# Patient Record
Sex: FEMALE | Race: WHITE | NOT HISPANIC OR LATINO | Employment: OTHER | URBAN - METROPOLITAN AREA
[De-identification: names, ages, dates, MRNs, and addresses within clinical notes are randomized per-mention and may not be internally consistent; named-entity substitution may affect disease eponyms.]

---

## 2017-01-20 ENCOUNTER — APPOINTMENT (OUTPATIENT)
Dept: WOMENS IMAGING | Facility: HOSPITAL | Age: 72
End: 2017-01-20

## 2017-01-20 PROCEDURE — MDREVIEWSP: Performed by: RADIOLOGY

## 2017-01-20 PROCEDURE — 77063 BREAST TOMOSYNTHESIS BI: CPT | Performed by: RADIOLOGY

## 2017-01-20 PROCEDURE — G0202 SCR MAMMO BI INCL CAD: HCPCS | Performed by: RADIOLOGY

## 2017-01-27 RX ORDER — LISINOPRIL 20 MG/1
TABLET ORAL
Qty: 180 TABLET | Refills: 1 | Status: SHIPPED | OUTPATIENT
Start: 2017-01-27 | End: 2017-06-12 | Stop reason: SDUPTHER

## 2017-01-27 RX ORDER — GLIMEPIRIDE 2 MG/1
TABLET ORAL
Qty: 270 TABLET | Refills: 1 | Status: SHIPPED | OUTPATIENT
Start: 2017-01-27 | End: 2017-06-12 | Stop reason: SDUPTHER

## 2017-02-20 RX ORDER — AMLODIPINE BESYLATE 10 MG/1
TABLET ORAL
Qty: 90 TABLET | Refills: 1 | Status: SHIPPED | OUTPATIENT
Start: 2017-02-20 | End: 2017-07-07 | Stop reason: SDUPTHER

## 2017-03-13 RX ORDER — ATENOLOL 100 MG/1
TABLET ORAL
Qty: 90 TABLET | Refills: 0 | Status: SHIPPED | OUTPATIENT
Start: 2017-03-13 | End: 2017-05-17 | Stop reason: SDUPTHER

## 2017-04-20 RX ORDER — FUROSEMIDE 20 MG/1
TABLET ORAL
Qty: 90 TABLET | Refills: 0 | Status: SHIPPED | OUTPATIENT
Start: 2017-04-20 | End: 2017-06-21 | Stop reason: SDUPTHER

## 2017-05-08 RX ORDER — ATORVASTATIN CALCIUM 10 MG/1
TABLET, FILM COATED ORAL
Qty: 90 TABLET | Refills: 1 | Status: SHIPPED | OUTPATIENT
Start: 2017-05-08 | End: 2017-07-07 | Stop reason: SDUPTHER

## 2017-05-17 RX ORDER — ATENOLOL 100 MG/1
TABLET ORAL
Qty: 90 TABLET | Refills: 0 | Status: SHIPPED | OUTPATIENT
Start: 2017-05-17 | End: 2017-07-07 | Stop reason: SDUPTHER

## 2017-06-13 RX ORDER — GLIMEPIRIDE 2 MG/1
TABLET ORAL
Qty: 270 TABLET | Refills: 1 | Status: SHIPPED | OUTPATIENT
Start: 2017-06-13 | End: 2017-07-07 | Stop reason: SDUPTHER

## 2017-06-13 RX ORDER — LISINOPRIL 20 MG/1
TABLET ORAL
Qty: 180 TABLET | Refills: 1 | Status: SHIPPED | OUTPATIENT
Start: 2017-06-13 | End: 2017-07-07 | Stop reason: SDUPTHER

## 2017-06-21 RX ORDER — FUROSEMIDE 20 MG/1
TABLET ORAL
Qty: 90 TABLET | Refills: 0 | Status: SHIPPED | OUTPATIENT
Start: 2017-06-21 | End: 2017-06-26 | Stop reason: SDUPTHER

## 2017-06-23 ENCOUNTER — RESULTS ENCOUNTER (OUTPATIENT)
Dept: FAMILY MEDICINE CLINIC | Facility: CLINIC | Age: 72
End: 2017-06-23

## 2017-06-23 DIAGNOSIS — E11.9 TYPE 2 DIABETES MELLITUS WITHOUT COMPLICATION, WITHOUT LONG-TERM CURRENT USE OF INSULIN (HCC): ICD-10-CM

## 2017-06-23 DIAGNOSIS — E78.00 HYPERCHOLESTEROLEMIA: ICD-10-CM

## 2017-06-23 DIAGNOSIS — I10 BENIGN ESSENTIAL HYPERTENSION: ICD-10-CM

## 2017-06-26 RX ORDER — FUROSEMIDE 20 MG/1
TABLET ORAL
Qty: 90 TABLET | Refills: 0 | Status: SHIPPED | OUTPATIENT
Start: 2017-06-26 | End: 2017-07-07 | Stop reason: SDUPTHER

## 2017-07-05 LAB
BUN SERPL-MCNC: 12 MG/DL (ref 8–23)
BUN/CREAT SERPL: 12.2 (ref 7–25)
CALCIUM SERPL-MCNC: 9.3 MG/DL (ref 8.6–10.5)
CHLORIDE SERPL-SCNC: 97 MMOL/L (ref 98–107)
CO2 SERPL-SCNC: 25.3 MMOL/L (ref 22–29)
CREAT SERPL-MCNC: 0.98 MG/DL (ref 0.57–1)
ERYTHROCYTE [DISTWIDTH] IN BLOOD BY AUTOMATED COUNT: 14.3 % (ref 11.7–13)
GLUCOSE SERPL-MCNC: 148 MG/DL (ref 65–99)
HBA1C MFR BLD: 7.2 % (ref 4.8–5.6)
HCT VFR BLD AUTO: 39.4 % (ref 35.6–45.5)
HGB BLD-MCNC: 12.6 G/DL (ref 11.9–15.5)
MCH RBC QN AUTO: 29.2 PG (ref 26.9–32)
MCHC RBC AUTO-ENTMCNC: 32 G/DL (ref 32.4–36.3)
MCV RBC AUTO: 91.4 FL (ref 80.5–98.2)
MICROALBUMIN UR-MCNC: 422.9 UG/ML
PLATELET # BLD AUTO: 160 10*3/MM3 (ref 140–500)
POTASSIUM SERPL-SCNC: 4.4 MMOL/L (ref 3.5–5.2)
RBC # BLD AUTO: 4.31 10*6/MM3 (ref 3.9–5.2)
SODIUM SERPL-SCNC: 140 MMOL/L (ref 136–145)
TSH SERPL DL<=0.005 MIU/L-ACNC: 1.45 UIU/ML (ref 0.45–4.5)
WBC # BLD AUTO: 8.09 10*3/MM3 (ref 4.5–10.7)

## 2017-07-07 ENCOUNTER — OFFICE VISIT (OUTPATIENT)
Dept: FAMILY MEDICINE CLINIC | Facility: CLINIC | Age: 72
End: 2017-07-07

## 2017-07-07 VITALS
HEIGHT: 62 IN | BODY MASS INDEX: 47.11 KG/M2 | TEMPERATURE: 98.2 F | SYSTOLIC BLOOD PRESSURE: 130 MMHG | WEIGHT: 256 LBS | OXYGEN SATURATION: 96 % | HEART RATE: 82 BPM | DIASTOLIC BLOOD PRESSURE: 70 MMHG

## 2017-07-07 DIAGNOSIS — E11.9 TYPE 2 DIABETES MELLITUS WITHOUT COMPLICATION, WITHOUT LONG-TERM CURRENT USE OF INSULIN (HCC): Primary | ICD-10-CM

## 2017-07-07 DIAGNOSIS — E78.00 HYPERCHOLESTEROLEMIA: ICD-10-CM

## 2017-07-07 DIAGNOSIS — I10 BENIGN ESSENTIAL HYPERTENSION: ICD-10-CM

## 2017-07-07 PROCEDURE — 99214 OFFICE O/P EST MOD 30 MIN: CPT | Performed by: FAMILY MEDICINE

## 2017-07-07 RX ORDER — LISINOPRIL 20 MG/1
40 TABLET ORAL DAILY
Qty: 180 TABLET | Refills: 1 | Status: SHIPPED | OUTPATIENT
Start: 2017-07-07 | End: 2017-10-30 | Stop reason: SDUPTHER

## 2017-07-07 RX ORDER — ATENOLOL 100 MG/1
100 TABLET ORAL DAILY
Qty: 90 TABLET | Refills: 1 | Status: SHIPPED | OUTPATIENT
Start: 2017-07-07 | End: 2017-07-25 | Stop reason: SDUPTHER

## 2017-07-07 RX ORDER — AMLODIPINE BESYLATE 10 MG/1
10 TABLET ORAL DAILY
Qty: 90 TABLET | Refills: 1 | Status: SHIPPED | OUTPATIENT
Start: 2017-07-07 | End: 2017-10-23 | Stop reason: SDUPTHER

## 2017-07-07 RX ORDER — FUROSEMIDE 20 MG/1
20 TABLET ORAL DAILY
Qty: 90 TABLET | Refills: 0 | Status: SHIPPED | OUTPATIENT
Start: 2017-07-07 | End: 2017-10-25 | Stop reason: SDUPTHER

## 2017-07-07 RX ORDER — GLIMEPIRIDE 2 MG/1
TABLET ORAL
Qty: 270 TABLET | Refills: 1 | Status: SHIPPED | OUTPATIENT
Start: 2017-07-07 | End: 2017-10-06 | Stop reason: SDUPTHER

## 2017-07-07 RX ORDER — ATORVASTATIN CALCIUM 10 MG/1
10 TABLET, FILM COATED ORAL NIGHTLY
Qty: 90 TABLET | Refills: 1 | Status: SHIPPED | OUTPATIENT
Start: 2017-07-07 | End: 2017-11-29 | Stop reason: SDUPTHER

## 2017-07-07 NOTE — PROGRESS NOTES
Subjective   Philly Person is a 71 y.o. female.   Chief Complaint   Patient presents with   • Hypertension   • Diabetes   • Hyperlipidemia       History of Present Illness     #1 diabetes type 2 - diagnosed in 2001. Patient did diabetes education at the time of diagnosis. She is on metformin 1000 mg twice a day and glimepiride 2 mg, 1.5 tablet twice a day. She had eye exam in 3/ 2017. She is on ACE inhibitor and statin. She has a history of microalbuminuria.   She had Pneumovax in 2009, she had Prevnar 13 in 12/2015.  She saw Dr. Millard for diabetes, but she prefers to follow-up with us. She says that her diabetes is under good control and it is easier for her to follow-up on it with us. She agrees to see Dr. Millard if diabetes gets out of control.  From last office visit-no blood sugar log available.  No low symptomatic sugars.  She takes all the medications as prescribed. , A1c 7.2. Microalbumin 422.  On lisinopril.      #2 HTN- on amlodipine 10 mg a day, atenolol 100 mg a day and lisinopril 20 mg 2 tablets taken daily. She is also on furosemide 20 mg a day. She takes no additional potassium. She reports no side effects. She does not exercise regularly.  Kidney tests are normal.       #3 hypercholesterolemia- on atorvastatin 10 mg a day. She takes it everyday. No side effects. No muscle aches, no muscle cramps.      The following portions of the patient's history were reviewed and updated as appropriate: allergies, current medications, past family history, past medical history, past social history, past surgical history and problem list.    Review of Systems   Constitutional: Negative.    Respiratory: Negative.    Cardiovascular: Negative.    Musculoskeletal: Negative for myalgias.         Objective   Wt Readings from Last 3 Encounters:   07/07/17 256 lb (116 kg)   12/23/16 256 lb (116 kg)   08/03/16 249 lb (113 kg)      Vitals:    07/07/17 1306   BP: 130/70   Pulse: 82   Temp: 98.2 °F (36.8 °C)   SpO2: 96%      Temp Readings from Last 3 Encounters:   07/07/17 98.2 °F (36.8 °C)   12/23/16 98.4 °F (36.9 °C)   08/03/16 98.4 °F (36.9 °C)     BP Readings from Last 3 Encounters:   07/07/17 130/70   12/23/16 132/76   08/03/16 128/80     Pulse Readings from Last 3 Encounters:   07/07/17 82   12/23/16 80   08/03/16 76       Physical Exam   Constitutional: She is oriented to person, place, and time. She appears well-developed and well-nourished.   HENT:   Head: Normocephalic and atraumatic.   Neck: Neck supple. Carotid bruit is not present. No thyromegaly present.   Cardiovascular: Normal rate, regular rhythm, normal heart sounds and intact distal pulses.    Pulmonary/Chest: Effort normal and breath sounds normal.      During the foot exam she had a monofilament test performed (Please see diabetic foot form which was scanned.).  Neurological: She is alert and oriented to person, place, and time.   Skin: Skin is warm, dry and intact.   Psychiatric: She has a normal mood and affect. Her behavior is normal.       Assessment/Plan   Philly was seen today for hypertension, diabetes and hyperlipidemia.    Diagnoses and all orders for this visit:    Type 2 diabetes mellitus without complication, without long-term current use of insulin  -     Basic Metabolic Panel; Future  -     Hemoglobin A1c; Future    Benign essential hypertension    Hypercholesterolemia    Other orders  -     metFORMIN (GLUCOPHAGE) 1000 MG tablet; Take 1 tablet by mouth 2 (Two) Times a Day With Meals.  -     lisinopril (PRINIVIL,ZESTRIL) 20 MG tablet; Take 2 tablets by mouth Daily.  -     glimepiride (AMARYL) 2 MG tablet; 1.5tb twice a day  -     furosemide (LASIX) 20 MG tablet; Take 1 tablet by mouth Daily.  -     atorvastatin (LIPITOR) 10 MG tablet; Take 1 tablet by mouth Every Night.  -     atenolol (TENORMIN) 100 MG tablet; Take 1 tablet by mouth Daily.  -     amLODIPine (NORVASC) 10 MG tablet; Take 1 tablet by mouth Daily.        #1 diabetes type  2-uncontrolled.  Patient prefers no change in medications at this time.  She will work on lifestyle changes.  Follow-up in 3 months.  Labs before office visit.    #2 hypertension-continue current treatment.  Follow-up in 6 months.      #3 hyperlipidemia-continue current treatment.  Follow-up in 6 months.

## 2017-07-25 RX ORDER — ATENOLOL 100 MG/1
TABLET ORAL
Qty: 90 TABLET | Refills: 1 | Status: SHIPPED | OUTPATIENT
Start: 2017-07-25 | End: 2018-01-30 | Stop reason: SDUPTHER

## 2017-09-26 DIAGNOSIS — E11.9 TYPE 2 DIABETES MELLITUS WITHOUT COMPLICATION, WITHOUT LONG-TERM CURRENT USE OF INSULIN (HCC): ICD-10-CM

## 2017-09-29 LAB
BUN SERPL-MCNC: 15 MG/DL (ref 8–23)
BUN/CREAT SERPL: 16.3 (ref 7–25)
CALCIUM SERPL-MCNC: 9.8 MG/DL (ref 8.6–10.5)
CHLORIDE SERPL-SCNC: 100 MMOL/L (ref 98–107)
CO2 SERPL-SCNC: 26.3 MMOL/L (ref 22–29)
CREAT SERPL-MCNC: 0.92 MG/DL (ref 0.57–1)
GLUCOSE SERPL-MCNC: 150 MG/DL (ref 65–99)
HBA1C MFR BLD: 7.1 % (ref 4.8–5.6)
POTASSIUM SERPL-SCNC: 4.1 MMOL/L (ref 3.5–5.2)
SODIUM SERPL-SCNC: 143 MMOL/L (ref 136–145)

## 2017-10-06 ENCOUNTER — OFFICE VISIT (OUTPATIENT)
Dept: INTERNAL MEDICINE | Facility: CLINIC | Age: 72
End: 2017-10-06

## 2017-10-06 VITALS
SYSTOLIC BLOOD PRESSURE: 130 MMHG | OXYGEN SATURATION: 98 % | HEART RATE: 87 BPM | TEMPERATURE: 98.2 F | DIASTOLIC BLOOD PRESSURE: 80 MMHG | WEIGHT: 256 LBS | HEIGHT: 62 IN | BODY MASS INDEX: 47.11 KG/M2

## 2017-10-06 DIAGNOSIS — E11.9 TYPE 2 DIABETES MELLITUS WITHOUT COMPLICATION, WITHOUT LONG-TERM CURRENT USE OF INSULIN (HCC): Primary | ICD-10-CM

## 2017-10-06 DIAGNOSIS — E78.00 HYPERCHOLESTEROLEMIA: ICD-10-CM

## 2017-10-06 PROCEDURE — 99213 OFFICE O/P EST LOW 20 MIN: CPT | Performed by: FAMILY MEDICINE

## 2017-10-06 RX ORDER — GLIMEPIRIDE 2 MG/1
TABLET ORAL
Qty: 315 TABLET | Refills: 0 | Status: SHIPPED | OUTPATIENT
Start: 2017-10-06 | End: 2017-12-21 | Stop reason: SDUPTHER

## 2017-10-06 NOTE — PROGRESS NOTES
Subjective   Philly Person is a 72 y.o. female.   Chief Complaint   Patient presents with   • Diabetes       History of Present Illness   #1 diabetes type 2 - diagnosed in 2001. Patient did diabetes education at the time of diagnosis. She is on metformin 1000 mg twice a day and glimepiride 2 mg, 1.5 tablet twice a day. She had eye exam in 3/ 2017. She is on ACE inhibitor and statin. She has a history of microalbuminuria.   She had Pneumovax in 2009, she had Prevnar 13 in 12/2015.  She saw Dr. Millard for diabetes, but she prefers to follow-up with us. It is easier for her to follow-up on it with us. She agrees to see Dr. Millard if diabetes gets out of control.  From last office visit-no blood sugar log available, but patient reports blood sugars in 110s.  No low symptomatic sugars.  She takes all the medications as prescribed. , A1c 7.1.     The following portions of the patient's history were reviewed and updated as appropriate: allergies, current medications, past medical history, past social history and problem list.    Review of Systems   Constitutional: Negative.    Respiratory: Negative.    Cardiovascular: Negative.    Endocrine: Negative.          Objective   Wt Readings from Last 3 Encounters:   10/06/17 256 lb (116 kg)   07/07/17 256 lb (116 kg)   12/23/16 256 lb (116 kg)      Vitals:    10/06/17 1307   BP: 130/80   Pulse: 87   Temp: 98.2 °F (36.8 °C)   SpO2: 98%     Temp Readings from Last 3 Encounters:   10/06/17 98.2 °F (36.8 °C)   07/07/17 98.2 °F (36.8 °C)   12/23/16 98.4 °F (36.9 °C)     BP Readings from Last 3 Encounters:   10/06/17 130/80   07/07/17 130/70   12/23/16 132/76     Pulse Readings from Last 3 Encounters:   10/06/17 87   07/07/17 82   12/23/16 80       Physical Exam   Constitutional: She is oriented to person, place, and time. She appears well-developed and well-nourished.   HENT:   Head: Normocephalic and atraumatic.   Neck: Neck supple. Carotid bruit is not present. No thyromegaly  present.   Cardiovascular: Normal rate, regular rhythm, normal heart sounds and intact distal pulses.    Pulmonary/Chest: Effort normal and breath sounds normal.   Neurological: She is alert and oriented to person, place, and time.   Skin: Skin is warm, dry and intact.   Psychiatric: She has a normal mood and affect. Her behavior is normal.       Assessment/Plan   Philly was seen today for diabetes.    Diagnoses and all orders for this visit:    Type 2 diabetes mellitus without complication, without long-term current use of insulin  -     Lipid Panel With LDL / HDL Ratio; Future  -     Comprehensive Metabolic Panel; Future  -     MicroAlbumin, Urine, Random - Urine, Clean Catch; Future  -     Hemoglobin A1c; Future    Hypercholesterolemia  -     Lipid Panel With LDL / HDL Ratio; Future  -     Comprehensive Metabolic Panel; Future  -     MicroAlbumin, Urine, Random - Urine, Clean Catch; Future  -     Hemoglobin A1c; Future    Other orders  -     glimepiride (AMARYL) 2 MG tablet; 2 tablets in the morning, 1.5 tablet in the evening        #1 diabetes type 2-uncontrolled.  We'll continue metformin at current dose.  We are increasing dose of glimepiride to 2 tablets in the morning and 1.5 tablet in the evening.  Risk of hypoglycemia.  Increase exercise to at least 5 days a week.  Follow-up in 3 months.  Labs before office visit.

## 2017-10-23 RX ORDER — AMLODIPINE BESYLATE 10 MG/1
TABLET ORAL
Qty: 90 TABLET | Refills: 1 | Status: SHIPPED | OUTPATIENT
Start: 2017-10-23 | End: 2018-01-30 | Stop reason: SDUPTHER

## 2017-10-26 RX ORDER — FUROSEMIDE 20 MG/1
TABLET ORAL
Qty: 90 TABLET | Refills: 0 | Status: SHIPPED | OUTPATIENT
Start: 2017-10-26 | End: 2018-01-02 | Stop reason: SDUPTHER

## 2017-10-31 RX ORDER — LISINOPRIL 20 MG/1
TABLET ORAL
Qty: 180 TABLET | Refills: 1 | Status: SHIPPED | OUTPATIENT
Start: 2017-10-31 | End: 2018-01-30 | Stop reason: SDUPTHER

## 2017-11-29 RX ORDER — ATORVASTATIN CALCIUM 10 MG/1
TABLET, FILM COATED ORAL
Qty: 90 TABLET | Refills: 1 | Status: SHIPPED | OUTPATIENT
Start: 2017-11-29 | End: 2018-01-30 | Stop reason: SDUPTHER

## 2017-12-22 RX ORDER — GLIMEPIRIDE 2 MG/1
TABLET ORAL
Qty: 315 TABLET | Refills: 0 | Status: SHIPPED | OUTPATIENT
Start: 2017-12-22 | End: 2018-01-30 | Stop reason: SDUPTHER

## 2018-01-02 RX ORDER — FUROSEMIDE 20 MG/1
TABLET ORAL
Qty: 90 TABLET | Refills: 0 | Status: SHIPPED | OUTPATIENT
Start: 2018-01-02 | End: 2018-01-30 | Stop reason: SDUPTHER

## 2018-01-06 ENCOUNTER — RESULTS ENCOUNTER (OUTPATIENT)
Dept: INTERNAL MEDICINE | Facility: CLINIC | Age: 73
End: 2018-01-06

## 2018-01-06 DIAGNOSIS — E11.9 TYPE 2 DIABETES MELLITUS WITHOUT COMPLICATION, WITHOUT LONG-TERM CURRENT USE OF INSULIN (HCC): ICD-10-CM

## 2018-01-06 DIAGNOSIS — E78.00 HYPERCHOLESTEROLEMIA: ICD-10-CM

## 2018-01-26 LAB
ALBUMIN SERPL-MCNC: 3.7 G/DL (ref 3.5–5.2)
ALBUMIN/GLOB SERPL: 1.1 G/DL
ALP SERPL-CCNC: 143 U/L (ref 39–117)
ALT SERPL-CCNC: 31 U/L (ref 1–33)
AST SERPL-CCNC: 42 U/L (ref 1–32)
BILIRUB SERPL-MCNC: 2 MG/DL (ref 0.1–1.2)
BUN SERPL-MCNC: 21 MG/DL (ref 8–23)
BUN/CREAT SERPL: 19.4 (ref 7–25)
CALCIUM SERPL-MCNC: 9.4 MG/DL (ref 8.6–10.5)
CHLORIDE SERPL-SCNC: 96 MMOL/L (ref 98–107)
CHOLEST SERPL-MCNC: 116 MG/DL (ref 0–200)
CO2 SERPL-SCNC: 28.1 MMOL/L (ref 22–29)
CREAT SERPL-MCNC: 1.08 MG/DL (ref 0.57–1)
GFR SERPLBLD CREATININE-BSD FMLA CKD-EPI: 50 ML/MIN/1.73
GFR SERPLBLD CREATININE-BSD FMLA CKD-EPI: 60 ML/MIN/1.73
GLOBULIN SER CALC-MCNC: 3.4 GM/DL
GLUCOSE SERPL-MCNC: 129 MG/DL (ref 65–99)
HBA1C MFR BLD: 5.9 % (ref 4.8–5.6)
HDLC SERPL-MCNC: 44 MG/DL (ref 40–60)
LDLC SERPL CALC-MCNC: 51 MG/DL (ref 0–100)
LDLC/HDLC SERPL: 1.16 {RATIO}
MICROALBUMIN UR-MCNC: 1251.3 UG/ML
POTASSIUM SERPL-SCNC: 4.1 MMOL/L (ref 3.5–5.2)
PROT SERPL-MCNC: 7.1 G/DL (ref 6–8.5)
SODIUM SERPL-SCNC: 140 MMOL/L (ref 136–145)
TRIGL SERPL-MCNC: 105 MG/DL (ref 0–150)
VLDLC SERPL CALC-MCNC: 21 MG/DL (ref 5–40)

## 2018-01-30 ENCOUNTER — OFFICE VISIT (OUTPATIENT)
Dept: INTERNAL MEDICINE | Facility: CLINIC | Age: 73
End: 2018-01-30

## 2018-01-30 VITALS
WEIGHT: 244 LBS | OXYGEN SATURATION: 98 % | BODY MASS INDEX: 44.9 KG/M2 | HEART RATE: 89 BPM | DIASTOLIC BLOOD PRESSURE: 80 MMHG | SYSTOLIC BLOOD PRESSURE: 136 MMHG | HEIGHT: 62 IN | TEMPERATURE: 98.2 F

## 2018-01-30 DIAGNOSIS — E11.29 TYPE 2 DIABETES MELLITUS WITH MICROALBUMINURIA, WITHOUT LONG-TERM CURRENT USE OF INSULIN (HCC): Primary | ICD-10-CM

## 2018-01-30 DIAGNOSIS — M79.671 FOOT PAIN, RIGHT: ICD-10-CM

## 2018-01-30 DIAGNOSIS — I10 BENIGN ESSENTIAL HYPERTENSION: ICD-10-CM

## 2018-01-30 DIAGNOSIS — E78.00 HYPERCHOLESTEROLEMIA: ICD-10-CM

## 2018-01-30 DIAGNOSIS — R80.9 TYPE 2 DIABETES MELLITUS WITH MICROALBUMINURIA, WITHOUT LONG-TERM CURRENT USE OF INSULIN (HCC): Primary | ICD-10-CM

## 2018-01-30 DIAGNOSIS — Z00.00 MEDICARE ANNUAL WELLNESS VISIT, INITIAL: ICD-10-CM

## 2018-01-30 PROBLEM — C43.9 MELANOMA: Status: ACTIVE | Noted: 2018-01-30

## 2018-01-30 PROBLEM — C44.91 BASAL CELL CARCINOMA: Status: ACTIVE | Noted: 2018-01-30

## 2018-01-30 PROCEDURE — G0438 PPPS, INITIAL VISIT: HCPCS | Performed by: FAMILY MEDICINE

## 2018-01-30 PROCEDURE — 99214 OFFICE O/P EST MOD 30 MIN: CPT | Performed by: FAMILY MEDICINE

## 2018-01-30 RX ORDER — GLIMEPIRIDE 2 MG/1
TABLET ORAL
Qty: 315 TABLET | Refills: 1 | Status: SHIPPED | OUTPATIENT
Start: 2018-01-30 | End: 2018-01-30 | Stop reason: SDUPTHER

## 2018-01-30 RX ORDER — AMLODIPINE BESYLATE 10 MG/1
10 TABLET ORAL DAILY
Qty: 90 TABLET | Refills: 1 | Status: SHIPPED | OUTPATIENT
Start: 2018-01-30 | End: 2018-07-10 | Stop reason: SDUPTHER

## 2018-01-30 RX ORDER — ATENOLOL 100 MG/1
100 TABLET ORAL DAILY
Qty: 90 TABLET | Refills: 1 | Status: SHIPPED | OUTPATIENT
Start: 2018-01-30 | End: 2018-07-10 | Stop reason: SDUPTHER

## 2018-01-30 RX ORDER — GLIMEPIRIDE 2 MG/1
TABLET ORAL
Qty: 315 TABLET | Refills: 1 | Status: SHIPPED | OUTPATIENT
Start: 2018-01-30 | End: 2018-01-31 | Stop reason: SDUPTHER

## 2018-01-30 RX ORDER — FUROSEMIDE 20 MG/1
20 TABLET ORAL DAILY
Qty: 90 TABLET | Refills: 1 | Status: SHIPPED | OUTPATIENT
Start: 2018-01-30 | End: 2018-07-10 | Stop reason: SDUPTHER

## 2018-01-30 RX ORDER — ATORVASTATIN CALCIUM 10 MG/1
10 TABLET, FILM COATED ORAL NIGHTLY
Qty: 90 TABLET | Refills: 1 | Status: SHIPPED | OUTPATIENT
Start: 2018-01-30 | End: 2018-06-22 | Stop reason: SDUPTHER

## 2018-01-30 RX ORDER — INDOMETHACIN 50 MG/1
50 CAPSULE ORAL 3 TIMES DAILY PRN
Qty: 15 CAPSULE | Refills: 0 | Status: SHIPPED | OUTPATIENT
Start: 2018-01-30 | End: 2018-07-31

## 2018-01-30 RX ORDER — LISINOPRIL 20 MG/1
40 TABLET ORAL DAILY
Qty: 180 TABLET | Refills: 1 | Status: SHIPPED | OUTPATIENT
Start: 2018-01-30 | End: 2018-07-31 | Stop reason: SDUPTHER

## 2018-01-30 NOTE — PROGRESS NOTES
QUICK REFERENCE INFORMATION:  The ABCs of the Annual Wellness Visit    Subsequent Medicare Wellness Visit    HEALTH RISK ASSESSMENT    1945    Recent Hospitalizations:  No hospitalization(s) within the last year..        Current Medical Providers:  Patient Care Team:  Shazia Cardoza MD as PCP - General (Family Medicine)  No Known Provider as PCP - Family Medicine  Shazia Cardoza MD as PCP - Claims Attributed        Smoking Status:  History   Smoking Status   • Never Smoker   Smokeless Tobacco   • Never Used       Alcohol Consumption:  History   Alcohol Use   • Yes       Depression Screen:   PHQ-2/PHQ-9 Depression Screening 1/30/2018   Little interest or pleasure in doing things 0   Feeling down, depressed, or hopeless 0   Trouble falling or staying asleep, or sleeping too much 1   Feeling tired or having little energy 0   Poor appetite or overeating 0   Feeling bad about yourself - or that you are a failure or have let yourself or your family down 0   Trouble concentrating on things, such as reading the newspaper or watching television 0   Moving or speaking so slowly that other people could have noticed. Or the opposite - being so fidgety or restless that you have been moving around a lot more than usual 0   Thoughts that you would be better off dead, or of hurting yourself in some way 0   Total Score 1   If you checked off any problems, how difficult have these problems made it for you to do your work, take care of things at home, or get along with other people? Not difficult at all       Health Habits and Functional and Cognitive Screening:  Functional & Cognitive Status 1/30/2018   Do you have difficulty preparing food and eating? No   Do you have difficulty bathing yourself, getting dressed or grooming yourself? No   Do you have difficulty using the toilet? No   Do you have difficulty moving around from place to place? No   Do you have trouble with steps or getting out of a bed or a chair? No   In the  past year have you fallen or experienced a near fall? No   Current Diet Well Balanced Diet   Dental Exam Up to date   Eye Exam Up to date   Exercise (times per week) 0 times per week   Current Exercise Activities Include None   Do you need help using the phone?  No   Are you deaf or do you have serious difficulty hearing?  No   Do you need help with transportation? No   Do you need help shopping? No   Do you need help preparing meals?  No   Do you need help with housework?  No   Do you need help with laundry? No   Do you need help taking your medications? No   Do you need help managing money? No   Have you felt unusual stress, anger or loneliness in the last month? No   Who do you live with? Spouse   If you need help, do you have trouble finding someone available to you? No   Have you been bothered in the last four weeks by sexual problems? No   Do you have difficulty concentrating, remembering or making decisions? No           Does the patient have evidence of cognitive impairment? No    Aspirin use counseling: Taking ASA appropriately as indicated      Recent Lab Results:  CMP:  Lab Results   Component Value Date     (H) 01/25/2018    BUN 21 01/25/2018    CREATININE 1.08 (H) 01/25/2018    EGFRIFNONA 50 (L) 01/25/2018    EGFRIFAFRI 60 (L) 01/25/2018    BCR 19.4 01/25/2018     01/25/2018    K 4.1 01/25/2018    CO2 28.1 01/25/2018    CALCIUM 9.4 01/25/2018    PROTENTOTREF 7.1 01/25/2018    ALBUMIN 3.70 01/25/2018    LABGLOBREF 3.4 01/25/2018    LABIL2 1.1 01/25/2018    BILITOT 2.0 (H) 01/25/2018    ALKPHOS 143 (H) 01/25/2018    AST 42 (H) 01/25/2018    ALT 31 01/25/2018     Lipid Panel:  Lab Results   Component Value Date    TRIG 105 01/25/2018    HDL 44 01/25/2018    VLDL 21 01/25/2018    LDLHDL 1.16 01/25/2018     HbA1c:  Lab Results   Component Value Date    HGBA1C 5.90 (H) 01/25/2018       Visual Acuity:  No exam data present    Age-appropriate Screening Schedule:  Refer to the list below for future  screening recommendations based on patient's age, sex and/or medical conditions. Orders for these recommended tests are listed in the plan section. The patient has been provided with a written plan.    Health Maintenance   Topic Date Due   • TDAP/TD VACCINES (2 - Td) 03/18/2018   • DIABETIC FOOT EXAM  07/07/2018   • HEMOGLOBIN A1C  07/25/2018   • DIABETIC EYE EXAM  11/09/2018   • MAMMOGRAM  01/20/2019   • LIPID PANEL  01/25/2019   • URINE MICROALBUMIN  01/25/2019   • COLONOSCOPY  08/16/2020   • INFLUENZA VACCINE  Addressed   • PNEUMOCOCCAL VACCINES (65+ LOW/MEDIUM RISK)  Addressed   • ZOSTER VACCINE  Completed        Subjective   History of Present Illness    Philly Person is a 72 y.o. female who presents for an Subsequent Wellness Visit.    The following portions of the patient's history were reviewed and updated as appropriate: allergies, current medications, past family history, past medical history, past social history, past surgical history and problem list.    Outpatient Medications Prior to Visit   Medication Sig Dispense Refill   • aspirin 325 MG tablet Take  by mouth daily.     • Biotin 1000 MCG tablet Take 1,000 mcg by mouth 3 (three) times a day.     • Cholecalciferol 97941 UNITS capsule Take  by mouth daily.     • Cranberry 125 MG tablet Take 2 tablets by mouth daily.     • glucose blood (ONE TOUCH ULTRA TEST) test strip OneTouch Ultra Blue In Vitro Strip; Patient Sig: OneTouch Ultra Blue In Vitro Strip TEST ONCE BID; 180; 0; 15-Jun-2015; Active 100 each 5   • vitamin B-6 (PYRIDOXINE) 50 MG tablet Take 50 mg by mouth daily.     • amLODIPine (NORVASC) 10 MG tablet TAKE 1 TABLET EVERY DAY AS DIRECTED 90 tablet 1   • atenolol (TENORMIN) 100 MG tablet TAKE 1 TABLET EVERY DAY 90 tablet 1   • atorvastatin (LIPITOR) 10 MG tablet TAKE 1 TABLET EVERY DAY 90 tablet 1   • furosemide (LASIX) 20 MG tablet TAKE 1 TABLET EVERY DAY 90 tablet 0   • glimepiride (AMARYL) 2 MG tablet TAKE 2 TABLETS IN THE MORNING AND 1  "AND 1/2 TABLETS IN THE EVENING 315 tablet 0   • lisinopril (PRINIVIL,ZESTRIL) 20 MG tablet TAKE 2 TABLETS EVERY  tablet 1   • metFORMIN (GLUCOPHAGE) 1000 MG tablet TAKE 1 TABLET TWICE DAILY WITH MEALS 180 tablet 1     No facility-administered medications prior to visit.        Patient Active Problem List   Diagnosis   • Elevated liver enzymes   • Acute renal failure   • Anemia   • Arthritis   • Benign essential hypertension   • Hypercholesterolemia   • Hyperkalemia   • Microalbuminuria   • Nonalcoholic fatty liver disease   • Calculus of kidney   • Adiposity   • Type 2 diabetes mellitus   • Squamous cell carcinoma of skin   • Melanoma   • Basal cell carcinoma       Advance Care Planning:  has an advance directive - a copy has been provided and is in file    Identification of Risk Factors:  Risk factors include: weight , cardiovascular risk, inactivity, increased fall risk and vision limitations.    Review of Systems    Compared to one year ago, the patient feels her physical health is the same.  Compared to one year ago, the patient feels her mental health is the same.    Objective     Physical Exam    Vitals:    01/30/18 1307   BP: 136/80   Pulse: 89   Temp: 98.2 °F (36.8 °C)   SpO2: 98%   Weight: 111 kg (244 lb)   Height: 157.5 cm (62\")   PainSc: 6  Comment: foot pain       Body mass index is 44.63 kg/(m^2).  Discussed the patient's BMI with her. BMI is above normal parameters. Follow-up plan includes:  educational material, exercise counseling and nutrition counseling.    Assessment/Plan   Patient Self-Management and Personalized Health Advice  The patient has been provided with information about: diet, exercise, weight management, prevention of cardiac or vascular disease and fall prevention and preventive services including:   · Exercise counseling provided, Fall Risk plan of care done, Nutrition counseling provided.  Patient saw dietitian in the past.  She is going to review her notes.  Exercise as " tolerated 30 minutes a day, 5 days a week.  Fall prevention discussed.      Visit Diagnoses:    ICD-10-CM ICD-9-CM   1. Type 2 diabetes mellitus with microalbuminuria, without long-term current use of insulin E11.29 250.40    R80.9 791.0   2. Benign essential hypertension I10 401.1   3. Hypercholesterolemia E78.00 272.0   4. Foot pain, right M79.671 729.5   5. Medicare annual wellness visit, initial Z00.00 V70.0       Orders Placed This Encounter   Procedures   • Hemoglobin A1c     Standing Status:   Future     Standing Expiration Date:   1/31/2019   • Comprehensive Metabolic Panel     Standing Status:   Future     Standing Expiration Date:   1/31/2019   • Uric Acid       Outpatient Encounter Prescriptions as of 1/30/2018   Medication Sig Dispense Refill   • amLODIPine (NORVASC) 10 MG tablet Take 1 tablet by mouth Daily. 90 tablet 1   • aspirin 325 MG tablet Take  by mouth daily.     • atenolol (TENORMIN) 100 MG tablet Take 1 tablet by mouth Daily. 90 tablet 1   • atorvastatin (LIPITOR) 10 MG tablet Take 1 tablet by mouth Every Night. 90 tablet 1   • Biotin 1000 MCG tablet Take 1,000 mcg by mouth 3 (three) times a day.     • Cholecalciferol 95699 UNITS capsule Take  by mouth daily.     • Cranberry 125 MG tablet Take 2 tablets by mouth daily.     • furosemide (LASIX) 20 MG tablet Take 1 tablet by mouth Daily. 90 tablet 1   • glimepiride (AMARYL) 2 MG tablet Sig:   TAKE 2 TABLETS IN THE MORNING AND 1 AND 1/2 TABLETS IN THE EVENING 315 tablet 1   • glucose blood (ONE TOUCH ULTRA TEST) test strip OneTouch Ultra Blue In Vitro Strip; Patient Sig: OneTouch Ultra Blue In Vitro Strip TEST ONCE BID; 180; 0; 15-Jun-2015; Active 100 each 5   • lisinopril (PRINIVIL,ZESTRIL) 20 MG tablet Take 2 tablets by mouth Daily. 180 tablet 1   • metFORMIN (GLUCOPHAGE) 1000 MG tablet Take 1 tablet by mouth 2 (Two) Times a Day With Meals. 180 tablet 1   • vitamin B-6 (PYRIDOXINE) 50 MG tablet Take 50 mg by mouth daily.     • [DISCONTINUED]  amLODIPine (NORVASC) 10 MG tablet TAKE 1 TABLET EVERY DAY AS DIRECTED 90 tablet 1   • [DISCONTINUED] atenolol (TENORMIN) 100 MG tablet TAKE 1 TABLET EVERY DAY 90 tablet 1   • [DISCONTINUED] atorvastatin (LIPITOR) 10 MG tablet TAKE 1 TABLET EVERY DAY 90 tablet 1   • [DISCONTINUED] furosemide (LASIX) 20 MG tablet TAKE 1 TABLET EVERY DAY 90 tablet 0   • [DISCONTINUED] glimepiride (AMARYL) 2 MG tablet TAKE 2 TABLETS IN THE MORNING AND 1 AND 1/2 TABLETS IN THE EVENING 315 tablet 0   • [DISCONTINUED] lisinopril (PRINIVIL,ZESTRIL) 20 MG tablet TAKE 2 TABLETS EVERY  tablet 1   • [DISCONTINUED] metFORMIN (GLUCOPHAGE) 1000 MG tablet TAKE 1 TABLET TWICE DAILY WITH MEALS 180 tablet 1     No facility-administered encounter medications on file as of 1/30/2018.        Reviewed use of high risk medication in the elderly: yes  Reviewed for potential of harmful drug interactions in the elderly: yes    Follow Up:  Return in about 6 months (around 7/30/2018).     An After Visit Summary and PPPS with all of these plans were given to the patient.

## 2018-01-30 NOTE — PROGRESS NOTES
Subjective   Philly Person is a 72 y.o. female.   Chief Complaint   Patient presents with   • Hypertension   • Hyperlipidemia   • Diabetes   • Foot Pain     right for 1 week        History of Present Illness     #1 diabetes type 2 - diagnosed in 2001. Patient did diabetes education at the time of diagnosis. She is on metformin 1000 mg twice a day and glimepiride 2 mg, 2 tb in AM, 1.5 tablet in PM. We increased dose at last office visit.  He had one episode of low symptom of thick sugars at the beginning of treatment.  Blood sugar was at 69.  No other episodes of low sugars.  Blood sugar stays mostly between 105 and 120.  A1c is at 5.9 from 7.1.  , microalbumin 1251.5.  She had eye exam in 11/ 2017. She is on ACE inhibitor and statin. She has a history of microalbuminuria.   She had Pneumovax in 2009, she had Prevnar 13 in 12/2015.    She saw Dr. Millard for diabetes, but she prefers to follow-up with us. It is easier for her to follow-up on it with us. She agrees to see Dr. Millard if diabetes gets out of control.      #2 HTN- on amlodipine 10 mg a day, atenolol 100 mg a day and lisinopril 20 mg 2 tablets taken daily. She is also on furosemide 20 mg a day. She takes no additional potassium. She reports no side effects. She does not exercise regularly.  Kidney tests are mildly elevated, but BUN was up.      #3 hypercholesterolemia- on atorvastatin 10 mg a day. She takes it everyday. No side effects. No muscle aches, no muscle cramps.   LDL 51, HDL 44, LFTs stable.    #4 Right foot pain-started one week ago.  It is localized at the base of the right big toe.  There was no known injury.  Pain is sharp, constant, worse with walking.  Intensity from 2-10 out of 10.  It is associated with swelling or redness.  A day prior to that patient had sea food.    The following portions of the patient's history were reviewed and updated as appropriate: allergies, current medications, past family history, past medical history,  past social history, past surgical history and problem list.    Review of Systems   Constitutional: Negative.    Respiratory: Negative.    Cardiovascular: Negative.    Musculoskeletal: Positive for gait problem.   Skin: Positive for rash.         Objective   Wt Readings from Last 3 Encounters:   01/30/18 111 kg (244 lb)   10/06/17 116 kg (256 lb)   07/07/17 116 kg (256 lb)      Vitals:    01/30/18 1307   BP: 136/80   Pulse: 89   Temp: 98.2 °F (36.8 °C)   SpO2: 98%     Temp Readings from Last 3 Encounters:   01/30/18 98.2 °F (36.8 °C)   10/06/17 98.2 °F (36.8 °C)   07/07/17 98.2 °F (36.8 °C)     BP Readings from Last 3 Encounters:   01/30/18 136/80   10/06/17 130/80   07/07/17 130/70     Pulse Readings from Last 3 Encounters:   01/30/18 89   10/06/17 87   07/07/17 82       Physical Exam   Constitutional: She is oriented to person, place, and time. She appears well-developed and well-nourished.   HENT:   Head: Normocephalic and atraumatic.   Neck: Neck supple. Carotid bruit is not present. No thyromegaly present.   Cardiovascular: Normal rate, regular rhythm and normal heart sounds.    Pulmonary/Chest: Effort normal and breath sounds normal.   Musculoskeletal:   Redness,swelling and TTP over right MTP.    Neurological: She is alert and oriented to person, place, and time.   Skin: Skin is warm, dry and intact.   Psychiatric: She has a normal mood and affect. Her behavior is normal.       Assessment/Plan   Philly was seen today for hypertension, hyperlipidemia, diabetes and foot pain.    Diagnoses and all orders for this visit:    Type 2 diabetes mellitus with microalbuminuria, without long-term current use of insulin  -     Hemoglobin A1c; Future  -     Comprehensive Metabolic Panel; Future    Benign essential hypertension    Hypercholesterolemia  -     Hemoglobin A1c; Future  -     Comprehensive Metabolic Panel; Future    Foot pain, right  -     Uric Acid    Medicare annual wellness visit, initial    Other orders  -      metFORMIN (GLUCOPHAGE) 1000 MG tablet; Take 1 tablet by mouth 2 (Two) Times a Day With Meals.  -     lisinopril (PRINIVIL,ZESTRIL) 20 MG tablet; Take 2 tablets by mouth Daily.  -     glimepiride (AMARYL) 2 MG tablet; Sig:   TAKE 2 TABLETS IN THE MORNING AND 1 AND 1/2 TABLETS IN THE EVENING  -     furosemide (LASIX) 20 MG tablet; Take 1 tablet by mouth Daily.  -     atorvastatin (LIPITOR) 10 MG tablet; Take 1 tablet by mouth Every Night.  -     atenolol (TENORMIN) 100 MG tablet; Take 1 tablet by mouth Daily.  -     amLODIPine (NORVASC) 10 MG tablet; Take 1 tablet by mouth Daily.  -     indomethacin (INDOCIN) 50 MG capsule; Take 1 capsule by mouth 3 (Three) Times a Day As Needed for Moderate Pain .        #1 DM type II with microalbuminuria-significant improvement.  Will continue current treatment.  Warnings on hypoglycemia.  Follow-up in 6 months.      #2 hypertension-controlled.  Continue same.  Follow-up in 6 months.    #3 hyperlipidemia-continue current treatment.  Follow-up in 6 months.      #4 foot pain-suspecting gout.  Check uric acid.  Discussed treatment with prednisone, but patient prefers not to use it.  She also prefers no colchicine until we know results of uric acid.  Will treat with indomethacin as needed.  Risks of kidney failure, bleeding and cardiovascular risk.  Consider treatment with allopurinol when asymptomatic.

## 2018-01-30 NOTE — PATIENT INSTRUCTIONS
Medicare Wellness  Personal Prevention Plan of Service     Date of Office Visit:  2018  Encounter Provider:  Shazia Cardoza MD  Place of Service:  [facility]  Patient Name: Philly Person  :  1945    As part of the Medicare Wellness portion of your visit today, we are providing you with this personalized preventive plan of services (PPPS). This plan is based upon recommendations of the [place] Preventive Services Task Force (USPSTF) and the Advisory Committee on Immunization Practices (ACIP).    This lists the preventive care services that should be considered, and provides dates of when you are due. Items listed as completed are up-to-date and do not require any further intervention.    Health Maintenance   Topic Date Due   • MEDICARE ANNUAL WELLNESS  2016   • TDAP/TD VACCINES (2 - Td) 2018   • DIABETIC FOOT EXAM  2018   • HEMOGLOBIN A1C  2018   • DIABETIC EYE EXAM  2018   • MAMMOGRAM  2019   • LIPID PANEL  2019   • URINE MICROALBUMIN  2019   • COLONOSCOPY  2020   • INFLUENZA VACCINE  Addressed   • PNEUMOCOCCAL VACCINES (65+ LOW/MEDIUM RISK)  Addressed   • ZOSTER VACCINE  Completed       Orders Placed This Encounter   Procedures   • Hemoglobin A1c     Standing Status:   Future     Standing Expiration Date:   2019   • Comprehensive Metabolic Panel     Standing Status:   Future     Standing Expiration Date:   2019   • Uric Acid       Return in about 6 months (around 2018).

## 2018-01-31 LAB — URATE SERPL-MCNC: 8.6 MG/DL (ref 2.4–5.7)

## 2018-01-31 RX ORDER — GLIMEPIRIDE 2 MG/1
TABLET ORAL
Qty: 315 TABLET | Refills: 1 | Status: SHIPPED | OUTPATIENT
Start: 2018-01-31 | End: 2018-07-10 | Stop reason: SDUPTHER

## 2018-01-31 RX ORDER — COLCHICINE 0.6 MG/1
TABLET ORAL
Qty: 15 TABLET | Refills: 0 | Status: SHIPPED | OUTPATIENT
Start: 2018-01-31 | End: 2018-02-02 | Stop reason: SDUPTHER

## 2018-02-02 RX ORDER — COLCHICINE 0.6 MG/1
TABLET ORAL
Qty: 3 TABLET | Refills: 3 | Status: SHIPPED | OUTPATIENT
Start: 2018-02-02 | End: 2018-07-31

## 2018-03-01 ENCOUNTER — APPOINTMENT (OUTPATIENT)
Dept: WOMENS IMAGING | Facility: HOSPITAL | Age: 73
End: 2018-03-01

## 2018-03-01 PROCEDURE — 77067 SCR MAMMO BI INCL CAD: CPT | Performed by: RADIOLOGY

## 2018-03-01 PROCEDURE — 77063 BREAST TOMOSYNTHESIS BI: CPT | Performed by: RADIOLOGY

## 2018-03-13 ENCOUNTER — APPOINTMENT (OUTPATIENT)
Dept: WOMENS IMAGING | Facility: HOSPITAL | Age: 73
End: 2018-03-13

## 2018-03-13 PROCEDURE — G0279 TOMOSYNTHESIS, MAMMO: HCPCS | Performed by: RADIOLOGY

## 2018-03-13 PROCEDURE — 77065 DX MAMMO INCL CAD UNI: CPT | Performed by: RADIOLOGY

## 2018-06-25 RX ORDER — ATORVASTATIN CALCIUM 10 MG/1
TABLET, FILM COATED ORAL
Qty: 90 TABLET | Refills: 1 | Status: SHIPPED | OUTPATIENT
Start: 2018-06-25 | End: 2018-07-31 | Stop reason: SDUPTHER

## 2018-07-11 RX ORDER — ATENOLOL 100 MG/1
TABLET ORAL
Qty: 90 TABLET | Refills: 1 | Status: SHIPPED | OUTPATIENT
Start: 2018-07-11 | End: 2018-07-31 | Stop reason: SDUPTHER

## 2018-07-11 RX ORDER — AMLODIPINE BESYLATE 10 MG/1
TABLET ORAL
Qty: 90 TABLET | Refills: 1 | Status: SHIPPED | OUTPATIENT
Start: 2018-07-11 | End: 2018-07-31 | Stop reason: SDUPTHER

## 2018-07-11 RX ORDER — FUROSEMIDE 20 MG/1
TABLET ORAL
Qty: 90 TABLET | Refills: 1 | Status: SHIPPED | OUTPATIENT
Start: 2018-07-11 | End: 2018-07-31 | Stop reason: SDUPTHER

## 2018-07-11 RX ORDER — GLIMEPIRIDE 2 MG/1
TABLET ORAL
Qty: 315 TABLET | Refills: 1 | Status: SHIPPED | OUTPATIENT
Start: 2018-07-11 | End: 2018-07-31 | Stop reason: SDUPTHER

## 2018-07-24 DIAGNOSIS — E11.29 TYPE 2 DIABETES MELLITUS WITH MICROALBUMINURIA, WITHOUT LONG-TERM CURRENT USE OF INSULIN (HCC): ICD-10-CM

## 2018-07-24 DIAGNOSIS — R80.9 TYPE 2 DIABETES MELLITUS WITH MICROALBUMINURIA, WITHOUT LONG-TERM CURRENT USE OF INSULIN (HCC): ICD-10-CM

## 2018-07-24 DIAGNOSIS — E78.00 HYPERCHOLESTEROLEMIA: ICD-10-CM

## 2018-07-25 LAB
ALBUMIN SERPL-MCNC: 4.1 G/DL (ref 3.5–5.2)
ALBUMIN/GLOB SERPL: 1.5 G/DL
ALP SERPL-CCNC: 124 U/L (ref 39–117)
ALT SERPL-CCNC: 45 U/L (ref 1–33)
AST SERPL-CCNC: 55 U/L (ref 1–32)
BILIRUB SERPL-MCNC: 1.5 MG/DL (ref 0.1–1.2)
BUN SERPL-MCNC: 18 MG/DL (ref 8–23)
BUN/CREAT SERPL: 20.5 (ref 7–25)
CALCIUM SERPL-MCNC: 10 MG/DL (ref 8.6–10.5)
CHLORIDE SERPL-SCNC: 99 MMOL/L (ref 98–107)
CO2 SERPL-SCNC: 28.1 MMOL/L (ref 22–29)
CREAT SERPL-MCNC: 0.88 MG/DL (ref 0.57–1)
GLOBULIN SER CALC-MCNC: 2.8 GM/DL
GLUCOSE SERPL-MCNC: 122 MG/DL (ref 65–99)
HBA1C MFR BLD: 6.43 % (ref 4.8–5.6)
POTASSIUM SERPL-SCNC: 4 MMOL/L (ref 3.5–5.2)
PROT SERPL-MCNC: 6.9 G/DL (ref 6–8.5)
SODIUM SERPL-SCNC: 140 MMOL/L (ref 136–145)

## 2018-07-31 ENCOUNTER — OFFICE VISIT (OUTPATIENT)
Dept: INTERNAL MEDICINE | Facility: CLINIC | Age: 73
End: 2018-07-31

## 2018-07-31 VITALS
BODY MASS INDEX: 44.53 KG/M2 | WEIGHT: 242 LBS | OXYGEN SATURATION: 98 % | HEART RATE: 87 BPM | HEIGHT: 62 IN | DIASTOLIC BLOOD PRESSURE: 80 MMHG | TEMPERATURE: 98.2 F | SYSTOLIC BLOOD PRESSURE: 136 MMHG

## 2018-07-31 DIAGNOSIS — E78.00 HYPERCHOLESTEROLEMIA: ICD-10-CM

## 2018-07-31 DIAGNOSIS — M10.9 GOUT OF FOOT, UNSPECIFIED CAUSE, UNSPECIFIED CHRONICITY, UNSPECIFIED LATERALITY: ICD-10-CM

## 2018-07-31 DIAGNOSIS — E11.29 TYPE 2 DIABETES MELLITUS WITH MICROALBUMINURIA, WITHOUT LONG-TERM CURRENT USE OF INSULIN (HCC): Primary | ICD-10-CM

## 2018-07-31 DIAGNOSIS — I10 BENIGN ESSENTIAL HYPERTENSION: ICD-10-CM

## 2018-07-31 DIAGNOSIS — R80.9 TYPE 2 DIABETES MELLITUS WITH MICROALBUMINURIA, WITHOUT LONG-TERM CURRENT USE OF INSULIN (HCC): Primary | ICD-10-CM

## 2018-07-31 PROCEDURE — 99214 OFFICE O/P EST MOD 30 MIN: CPT | Performed by: FAMILY MEDICINE

## 2018-07-31 RX ORDER — ATENOLOL 100 MG/1
100 TABLET ORAL DAILY
Qty: 90 TABLET | Refills: 1 | Status: SHIPPED | OUTPATIENT
Start: 2018-07-31 | End: 2019-01-29 | Stop reason: SDUPTHER

## 2018-07-31 RX ORDER — ATORVASTATIN CALCIUM 10 MG/1
10 TABLET, FILM COATED ORAL NIGHTLY
Qty: 90 TABLET | Refills: 1 | Status: SHIPPED | OUTPATIENT
Start: 2018-07-31 | End: 2018-08-03 | Stop reason: SDUPTHER

## 2018-07-31 RX ORDER — GLIMEPIRIDE 2 MG/1
TABLET ORAL
Qty: 315 TABLET | Refills: 1 | Status: SHIPPED | OUTPATIENT
Start: 2018-07-31 | End: 2019-01-29 | Stop reason: SDUPTHER

## 2018-07-31 RX ORDER — LISINOPRIL 20 MG/1
40 TABLET ORAL DAILY
Qty: 180 TABLET | Refills: 1 | Status: SHIPPED | OUTPATIENT
Start: 2018-07-31 | End: 2018-10-22 | Stop reason: SDUPTHER

## 2018-07-31 RX ORDER — AMLODIPINE BESYLATE 10 MG/1
10 TABLET ORAL DAILY
Qty: 90 TABLET | Refills: 1 | Status: SHIPPED | OUTPATIENT
Start: 2018-07-31 | End: 2019-01-29 | Stop reason: SDUPTHER

## 2018-07-31 RX ORDER — FUROSEMIDE 20 MG/1
20 TABLET ORAL DAILY
Qty: 90 TABLET | Refills: 1 | Status: SHIPPED | OUTPATIENT
Start: 2018-07-31 | End: 2019-01-29 | Stop reason: SDUPTHER

## 2018-07-31 NOTE — PROGRESS NOTES
Subjective   Philly Person is a 72 y.o. female.   Chief Complaint   Patient presents with   • Diabetes   • Hypertension   • Gout   • Hyperlipidemia       History of Present Illness     She was diagnosed with melanoma in spring 2018.  It was early stage and she is post surgery.  She follows up with dermatologist every 3 months.    #1 diabetes type 2 - diagnosed in 2001. Patient did diabetes education at the time of diagnosis. She is on metformin 1000 mg twice a day and glimepiride 2 mg, 2 tb in AM, 1.5 tablet in PM.  No symptomatic low sugars.   A1c is at 6.4 .  She had eye exam in 11/ 2017. She is on ACE inhibitor and statin. She has a history of microalbuminuria.   She had Pneumovax in 2009, she had Prevnar 13 in 12/2015.  She is under a lot of stress.  She worries about her son Juan Carlos.     She saw Dr. Millard for diabetes, but she prefers to follow-up with us. It is easier for her to follow-up on it with us. She agrees to see Dr. Millard if diabetes gets out of control.        #2 HTN- on amlodipine 10 mg a day, atenolol 100 mg a day and lisinopril 20 mg 2 tablets taken daily. She is also on furosemide 20 mg a day. She takes no additional potassium. She reports no side effects. She does not exercise regularly.  Kidney tests are stable.      #3 hypercholesterolemia- on atorvastatin 10 mg a day. She takes it everyday. No side effects. No muscle aches, no muscle cramps.       #4 Gout of rt foot- In January uric acid was 8.6.  It was the first attack.  Patient had no flareups since then.  It responded to colchicin.    The following portions of the patient's history were reviewed and updated as appropriate: allergies, current medications, past family history, past medical history, past social history, past surgical history and problem list.    Review of Systems   Constitutional: Negative.    Respiratory: Negative.    Cardiovascular: Negative.    Endocrine: Negative for polydipsia, polyphagia and polyuria.          Objective   Wt Readings from Last 3 Encounters:   07/31/18 110 kg (242 lb)   01/30/18 111 kg (244 lb)   10/06/17 116 kg (256 lb)      Vitals:    07/31/18 1300   BP: 136/80   Pulse: 87   Temp: 98.2 °F (36.8 °C)   SpO2: 98%     Temp Readings from Last 3 Encounters:   07/31/18 98.2 °F (36.8 °C)   01/30/18 98.2 °F (36.8 °C)   10/06/17 98.2 °F (36.8 °C)     BP Readings from Last 3 Encounters:   07/31/18 136/80   01/30/18 136/80   10/06/17 130/80     Pulse Readings from Last 3 Encounters:   07/31/18 87   01/30/18 89   10/06/17 87       Physical Exam   Constitutional: She is oriented to person, place, and time. She appears well-developed and well-nourished.   HENT:   Head: Normocephalic and atraumatic.   Neck: Neck supple. Carotid bruit is not present. No thyromegaly present.   Cardiovascular: Normal rate, regular rhythm, normal heart sounds and intact distal pulses.    Pulmonary/Chest: Effort normal and breath sounds normal.      During the foot exam she had a monofilament test performed (See diabetic foot form which will be scanned.).  Neurological: She is alert and oriented to person, place, and time.   Skin: Skin is warm, dry and intact.   Psychiatric: She has a normal mood and affect. Her behavior is normal.       Assessment/Plan   Philly was seen today for diabetes, hypertension, gout and hyperlipidemia.    Diagnoses and all orders for this visit:    Type 2 diabetes mellitus with microalbuminuria, without long-term current use of insulin (CMS/[facility])  -     Hemoglobin A1c; Future  -     MicroAlbumin, Urine, Random - Urine, Clean Catch; Future  -     CBC (No Diff); Future  -     Lipid Panel With LDL / HDL Ratio; Future  -     Comprehensive Metabolic Panel; Future  -     Thyroid Cascade Profile; Future    Benign essential hypertension  -     Hemoglobin A1c; Future  -     MicroAlbumin, Urine, Random - Urine, Clean Catch; Future  -     CBC (No Diff); Future  -     Lipid Panel With LDL / HDL Ratio; Future  -      Comprehensive Metabolic Panel; Future  -     Thyroid Cascade Profile; Future    Hypercholesterolemia  -     Hemoglobin A1c; Future  -     MicroAlbumin, Urine, Random - Urine, Clean Catch; Future  -     CBC (No Diff); Future  -     Lipid Panel With LDL / HDL Ratio; Future  -     Comprehensive Metabolic Panel; Future  -     Thyroid Cascade Profile; Future    Gout of foot, unspecified cause, unspecified chronicity, unspecified laterality  -     Uric Acid; Future    Other orders  -     metFORMIN (GLUCOPHAGE) 1000 MG tablet; Take 1 tablet by mouth 2 (Two) Times a Day With Meals.  -     lisinopril (PRINIVIL,ZESTRIL) 20 MG tablet; Take 2 tablets by mouth Daily.  -     glimepiride (AMARYL) 2 MG tablet; TAKE 2 TABLETS IN THE MORNING AND 1 AND 1/2 TABLETS IN THE EVENING  -     furosemide (LASIX) 20 MG tablet; Take 1 tablet by mouth Daily.  -     atorvastatin (LIPITOR) 10 MG tablet; Take 1 tablet by mouth Every Night.  -     atenolol (TENORMIN) 100 MG tablet; Take 1 tablet by mouth Daily.  -     amLODIPine (NORVASC) 10 MG tablet; Take 1 tablet by mouth Daily.        #1 DM type II-controlled.  Continue current treatment.  I'm asking patient to start exercise at least 30 minutes late for 5 days a week.  Follow-up in 6 months.      #2 hypertension-continue current treatment.  Follow-up in 6 months.    #3 hyperlipidemia-continue current treatment.  Follow-up in 6 months.     #4 gout-talked about starting allopurinol, but as it was only one episode, patient would like to hold with it.

## 2018-08-03 RX ORDER — ATORVASTATIN CALCIUM 10 MG/1
10 TABLET, FILM COATED ORAL NIGHTLY
Qty: 90 TABLET | Refills: 1 | Status: SHIPPED | OUTPATIENT
Start: 2018-08-03 | End: 2019-02-26 | Stop reason: SDUPTHER

## 2018-10-23 RX ORDER — LISINOPRIL 20 MG/1
TABLET ORAL
Qty: 180 TABLET | Refills: 1 | Status: SHIPPED | OUTPATIENT
Start: 2018-10-23 | End: 2019-02-26 | Stop reason: SDUPTHER

## 2019-01-30 RX ORDER — AMLODIPINE BESYLATE 10 MG/1
TABLET ORAL
Qty: 90 TABLET | Refills: 1 | Status: SHIPPED | OUTPATIENT
Start: 2019-01-30 | End: 2019-02-26 | Stop reason: SDUPTHER

## 2019-01-30 RX ORDER — ATENOLOL 100 MG/1
TABLET ORAL
Qty: 90 TABLET | Refills: 1 | Status: SHIPPED | OUTPATIENT
Start: 2019-01-30 | End: 2019-02-26 | Stop reason: SDUPTHER

## 2019-01-30 RX ORDER — FUROSEMIDE 20 MG/1
TABLET ORAL
Qty: 90 TABLET | Refills: 1 | Status: SHIPPED | OUTPATIENT
Start: 2019-01-30 | End: 2019-02-26 | Stop reason: SDUPTHER

## 2019-01-30 RX ORDER — GLIMEPIRIDE 2 MG/1
TABLET ORAL
Qty: 315 TABLET | Refills: 1 | Status: SHIPPED | OUTPATIENT
Start: 2019-01-30 | End: 2019-02-26 | Stop reason: SDUPTHER

## 2019-01-31 ENCOUNTER — RESULTS ENCOUNTER (OUTPATIENT)
Dept: INTERNAL MEDICINE | Facility: CLINIC | Age: 74
End: 2019-01-31

## 2019-01-31 DIAGNOSIS — M10.9 GOUT OF FOOT, UNSPECIFIED CAUSE, UNSPECIFIED CHRONICITY, UNSPECIFIED LATERALITY: ICD-10-CM

## 2019-01-31 DIAGNOSIS — E11.29 TYPE 2 DIABETES MELLITUS WITH MICROALBUMINURIA, WITHOUT LONG-TERM CURRENT USE OF INSULIN (HCC): ICD-10-CM

## 2019-01-31 DIAGNOSIS — E78.00 HYPERCHOLESTEROLEMIA: ICD-10-CM

## 2019-01-31 DIAGNOSIS — R80.9 TYPE 2 DIABETES MELLITUS WITH MICROALBUMINURIA, WITHOUT LONG-TERM CURRENT USE OF INSULIN (HCC): ICD-10-CM

## 2019-01-31 DIAGNOSIS — I10 BENIGN ESSENTIAL HYPERTENSION: ICD-10-CM

## 2019-02-19 LAB
ALBUMIN SERPL-MCNC: 3.4 G/DL (ref 3.5–4.8)
ALBUMIN/GLOB SERPL: 1 {RATIO} (ref 1.2–2.2)
ALP SERPL-CCNC: 113 IU/L (ref 39–117)
ALT SERPL-CCNC: 34 IU/L (ref 0–32)
AST SERPL-CCNC: 44 IU/L (ref 0–40)
BILIRUB SERPL-MCNC: 1.5 MG/DL (ref 0–1.2)
BUN SERPL-MCNC: 23 MG/DL (ref 8–27)
BUN/CREAT SERPL: 20 (ref 12–28)
CALCIUM SERPL-MCNC: 9.3 MG/DL (ref 8.7–10.3)
CHLORIDE SERPL-SCNC: 101 MMOL/L (ref 96–106)
CHOLEST SERPL-MCNC: 121 MG/DL (ref 100–199)
CO2 SERPL-SCNC: 24 MMOL/L (ref 20–29)
CREAT SERPL-MCNC: 1.13 MG/DL (ref 0.57–1)
ERYTHROCYTE [DISTWIDTH] IN BLOOD BY AUTOMATED COUNT: 13.8 % (ref 12.3–15.4)
GLOBULIN SER CALC-MCNC: 3.4 G/DL (ref 1.5–4.5)
GLUCOSE SERPL-MCNC: 124 MG/DL (ref 65–99)
HBA1C MFR BLD: 6.8 % (ref 4.8–5.6)
HCT VFR BLD AUTO: 37.9 % (ref 34–46.6)
HDLC SERPL-MCNC: 44 MG/DL
HGB BLD-MCNC: 12.6 G/DL (ref 11.1–15.9)
LDLC SERPL CALC-MCNC: 52 MG/DL (ref 0–99)
LDLC/HDLC SERPL: 1.2 RATIO (ref 0–3.2)
MCH RBC QN AUTO: 29.4 PG (ref 26.6–33)
MCHC RBC AUTO-ENTMCNC: 33.2 G/DL (ref 31.5–35.7)
MCV RBC AUTO: 88 FL (ref 79–97)
MICROALBUMIN UR-MCNC: 1074.4 UG/ML
PLATELET # BLD AUTO: 158 X10E3/UL (ref 150–379)
POTASSIUM SERPL-SCNC: 4.2 MMOL/L (ref 3.5–5.2)
PROT SERPL-MCNC: 6.8 G/DL (ref 6–8.5)
RBC # BLD AUTO: 4.29 X10E6/UL (ref 3.77–5.28)
SODIUM SERPL-SCNC: 141 MMOL/L (ref 134–144)
TRIGL SERPL-MCNC: 124 MG/DL (ref 0–149)
TSH SERPL DL<=0.005 MIU/L-ACNC: 1.7 UIU/ML (ref 0.45–4.5)
URATE SERPL-MCNC: 9.5 MG/DL (ref 2.5–7.1)
VLDLC SERPL CALC-MCNC: 25 MG/DL (ref 5–40)
WBC # BLD AUTO: 7.1 X10E3/UL (ref 3.4–10.8)

## 2019-02-26 ENCOUNTER — OFFICE VISIT (OUTPATIENT)
Dept: INTERNAL MEDICINE | Facility: CLINIC | Age: 74
End: 2019-02-26

## 2019-02-26 VITALS
BODY MASS INDEX: 45.82 KG/M2 | DIASTOLIC BLOOD PRESSURE: 80 MMHG | HEART RATE: 81 BPM | WEIGHT: 249 LBS | OXYGEN SATURATION: 98 % | TEMPERATURE: 98 F | SYSTOLIC BLOOD PRESSURE: 130 MMHG | HEIGHT: 62 IN

## 2019-02-26 DIAGNOSIS — E78.00 HYPERCHOLESTEROLEMIA: ICD-10-CM

## 2019-02-26 DIAGNOSIS — I10 BENIGN ESSENTIAL HYPERTENSION: ICD-10-CM

## 2019-02-26 DIAGNOSIS — R80.9 TYPE 2 DIABETES MELLITUS WITH MICROALBUMINURIA, WITHOUT LONG-TERM CURRENT USE OF INSULIN (HCC): Primary | ICD-10-CM

## 2019-02-26 DIAGNOSIS — E11.29 TYPE 2 DIABETES MELLITUS WITH MICROALBUMINURIA, WITHOUT LONG-TERM CURRENT USE OF INSULIN (HCC): Primary | ICD-10-CM

## 2019-02-26 PROCEDURE — 99214 OFFICE O/P EST MOD 30 MIN: CPT | Performed by: FAMILY MEDICINE

## 2019-02-26 RX ORDER — LISINOPRIL 20 MG/1
40 TABLET ORAL DAILY
Qty: 180 TABLET | Refills: 1 | Status: SHIPPED | OUTPATIENT
Start: 2019-02-26 | End: 2019-03-27 | Stop reason: SDUPTHER

## 2019-02-26 RX ORDER — ATORVASTATIN CALCIUM 10 MG/1
10 TABLET, FILM COATED ORAL NIGHTLY
Qty: 90 TABLET | Refills: 1 | Status: SHIPPED | OUTPATIENT
Start: 2019-02-26 | End: 2019-05-20 | Stop reason: SDUPTHER

## 2019-02-26 RX ORDER — AMLODIPINE BESYLATE 10 MG/1
10 TABLET ORAL DAILY
Qty: 90 TABLET | Refills: 1 | Status: SHIPPED | OUTPATIENT
Start: 2019-02-26 | End: 2019-06-24 | Stop reason: SDUPTHER

## 2019-02-26 RX ORDER — GLIMEPIRIDE 2 MG/1
TABLET ORAL
Qty: 315 TABLET | Refills: 1 | Status: SHIPPED | OUTPATIENT
Start: 2019-02-26 | End: 2019-06-24 | Stop reason: SDUPTHER

## 2019-02-26 RX ORDER — ATENOLOL 100 MG/1
100 TABLET ORAL DAILY
Qty: 90 TABLET | Refills: 1 | Status: SHIPPED | OUTPATIENT
Start: 2019-02-26 | End: 2019-06-24 | Stop reason: SDUPTHER

## 2019-02-26 RX ORDER — FUROSEMIDE 20 MG/1
20 TABLET ORAL DAILY
Qty: 90 TABLET | Refills: 1 | Status: SHIPPED | OUTPATIENT
Start: 2019-02-26 | End: 2019-06-24 | Stop reason: SDUPTHER

## 2019-02-26 NOTE — PROGRESS NOTES
Subjective   Philly Person is a 73 y.o. female.   Chief Complaint   Patient presents with   • Hypertension   • Diabetes   • Hyperlipidemia       History of Present Illness     #1 diabetes type 2 - diagnosed in 2001. Patient did diabetes education at the time of diagnosis. She is on metformin 1000 mg twice a day and glimepiride 2 mg, 2 tb in AM, 1.5 tablet in PM.  She does not check sugars at home.  No symptomatic low sugars.   A1c is at 6.8 .  Microalbumin at 1074.4  She had eye exam in 11/ 2018. She is on ACE inhibitor and statin. She has a history of microalbuminuria.   She had Pneumovax in 2009, she had Prevnar 13 in 12/2015.     She saw Dr. Millard for diabetes, but she prefers to follow-up with us. It is easier for her to follow-up on it with us. She agrees to see Dr. Millard if diabetes gets out of control.      #2 HTN- on amlodipine 10 mg a day, atenolol 100 mg a day and lisinopril 20 mg 2 tablets taken daily. She is also on furosemide 20 mg a day. She takes no additional potassium. She reports no side effects. She does not exercise regularly.  Kidney tests are stable.  Sodium and potassium normal.  No exercise.      #3 hypercholesterolemia- on atorvastatin 10 mg a day. She takes it everyday. No side effects. No muscle aches, no muscle cramps.  LDL 52, HDL 44, LFTs stable.     #4 Gout of rt foot- uric acid at 9.5.    She had one attack.  Patient had no flareups since then.  It responded to colchicin.  She prefers no daily medication for prevention at this point.      The following portions of the patient's history were reviewed and updated as appropriate: allergies, current medications, past family history, past medical history, past social history, past surgical history and problem list.    Review of Systems   Constitutional: Negative.    Respiratory: Negative.    Cardiovascular: Negative.          Objective   Wt Readings from Last 3 Encounters:   02/26/19 113 kg (249 lb)   07/31/18 110 kg (242 lb)    01/30/18 111 kg (244 lb)      Vitals:    02/26/19 1303   BP: 130/80   Pulse: 81   Temp: 98 °F (36.7 °C)   SpO2: 98%     Temp Readings from Last 3 Encounters:   02/26/19 98 °F (36.7 °C)   07/31/18 98.2 °F (36.8 °C)   01/30/18 98.2 °F (36.8 °C)     BP Readings from Last 3 Encounters:   02/26/19 130/80   07/31/18 136/80   01/30/18 136/80     Pulse Readings from Last 3 Encounters:   02/26/19 81   07/31/18 87   01/30/18 89       Physical Exam   Constitutional: She is oriented to person, place, and time. She appears well-developed and well-nourished.   HENT:   Head: Normocephalic and atraumatic.   Neck: Neck supple. Carotid bruit is not present. No thyromegaly present.   Cardiovascular: Normal rate, regular rhythm, normal heart sounds and intact distal pulses.   Pulmonary/Chest: Effort normal and breath sounds normal.   Neurological: She is alert and oriented to person, place, and time.   Skin: Skin is warm, dry and intact.   Psychiatric: She has a normal mood and affect. Her behavior is normal.       Assessment/Plan   Philly was seen today for hypertension, diabetes and hyperlipidemia.    Diagnoses and all orders for this visit:    Type 2 diabetes mellitus with microalbuminuria, without long-term current use of insulin (CMS/[facility])    Benign essential hypertension    Hypercholesterolemia    Other orders  -     metFORMIN (GLUCOPHAGE) 1000 MG tablet; Take 1 tablet by mouth 2 (Two) Times a Day With Meals.  -     lisinopril (PRINIVIL,ZESTRIL) 20 MG tablet; Take 2 tablets by mouth Daily.  -     glimepiride (AMARYL) 2 MG tablet; Take 2 tablets in the morning, 1.5 tablet in the evening.  -     furosemide (LASIX) 20 MG tablet; Take 1 tablet by mouth Daily.  -     atenolol (TENORMIN) 100 MG tablet; Take 1 tablet by mouth Daily.  -     amLODIPine (NORVASC) 10 MG tablet; Take 1 tablet by mouth Daily.  -     atorvastatin (LIPITOR) 10 MG tablet; Take 1 tablet by mouth Every Night.        #1 diabetes type 2-continue current treatment.   Start exercise at least 30 minutes a day, 5 days a week.  Follow-up in 6 months.    2.  Hyperlipidemia-continue current treatment.  Follow-up in 6 months.    3.  Hypertension-continue same.  Follow-up in 6 months.

## 2019-03-18 ENCOUNTER — APPOINTMENT (OUTPATIENT)
Dept: WOMENS IMAGING | Facility: HOSPITAL | Age: 74
End: 2019-03-18

## 2019-03-18 PROCEDURE — 77067 SCR MAMMO BI INCL CAD: CPT | Performed by: RADIOLOGY

## 2019-03-18 PROCEDURE — 77063 BREAST TOMOSYNTHESIS BI: CPT | Performed by: RADIOLOGY

## 2019-03-29 RX ORDER — LISINOPRIL 20 MG/1
TABLET ORAL
Qty: 180 TABLET | Refills: 1 | Status: SHIPPED | OUTPATIENT
Start: 2019-03-29 | End: 2019-07-29

## 2019-05-20 RX ORDER — ATORVASTATIN CALCIUM 10 MG/1
TABLET, FILM COATED ORAL
Qty: 90 TABLET | Refills: 1 | Status: SHIPPED | OUTPATIENT
Start: 2019-05-20 | End: 2019-07-29

## 2019-06-24 RX ORDER — ATENOLOL 100 MG/1
TABLET ORAL
Qty: 90 TABLET | Refills: 1 | Status: SHIPPED | OUTPATIENT
Start: 2019-06-24 | End: 2019-07-29

## 2019-06-24 RX ORDER — FUROSEMIDE 20 MG/1
TABLET ORAL
Qty: 90 TABLET | Refills: 1 | Status: SHIPPED | OUTPATIENT
Start: 2019-06-24 | End: 2019-07-29

## 2019-06-24 RX ORDER — AMLODIPINE BESYLATE 10 MG/1
TABLET ORAL
Qty: 90 TABLET | Refills: 1 | Status: SHIPPED | OUTPATIENT
Start: 2019-06-24 | End: 2019-07-29

## 2019-06-24 RX ORDER — GLIMEPIRIDE 2 MG/1
TABLET ORAL
Qty: 315 TABLET | Refills: 1 | Status: SHIPPED | OUTPATIENT
Start: 2019-06-24 | End: 2019-07-29

## 2019-07-29 ENCOUNTER — APPOINTMENT (OUTPATIENT)
Dept: PREADMISSION TESTING | Facility: HOSPITAL | Age: 74
End: 2019-07-29

## 2019-07-29 ENCOUNTER — HOSPITAL ENCOUNTER (OUTPATIENT)
Dept: GENERAL RADIOLOGY | Facility: HOSPITAL | Age: 74
Discharge: HOME | End: 2019-07-29
Admitting: ORTHOPAEDIC SURGERY

## 2019-07-29 ENCOUNTER — HOSPITAL ENCOUNTER (OUTPATIENT)
Dept: GENERAL RADIOLOGY | Facility: HOSPITAL | Age: 74
Discharge: HOME | End: 2019-07-29

## 2019-07-29 VITALS
DIASTOLIC BLOOD PRESSURE: 77 MMHG | WEIGHT: 246.8 LBS | HEART RATE: 81 BPM | BODY MASS INDEX: 45.42 KG/M2 | OXYGEN SATURATION: 96 % | RESPIRATION RATE: 16 BRPM | TEMPERATURE: 97.9 F | SYSTOLIC BLOOD PRESSURE: 153 MMHG | HEIGHT: 62 IN

## 2019-07-29 LAB
ALBUMIN SERPL-MCNC: 3.5 G/DL (ref 3.5–5.2)
ALBUMIN/GLOB SERPL: 0.9 G/DL
ALP SERPL-CCNC: 118 U/L (ref 39–117)
ALT SERPL W P-5'-P-CCNC: 30 U/L (ref 1–33)
ANION GAP SERPL CALCULATED.3IONS-SCNC: 16.1 MMOL/L (ref 5–15)
APTT PPP: 33 SECONDS (ref 22.7–35.4)
AST SERPL-CCNC: 43 U/L (ref 1–32)
BACTERIA UR QL AUTO: ABNORMAL /HPF
BASOPHILS # BLD AUTO: 0.06 10*3/MM3 (ref 0–0.2)
BASOPHILS NFR BLD AUTO: 1 % (ref 0–1.5)
BILIRUB SERPL-MCNC: 1.4 MG/DL (ref 0.2–1.2)
BILIRUB UR QL STRIP: NEGATIVE
BUN BLD-MCNC: 17 MG/DL (ref 8–23)
BUN/CREAT SERPL: 15.3 (ref 7–25)
CALCIUM SPEC-SCNC: 8.1 MG/DL (ref 8.6–10.5)
CHLORIDE SERPL-SCNC: 101 MMOL/L (ref 98–107)
CLARITY UR: ABNORMAL
CO2 SERPL-SCNC: 24.9 MMOL/L (ref 22–29)
COLOR UR: YELLOW
CREAT BLD-MCNC: 1.11 MG/DL (ref 0.57–1)
DEPRECATED RDW RBC AUTO: 44.1 FL (ref 37–54)
EOSINOPHIL # BLD AUTO: 0.24 10*3/MM3 (ref 0–0.4)
EOSINOPHIL NFR BLD AUTO: 3.8 % (ref 0.3–6.2)
ERYTHROCYTE [DISTWIDTH] IN BLOOD BY AUTOMATED COUNT: 13.2 % (ref 12.3–15.4)
GFR SERPL CREATININE-BSD FRML MDRD: 48 ML/MIN/1.73
GLOBULIN UR ELPH-MCNC: 3.7 GM/DL
GLUCOSE BLD-MCNC: 191 MG/DL (ref 65–99)
GLUCOSE UR STRIP-MCNC: NEGATIVE MG/DL
HCT VFR BLD AUTO: 39.7 % (ref 34–46.6)
HGB BLD-MCNC: 12.7 G/DL (ref 12–15.9)
HGB UR QL STRIP.AUTO: ABNORMAL
HYALINE CASTS UR QL AUTO: ABNORMAL /LPF
IMM GRANULOCYTES # BLD AUTO: 0.04 10*3/MM3 (ref 0–0.05)
IMM GRANULOCYTES NFR BLD AUTO: 0.6 % (ref 0–0.5)
INR PPP: 1.03 (ref 0.9–1.1)
KETONES UR QL STRIP: NEGATIVE
LEUKOCYTE ESTERASE UR QL STRIP.AUTO: ABNORMAL
LYMPHOCYTES # BLD AUTO: 1.6 10*3/MM3 (ref 0.7–3.1)
LYMPHOCYTES NFR BLD AUTO: 25.5 % (ref 19.6–45.3)
MCH RBC QN AUTO: 29.3 PG (ref 26.6–33)
MCHC RBC AUTO-ENTMCNC: 32 G/DL (ref 31.5–35.7)
MCV RBC AUTO: 91.7 FL (ref 79–97)
MONOCYTES # BLD AUTO: 0.59 10*3/MM3 (ref 0.1–0.9)
MONOCYTES NFR BLD AUTO: 9.4 % (ref 5–12)
NEUTROPHILS # BLD AUTO: 3.75 10*3/MM3 (ref 1.7–7)
NEUTROPHILS NFR BLD AUTO: 59.7 % (ref 42.7–76)
NITRITE UR QL STRIP: NEGATIVE
NRBC BLD AUTO-RTO: 0 /100 WBC (ref 0–0.2)
PH UR STRIP.AUTO: 6 [PH] (ref 5–8)
PLATELET # BLD AUTO: 181 10*3/MM3 (ref 140–450)
PMV BLD AUTO: 10.9 FL (ref 6–12)
POTASSIUM BLD-SCNC: 4.3 MMOL/L (ref 3.5–5.2)
PROT SERPL-MCNC: 7.2 G/DL (ref 6–8.5)
PROT UR QL STRIP: ABNORMAL
PROTHROMBIN TIME: 13.2 SECONDS (ref 11.7–14.2)
RBC # BLD AUTO: 4.33 10*6/MM3 (ref 3.77–5.28)
RBC # UR: ABNORMAL /HPF
REF LAB TEST METHOD: ABNORMAL
SODIUM BLD-SCNC: 142 MMOL/L (ref 136–145)
SP GR UR STRIP: 1.02 (ref 1–1.03)
SQUAMOUS #/AREA URNS HPF: ABNORMAL /HPF
TRANS CELLS #/AREA URNS HPF: ABNORMAL /HPF
UROBILINOGEN UR QL STRIP: ABNORMAL
WBC NRBC COR # BLD: 6.28 10*3/MM3 (ref 3.4–10.8)
WBC UR QL AUTO: ABNORMAL /HPF

## 2019-07-29 PROCEDURE — 63710000001 MUPIROCIN 2 % OINTMENT: Performed by: ORTHOPAEDIC SURGERY

## 2019-07-29 PROCEDURE — A9270 NON-COVERED ITEM OR SERVICE: HCPCS | Performed by: ORTHOPAEDIC SURGERY

## 2019-07-29 PROCEDURE — 81001 URINALYSIS AUTO W/SCOPE: CPT | Performed by: ORTHOPAEDIC SURGERY

## 2019-07-29 PROCEDURE — 71046 X-RAY EXAM CHEST 2 VIEWS: CPT

## 2019-07-29 PROCEDURE — 93010 ELECTROCARDIOGRAM REPORT: CPT | Performed by: INTERNAL MEDICINE

## 2019-07-29 PROCEDURE — 85730 THROMBOPLASTIN TIME PARTIAL: CPT | Performed by: ORTHOPAEDIC SURGERY

## 2019-07-29 PROCEDURE — 93005 ELECTROCARDIOGRAM TRACING: CPT

## 2019-07-29 PROCEDURE — 73560 X-RAY EXAM OF KNEE 1 OR 2: CPT

## 2019-07-29 PROCEDURE — 80053 COMPREHEN METABOLIC PANEL: CPT | Performed by: ORTHOPAEDIC SURGERY

## 2019-07-29 PROCEDURE — 36415 COLL VENOUS BLD VENIPUNCTURE: CPT

## 2019-07-29 PROCEDURE — 85610 PROTHROMBIN TIME: CPT | Performed by: ORTHOPAEDIC SURGERY

## 2019-07-29 PROCEDURE — 85025 COMPLETE CBC W/AUTO DIFF WBC: CPT | Performed by: ORTHOPAEDIC SURGERY

## 2019-07-29 RX ORDER — ATORVASTATIN CALCIUM 10 MG/1
10 TABLET, FILM COATED ORAL NIGHTLY
COMMUNITY
End: 2019-09-09 | Stop reason: SDUPTHER

## 2019-07-29 RX ORDER — MULTIVIT-MIN/IRON/FOLIC ACID/K 18-600-40
2000 CAPSULE ORAL DAILY
COMMUNITY
End: 2022-04-19

## 2019-07-29 RX ORDER — ASCORBIC ACID 500 MG
500 TABLET ORAL NIGHTLY
COMMUNITY

## 2019-07-29 RX ORDER — GLIMEPIRIDE 2 MG/1
4 TABLET ORAL EVERY MORNING
COMMUNITY
End: 2019-09-09 | Stop reason: SDUPTHER

## 2019-07-29 RX ORDER — AMLODIPINE BESYLATE 10 MG/1
10 TABLET ORAL NIGHTLY
COMMUNITY
End: 2019-09-09 | Stop reason: SDUPTHER

## 2019-07-29 RX ORDER — ATENOLOL 100 MG/1
100 TABLET ORAL DAILY
COMMUNITY
End: 2019-09-09 | Stop reason: SDUPTHER

## 2019-07-29 RX ORDER — PYRIDOXINE HCL (VITAMIN B6) 100 MG
500 TABLET ORAL DAILY
COMMUNITY

## 2019-07-29 RX ORDER — FUROSEMIDE 20 MG/1
20 TABLET ORAL DAILY
COMMUNITY
End: 2019-09-09 | Stop reason: SDUPTHER

## 2019-07-29 RX ORDER — LISINOPRIL 20 MG/1
40 TABLET ORAL EVERY EVENING
COMMUNITY
End: 2019-08-12 | Stop reason: SDUPTHER

## 2019-07-29 RX ORDER — UBIDECARENONE 100 MG
200 CAPSULE ORAL EVERY EVENING
COMMUNITY

## 2019-07-29 ASSESSMENT — KOOS JR
KOOS JR SCORE: 4
KOOS JR SCORE: 76.332

## 2019-07-29 NOTE — DISCHARGE INSTRUCTIONS
Take the following medications the morning of surgery with a small sip of water: ATENOLOL        General Instructions:  • Do not eat solid food after midnight the night before surgery.  • You may drink clear liquids day of surgery but must stop at least one hour before your hospital arrival time.  • It is beneficial for you to have a clear drink that contains carbohydrates the day of surgery.We suggest  a 12 to 20 ounce bottle of G2 or Powerade Zero for diabetic patients.     Clear liquids are liquids you can see through.  Nothing red in color.     Plain water                               Sports drinks  Sodas                                   Gelatin (Jell-O)  Fruit juices without pulp such as white grape juice and apple juice  Popsicles that contain no fruit or yogurt  Tea or coffee (no cream or milk added)  Gatorade / Powerade  G2 / Powerade Zero      • Patients who avoid smoking, chewing tobacco and alcohol for 4 weeks prior to surgery have a reduced risk of post-operative complications.  Quit smoking as many days before surgery as you can.  • Do not smoke, use chewing tobacco or drink alcohol the day of surgery. .  • Bring any papers given to you in the doctor’s office.  • Wear clean comfortable clothes and socks.  • Do not wear contact lenses, false eyelashes or make-up.  Bring a case for your glasses.   • Bring crutches or walker if applicable.  • Remove all piercings.  Leave jewelry and any other valuables at home.  • The Pre-Admission Testing nurse will instruct you to bring medications if unable to obtain an accurate list in Pre-Admission Testing.            Preventing a Surgical Site Infection:  • For 2 to 3 days before surgery, avoid shaving with a razor because the razor can irritate skin and make it easier to develop an infection.    • Any areas of open skin can increase the risk of a post-operative wound infection by allowing bacteria to enter and travel throughout the body.  Notify your surgeon if  you have any skin wounds / rashes even if it is not near the expected surgical site.  The area will need assessed to determine if surgery should be delayed until it is healed.  • The night prior to surgery sleep in a clean bed with clean clothing.  Do not allow pets to sleep with you.  • Shower on the morning of surgery using a fresh bar of anti-bacterial soap (such as Dial) and clean washcloth.  Dry with a clean towel and dress in clean clothing.  • Ask your surgeon if you will be receiving antibiotics prior to surgery.  • Make sure you, your family, and all healthcare providers clean their hands with soap and water or an alcohol based hand  before caring for you or your wound.    Day of surgery: 8/5/2019. MAIN OR. ARRIVAL TIME 11 AM  Upon arrival, a Pre-op nurse and Anesthesiologist will review your health history, obtain vital signs, and answer questions you may have.  The only belongings needed at this time will be a list of your home medications and if applicable your C-PAP/BI-PAP machine.  If you are staying overnight your family can leave the rest of your belongings in the car and bring them to your room later.  A Pre-op nurse will start an IV and you may receive medication in preparation for surgery, including something to help you relax.  Your family will be able to see you in the Pre-op area.  While you are in surgery your family should notify the waiting room  if they leave the waiting room area and provide a contact phone number.    Please be aware that surgery does come with discomfort.  We want to make every effort to control your discomfort so please discuss any uncontrolled symptoms with your nurse.   Your doctor will most likely have prescribed pain medications.          If you are staying overnight following surgery, you will be transported to your hospital room following the recovery period.  [facility] has all private rooms.    You have received a list of  surgical assistants for your reference.  If you have any questions please call Pre-Admission Testing at [phone].  Deductibles and co-payments are collected on the day of service. Please be prepared to pay the required co-pay, deductible or deposit on the day of service as defined by your plan.          2% CHLORAHEXIDINE GLUCONATE* CLOTH  Preparing or “prepping” skin before surgery can reduce the risk of infection at the surgical site. To make the process easier, [facility] has chosen disposable cloths moistened with a rinse-free, 2% Chlorhexidine Gluconate (CHG) antiseptic solution. The steps below outline the prepping process and should be carefully followed.        Use the prep cloth on the area that is circled in the diagram             Directions Night before Surgery  1) Shower using a fresh bar of anti-bacterial soap (such as Dial) and clean washcloth.  Use a clean towel to completely dry your skin.  2) Do not use any lotions, oils or creams on your skin.  3) Open the package and remove 1 cloth, wipe your skin for 30 seconds in a circular motion.  Allow to dry for 3 minutes.  4) Repeat #3 with second cloth.  5) Do not touch your eyes, ears, or mouth with the prep cloth.  6) Allow the wet prep solution to air dry.  7) Discard the prep cloth and wash your hands with soap and water.   8) Dress in clean bed clothes and sleep on fresh clean bed sheets.   9) You may experience some temporary itching after the prep.    Directions Day of Surgery  1) Repeat steps 1,2,3,4,5,6,7, and 9.   2) Dress in clean clothes before coming to the hospital.    BACTROBAN NASAL OINTMENT  There are many germs normally in your nose. Bactroban is an ointment that will help reduce these germs. Please follow these instructions for Bactroban use:      _1___The day before surgery in the morning  Date_8/4_______    _2___The day before surgery in the evening              Date_8/4_______    _3___The day of surgery in the morning     Date_8/5_______    **Squirt ½ package of Bactroban Ointment onto a cotton applicator and apply to inside of 1st nostril.  Squirt the remaining Bactroban and apply to the inside of the other nostril.

## 2019-08-05 ENCOUNTER — ANESTHESIA EVENT (OUTPATIENT)
Dept: PERIOP | Facility: HOSPITAL | Age: 74
End: 2019-08-05

## 2019-08-05 ENCOUNTER — ANESTHESIA (OUTPATIENT)
Dept: PERIOP | Facility: HOSPITAL | Age: 74
End: 2019-08-05

## 2019-08-05 ENCOUNTER — HOSPITAL ENCOUNTER (INPATIENT)
Facility: HOSPITAL | Age: 74
LOS: 2 days | Discharge: HOME | End: 2019-08-07
Attending: ORTHOPAEDIC SURGERY | Admitting: ORTHOPAEDIC SURGERY

## 2019-08-05 ENCOUNTER — APPOINTMENT (OUTPATIENT)
Dept: GENERAL RADIOLOGY | Facility: HOSPITAL | Age: 74
End: 2019-08-05

## 2019-08-05 DIAGNOSIS — R26.2 DIFFICULTY WALKING: ICD-10-CM

## 2019-08-05 DIAGNOSIS — M17.12 PRIMARY OSTEOARTHRITIS OF LEFT KNEE: Primary | ICD-10-CM

## 2019-08-05 PROBLEM — M17.9 DJD (DEGENERATIVE JOINT DISEASE) OF KNEE: Status: ACTIVE | Noted: 2019-08-05

## 2019-08-05 LAB
GLUCOSE BLDC GLUCOMTR-MCNC: 160 MG/DL (ref 70–130)
GLUCOSE BLDC GLUCOMTR-MCNC: 182 MG/DL (ref 70–130)
GLUCOSE BLDC GLUCOMTR-MCNC: 209 MG/DL (ref 70–130)

## 2019-08-05 PROCEDURE — 25010000002 HYDROMORPHONE PER 4 MG: Performed by: ANESTHESIOLOGY

## 2019-08-05 PROCEDURE — C1776 JOINT DEVICE (IMPLANTABLE): HCPCS | Performed by: ORTHOPAEDIC SURGERY

## 2019-08-05 PROCEDURE — 25010000002 NEOSTIGMINE 0.5 MG/ML SOLUTION: Performed by: ANESTHESIOLOGY

## 2019-08-05 PROCEDURE — 25010000002 MIDAZOLAM PER 1 MG: Performed by: ANESTHESIOLOGY

## 2019-08-05 PROCEDURE — 25010000003 BUPIVACAINE LIPOSOME 1.3 % SUSPENSION 20 ML VIAL: Performed by: ORTHOPAEDIC SURGERY

## 2019-08-05 PROCEDURE — 25010000002 FENTANYL CITRATE (PF) 100 MCG/2ML SOLUTION: Performed by: NURSE ANESTHETIST, CERTIFIED REGISTERED

## 2019-08-05 PROCEDURE — 25010000003 CEFAZOLIN IN DEXTROSE 2-4 GM/100ML-% SOLUTION: Performed by: ORTHOPAEDIC SURGERY

## 2019-08-05 PROCEDURE — 25010000002 CEFAZOLIN PER 500 MG: Performed by: NURSE ANESTHETIST, CERTIFIED REGISTERED

## 2019-08-05 PROCEDURE — 82962 GLUCOSE BLOOD TEST: CPT

## 2019-08-05 PROCEDURE — 25010000002 ONDANSETRON PER 1 MG: Performed by: ANESTHESIOLOGY

## 2019-08-05 PROCEDURE — 0SRD0J9 REPLACEMENT OF LEFT KNEE JOINT WITH SYNTHETIC SUBSTITUTE, CEMENTED, OPEN APPROACH: ICD-10-PCS | Performed by: ORTHOPAEDIC SURGERY

## 2019-08-05 PROCEDURE — 25010000002 VANCOMYCIN 10 G RECONSTITUTED SOLUTION: Performed by: ORTHOPAEDIC SURGERY

## 2019-08-05 PROCEDURE — 25010000002 KETOROLAC TROMETHAMINE PER 15 MG: Performed by: ORTHOPAEDIC SURGERY

## 2019-08-05 PROCEDURE — 73560 X-RAY EXAM OF KNEE 1 OR 2: CPT

## 2019-08-05 PROCEDURE — C9290 INJ, BUPIVACAINE LIPOSOME: HCPCS | Performed by: ORTHOPAEDIC SURGERY

## 2019-08-05 PROCEDURE — 25010000002 PROPOFOL 10 MG/ML EMULSION: Performed by: NURSE ANESTHETIST, CERTIFIED REGISTERED

## 2019-08-05 PROCEDURE — C1713 ANCHOR/SCREW BN/BN,TIS/BN: HCPCS | Performed by: ORTHOPAEDIC SURGERY

## 2019-08-05 DEVICE — JOURNEY 7.5 ROUND RESURF PAT 32MM STANDARD
Type: IMPLANTABLE DEVICE | Site: KNEE | Status: FUNCTIONAL
Brand: JOURNEY

## 2019-08-05 DEVICE — JOURNEY II CR FEMORAL OXINIUM NONPOROUS LEFT SIZE 6
Type: IMPLANTABLE DEVICE | Site: KNEE | Status: FUNCTIONAL
Brand: JOURNEY

## 2019-08-05 DEVICE — JOURNEY TIBIAL BASEPLATE NONPOROUS                                    LEFT SIZE 4
Type: IMPLANTABLE DEVICE | Site: KNEE | Status: FUNCTIONAL
Brand: JOURNEY

## 2019-08-05 DEVICE — IMPLANTABLE DEVICE: Type: IMPLANTABLE DEVICE | Site: KNEE | Status: FUNCTIONAL

## 2019-08-05 DEVICE — JOURNEY II INSERT XLPE DEEP DISHED                                    LEFT SIZE 3-4 11MM
Type: IMPLANTABLE DEVICE | Site: KNEE | Status: FUNCTIONAL
Brand: JOURNEY

## 2019-08-05 DEVICE — CMT BONE PALACOS R HI/VISC 1X40: Type: IMPLANTABLE DEVICE | Site: KNEE | Status: FUNCTIONAL

## 2019-08-05 RX ORDER — GLYCOPYRROLATE 0.2 MG/ML
INJECTION INTRAMUSCULAR; INTRAVENOUS AS NEEDED
Status: DISCONTINUED | OUTPATIENT
Start: 2019-08-05 | End: 2019-08-05 | Stop reason: SURG

## 2019-08-05 RX ORDER — TRANEXAMIC ACID 100 MG/ML
INJECTION, SOLUTION INTRAVENOUS AS NEEDED
Status: DISCONTINUED | OUTPATIENT
Start: 2019-08-05 | End: 2019-08-05 | Stop reason: SURG

## 2019-08-05 RX ORDER — MAGNESIUM HYDROXIDE 1200 MG/15ML
LIQUID ORAL AS NEEDED
Status: DISCONTINUED | OUTPATIENT
Start: 2019-08-05 | End: 2019-08-05 | Stop reason: HOSPADM

## 2019-08-05 RX ORDER — GLIPIZIDE 5 MG/1
2.5 TABLET ORAL
Status: DISCONTINUED | OUTPATIENT
Start: 2019-08-06 | End: 2019-08-07 | Stop reason: HOSPADM

## 2019-08-05 RX ORDER — ONDANSETRON 4 MG/1
4 TABLET, FILM COATED ORAL EVERY 6 HOURS PRN
Status: DISCONTINUED | OUTPATIENT
Start: 2019-08-05 | End: 2019-08-07 | Stop reason: HOSPADM

## 2019-08-05 RX ORDER — MIDAZOLAM HYDROCHLORIDE 1 MG/ML
1 INJECTION INTRAMUSCULAR; INTRAVENOUS
Status: DISCONTINUED | OUTPATIENT
Start: 2019-08-05 | End: 2019-08-05 | Stop reason: HOSPADM

## 2019-08-05 RX ORDER — LANOLIN ALCOHOL/MO/W.PET/CERES
100 CREAM (GRAM) TOPICAL DAILY
Status: DISCONTINUED | OUTPATIENT
Start: 2019-08-05 | End: 2019-08-07 | Stop reason: HOSPADM

## 2019-08-05 RX ORDER — FENTANYL CITRATE 50 UG/ML
INJECTION, SOLUTION INTRAMUSCULAR; INTRAVENOUS AS NEEDED
Status: DISCONTINUED | OUTPATIENT
Start: 2019-08-05 | End: 2019-08-05 | Stop reason: SURG

## 2019-08-05 RX ORDER — ACETAMINOPHEN 325 MG/1
325 TABLET ORAL EVERY 4 HOURS PRN
Status: DISCONTINUED | OUTPATIENT
Start: 2019-08-05 | End: 2019-08-07 | Stop reason: HOSPADM

## 2019-08-05 RX ORDER — BISACODYL 5 MG/1
10 TABLET, DELAYED RELEASE ORAL DAILY PRN
Status: DISCONTINUED | OUTPATIENT
Start: 2019-08-05 | End: 2019-08-07 | Stop reason: HOSPADM

## 2019-08-05 RX ORDER — FAMOTIDINE 10 MG/ML
20 INJECTION, SOLUTION INTRAVENOUS ONCE
Status: COMPLETED | OUTPATIENT
Start: 2019-08-05 | End: 2019-08-05

## 2019-08-05 RX ORDER — NALOXONE HCL 0.4 MG/ML
0.1 VIAL (ML) INJECTION
Status: DISCONTINUED | OUTPATIENT
Start: 2019-08-05 | End: 2019-08-07 | Stop reason: HOSPADM

## 2019-08-05 RX ORDER — GLIMEPIRIDE 2 MG/1
3 TABLET ORAL EVERY EVENING
COMMUNITY
End: 2019-09-09 | Stop reason: SDUPTHER

## 2019-08-05 RX ORDER — GLIPIZIDE 5 MG/1
2.5 TABLET ORAL EVERY EVENING
Status: DISCONTINUED | OUTPATIENT
Start: 2019-08-05 | End: 2019-08-07 | Stop reason: HOSPADM

## 2019-08-05 RX ORDER — KETAMINE HYDROCHLORIDE 10 MG/ML
INJECTION INTRAMUSCULAR; INTRAVENOUS AS NEEDED
Status: DISCONTINUED | OUTPATIENT
Start: 2019-08-05 | End: 2019-08-05 | Stop reason: SURG

## 2019-08-05 RX ORDER — HYDROMORPHONE HYDROCHLORIDE 1 MG/ML
0.2 INJECTION, SOLUTION INTRAMUSCULAR; INTRAVENOUS; SUBCUTANEOUS
Status: DISCONTINUED | OUTPATIENT
Start: 2019-08-05 | End: 2019-08-05 | Stop reason: HOSPADM

## 2019-08-05 RX ORDER — SODIUM CHLORIDE, SODIUM LACTATE, POTASSIUM CHLORIDE, CALCIUM CHLORIDE 600; 310; 30; 20 MG/100ML; MG/100ML; MG/100ML; MG/100ML
100 INJECTION, SOLUTION INTRAVENOUS CONTINUOUS
Status: DISCONTINUED | OUTPATIENT
Start: 2019-08-05 | End: 2019-08-07 | Stop reason: HOSPADM

## 2019-08-05 RX ORDER — ACETAMINOPHEN 160 MG/5ML
650 SOLUTION ORAL EVERY 4 HOURS PRN
Status: DISCONTINUED | OUTPATIENT
Start: 2019-08-05 | End: 2019-08-07 | Stop reason: HOSPADM

## 2019-08-05 RX ORDER — HYDROMORPHONE HYDROCHLORIDE 1 MG/ML
0.5 INJECTION, SOLUTION INTRAMUSCULAR; INTRAVENOUS; SUBCUTANEOUS EVERY 4 HOURS PRN
Status: DISCONTINUED | OUTPATIENT
Start: 2019-08-05 | End: 2019-08-07 | Stop reason: HOSPADM

## 2019-08-05 RX ORDER — KETOROLAC TROMETHAMINE 30 MG/ML
15 INJECTION, SOLUTION INTRAMUSCULAR; INTRAVENOUS EVERY 6 HOURS
Status: DISPENSED | OUTPATIENT
Start: 2019-08-05 | End: 2019-08-06

## 2019-08-05 RX ORDER — ONDANSETRON 2 MG/ML
4 INJECTION INTRAMUSCULAR; INTRAVENOUS EVERY 6 HOURS PRN
Status: DISCONTINUED | OUTPATIENT
Start: 2019-08-05 | End: 2019-08-07 | Stop reason: HOSPADM

## 2019-08-05 RX ORDER — ASPIRIN 325 MG
325 TABLET, DELAYED RELEASE (ENTERIC COATED) ORAL DAILY
Status: DISCONTINUED | OUTPATIENT
Start: 2019-08-06 | End: 2019-08-07 | Stop reason: HOSPADM

## 2019-08-05 RX ORDER — SODIUM CHLORIDE 0.9 % (FLUSH) 0.9 %
1-10 SYRINGE (ML) INJECTION AS NEEDED
Status: DISCONTINUED | OUTPATIENT
Start: 2019-08-05 | End: 2019-08-07 | Stop reason: HOSPADM

## 2019-08-05 RX ORDER — OXYCODONE HYDROCHLORIDE AND ACETAMINOPHEN 5; 325 MG/1; MG/1
2 TABLET ORAL EVERY 4 HOURS PRN
Status: DISCONTINUED | OUTPATIENT
Start: 2019-08-05 | End: 2019-08-07 | Stop reason: HOSPADM

## 2019-08-05 RX ORDER — SODIUM CHLORIDE 0.9 % (FLUSH) 0.9 %
1-10 SYRINGE (ML) INJECTION AS NEEDED
Status: DISCONTINUED | OUTPATIENT
Start: 2019-08-05 | End: 2019-08-05 | Stop reason: HOSPADM

## 2019-08-05 RX ORDER — FENTANYL CITRATE 50 UG/ML
50 INJECTION, SOLUTION INTRAMUSCULAR; INTRAVENOUS
Status: DISCONTINUED | OUTPATIENT
Start: 2019-08-05 | End: 2019-08-05 | Stop reason: HOSPADM

## 2019-08-05 RX ORDER — ACETAMINOPHEN 650 MG/1
650 SUPPOSITORY RECTAL EVERY 4 HOURS PRN
Status: DISCONTINUED | OUTPATIENT
Start: 2019-08-05 | End: 2019-08-07 | Stop reason: HOSPADM

## 2019-08-05 RX ORDER — BISACODYL 10 MG
10 SUPPOSITORY, RECTAL RECTAL DAILY PRN
Status: DISCONTINUED | OUTPATIENT
Start: 2019-08-05 | End: 2019-08-07 | Stop reason: HOSPADM

## 2019-08-05 RX ORDER — CEFAZOLIN SODIUM 2 G/100ML
2 INJECTION, SOLUTION INTRAVENOUS EVERY 8 HOURS
Status: COMPLETED | OUTPATIENT
Start: 2019-08-05 | End: 2019-08-06

## 2019-08-05 RX ORDER — LIDOCAINE HYDROCHLORIDE 20 MG/ML
INJECTION, SOLUTION INFILTRATION; PERINEURAL AS NEEDED
Status: DISCONTINUED | OUTPATIENT
Start: 2019-08-05 | End: 2019-08-05 | Stop reason: SURG

## 2019-08-05 RX ORDER — AMLODIPINE BESYLATE 10 MG/1
10 TABLET ORAL NIGHTLY
Status: DISCONTINUED | OUTPATIENT
Start: 2019-08-05 | End: 2019-08-07 | Stop reason: HOSPADM

## 2019-08-05 RX ORDER — ONDANSETRON 2 MG/ML
INJECTION INTRAMUSCULAR; INTRAVENOUS AS NEEDED
Status: DISCONTINUED | OUTPATIENT
Start: 2019-08-05 | End: 2019-08-05 | Stop reason: SURG

## 2019-08-05 RX ORDER — ACETAMINOPHEN 500 MG
1000 TABLET ORAL ONCE
Status: COMPLETED | OUTPATIENT
Start: 2019-08-05 | End: 2019-08-05

## 2019-08-05 RX ORDER — MIDAZOLAM HYDROCHLORIDE 1 MG/ML
2 INJECTION INTRAMUSCULAR; INTRAVENOUS
Status: DISCONTINUED | OUTPATIENT
Start: 2019-08-05 | End: 2019-08-05 | Stop reason: HOSPADM

## 2019-08-05 RX ORDER — FUROSEMIDE 20 MG/1
20 TABLET ORAL DAILY
Status: DISCONTINUED | OUTPATIENT
Start: 2019-08-05 | End: 2019-08-07 | Stop reason: HOSPADM

## 2019-08-05 RX ORDER — OXYCODONE HYDROCHLORIDE AND ACETAMINOPHEN 5; 325 MG/1; MG/1
1 TABLET ORAL EVERY 4 HOURS PRN
Status: DISCONTINUED | OUTPATIENT
Start: 2019-08-05 | End: 2019-08-07 | Stop reason: HOSPADM

## 2019-08-05 RX ORDER — SODIUM CHLORIDE 0.9 % (FLUSH) 0.9 %
3 SYRINGE (ML) INJECTION EVERY 12 HOURS SCHEDULED
Status: DISCONTINUED | OUTPATIENT
Start: 2019-08-05 | End: 2019-08-07 | Stop reason: HOSPADM

## 2019-08-05 RX ORDER — CEFAZOLIN SODIUM 500 MG/2.2ML
INJECTION, POWDER, FOR SOLUTION INTRAMUSCULAR; INTRAVENOUS AS NEEDED
Status: DISCONTINUED | OUTPATIENT
Start: 2019-08-05 | End: 2019-08-05 | Stop reason: SURG

## 2019-08-05 RX ORDER — LIDOCAINE HYDROCHLORIDE 10 MG/ML
0.5 INJECTION, SOLUTION EPIDURAL; INFILTRATION; INTRACAUDAL; PERINEURAL ONCE AS NEEDED
Status: DISCONTINUED | OUTPATIENT
Start: 2019-08-05 | End: 2019-08-05 | Stop reason: HOSPADM

## 2019-08-05 RX ORDER — ACETAMINOPHEN 500 MG
1500 TABLET ORAL EVERY 6 HOURS PRN
COMMUNITY
End: 2021-06-28

## 2019-08-05 RX ORDER — SODIUM CHLORIDE, SODIUM LACTATE, POTASSIUM CHLORIDE, CALCIUM CHLORIDE 600; 310; 30; 20 MG/100ML; MG/100ML; MG/100ML; MG/100ML
9 INJECTION, SOLUTION INTRAVENOUS CONTINUOUS
Status: DISCONTINUED | OUTPATIENT
Start: 2019-08-05 | End: 2019-08-07 | Stop reason: HOSPADM

## 2019-08-05 RX ORDER — PROPOFOL 10 MG/ML
VIAL (ML) INTRAVENOUS AS NEEDED
Status: DISCONTINUED | OUTPATIENT
Start: 2019-08-05 | End: 2019-08-05 | Stop reason: SURG

## 2019-08-05 RX ORDER — LISINOPRIL 40 MG/1
40 TABLET ORAL EVERY EVENING
Status: DISCONTINUED | OUTPATIENT
Start: 2019-08-05 | End: 2019-08-07 | Stop reason: HOSPADM

## 2019-08-05 RX ORDER — FERROUS SULFATE 325(65) MG
325 TABLET ORAL
Status: DISCONTINUED | OUTPATIENT
Start: 2019-08-06 | End: 2019-08-07 | Stop reason: HOSPADM

## 2019-08-05 RX ORDER — ATENOLOL 50 MG/1
100 TABLET ORAL DAILY
Status: DISCONTINUED | OUTPATIENT
Start: 2019-08-06 | End: 2019-08-07 | Stop reason: HOSPADM

## 2019-08-05 RX ORDER — ROCURONIUM BROMIDE 10 MG/ML
INJECTION, SOLUTION INTRAVENOUS AS NEEDED
Status: DISCONTINUED | OUTPATIENT
Start: 2019-08-05 | End: 2019-08-05 | Stop reason: SURG

## 2019-08-05 RX ORDER — ASCORBIC ACID 500 MG
500 TABLET ORAL NIGHTLY
Status: DISCONTINUED | OUTPATIENT
Start: 2019-08-05 | End: 2019-08-07 | Stop reason: HOSPADM

## 2019-08-05 RX ORDER — ACETAMINOPHEN 325 MG/1
650 TABLET ORAL EVERY 4 HOURS PRN
Status: DISCONTINUED | OUTPATIENT
Start: 2019-08-05 | End: 2019-08-07 | Stop reason: HOSPADM

## 2019-08-05 RX ADMIN — VANCOMYCIN HYDROCHLORIDE 2000 MG: 10 INJECTION, POWDER, LYOPHILIZED, FOR SOLUTION INTRAVENOUS at 12:34

## 2019-08-05 RX ADMIN — LISINOPRIL 40 MG: 40 TABLET ORAL at 21:51

## 2019-08-05 RX ADMIN — OXYCODONE HYDROCHLORIDE AND ACETAMINOPHEN 500 MG: 500 TABLET ORAL at 21:50

## 2019-08-05 RX ADMIN — METFORMIN HYDROCHLORIDE 1000 MG: 1000 TABLET ORAL at 21:51

## 2019-08-05 RX ADMIN — KETOROLAC TROMETHAMINE 15 MG: 30 INJECTION, SOLUTION INTRAMUSCULAR at 16:52

## 2019-08-05 RX ADMIN — HYDROMORPHONE HYDROCHLORIDE 0.2 MG: 1 INJECTION, SOLUTION INTRAMUSCULAR; INTRAVENOUS; SUBCUTANEOUS at 17:33

## 2019-08-05 RX ADMIN — CEFAZOLIN SODIUM 2 G: 2 INJECTION, SOLUTION INTRAVENOUS at 21:49

## 2019-08-05 RX ADMIN — GLYCOPYRROLATE 0.6 MG: 0.2 INJECTION INTRAMUSCULAR; INTRAVENOUS at 15:57

## 2019-08-05 RX ADMIN — FAMOTIDINE 20 MG: 10 INJECTION INTRAVENOUS at 13:03

## 2019-08-05 RX ADMIN — TRANEXAMIC ACID 1000 MG: 100 INJECTION, SOLUTION INTRAVENOUS at 15:45

## 2019-08-05 RX ADMIN — AMLODIPINE BESYLATE 10 MG: 10 TABLET ORAL at 21:51

## 2019-08-05 RX ADMIN — KETAMINE HYDROCHLORIDE 50 MG: 10 INJECTION INTRAMUSCULAR; INTRAVENOUS at 14:43

## 2019-08-05 RX ADMIN — NEOSTIGMINE METHYLSULFATE 3 MG: 5 INJECTION, SOLUTION INTRAMUSCULAR; INTRAVENOUS; SUBCUTANEOUS at 15:57

## 2019-08-05 RX ADMIN — ACETAMINOPHEN 1000 MG: 500 TABLET, FILM COATED ORAL at 13:02

## 2019-08-05 RX ADMIN — SODIUM CHLORIDE, POTASSIUM CHLORIDE, SODIUM LACTATE AND CALCIUM CHLORIDE 9 ML/HR: 600; 310; 30; 20 INJECTION, SOLUTION INTRAVENOUS at 12:26

## 2019-08-05 RX ADMIN — ROCURONIUM BROMIDE 40 MG: 10 INJECTION INTRAVENOUS at 14:34

## 2019-08-05 RX ADMIN — SODIUM CHLORIDE, POTASSIUM CHLORIDE, SODIUM LACTATE AND CALCIUM CHLORIDE 100 ML/HR: 600; 310; 30; 20 INJECTION, SOLUTION INTRAVENOUS at 21:54

## 2019-08-05 RX ADMIN — FENTANYL CITRATE 150 MCG: 50 INJECTION, SOLUTION INTRAMUSCULAR; INTRAVENOUS at 14:34

## 2019-08-05 RX ADMIN — ONDANSETRON 4 MG: 2 INJECTION INTRAMUSCULAR; INTRAVENOUS at 15:58

## 2019-08-05 RX ADMIN — LIDOCAINE HYDROCHLORIDE 80 MG: 20 INJECTION, SOLUTION INFILTRATION; PERINEURAL at 14:34

## 2019-08-05 RX ADMIN — CEFAZOLIN SODIUM 2 G: 500 INJECTION, POWDER, FOR SOLUTION INTRAMUSCULAR; INTRAVENOUS at 14:45

## 2019-08-05 RX ADMIN — PROPOFOL 150 MG: 10 INJECTION, EMULSION INTRAVENOUS at 14:34

## 2019-08-05 RX ADMIN — OXYCODONE AND ACETAMINOPHEN 1 TABLET: 5; 325 TABLET ORAL at 19:59

## 2019-08-05 RX ADMIN — FENTANYL CITRATE 100 MCG: 50 INJECTION, SOLUTION INTRAMUSCULAR; INTRAVENOUS at 15:45

## 2019-08-05 RX ADMIN — GLIPIZIDE 2.5 MG: 5 TABLET ORAL at 21:51

## 2019-08-05 RX ADMIN — HYDROMORPHONE HYDROCHLORIDE 0.2 MG: 1 INJECTION, SOLUTION INTRAMUSCULAR; INTRAVENOUS; SUBCUTANEOUS at 17:23

## 2019-08-05 RX ADMIN — MIDAZOLAM 1 MG: 1 INJECTION INTRAMUSCULAR; INTRAVENOUS at 13:03

## 2019-08-05 NOTE — ANESTHESIA POSTPROCEDURE EVALUATION
"Patient: Philly Person    Procedure Summary     Date:  08/05/19 Room / Location:  [facility] OR [address] [place] [address] MAIN OR    Anesthesia Start:  1427 Anesthesia Stop:  1624    Procedure:  LEFT TOTAL KNEE ARTHROPLASTY (Left Knee) Diagnosis:      Surgeon:  Magdaleno Saleh MD Provider:  Rosario Sanchez MD    Anesthesia Type:  general ASA Status:  3          Anesthesia Type: general  Last vitals  BP   148/68 (08/05/19 1823)   Temp   36 °C (96.8 °F) (08/05/19 1823)   Pulse   68 (08/05/19 1823)   Resp   16 (08/05/19 1823)     SpO2   94 % (08/05/19 1823)     Post Anesthesia Care and Evaluation    Patient location during evaluation: bedside  Patient participation: complete - patient participated  Level of consciousness: awake and alert  Pain management: adequate  Airway patency: patent  Anesthetic complications: No anesthetic complications  PONV Status: none  Cardiovascular status: acceptable  Respiratory status: acceptable  Hydration status: acceptable    Comments: /68 (BP Location: Right arm, Patient Position: Lying)   Pulse 68   Temp 36 °C (96.8 °F) (Oral)   Resp 16   Ht 160 cm (63\")   Wt 113 kg (249 lb 4.8 oz)   SpO2 94%   BMI 44.16 kg/m²         "

## 2019-08-05 NOTE — ANESTHESIA PREPROCEDURE EVALUATION
Anesthesia Evaluation     Patient summary reviewed   NPO Solid Status: > 8 hours  NPO Liquid Status: > 6 hours           Airway   Mallampati: I  TM distance: >3 FB  Dental      Pulmonary    Cardiovascular     ECG reviewed  Rhythm: regular  Rate: normal    (+) hypertension, hyperlipidemia,       Neuro/Psych  GI/Hepatic/Renal/Endo    (+) morbid obesity,  diabetes mellitus type 2,     Musculoskeletal     Abdominal    Substance History      OB/GYN          Other   (+) arthritis   history of cancer remission                    Anesthesia Plan    ASA 3     general     intravenous induction   Anesthetic plan, all risks, benefits, and alternatives have been provided, discussed and informed consent has been obtained with: patient.

## 2019-08-05 NOTE — ANESTHESIA PROCEDURE NOTES
Airway  Urgency: elective    Date/Time: 8/5/2019 2:54 PM  End Time:8/5/2019 2:54 PM  Airway not difficult    General Information and Staff    Patient location during procedure: OR  Anesthesiologist: Rosario Sanchez MD  CRNA: Ros Cervantes CRNA    Indications and Patient Condition  Indications for airway management: airway protection    Preoxygenated: yes  MILS maintained throughout  Mask difficulty assessment: 1 - vent by mask    Final Airway Details  Final airway type: endotracheal airway      Successful airway: ETT  Cuffed: yes   Successful intubation technique: direct laryngoscopy  Facilitating devices/methods: intubating stylet  Endotracheal tube insertion site: oral  Blade: Contreras  Blade size: 2  ETT size (mm): 7.0  Cormack-Lehane Classification: grade I - full view of glottis  Placement verified by: chest auscultation and capnometry   Measured from: lips  Number of attempts at approach: 1    Additional Comments  Atraumatic, Secured after verification of placement

## 2019-08-06 LAB
ANION GAP SERPL CALCULATED.3IONS-SCNC: 11.8 MMOL/L (ref 5–15)
BUN BLD-MCNC: 15 MG/DL (ref 8–23)
BUN/CREAT SERPL: 12.3 (ref 7–25)
CALCIUM SPEC-SCNC: 8.3 MG/DL (ref 8.6–10.5)
CHLORIDE SERPL-SCNC: 101 MMOL/L (ref 98–107)
CO2 SERPL-SCNC: 23.2 MMOL/L (ref 22–29)
CREAT BLD-MCNC: 1.22 MG/DL (ref 0.57–1)
GFR SERPL CREATININE-BSD FRML MDRD: 43 ML/MIN/1.73
GLUCOSE BLD-MCNC: 155 MG/DL (ref 65–99)
GLUCOSE BLDC GLUCOMTR-MCNC: 159 MG/DL (ref 70–130)
GLUCOSE BLDC GLUCOMTR-MCNC: 193 MG/DL (ref 70–130)
GLUCOSE BLDC GLUCOMTR-MCNC: 211 MG/DL (ref 70–130)
GLUCOSE BLDC GLUCOMTR-MCNC: 218 MG/DL (ref 70–130)
HCT VFR BLD AUTO: 30.3 % (ref 34–46.6)
HGB BLD-MCNC: 9.8 G/DL (ref 12–15.9)
POTASSIUM BLD-SCNC: 5 MMOL/L (ref 3.5–5.2)
SODIUM BLD-SCNC: 136 MMOL/L (ref 136–145)

## 2019-08-06 PROCEDURE — 80048 BASIC METABOLIC PNL TOTAL CA: CPT | Performed by: ORTHOPAEDIC SURGERY

## 2019-08-06 PROCEDURE — 63710000001 INSULIN LISPRO (HUMAN) PER 5 UNITS: Performed by: HOSPITALIST

## 2019-08-06 PROCEDURE — 97150 GROUP THERAPEUTIC PROCEDURES: CPT

## 2019-08-06 PROCEDURE — 85018 HEMOGLOBIN: CPT | Performed by: ORTHOPAEDIC SURGERY

## 2019-08-06 PROCEDURE — 97110 THERAPEUTIC EXERCISES: CPT

## 2019-08-06 PROCEDURE — 25010000002 ONDANSETRON PER 1 MG: Performed by: ORTHOPAEDIC SURGERY

## 2019-08-06 PROCEDURE — 25010000003 CEFAZOLIN IN DEXTROSE 2-4 GM/100ML-% SOLUTION: Performed by: ORTHOPAEDIC SURGERY

## 2019-08-06 PROCEDURE — 97162 PT EVAL MOD COMPLEX 30 MIN: CPT

## 2019-08-06 PROCEDURE — 85014 HEMATOCRIT: CPT | Performed by: ORTHOPAEDIC SURGERY

## 2019-08-06 PROCEDURE — 82962 GLUCOSE BLOOD TEST: CPT

## 2019-08-06 RX ORDER — NICOTINE POLACRILEX 4 MG
15 LOZENGE BUCCAL
Status: DISCONTINUED | OUTPATIENT
Start: 2019-08-06 | End: 2019-08-07 | Stop reason: HOSPADM

## 2019-08-06 RX ORDER — DEXTROSE MONOHYDRATE 25 G/50ML
25 INJECTION, SOLUTION INTRAVENOUS
Status: DISCONTINUED | OUTPATIENT
Start: 2019-08-06 | End: 2019-08-07 | Stop reason: HOSPADM

## 2019-08-06 RX ADMIN — FERROUS SULFATE TAB 325 MG (65 MG ELEMENTAL FE) 325 MG: 325 (65 FE) TAB at 08:12

## 2019-08-06 RX ADMIN — AMLODIPINE BESYLATE 10 MG: 10 TABLET ORAL at 22:43

## 2019-08-06 RX ADMIN — GLIPIZIDE 2.5 MG: 5 TABLET ORAL at 17:13

## 2019-08-06 RX ADMIN — SODIUM CHLORIDE, PRESERVATIVE FREE 3 ML: 5 INJECTION INTRAVENOUS at 09:10

## 2019-08-06 RX ADMIN — FUROSEMIDE 20 MG: 20 TABLET ORAL at 08:12

## 2019-08-06 RX ADMIN — SODIUM CHLORIDE, PRESERVATIVE FREE 3 ML: 5 INJECTION INTRAVENOUS at 22:44

## 2019-08-06 RX ADMIN — INSULIN LISPRO 2 UNITS: 100 INJECTION, SOLUTION INTRAVENOUS; SUBCUTANEOUS at 17:13

## 2019-08-06 RX ADMIN — INSULIN LISPRO 4 UNITS: 100 INJECTION, SOLUTION INTRAVENOUS; SUBCUTANEOUS at 22:44

## 2019-08-06 RX ADMIN — METFORMIN HYDROCHLORIDE 1000 MG: 1000 TABLET ORAL at 08:12

## 2019-08-06 RX ADMIN — OXYCODONE AND ACETAMINOPHEN 1 TABLET: 5; 325 TABLET ORAL at 04:02

## 2019-08-06 RX ADMIN — OXYCODONE AND ACETAMINOPHEN 2 TABLET: 5; 325 TABLET ORAL at 12:17

## 2019-08-06 RX ADMIN — ATENOLOL 100 MG: 50 TABLET ORAL at 08:12

## 2019-08-06 RX ADMIN — ASPIRIN 325 MG: 325 TABLET, COATED ORAL at 08:12

## 2019-08-06 RX ADMIN — CEFAZOLIN SODIUM 2 G: 2 INJECTION, SOLUTION INTRAVENOUS at 06:28

## 2019-08-06 RX ADMIN — OXYCODONE HYDROCHLORIDE AND ACETAMINOPHEN 500 MG: 500 TABLET ORAL at 22:44

## 2019-08-06 RX ADMIN — METFORMIN HYDROCHLORIDE 1000 MG: 1000 TABLET ORAL at 17:12

## 2019-08-06 RX ADMIN — GLIPIZIDE 2.5 MG: 5 TABLET ORAL at 08:12

## 2019-08-06 RX ADMIN — OXYCODONE AND ACETAMINOPHEN 1 TABLET: 5; 325 TABLET ORAL at 22:44

## 2019-08-06 RX ADMIN — OXYCODONE AND ACETAMINOPHEN 1 TABLET: 5; 325 TABLET ORAL at 08:13

## 2019-08-06 RX ADMIN — LISINOPRIL 40 MG: 40 TABLET ORAL at 17:12

## 2019-08-06 RX ADMIN — OXYCODONE AND ACETAMINOPHEN 1 TABLET: 5; 325 TABLET ORAL at 17:13

## 2019-08-06 RX ADMIN — Medication 100 MG: at 08:12

## 2019-08-06 RX ADMIN — ONDANSETRON HYDROCHLORIDE 4 MG: 2 SOLUTION INTRAMUSCULAR; INTRAVENOUS at 12:17

## 2019-08-06 RX ADMIN — INSULIN LISPRO 4 UNITS: 100 INJECTION, SOLUTION INTRAVENOUS; SUBCUTANEOUS at 12:17

## 2019-08-06 RX ADMIN — INSULIN LISPRO 2 UNITS: 100 INJECTION, SOLUTION INTRAVENOUS; SUBCUTANEOUS at 08:23

## 2019-08-06 RX ADMIN — OXYCODONE AND ACETAMINOPHEN 1 TABLET: 5; 325 TABLET ORAL at 00:01

## 2019-08-07 VITALS
RESPIRATION RATE: 18 BRPM | WEIGHT: 249.3 LBS | BODY MASS INDEX: 44.17 KG/M2 | SYSTOLIC BLOOD PRESSURE: 176 MMHG | OXYGEN SATURATION: 92 % | TEMPERATURE: 99.1 F | HEIGHT: 63 IN | DIASTOLIC BLOOD PRESSURE: 65 MMHG | HEART RATE: 83 BPM

## 2019-08-07 LAB
ANION GAP SERPL CALCULATED.3IONS-SCNC: 8.9 MMOL/L (ref 5–15)
BUN BLD-MCNC: 15 MG/DL (ref 8–23)
BUN/CREAT SERPL: 13.3 (ref 7–25)
CALCIUM SPEC-SCNC: 8.4 MG/DL (ref 8.6–10.5)
CHLORIDE SERPL-SCNC: 102 MMOL/L (ref 98–107)
CO2 SERPL-SCNC: 23.1 MMOL/L (ref 22–29)
CREAT BLD-MCNC: 1.13 MG/DL (ref 0.57–1)
GFR SERPL CREATININE-BSD FRML MDRD: 47 ML/MIN/1.73
GLUCOSE BLD-MCNC: 128 MG/DL (ref 65–99)
GLUCOSE BLDC GLUCOMTR-MCNC: 169 MG/DL (ref 70–130)
GLUCOSE BLDC GLUCOMTR-MCNC: 211 MG/DL (ref 70–130)
HCT VFR BLD AUTO: 28.5 % (ref 34–46.6)
HGB BLD-MCNC: 9.2 G/DL (ref 12–15.9)
POTASSIUM BLD-SCNC: 4.2 MMOL/L (ref 3.5–5.2)
SODIUM BLD-SCNC: 134 MMOL/L (ref 136–145)

## 2019-08-07 PROCEDURE — 97150 GROUP THERAPEUTIC PROCEDURES: CPT

## 2019-08-07 PROCEDURE — 82962 GLUCOSE BLOOD TEST: CPT

## 2019-08-07 PROCEDURE — 85018 HEMOGLOBIN: CPT | Performed by: HOSPITALIST

## 2019-08-07 PROCEDURE — 63710000001 INSULIN LISPRO (HUMAN) PER 5 UNITS: Performed by: HOSPITALIST

## 2019-08-07 PROCEDURE — 85014 HEMATOCRIT: CPT | Performed by: HOSPITALIST

## 2019-08-07 PROCEDURE — 97110 THERAPEUTIC EXERCISES: CPT

## 2019-08-07 PROCEDURE — 80048 BASIC METABOLIC PNL TOTAL CA: CPT | Performed by: ORTHOPAEDIC SURGERY

## 2019-08-07 RX ORDER — OXYCODONE HYDROCHLORIDE AND ACETAMINOPHEN 5; 325 MG/1; MG/1
1-2 TABLET ORAL EVERY 4 HOURS PRN
Qty: 60 TABLET | Refills: 0 | Status: SHIPPED | OUTPATIENT
Start: 2019-08-07 | End: 2019-08-15

## 2019-08-07 RX ADMIN — OXYCODONE AND ACETAMINOPHEN 1 TABLET: 5; 325 TABLET ORAL at 08:27

## 2019-08-07 RX ADMIN — OXYCODONE AND ACETAMINOPHEN 1 TABLET: 5; 325 TABLET ORAL at 16:24

## 2019-08-07 RX ADMIN — Medication 100 MG: at 08:26

## 2019-08-07 RX ADMIN — METFORMIN HYDROCHLORIDE 1000 MG: 1000 TABLET ORAL at 08:26

## 2019-08-07 RX ADMIN — SODIUM CHLORIDE, PRESERVATIVE FREE 3 ML: 5 INJECTION INTRAVENOUS at 08:32

## 2019-08-07 RX ADMIN — LISINOPRIL 40 MG: 40 TABLET ORAL at 16:24

## 2019-08-07 RX ADMIN — BISACODYL 10 MG: 5 TABLET ORAL at 12:13

## 2019-08-07 RX ADMIN — FERROUS SULFATE TAB 325 MG (65 MG ELEMENTAL FE) 325 MG: 325 (65 FE) TAB at 08:26

## 2019-08-07 RX ADMIN — GLIPIZIDE 2.5 MG: 5 TABLET ORAL at 17:10

## 2019-08-07 RX ADMIN — INSULIN LISPRO 2 UNITS: 100 INJECTION, SOLUTION INTRAVENOUS; SUBCUTANEOUS at 12:12

## 2019-08-07 RX ADMIN — METFORMIN HYDROCHLORIDE 1000 MG: 1000 TABLET ORAL at 17:10

## 2019-08-07 RX ADMIN — INSULIN LISPRO 4 UNITS: 100 INJECTION, SOLUTION INTRAVENOUS; SUBCUTANEOUS at 17:10

## 2019-08-07 RX ADMIN — ASPIRIN 325 MG: 325 TABLET, COATED ORAL at 08:26

## 2019-08-07 RX ADMIN — OXYCODONE AND ACETAMINOPHEN 1 TABLET: 5; 325 TABLET ORAL at 04:19

## 2019-08-07 RX ADMIN — GLIPIZIDE 2.5 MG: 5 TABLET ORAL at 08:27

## 2019-08-08 ENCOUNTER — READMISSION MANAGEMENT (OUTPATIENT)
Dept: CALL CENTER | Facility: HOSPITAL | Age: 74
End: 2019-08-08

## 2019-08-08 NOTE — OUTREACH NOTE
Prep Survey      Responses   Facility patient discharged from?  [facility]   Is patient eligible?  Yes   Discharge diagnosis  Left Total Knee Arthroplasty   Does the patient have one of the following disease processes/diagnoses(primary or secondary)?  Total Joint Replacement   Does the patient have Home health ordered?  No   What is the Home health agency?   [facility].    Is there a DME ordered?  No   Prep survey completed?  Yes          Sulema De Leon RN

## 2019-08-09 ENCOUNTER — READMISSION MANAGEMENT (OUTPATIENT)
Dept: CALL CENTER | Facility: HOSPITAL | Age: 74
End: 2019-08-09

## 2019-08-09 ENCOUNTER — EPISODE CHANGES (OUTPATIENT)
Dept: CASE MANAGEMENT | Facility: OTHER | Age: 74
End: 2019-08-09

## 2019-08-12 ENCOUNTER — READMISSION MANAGEMENT (OUTPATIENT)
Dept: CALL CENTER | Facility: HOSPITAL | Age: 74
End: 2019-08-12

## 2019-08-12 NOTE — OUTREACH NOTE
Total Joint Week 1 Survey      Responses   Facility patient discharged from?  [place]   Does the patient have one of the following disease processes/diagnoses(primary or secondary)?  Total Joint Replacement   Is there a successful TCM telephone encounter documented?  No   Joint surgery performed?  Knee   Week 1 attempt successful?  No   Unsuccessful attempts  Attempt 2          Kirti Robertson RN

## 2019-08-13 RX ORDER — LISINOPRIL 20 MG/1
TABLET ORAL
Qty: 180 TABLET | Refills: 1 | Status: SHIPPED | OUTPATIENT
Start: 2019-08-13 | End: 2019-09-09 | Stop reason: SDUPTHER

## 2019-08-14 ENCOUNTER — READMISSION MANAGEMENT (OUTPATIENT)
Dept: CALL CENTER | Facility: HOSPITAL | Age: 74
End: 2019-08-14

## 2019-08-14 NOTE — OUTREACH NOTE
Total Joint Week 2 Survey      Responses   Facility patient discharged from?  [facility]   Does the patient have one of the following disease processes/diagnoses(primary or secondary)?  Total Joint Replacement   Joint surgery performed?  Knee   Week 2 attempt successful?  No   Unsuccessful attempts  Attempt 1          Jeanie Dunlap RN

## 2019-08-15 ENCOUNTER — READMISSION MANAGEMENT (OUTPATIENT)
Dept: CALL CENTER | Facility: HOSPITAL | Age: 74
End: 2019-08-15

## 2019-08-15 NOTE — OUTREACH NOTE
Total Joint Week 2 Survey      Responses   Facility patient discharged from?  [place]   Does the patient have one of the following disease processes/diagnoses(primary or secondary)?  Total Joint Replacement   Joint surgery performed?  Knee   Week 2 attempt successful?  Yes   Call start time  1044   Call end time  1054   Has the patient been back in either the hospital or Emergency Department since discharge?  No   Discharge diagnosis  Left Total Knee Arthroplasty   Does the patient have all medications related to this admission filled (includes all antibiotics, pain medications, etc.)  Yes   Is the patient taking all medications as directed (includes completed medication regime)?  Yes   Is the patient able to teach back alternate methods of pain control?  Ice, Knee-elevation/no pillow under knee   Has the patient kept scheduled appointments due by today?  N/A   What is the Home health agency?   [facility].    Psychosocial issues?  No   Has the patient began therapy sessions (either in the home or as an out patient)?  Yes   Has the patient fallen since discharge?  No   What is the patient's perception of their functional status since discharge?  Improving   Is the patient able to teach back signs and symptoms of infection?  Temp >100.4 for 24h or longer, Incisional drainage, Severe discomfort or pain, Changes in mobility   Is the patient able to teach back how to prevent infection?  Check incision daily, Wash hands before and after touching incision, Keep incision covered if drainage, Keep incision covered if pets in house, Shower only as directed by surgeon, Eat well-balanced diet, No tub baths, hot tub or swimming, No lotion or creams   Is the patient able to teach back signs and symptoms of DVT?  Redness in calf, Area hot to touch, Shortness of breath or chest pain, Swelling in calf   Is the patient able to teach back home safety measures?  Accessibility to necessary areas in home   Did the patient  implement home safety suggestions from pre-surgery classes if attended?  N/A   Is the patient/caregiver able to teach back the hierarchy of who to call/visit for symptoms/problems? PCP, Specialist, Home health nurse, Urgent Care, ED, 911  Yes   Additional teach back comments  pt doing well. having some pain and taking meds at night. Has dressing in place and PT has been coming in to see patient.   Week 2 call completed?  Yes          Krystal Cameron RN

## 2019-08-29 ENCOUNTER — READMISSION MANAGEMENT (OUTPATIENT)
Dept: CALL CENTER | Facility: HOSPITAL | Age: 74
End: 2019-08-29

## 2019-08-29 NOTE — OUTREACH NOTE
Total Joint Month 1 Survey      Responses   Facility patient discharged from?  [place]   Does the patient have one of the following disease processes/diagnoses(primary or secondary)?  Total Joint Replacement   Joint surgery performed?  Knee   Month 1 attempt successful?  No   Unsuccessful attempts  Attempt 1          Kalani Kim RN

## 2019-08-30 ENCOUNTER — READMISSION MANAGEMENT (OUTPATIENT)
Dept: CALL CENTER | Facility: HOSPITAL | Age: 74
End: 2019-08-30

## 2019-08-30 NOTE — OUTREACH NOTE
Total Joint Month 1 Survey      Responses   Facility patient discharged from?  [place]   Does the patient have one of the following disease processes/diagnoses(primary or secondary)?  Total Joint Replacement   Joint surgery performed?  Knee   Month 1 attempt successful?  No   Unsuccessful attempts  Attempt 2          Rosario Munroe RN

## 2019-09-03 DIAGNOSIS — R80.9 TYPE 2 DIABETES MELLITUS WITH MICROALBUMINURIA, WITHOUT LONG-TERM CURRENT USE OF INSULIN (HCC): ICD-10-CM

## 2019-09-03 DIAGNOSIS — E78.00 HYPERCHOLESTEROLEMIA: ICD-10-CM

## 2019-09-03 DIAGNOSIS — D64.9 ANEMIA, UNSPECIFIED TYPE: ICD-10-CM

## 2019-09-03 DIAGNOSIS — M10.9 GOUT, UNSPECIFIED CAUSE, UNSPECIFIED CHRONICITY, UNSPECIFIED SITE: ICD-10-CM

## 2019-09-03 DIAGNOSIS — E11.29 TYPE 2 DIABETES MELLITUS WITH MICROALBUMINURIA, WITHOUT LONG-TERM CURRENT USE OF INSULIN (HCC): ICD-10-CM

## 2019-09-03 DIAGNOSIS — I10 BENIGN ESSENTIAL HYPERTENSION: Primary | ICD-10-CM

## 2019-09-05 ENCOUNTER — EPISODE CHANGES (OUTPATIENT)
Dept: CASE MANAGEMENT | Facility: OTHER | Age: 74
End: 2019-09-05

## 2019-09-05 LAB
BUN SERPL-MCNC: 15 MG/DL (ref 8–23)
BUN/CREAT SERPL: 16.5 (ref 7–25)
CALCIUM SERPL-MCNC: 9.8 MG/DL (ref 8.6–10.5)
CHLORIDE SERPL-SCNC: 99 MMOL/L (ref 98–107)
CO2 SERPL-SCNC: 26.2 MMOL/L (ref 22–29)
CREAT SERPL-MCNC: 0.91 MG/DL (ref 0.57–1)
ERYTHROCYTE [DISTWIDTH] IN BLOOD BY AUTOMATED COUNT: 14.3 % (ref 12.3–15.4)
GLUCOSE SERPL-MCNC: 102 MG/DL (ref 65–99)
HBA1C MFR BLD: 6.1 % (ref 4.8–5.6)
HCT VFR BLD AUTO: 37.7 % (ref 34–46.6)
HGB BLD-MCNC: 11.7 G/DL (ref 12–15.9)
MCH RBC QN AUTO: 29.9 PG (ref 26.6–33)
MCHC RBC AUTO-ENTMCNC: 31 G/DL (ref 31.5–35.7)
MCV RBC AUTO: 96.4 FL (ref 79–97)
PLATELET # BLD AUTO: 167 10*3/MM3 (ref 140–450)
POTASSIUM SERPL-SCNC: 4.3 MMOL/L (ref 3.5–5.2)
RBC # BLD AUTO: 3.91 10*6/MM3 (ref 3.77–5.28)
SODIUM SERPL-SCNC: 142 MMOL/L (ref 136–145)
URATE SERPL-MCNC: 9.3 MG/DL (ref 2.4–5.7)
WBC # BLD AUTO: 5.75 10*3/MM3 (ref 3.4–10.8)

## 2019-09-09 ENCOUNTER — OFFICE VISIT (OUTPATIENT)
Dept: INTERNAL MEDICINE | Facility: CLINIC | Age: 74
End: 2019-09-09

## 2019-09-09 VITALS
BODY MASS INDEX: 42.35 KG/M2 | WEIGHT: 239 LBS | HEART RATE: 67 BPM | SYSTOLIC BLOOD PRESSURE: 135 MMHG | OXYGEN SATURATION: 94 % | TEMPERATURE: 98.6 F | DIASTOLIC BLOOD PRESSURE: 60 MMHG | HEIGHT: 63 IN

## 2019-09-09 DIAGNOSIS — E78.00 HYPERCHOLESTEROLEMIA: ICD-10-CM

## 2019-09-09 DIAGNOSIS — E11.29 TYPE 2 DIABETES MELLITUS WITH MICROALBUMINURIA, WITHOUT LONG-TERM CURRENT USE OF INSULIN (HCC): Primary | ICD-10-CM

## 2019-09-09 DIAGNOSIS — M1A.0710 CHRONIC GOUT OF RIGHT FOOT, UNSPECIFIED CAUSE: ICD-10-CM

## 2019-09-09 DIAGNOSIS — R80.9 TYPE 2 DIABETES MELLITUS WITH MICROALBUMINURIA, WITHOUT LONG-TERM CURRENT USE OF INSULIN (HCC): Primary | ICD-10-CM

## 2019-09-09 DIAGNOSIS — I10 BENIGN ESSENTIAL HYPERTENSION: ICD-10-CM

## 2019-09-09 PROCEDURE — 99214 OFFICE O/P EST MOD 30 MIN: CPT | Performed by: FAMILY MEDICINE

## 2019-09-09 RX ORDER — AMLODIPINE BESYLATE 10 MG/1
10 TABLET ORAL NIGHTLY
Qty: 90 TABLET | Refills: 1 | Status: SHIPPED | OUTPATIENT
Start: 2019-09-09 | End: 2020-02-04

## 2019-09-09 RX ORDER — ATENOLOL 100 MG/1
100 TABLET ORAL DAILY
Qty: 90 TABLET | Refills: 1 | Status: SHIPPED | OUTPATIENT
Start: 2019-09-09 | End: 2020-02-04

## 2019-09-09 RX ORDER — LISINOPRIL 20 MG/1
40 TABLET ORAL DAILY
Qty: 180 TABLET | Refills: 1 | Status: SHIPPED | OUTPATIENT
Start: 2019-09-09 | End: 2020-09-22 | Stop reason: SDUPTHER

## 2019-09-09 RX ORDER — ATORVASTATIN CALCIUM 10 MG/1
10 TABLET, FILM COATED ORAL NIGHTLY
Qty: 90 TABLET | Refills: 1 | Status: SHIPPED | OUTPATIENT
Start: 2019-09-09 | End: 2020-08-12

## 2019-09-09 RX ORDER — GLIMEPIRIDE 2 MG/1
3 TABLET ORAL EVERY EVENING
Qty: 125 TABLET | Refills: 1 | Status: SHIPPED | OUTPATIENT
Start: 2019-09-09 | End: 2020-02-04

## 2019-09-09 RX ORDER — GLIMEPIRIDE 2 MG/1
4 TABLET ORAL EVERY MORNING
Qty: 180 TABLET | Refills: 1 | Status: SHIPPED | OUTPATIENT
Start: 2019-09-09 | End: 2020-02-04 | Stop reason: SDUPTHER

## 2019-09-09 RX ORDER — FUROSEMIDE 20 MG/1
20 TABLET ORAL DAILY
Qty: 90 TABLET | Refills: 1 | Status: SHIPPED | OUTPATIENT
Start: 2019-09-09 | End: 2020-02-04

## 2019-09-09 NOTE — PROGRESS NOTES
Subjective   Philly Person is a 74 y.o. female.   Chief Complaint   Patient presents with   • Hypertension   • Diabetes   • Hyperlipidemia       History of Present Illness     #1 diabetes type 2 - diagnosed in 2001. Patient did diabetes education at the time of diagnosis. She is on metformin 1000 mg twice a day and glimepiride 2 mg, 2 tb in AM, 1.5 tablet in PM.  She does not check sugars at home.  No symptomatic low sugars.   A1c is at 6.1 FBS 102.  Microalbumin at 1074.4  She had eye exam in 5/ 2019. She is on ACE inhibitor and statin. She has a history of microalbuminuria.   She had Pneumovax in 2009, she had Prevnar 13 in 12/2015.       She saw Dr. Millard for diabetes, but she prefers to follow-up with us. It is easier for her to follow-up on it with us. She agrees to see Dr. Millard if diabetes gets out of control.      #2 HTN- on amlodipine 10 mg a day, atenolol 100 mg a day and lisinopril 20 mg 2 tablets taken daily. She is also on furosemide 20 mg a day. She takes no additional potassium. She reports no side effects. She does not exercise regularly.  She had knee replacement on August 5.  She is in physical therapy.  Pain is controlled.  Kidney tests are stable.  Sodium and potassium normal.        #3 hypercholesterolemia- on atorvastatin 10 mg a day. She takes it everyday. No side effects. No muscle aches, no muscle cramps.      #4 Gout of rt foot- uric acid at 9.3.    She had one attack.  Patient had no flareups since then.  It responded to colchicin.  She prefers no daily medication for prevention at this point.    The following portions of the patient's history were reviewed and updated as appropriate: allergies, current medications, past family history, past medical history, past social history, past surgical history and problem list.    Review of Systems   Constitutional: Negative for fatigue.   Cardiovascular: Negative for chest pain.   Endocrine: Negative for polydipsia, polyphagia and polyuria.    Skin: Negative for pallor.   Neurological: Negative for dizziness, tremors, seizures, speech difficulty, weakness and headaches.   Psychiatric/Behavioral: Negative for confusion. The patient is not nervous/anxious.          Objective   Wt Readings from Last 3 Encounters:   09/09/19 108 kg (239 lb)   08/05/19 113 kg (249 lb 4.8 oz)   07/29/19 112 kg (246 lb 12.8 oz)      Vitals:    09/09/19 1145   BP: 135/60   Pulse: 67   Temp: 98.6 °F (37 °C)   SpO2: 94%     Temp Readings from Last 3 Encounters:   09/09/19 98.6 °F (37 °C)   08/07/19 99.1 °F (37.3 °C) (Oral)   07/29/19 97.9 °F (36.6 °C) (Oral)     BP Readings from Last 3 Encounters:   09/09/19 135/60   08/07/19 176/65   07/29/19 153/77     Pulse Readings from Last 3 Encounters:   09/09/19 67   08/07/19 83   07/29/19 81       Physical Exam   Constitutional: She is oriented to person, place, and time. She appears well-developed and well-nourished.   HENT:   Head: Normocephalic and atraumatic.   Neck: Neck supple. Carotid bruit is not present.   Cardiovascular: Normal rate, regular rhythm, normal heart sounds and intact distal pulses.   Pulmonary/Chest: Effort normal and breath sounds normal.      During the foot exam she had a monofilament test performed (Please see diabetic foot form which will be scanned..).  Neurological: She is alert and oriented to person, place, and time.   Skin: Skin is warm, dry and intact.   Psychiatric: She has a normal mood and affect. Her behavior is normal.       Assessment/Plan   Philly was seen today for hypertension, diabetes, hyperlipidemia and gout.    Diagnoses and all orders for this visit:    Type 2 diabetes mellitus with microalbuminuria, without long-term current use of insulin (CMS/[facility])    Benign essential hypertension    Hypercholesterolemia    Chronic gout of right foot, unspecified cause    Other orders  -     lisinopril (PRINIVIL,ZESTRIL) 20 MG tablet; Take 2 tablets by mouth Daily.  -     metFORMIN (GLUCOPHAGE) 1000 MG  tablet; Take 1 tablet by mouth 2 (Two) Times a Day With Meals.  -     atorvastatin (LIPITOR) 10 MG tablet; Take 1 tablet by mouth Every Night.  -     amLODIPine (NORVASC) 10 MG tablet; Take 1 tablet by mouth Every Night.  -     atenolol (TENORMIN) 100 MG tablet; Take 1 tablet by mouth Daily.  -     furosemide (LASIX) 20 MG tablet; Take 1 tablet by mouth Daily.  -     glimepiride (AMARYL) 2 MG tablet; Take 1.5 tablets by mouth Every Evening.  -     glimepiride (AMARYL) 2 MG tablet; Take 2 tablets by mouth Every Morning.        #1  Diabetes type 2-continue current treatment.  Follow-up in 6 months.  2.  Hypertension-continue same.  Follow-up in 6 months.  3.  Hyperlipidemia-continue same.  Follow-up in 6 months.  4.  Gout- we will continue without medications.  Follow-up as needed.

## 2019-09-10 ENCOUNTER — PATIENT OUTREACH (OUTPATIENT)
Dept: CASE MANAGEMENT | Facility: OTHER | Age: 74
End: 2019-09-10

## 2019-09-10 ENCOUNTER — EPISODE CHANGES (OUTPATIENT)
Dept: CASE MANAGEMENT | Facility: OTHER | Age: 74
End: 2019-09-10

## 2019-09-10 NOTE — OUTREACH NOTE
Care Plan Note      Responses   Lifestyle Goals  Routine follow-up with doctor(s), Self monitor blood pressure, Self monitor blood sugar, Increase physical activity   Barriers  Disease education   Self Management  Medication Adherence, Home Glucose Monitoring, Home BP Monitoring   Annual Wellness Visit:   -- [Patient states scheduled for next appointment]   Care Gaps Addressed  Colon Cancer Screening, Mammogram, Flu Shot   Colon Cancer Screening Type  Colonoscopy   Colonoscopy Status  Up to Date (< 10 yrs)   Colon Cancer Screening Completion at [facility] or Other  [facility]   Flu Shot Status  Patient will Complete   Flu Shot Completion at [facility] or Other  Other   Other Location  [facility]   Mammogram Status  Up to Date   Mammogram Completion at [facility] or Other  [facility]   Specific Disease Process Teaching  Hypertension, Diabetes   Other Patient Education/Resources   24/7 [facility] Nurse Call Line, Advanced Care Planning, [facility]   24/7 Nurse Call Line Education Method  Verbal   ACP Education Method  Verbal [Patient has Living Will]   [facility] Education Method  Verbal [Active]   Does patient have depression diagnosis?  No   Advanced Directives:  Patient Has   Ed Visits past 12 months:  None   Hospitalizations past 12 months  1   Medication Adherence  Medications understood        The main concerns and/or symptoms the patient would like to address are: Talked with patient. Patient admitted from 8/5/19 to 8/8/19 for left total knee arthroplasty. She states incision has healed without difficulty and is attending outpatient PT. Patient ambulates with cane when outside of home. She reports PCP appointment yesterday. Patient lives with spouse; independent with ADL's; meal preparation; ambulates with or without cane as needed and receiving assistance with transportation from spouse.  She states to be compliant with medications; medical appointments and monitoring pf blood pressure and blood sugar (WNL's). She reports  no difficulty with chest pain; SOB;appetite or sleeping.      Education/instruction provided by Care Coordinator: Reviewed with patient 24/7 Nurse Line Telephone number; CA contact information; Advance Directives; My Chart; gaps in care; MWV and Care Advising program. Patient verbalized understanding.Patient states to appreciate phone call and declines need for further phone calls at this time. No further questions or concerns voiced at this time.     Follow Up Outreach Due: Follow up as needed.     Lisa Bay RN  Community Care Coordinator    9/10/2019, 1:22 PM

## 2019-09-10 NOTE — OUTREACH NOTE
Care Coordination Assessment    Documented/Reviewed By:  Lisa Bay RN Date/time:  9/10/2019  1:17 PM   Assessment completed with:  patient  Enrolled in care management program:  No  Living arrangement:  spouse  Support system:  family  Equipment used at home:  cane (Comment: blood pressure cuff and glucometer)  Bed or wheelchair confined:  No  Inadequate nutrition:  No  Medication adherence problem:  No  Experiencing side effects from current medications:  No  History of fall(s) in last 6 months:  No  Difficulty keeping appointments:  No

## 2019-09-12 ENCOUNTER — EPISODE CHANGES (OUTPATIENT)
Dept: CASE MANAGEMENT | Facility: OTHER | Age: 74
End: 2019-09-12

## 2020-01-28 NOTE — OUTREACH NOTE
Please call her and give her number for psychiatry. Referral has been in since september Total Joint Week 1 Survey      Responses   Facility patient discharged from?  [place]   Does the patient have one of the following disease processes/diagnoses(primary or secondary)?  Total Joint Replacement   Is there a successful TCM telephone encounter documented?  No   Joint surgery performed?  Knee   Week 1 attempt successful?  No   Unsuccessful attempts  Attempt 1          Lupe Leonardo RN

## 2020-02-04 RX ORDER — ATENOLOL 100 MG/1
TABLET ORAL
Qty: 90 TABLET | Refills: 1 | Status: SHIPPED | OUTPATIENT
Start: 2020-02-04 | End: 2020-09-22 | Stop reason: SDUPTHER

## 2020-02-04 RX ORDER — AMLODIPINE BESYLATE 10 MG/1
10 TABLET ORAL NIGHTLY
Qty: 90 TABLET | Refills: 1 | Status: SHIPPED | OUTPATIENT
Start: 2020-02-04 | End: 2020-09-22 | Stop reason: SDUPTHER

## 2020-02-04 RX ORDER — FUROSEMIDE 20 MG/1
TABLET ORAL
Qty: 90 TABLET | Refills: 1 | Status: SHIPPED | OUTPATIENT
Start: 2020-02-04 | End: 2020-09-22 | Stop reason: SDUPTHER

## 2020-02-04 RX ORDER — GLIMEPIRIDE 2 MG/1
TABLET ORAL
Qty: 315 TABLET | Refills: 0 | Status: SHIPPED | OUTPATIENT
Start: 2020-02-04 | End: 2020-09-22 | Stop reason: SDUPTHER

## 2020-03-02 DIAGNOSIS — I10 BENIGN ESSENTIAL HYPERTENSION: ICD-10-CM

## 2020-03-02 DIAGNOSIS — M1A.0710 CHRONIC GOUT OF RIGHT FOOT, UNSPECIFIED CAUSE: ICD-10-CM

## 2020-03-02 DIAGNOSIS — E11.29 TYPE 2 DIABETES MELLITUS WITH MICROALBUMINURIA, WITHOUT LONG-TERM CURRENT USE OF INSULIN (HCC): Primary | ICD-10-CM

## 2020-03-02 DIAGNOSIS — E78.00 HYPERCHOLESTEROLEMIA: ICD-10-CM

## 2020-03-02 DIAGNOSIS — R80.9 TYPE 2 DIABETES MELLITUS WITH MICROALBUMINURIA, WITHOUT LONG-TERM CURRENT USE OF INSULIN (HCC): Primary | ICD-10-CM

## 2020-03-11 LAB
ALBUMIN SERPL-MCNC: 3.4 G/DL (ref 3.5–5.2)
ALBUMIN/GLOB SERPL: 1.1 G/DL
ALP SERPL-CCNC: 148 U/L (ref 39–117)
ALT SERPL-CCNC: 37 U/L (ref 1–33)
AST SERPL-CCNC: 49 U/L (ref 1–32)
BILIRUB SERPL-MCNC: 1.3 MG/DL (ref 0.2–1.2)
BUN SERPL-MCNC: 25 MG/DL (ref 8–23)
BUN/CREAT SERPL: 21 (ref 7–25)
CALCIUM SERPL-MCNC: 9.5 MG/DL (ref 8.6–10.5)
CHLORIDE SERPL-SCNC: 99 MMOL/L (ref 98–107)
CHOLEST SERPL-MCNC: 111 MG/DL (ref 0–200)
CO2 SERPL-SCNC: 26.5 MMOL/L (ref 22–29)
CREAT SERPL-MCNC: 1.19 MG/DL (ref 0.57–1)
GLOBULIN SER CALC-MCNC: 3.1 GM/DL
GLUCOSE SERPL-MCNC: 103 MG/DL (ref 65–99)
HBA1C MFR BLD: 6.5 % (ref 4.8–5.6)
HDLC SERPL-MCNC: 44 MG/DL (ref 40–60)
LDLC SERPL CALC-MCNC: 42 MG/DL (ref 0–100)
LDLC/HDLC SERPL: 0.96 {RATIO}
MICROALBUMIN UR-MCNC: 1041.3 UG/ML
POTASSIUM SERPL-SCNC: 4.1 MMOL/L (ref 3.5–5.2)
PROT SERPL-MCNC: 6.5 G/DL (ref 6–8.5)
SODIUM SERPL-SCNC: 138 MMOL/L (ref 136–145)
TRIGL SERPL-MCNC: 124 MG/DL (ref 0–150)
URATE SERPL-MCNC: 9.1 MG/DL (ref 2.4–5.7)
VLDLC SERPL CALC-MCNC: 24.8 MG/DL

## 2020-03-16 ENCOUNTER — OFFICE VISIT (OUTPATIENT)
Dept: INTERNAL MEDICINE | Facility: CLINIC | Age: 75
End: 2020-03-16

## 2020-03-16 VITALS
OXYGEN SATURATION: 95 % | HEIGHT: 61 IN | HEART RATE: 73 BPM | TEMPERATURE: 98.2 F | WEIGHT: 247 LBS | SYSTOLIC BLOOD PRESSURE: 132 MMHG | DIASTOLIC BLOOD PRESSURE: 80 MMHG | BODY MASS INDEX: 46.63 KG/M2

## 2020-03-16 DIAGNOSIS — I10 BENIGN ESSENTIAL HYPERTENSION: ICD-10-CM

## 2020-03-16 DIAGNOSIS — E78.00 HYPERCHOLESTEROLEMIA: ICD-10-CM

## 2020-03-16 DIAGNOSIS — R80.9 TYPE 2 DIABETES MELLITUS WITH MICROALBUMINURIA, WITHOUT LONG-TERM CURRENT USE OF INSULIN (HCC): Primary | ICD-10-CM

## 2020-03-16 DIAGNOSIS — E11.29 TYPE 2 DIABETES MELLITUS WITH MICROALBUMINURIA, WITHOUT LONG-TERM CURRENT USE OF INSULIN (HCC): Primary | ICD-10-CM

## 2020-03-16 PROCEDURE — 99214 OFFICE O/P EST MOD 30 MIN: CPT | Performed by: FAMILY MEDICINE

## 2020-03-16 NOTE — PROGRESS NOTES
Subjective   Philly Person is a 74 y.o. female.   Chief Complaint   Patient presents with   • Diabetes   • Hypertension   • Hyperlipidemia       History of Present Illness      #1 diabetes type 2 - diagnosed in 2001. Patient did diabetes education at the time of diagnosis. She is on metformin 1000 mg twice a day and glimepiride 2 mg, 2 tb in AM, 1.5 tablet in PM.  She does not check sugars at home.  No symptomatic low sugars.   A1c is at 6.5 FBS 103.  Microalbumin at 1041.3.  She had eye exam in 5/ 2019. She is on ACE inhibitor and statin. She has a history of microalbuminuria.   She had Pneumovax in 2009, she had Prevnar 13 in 12/2015.      She saw Dr. Millard for diabetes, but she prefers to follow-up with us. It is easier for her to follow-up on it with us. She agrees to see Dr. Millard if diabetes gets out of control.      #2 HTN- on amlodipine 10 mg a day, atenolol 100 mg a day and lisinopril 20 mg 2 tablets taken daily. She is also on furosemide 20 mg a day. She takes no additional potassium. She reports no side effects.  She rides a bike twice a week for 25 to 30 minutes.  Kidney tests are stable.  Sodium and potassium normal.        #3 hypercholesterolemia- on atorvastatin 10 mg a day. She takes it everyday. No side effects. No muscle aches, no muscle cramps.  LDL 42, HDL 44.  LFTs stable.    The following portions of the patient's history were reviewed and updated as appropriate: allergies, current medications, past medical history, past social history and problem list.    Review of Systems   Constitutional: Negative.    Respiratory: Negative.    Cardiovascular: Negative.          Objective   Wt Readings from Last 3 Encounters:   03/16/20 112 kg (247 lb)   09/09/19 108 kg (239 lb)   08/05/19 113 kg (249 lb 4.8 oz)      Vitals:    03/16/20 1305   BP: 132/80   Pulse: 73   Temp: 98.2 °F (36.8 °C)   SpO2: 95%     Temp Readings from Last 3 Encounters:   03/16/20 98.2 °F (36.8 °C)   09/09/19 98.6 °F (37 °C)    08/07/19 99.1 °F (37.3 °C) (Oral)     BP Readings from Last 3 Encounters:   03/16/20 132/80   09/09/19 135/60   08/07/19 176/65     Pulse Readings from Last 3 Encounters:   03/16/20 73   09/09/19 67   08/07/19 83     Body mass index is 46.16 kg/m².    Physical Exam   Constitutional: She is oriented to person, place, and time. She appears well-developed and well-nourished.   HENT:   Head: Normocephalic and atraumatic.   Neck: Neck supple. Carotid bruit is not present. No thyromegaly present.   Cardiovascular: Normal rate, regular rhythm and normal heart sounds.   Pulmonary/Chest: Effort normal and breath sounds normal.   Neurological: She is alert and oriented to person, place, and time.   Skin: Skin is warm, dry and intact.   Psychiatric: She has a normal mood and affect. Her behavior is normal.       Assessment/Plan   Philly was seen today for diabetes, hypertension and hyperlipidemia.    Diagnoses and all orders for this visit:    Type 2 diabetes mellitus with microalbuminuria, without long-term current use of insulin (CMS/[facility])    Benign essential hypertension    Hypercholesterolemia        #1 DM type II-we will continue current treatment.  Follow-up in 6 months.  #2 hypertension-continue current treatment.  Good job with starting exercise.  Increase it to 5 days a week.  Follow-up in 6 months.  3.  Hyperlipidemia-continue current treatment.  Follow-up in 6 months.

## 2020-05-04 ENCOUNTER — APPOINTMENT (OUTPATIENT)
Dept: WOMENS IMAGING | Facility: HOSPITAL | Age: 75
End: 2020-05-04

## 2020-05-04 PROCEDURE — 77067 SCR MAMMO BI INCL CAD: CPT | Performed by: RADIOLOGY

## 2020-05-04 PROCEDURE — 77063 BREAST TOMOSYNTHESIS BI: CPT | Performed by: RADIOLOGY

## 2020-08-12 RX ORDER — ATORVASTATIN CALCIUM 10 MG/1
TABLET, FILM COATED ORAL
Qty: 90 TABLET | Refills: 0 | Status: SHIPPED | OUTPATIENT
Start: 2020-08-12 | End: 2020-09-22 | Stop reason: SDUPTHER

## 2020-09-11 DIAGNOSIS — Z11.59 NEED FOR HEPATITIS C SCREENING TEST: ICD-10-CM

## 2020-09-11 DIAGNOSIS — E11.29 TYPE 2 DIABETES MELLITUS WITH MICROALBUMINURIA, WITHOUT LONG-TERM CURRENT USE OF INSULIN (HCC): Primary | ICD-10-CM

## 2020-09-11 DIAGNOSIS — R80.9 TYPE 2 DIABETES MELLITUS WITH MICROALBUMINURIA, WITHOUT LONG-TERM CURRENT USE OF INSULIN (HCC): Primary | ICD-10-CM

## 2020-09-16 LAB
ALBUMIN SERPL-MCNC: 3.7 G/DL (ref 3.7–4.7)
ALBUMIN/GLOB SERPL: 1.3 {RATIO} (ref 1.2–2.2)
ALP SERPL-CCNC: 135 IU/L (ref 39–117)
ALT SERPL-CCNC: 35 IU/L (ref 0–32)
AST SERPL-CCNC: 54 IU/L (ref 0–40)
BILIRUB SERPL-MCNC: 1.6 MG/DL (ref 0–1.2)
BUN SERPL-MCNC: 23 MG/DL (ref 8–27)
BUN/CREAT SERPL: 17 (ref 12–28)
CALCIUM SERPL-MCNC: 9.6 MG/DL (ref 8.7–10.3)
CHLORIDE SERPL-SCNC: 99 MMOL/L (ref 96–106)
CO2 SERPL-SCNC: 26 MMOL/L (ref 20–29)
CREAT SERPL-MCNC: 1.39 MG/DL (ref 0.57–1)
GLOBULIN SER CALC-MCNC: 2.9 G/DL (ref 1.5–4.5)
GLUCOSE SERPL-MCNC: 127 MG/DL (ref 65–99)
HBA1C MFR BLD: 6.9 % (ref 4.8–5.6)
HCV AB S/CO SERPL IA: 0.2 S/CO RATIO (ref 0–0.9)
POTASSIUM SERPL-SCNC: 4.4 MMOL/L (ref 3.5–5.2)
PROT SERPL-MCNC: 6.6 G/DL (ref 6–8.5)
SODIUM SERPL-SCNC: 140 MMOL/L (ref 134–144)

## 2020-09-22 ENCOUNTER — OFFICE VISIT (OUTPATIENT)
Dept: INTERNAL MEDICINE | Facility: CLINIC | Age: 75
End: 2020-09-22

## 2020-09-22 VITALS
TEMPERATURE: 97 F | DIASTOLIC BLOOD PRESSURE: 62 MMHG | HEART RATE: 80 BPM | HEIGHT: 61 IN | SYSTOLIC BLOOD PRESSURE: 138 MMHG | BODY MASS INDEX: 47.39 KG/M2 | WEIGHT: 251 LBS | OXYGEN SATURATION: 97 %

## 2020-09-22 DIAGNOSIS — E11.29 TYPE 2 DIABETES MELLITUS WITH MICROALBUMINURIA, WITHOUT LONG-TERM CURRENT USE OF INSULIN (HCC): ICD-10-CM

## 2020-09-22 DIAGNOSIS — I10 BENIGN ESSENTIAL HYPERTENSION: ICD-10-CM

## 2020-09-22 DIAGNOSIS — E78.00 HYPERCHOLESTEROLEMIA: ICD-10-CM

## 2020-09-22 DIAGNOSIS — Z00.00 MEDICARE ANNUAL WELLNESS VISIT, SUBSEQUENT: Primary | ICD-10-CM

## 2020-09-22 DIAGNOSIS — R80.9 TYPE 2 DIABETES MELLITUS WITH MICROALBUMINURIA, WITHOUT LONG-TERM CURRENT USE OF INSULIN (HCC): ICD-10-CM

## 2020-09-22 PROCEDURE — 99214 OFFICE O/P EST MOD 30 MIN: CPT | Performed by: FAMILY MEDICINE

## 2020-09-22 PROCEDURE — G0439 PPPS, SUBSEQ VISIT: HCPCS | Performed by: FAMILY MEDICINE

## 2020-09-22 RX ORDER — ATORVASTATIN CALCIUM 10 MG/1
10 TABLET, FILM COATED ORAL NIGHTLY
Qty: 90 TABLET | Refills: 1 | Status: SHIPPED | OUTPATIENT
Start: 2020-09-22 | End: 2021-02-10 | Stop reason: SDUPTHER

## 2020-09-22 RX ORDER — FUROSEMIDE 20 MG/1
20 TABLET ORAL DAILY
Qty: 90 TABLET | Refills: 1 | Status: SHIPPED | OUTPATIENT
Start: 2020-09-22 | End: 2021-02-10 | Stop reason: SDUPTHER

## 2020-09-22 RX ORDER — GLIMEPIRIDE 2 MG/1
TABLET ORAL
Qty: 315 TABLET | Refills: 1 | Status: SHIPPED | OUTPATIENT
Start: 2020-09-22 | End: 2021-02-10 | Stop reason: SDUPTHER

## 2020-09-22 RX ORDER — LISINOPRIL 20 MG/1
40 TABLET ORAL DAILY
Qty: 180 TABLET | Refills: 1 | Status: SHIPPED | OUTPATIENT
Start: 2020-09-22 | End: 2021-02-10 | Stop reason: SDUPTHER

## 2020-09-22 RX ORDER — AMLODIPINE BESYLATE 10 MG/1
10 TABLET ORAL NIGHTLY
Qty: 90 TABLET | Refills: 1 | Status: SHIPPED | OUTPATIENT
Start: 2020-09-22 | End: 2021-02-10 | Stop reason: SDUPTHER

## 2020-09-22 RX ORDER — ATENOLOL 100 MG/1
100 TABLET ORAL DAILY
Qty: 90 TABLET | Refills: 1 | Status: SHIPPED | OUTPATIENT
Start: 2020-09-22 | End: 2021-02-10 | Stop reason: SDUPTHER

## 2020-09-22 NOTE — PROGRESS NOTES
The ABCs of the Annual Wellness Visit  Subsequent Medicare Wellness Visit    Chief Complaint   Patient presents with   • Medicare Wellness-subsequent   • Hypertension   • Hyperlipidemia   • Diabetes       Subjective   History of Present Illness:  Philly Person is a 75 y.o. female who presents for a Subsequent Medicare Wellness Visit.    HEALTH RISK ASSESSMENT    Recent Hospitalizations:  No hospitalization(s) within the last year.    Current Medical Providers:  Patient Care Team:  Shazia Cardoza MD as PCP - General (Family Medicine)  Provider, No Known as PCP - Family Medicine  Wilbur, Sejal Emanuel MD as PCP - Claims Attributed    Smoking Status:  Social History     Tobacco Use   Smoking Status Never Smoker   Smokeless Tobacco Never Used       Alcohol Consumption:  Social History     Substance and Sexual Activity   Alcohol Use Yes    Comment: ON OCC       Depression Screen:   PHQ-2/PHQ-9 Depression Screening 9/22/2020   Little interest or pleasure in doing things 0   Feeling down, depressed, or hopeless 0   Trouble falling or staying asleep, or sleeping too much 0   Feeling tired or having little energy 0   Poor appetite or overeating 0   Feeling bad about yourself - or that you are a failure or have let yourself or your family down 0   Trouble concentrating on things, such as reading the newspaper or watching television 0   Moving or speaking so slowly that other people could have noticed. Or the opposite - being so fidgety or restless that you have been moving around a lot more than usual 0   Thoughts that you would be better off dead, or of hurting yourself in some way 0   Total Score 0   If you checked off any problems, how difficult have these problems made it for you to do your work, take care of things at home, or get along with other people? Not difficult at all       Fall Risk Screen:  STEADI Fall Risk Assessment was completed, and patient is at MODERATE risk for falls. Assessment completed  on:9/22/2020    Health Habits and Functional and Cognitive Screening:  Functional & Cognitive Status 9/22/2020   Do you have difficulty preparing food and eating? No   Do you have difficulty bathing yourself, getting dressed or grooming yourself? No   Do you have difficulty using the toilet? No   Do you have difficulty moving around from place to place? No   Do you have trouble with steps or getting out of a bed or a chair? No   Current Diet Low Carb Diet   Dental Exam Up to date   Eye Exam Up to date   Exercise (times per week) 0 times per week   Current Exercise Activities Include -   Do you need help using the phone?  No   Are you deaf or do you have serious difficulty hearing?  No   Do you need help with transportation? No   Do you need help shopping? No   Do you need help preparing meals?  No   Do you need help with housework?  No   Do you need help with laundry? No   Do you need help taking your medications? No   Do you need help managing money? No   Do you ever drive or ride in a car without wearing a seat belt? No   Have you felt unusual stress, anger or loneliness in the last month? No   Who do you live with? Spouse   If you need help, do you have trouble finding someone available to you? No   Have you been bothered in the last four weeks by sexual problems? No   Do you have difficulty concentrating, remembering or making decisions? No         Does the patient have evidence of cognitive impairment? No    Asprin use counseling:Does not need ASA (and currently is not on it)    Age-appropriate Screening Schedule:  Refer to the list below for future screening recommendations based on patient's age, sex and/or medical conditions. Orders for these recommended tests are listed in the plan section. The patient has been provided with a written plan.    Health Maintenance   Topic Date Due   • COLONOSCOPY  01/01/2021 (Originally 8/16/2020)   • TDAP/TD VACCINES (2 - Td) 08/01/2022 (Originally 3/18/2018)   • ZOSTER  VACCINE (2 of 3) 08/01/2022 (Originally 5/13/2008)   • LIPID PANEL  03/10/2021   • URINE MICROALBUMIN  03/10/2021   • HEMOGLOBIN A1C  03/15/2021   • DIABETIC EYE EXAM  05/13/2021   • MAMMOGRAM  05/04/2022   • INFLUENZA VACCINE  Completed          The following portions of the patient's history were reviewed and updated as appropriate: allergies, current medications, past family history, past medical history, past social history, past surgical history and problem list.    Outpatient Medications Prior to Visit   Medication Sig Dispense Refill   • acetaminophen (TYLENOL) 500 MG tablet Take 1,500 mg by mouth Every 6 (Six) Hours As Needed for Mild Pain .     • Cholecalciferol (VITAMIN D) 2000 units capsule Take 2,000 Units by mouth Daily.     • coenzyme Q10 100 MG capsule Take 200 mg by mouth Every Evening. WILL HOLD FOR SURGDRY     • Cranberry 500 MG capsule Take 500 mg by mouth Daily. WILL HOLD FOR SURGERY     • Multiple Vitamins-Minerals (MULTIVITAMIN ADULT PO) Take 1 tablet by mouth Daily.     • vitamin B-6 (PYRIDOXINE) 100 MG tablet Take 100 mg by mouth Daily.     • vitamin C (ASCORBIC ACID) 500 MG tablet Take 500 mg by mouth Every Night.     • amLODIPine (NORVASC) 10 MG tablet TAKE 1 TABLET BY MOUTH EVERY NIGHT. 90 tablet 1   • atenolol (TENORMIN) 100 MG tablet TAKE 1 TABLET EVERY DAY 90 tablet 1   • atorvastatin (LIPITOR) 10 MG tablet TAKE 1 TABLET EVERY NIGHT 90 tablet 0   • furosemide (LASIX) 20 MG tablet TAKE 1 TABLET EVERY DAY 90 tablet 1   • glimepiride (AMARYL) 2 MG tablet TAKE 2 TABLETS EVERY MORNING AND 1 AND 1/2 TABLETS EVERY EVENING 315 tablet 0   • lisinopril (PRINIVIL,ZESTRIL) 20 MG tablet Take 2 tablets by mouth Daily. 180 tablet 1   • metFORMIN (GLUCOPHAGE) 1000 MG tablet TAKE 1 TABLET TWICE DAILY WITH MEALS 180 tablet 1     No facility-administered medications prior to visit.        Patient Active Problem List   Diagnosis   • Elevated liver enzymes   • Anemia   • Arthritis   • Benign essential  "hypertension   • Hypercholesterolemia   • Microalbuminuria   • Nonalcoholic fatty liver disease   • Calculus of kidney   • Adiposity   • Type 2 diabetes mellitus (CMS/HCC)   • Squamous cell carcinoma of skin   • Melanoma (CMS/HCC)   • Basal cell carcinoma   • DJD (degenerative joint disease) of knee       Advanced Care Planning:  ACP discussion was held with the patient during this visit. Patient has an advance directive (not in EMR), copy requested.    Review of Systems    Compared to one year ago, the patient feels her physical health is the same.  Compared to one year ago, the patient feels her mental health is the same .    Reviewed chart for potential of high risk medication in the elderly: yes  Reviewed chart for potential of harmful drug interactions in the elderly:yes    Objective         Vitals:    09/22/20 1259   BP: 138/62   Pulse: 80   Temp: 97 °F (36.1 °C)   SpO2: 97%   Weight: 114 kg (251 lb)   Height: 155.8 cm (61.34\")   PainSc: 0-No pain       Body mass index is 46.9 kg/m².  Discussed the patient's BMI with her. The BMI is in the acceptable range.    Physical Exam    Lab Results   Component Value Date     (H) 09/15/2020    HGBA1C 6.9 (H) 09/15/2020        Assessment/Plan   Medicare Risks and Personalized Health Plan  CMS Preventative Services Quick Reference  Advance Directive Discussion  Cardiovascular risk  Colon Cancer Screening  Immunizations Discussed/Encouraged (specific immunizations; Td and Shingrix )  Inactivity/Sedentary  Obesity/Overweight     The above risks/problems have been discussed with the patient.  Pertinent information has been shared with the patient in the After Visit Summary.  Follow up plans and orders are seen below in the Assessment/Plan Section.    Patient is due for colonoscopy.  She has papers at home and is planning to send it to her GI office.  She had flu vaccine this season.  She is due for Shingrix and tetanus vaccine and is advised to get it.  Start to " exercise.  Try to get at least 30 minutes day, 5 days a week.  Information on calorie count to lose weight provided.  Information on fall prevention provided.  Patient has living will at home and is going to bring it to next office to be scanned.    Diagnoses and all orders for this visit:    1. Medicare annual wellness visit, subsequent (Primary)    2. Type 2 diabetes mellitus with microalbuminuria, without long-term current use of insulin (CMS/[facility])    3. Benign essential hypertension    4. Hypercholesterolemia    Other orders  -     metFORMIN (GLUCOPHAGE) 1000 MG tablet; Take 1 tablet by mouth 2 (Two) Times a Day With Meals.  Dispense: 180 tablet; Refill: 1  -     glimepiride (AMARYL) 2 MG tablet; Take 2 tb in AM and 1.5 tb in PM  Dispense: 315 tablet; Refill: 1  -     furosemide (LASIX) 20 MG tablet; Take 1 tablet by mouth Daily.  Dispense: 90 tablet; Refill: 1  -     atorvastatin (LIPITOR) 10 MG tablet; Take 1 tablet by mouth Every Night.  Dispense: 90 tablet; Refill: 1  -     atenolol (TENORMIN) 100 MG tablet; Take 1 tablet by mouth Daily.  Dispense: 90 tablet; Refill: 1  -     amLODIPine (NORVASC) 10 MG tablet; Take 1 tablet by mouth Every Night.  Dispense: 90 tablet; Refill: 1  -     lisinopril (PRINIVIL,ZESTRIL) 20 MG tablet; Take 2 tablets by mouth Daily.  Dispense: 180 tablet; Refill: 1      Follow Up:  Return in about 6 months (around 3/22/2021).     An After Visit Summary and PPPS were given to the patient.

## 2020-09-22 NOTE — PATIENT INSTRUCTIONS
Medicare Wellness  Personal Prevention Plan of Service     Date of Office Visit:  2020  Encounter Provider:  Shazia Cardoza MD  Place of Service:  [facility] INTER[facility] MED[facility]  Patient Name: Philly Person  :  1945    As part of the Medicare Wellness portion of your visit today, we are providing you with this personalized preventive plan of services (PPPS). This plan is based upon recommendations of the [place] Preventive Services Task Force (USPSTF) and the Advisory Committee on Immunization Practices (ACIP).    This lists the preventive care services that should be considered, and provides dates of when you are due. Items listed as completed are up-to-date and do not require any further intervention.    Health Maintenance   Topic Date Due   • MEDICARE ANNUAL WELLNESS  2019   • Pneumococcal Vaccine Once at 65 Years Old  2020 (Originally 2009)   • COLONOSCOPY  2021 (Originally 2020)   • TDAP/TD VACCINES (2 - Td) 2022 (Originally 3/18/2018)   • ZOSTER VACCINE (2 of 3) 2022 (Originally 2008)   • LIPID PANEL  03/10/2021   • URINE MICROALBUMIN  03/10/2021   • HEMOGLOBIN A1C  03/15/2021   • DIABETIC EYE EXAM  2021   • MAMMOGRAM  2022   • HEPATITIS C SCREENING  Completed   • INFLUENZA VACCINE  Completed       No orders of the defined types were placed in this encounter.      Return in about 6 months (around 3/22/2021).      Exercise Information for Aging Adults  Staying physically active is important as you age. The four types of exercises that are best for older adults are endurance, strength, balance, and flexibility. Contact your health care provider before you start any exercise routine. Ask your health care provider what activities are safe for you.  What are the risks?  Risks associated with exercising include:  · Overdoing it. This may lead to sore muscles or fatigue.  · Falls.  · Injuries.  · Dehydration.  How to do  these exercises  Endurance exercises  Endurance (aerobic) exercises raise your breathing rate and heart rate. Increasing your endurance helps you to do everyday tasks and stay healthy. By improving the health of your body system that includes your heart, lungs, and blood vessels (circulatory system), you may also delay or prevent diseases such as heart disease, diabetes, and bone loss (osteoporosis). Types of endurance exercises include:  · Sports.  · Indoor activities, such as using gym equipment, doing water aerobics, or dancing.  · Outdoor activities, such as biking or jogging.  · Tasks around the house, such as gardening, yard work, and heavy household chores like cleaning.  · Walking, such as hiking or walking around your neighborhood.  When doing endurance exercises, make sure you:  · Are aware of your surroundings.  · Use safety equipment as directed.  · Dress in layers when exercising outdoors.  · Drink plenty of water to stay well hydrated.  Build up endurance slowly. Start with 10 minutes at a time, and gradually build up to doing 30 minutes at a time. Unless your health care provider gave you different instructions, aim to exercise for a total of 150 minutes a week. Spread out that time so you are working on endurance on 3 or more days a week.  Strength exercises  Lifting, pulling, or pushing weights helps to strengthen muscles. Having stronger muscles makes it easier to do everyday activities, such as getting up from a chair, climbing stairs, carrying groceries, and playing with grandchildren. Strength exercises include arm and leg exercises that may be done:  · With weights.  · Without weights (using your own body weight).  · With a resistance band.  When doing strength exercises:  · Move smoothly and steadily. Do not suddenly thrust or jerk the weights, the resistance band, or your body.  · Start with no weights or with light weights, and gradually add more weight over time. Eventually, aim to use  weights that are hard or very hard for you to lift. This means that you are able to do 8 repetitions with the weight, and the last few repetitions are very challenging.  · Lift or push weights into position for 3 seconds, hold the position for 1 second, and then take 3 seconds to return to your starting position.  · Breathe out (exhale) during difficult movements, like lifting or pushing weights. Breathe in (inhale) to relax your muscles before the next repetition.  · Consider alternating arms or legs, especially when you first start strength exercises.  · Expect some slight muscle soreness after each session.  Do strength exercises on 2 or more days a week, for 30 minutes at a time. Avoid exercising the same muscle groups two days in a row. For example, if you work on your leg muscles one day, work on your arm muscles the next day. When you can do two sets of 10-15 repetitions with a certain weight, increase the amount of weight.  Balance  Balance exercises can help to prevent falls. Balance exercises include:  · Standing on one foot.  · Heel-to-toe walk.  · Balance walk.  · Lalito chi.  Make sure you have something sturdy to hold onto while doing balance exercises, such as a sturdy chair. As your balance improves, challenge yourself by holding onto the chair with one hand instead of two, and then with no hands. Trying exercises with your eyes closed also challenges your balance, but be sure to have a sturdy surface (like a countertop) close by in case you need it.  Do balance exercises as often as you want, or as often as directed by your health care provider. Strength exercises for the lower body also help to improve balance.  Flexibility    Flexibility exercises improve how far you can bend, straighten, move, or rotate parts of your body (range of motion). These exercises also help you to do everyday activities such as getting dressed or reaching for objects. Flexibility exercises include stretching different parts  "of the body, and they may be done in a standing or seated position or on the floor.  When stretching, make sure you:  · Keep a slight bend in your arms and legs. Avoid completely straightening (\"locking\") your joints.  · Do not stretch so far that you feel pain. You should feel a mild stretching feeling. You may try stretching farther as you become more flexible over time.  · Relax and breathe between stretches.  · Hold onto something sturdy for balance as needed.  Hold each stretch for 10-30 seconds. Repeat each stretch 3-5 times.  General safety tips  · Exercise in well-lit areas.  · Do not hold your breath during exercises or stretches.  · Warm up before exercising, and cool down after exercising. This can help prevent injury.  · Drink plenty of water during exercise or any activity that makes you sweat.  · Use smooth, steady movements. Do not use sudden, jerking movements, especially when lifting weights or doing flexibility exercises.  · If you are not sure if an exercise is safe for you, or you are not sure how to do an exercise, talk with your health care provider. This is especially important if you have had surgery on muscles, bones, or joints (orthopedic surgery).  Where to find more information  You can find more information about exercise for older adults from:  · Your local health department, fitness center, or community center. These facilities may have programs for aging adults.  · National [place] on Aging: www.[place].nih.gov  · National [place] on Aging: www.n[place].org  Summary  · Staying physically active is important as you age.  · Make sure to contact your health care provider before you start any exercise routine. Ask your health care provider what activities are safe for you.  · Doing endurance, strength, balance, and flexibility exercises can help to delay or prevent certain diseases, such as heart disease, diabetes, and bone loss (osteoporosis).  This information is not intended to replace " advice given to you by your health care provider. Make sure you discuss any questions you have with your health care provider.  Document Released: 05/09/2018 Document Revised: 10/10/2019 Document Reviewed: 05/09/2018  Elsevier Patient Education © 2020 [employer]  Calorie Counting for Weight Loss  Calories are units of energy. Your body needs a certain amount of calories from food to keep you going throughout the day. When you eat more calories than your body needs, your body stores the extra calories as fat. When you eat fewer calories than your body needs, your body burns fat to get the energy it needs.  Calorie counting means keeping track of how many calories you eat and drink each day. Calorie counting can be helpful if you need to lose weight. If you make sure to eat fewer calories than your body needs, you should lose weight. Ask your health care provider what a healthy weight is for you.  For calorie counting to work, you will need to eat the right number of calories in a day in order to lose a healthy amount of weight per week. A dietitian can help you determine how many calories you need in a day and will give you suggestions on how to reach your calorie goal.  · A healthy amount of weight to lose per week is usually 1-2 lb (0.5-0.9 kg). This usually means that your daily calorie intake should be reduced by 500-750 calories.  · Eating 1,200 - 1,500 calories per day can help most women lose weight.  · Eating 1,500 - 1,800 calories per day can help most men lose weight.  What is my plan?  My goal is to have __________ calories per day.  If I have this many calories per day, I should lose around __________ pounds per week.  What do I need to know about calorie counting?  In order to meet your daily calorie goal, you will need to:  · Find out how many calories are in each food you would like to eat. Try to do this before you eat.  · Decide how much of the food you plan to eat.  · Write down what you ate and  how many calories it had. Doing this is called keeping a food log.  To successfully lose weight, it is important to balance calorie counting with a healthy lifestyle that includes regular activity. Aim for 150 minutes of moderate exercise (such as walking) or 75 minutes of vigorous exercise (such as running) each week.  Where do I find calorie information?    The number of calories in a food can be found on a Nutrition Facts label. If a food does not have a Nutrition Facts label, try to look up the calories online or ask your dietitian for help.  Remember that calories are listed per serving. If you choose to have more than one serving of a food, you will have to multiply the calories per serving by the amount of servings you plan to eat. For example, the label on a package of bread might say that a serving size is 1 slice and that there are 90 calories in a serving. If you eat 1 slice, you will have eaten 90 calories. If you eat 2 slices, you will have eaten 180 calories.  How do I keep a food log?  Immediately after each meal, record the following information in your food log:  · What you ate. Don't forget to include toppings, sauces, and other extras on the food.  · How much you ate. This can be measured in cups, ounces, or number of items.  · How many calories each food and drink had.  · The total number of calories in the meal.  Keep your food log near you, such as in a small notebook in your pocket, or use a mobile oralia or website. Some programs will calculate calories for you and show you how many calories you have left for the day to meet your goal.  What are some calorie counting tips?    · Use your calories on foods and drinks that will fill you up and not leave you hungry:  ? Some examples of foods that fill you up are nuts and nut butters, vegetables, lean proteins, and high-fiber foods like whole grains. High-fiber foods are foods with more than 5 g fiber per serving.  ? Drinks such as sodas, specialty  "coffee drinks, alcohol, and juices have a lot of calories, yet do not fill you up.  · Eat nutritious foods and avoid empty calories. Empty calories are calories you get from foods or beverages that do not have many vitamins or protein, such as candy, sweets, and soda. It is better to have a nutritious high-calorie food (such as an avocado) than a food with few nutrients (such as a bag of chips).  · Know how many calories are in the foods you eat most often. This will help you calculate calorie counts faster.  · Pay attention to calories in drinks. Low-calorie drinks include water and unsweetened drinks.  · Pay attention to nutrition labels for \"low fat\" or \"fat free\" foods. These foods sometimes have the same amount of calories or more calories than the full fat versions. They also often have added sugar, starch, or salt, to make up for flavor that was removed with the fat.  · Find a way of tracking calories that works for you. Get creative. Try different apps or programs if writing down calories does not work for you.  What are some portion control tips?  · Know how many calories are in a serving. This will help you know how many servings of a certain food you can have.  · Use a measuring cup to measure serving sizes. You could also try weighing out portions on a kitchen scale. With time, you will be able to estimate serving sizes for some foods.  · Take some time to put servings of different foods on your favorite plates, bowls, and cups so you know what a serving looks like.  · Try not to eat straight from a bag or box. Doing this can lead to overeating. Put the amount you would like to eat in a cup or on a plate to make sure you are eating the right portion.  · Use smaller plates, glasses, and bowls to prevent overeating.  · Try not to multitask (for example, watch TV or use your computer) while eating. If it is time to eat, sit down at a table and enjoy your food. This will help you to know when you are full. " "It will also help you to be aware of what you are eating and how much you are eating.  What are tips for following this plan?  Reading food labels  · Check the calorie count compared to the serving size. The serving size may be smaller than what you are used to eating.  · Check the source of the calories. Make sure the food you are eating is high in vitamins and protein and low in saturated and trans fats.  Shopping  · Read nutrition labels while you shop. This will help you make healthy decisions before you decide to purchase your food.  · Make a grocery list and stick to it.  Cooking  · Try to cook your favorite foods in a healthier way. For example, try baking instead of frying.  · Use low-fat dairy products.  Meal planning  · Use more fruits and vegetables. Half of your plate should be fruits and vegetables.  · Include lean proteins like poultry and fish.  How do I count calories when eating out?  · Ask for smaller portion sizes.  · Consider sharing an entree and sides instead of getting your own entree.  · If you get your own entree, eat only half. Ask for a box at the beginning of your meal and put the rest of your entree in it so you are not tempted to eat it.  · If calories are listed on the menu, choose the lower calorie options.  · Choose dishes that include vegetables, fruits, whole grains, low-fat dairy products, and lean protein.  · Choose items that are boiled, broiled, grilled, or steamed. Stay away from items that are buttered, battered, fried, or served with cream sauce. Items labeled \"crispy\" are usually fried, unless stated otherwise.  · Choose water, low-fat milk, unsweetened iced tea, or other drinks without added sugar. If you want an alcoholic beverage, choose a lower calorie option such as a glass of wine or light beer.  · Ask for dressings, sauces, and syrups on the side. These are usually high in calories, so you should limit the amount you eat.  · If you want a salad, choose a garden salad " and ask for grilled meats. Avoid extra toppings like santiago, cheese, or fried items. Ask for the dressing on the side, or ask for olive oil and vinegar or lemon to use as dressing.  · Estimate how many servings of a food you are given. For example, a serving of cooked rice is ½ cup or about the size of half a baseball. Knowing serving sizes will help you be aware of how much food you are eating at restaurants. The list below tells you how big or small some common portion sizes are based on everyday objects:  ? 1 oz--4 stacked dice.  ? 3 oz--1 deck of cards.  ? 1 tsp--1 die.  ? 1 Tbsp--½ a ping-pong ball.  ? 2 Tbsp--1 ping-pong ball.  ? ½ cup--½ baseball.  ? 1 cup--1 baseball.  Summary  · Calorie counting means keeping track of how many calories you eat and drink each day. If you eat fewer calories than your body needs, you should lose weight.  · A healthy amount of weight to lose per week is usually 1-2 lb (0.5-0.9 kg). This usually means reducing your daily calorie intake by 500-750 calories.  · The number of calories in a food can be found on a Nutrition Facts label. If a food does not have a Nutrition Facts label, try to look up the calories online or ask your dietitian for help.  · Use your calories on foods and drinks that will fill you up, and not on foods and drinks that will leave you hungry.  · Use smaller plates, glasses, and bowls to prevent overeating.  This information is not intended to replace advice given to you by your health care provider. Make sure you discuss any questions you have with your health care provider.  Document Released: 12/18/2006 Document Revised: 09/06/2019 Document Reviewed: 11/17/2017  Elsevier Patient Education © 2020 [employer]    Fall Prevention in the Home, Adult  Falls can cause injuries. They can happen to people of all ages. There are many things you can do to make your home safe and to help prevent falls. Ask for help when making these changes, if needed.  What actions  can I take to prevent falls?  General Instructions  · Use good lighting in all rooms. Replace any light bulbs that burn out.  · Turn on the lights when you go into a dark area. Use night-lights.  · Keep items that you use often in easy-to-reach places. Lower the shelves around your home if necessary.  · Set up your furniture so you have a clear path. Avoid moving your furniture around.  · Do not have throw rugs and other things on the floor that can make you trip.  · Avoid walking on wet floors.  · If any of your floors are uneven, fix them.  · Add color or contrast paint or tape to clearly lissa and help you see:  ? Any grab bars or handrails.  ? First and last steps of stairways.  ? Where the edge of each step is.  · If you use a stepladder:  ? Make sure that it is fully opened. Do not climb a closed stepladder.  ? Make sure that both sides of the stepladder are locked into place.  ? Ask someone to hold the stepladder for you while you use it.  · If there are any pets around you, be aware of where they are.  What can I do in the bathroom?         · Keep the floor dry. Clean up any water that spills onto the floor as soon as it happens.  · Remove soap buildup in the tub or shower regularly.  · Use non-skid mats or decals on the floor of the tub or shower.  · Attach bath mats securely with double-sided, non-slip rug tape.  · If you need to sit down in the shower, use a plastic, non-slip stool.  · Install grab bars by the toilet and in the tub and shower. Do not use towel bars as grab bars.  What can I do in the bedroom?  · Make sure that you have a light by your bed that is easy to reach.  · Do not use any sheets or blankets that are too big for your bed. They should not hang down onto the floor.  · Have a firm chair that has side arms. You can use this for support while you get dressed.  What can I do in the kitchen?  · Clean up any spills right away.  · If you need to reach something above you, use a strong step  stool that has a grab bar.  · Keep electrical cords out of the way.  · Do not use floor polish or wax that makes floors slippery. If you must use wax, use non-skid floor wax.  What can I do with my stairs?  · Do not leave any items on the stairs.  · Make sure that you have a light switch at the top of the stairs and the bottom of the stairs. If you do not have them, ask someone to add them for you.  · Make sure that there are handrails on both sides of the stairs, and use them. Fix handrails that are broken or loose. Make sure that handrails are as long as the stairways.  · Install non-slip stair treads on all stairs in your home.  · Avoid having throw rugs at the top or bottom of the stairs. If you do have throw rugs, attach them to the floor with carpet tape.  · Choose a carpet that does not hide the edge of the steps on the stairway.  · Check any carpeting to make sure that it is firmly attached to the stairs. Fix any carpet that is loose or worn.  What can I do on the outside of my home?  · Use bright outdoor lighting.  · Regularly fix the edges of walkways and driveways and fix any cracks.  · Remove anything that might make you trip as you walk through a door, such as a raised step or threshold.  · Trim any bushes or trees on the path to your home.  · Regularly check to see if handrails are loose or broken. Make sure that both sides of any steps have handrails.  · Install guardrails along the edges of any raised decks and porches.  · Clear walking paths of anything that might make someone trip, such as tools or rocks.  · Have any leaves, snow, or ice cleared regularly.  · Use sand or salt on walking paths during winter.  · Clean up any spills in your garage right away. This includes grease or oil spills.  What other actions can I take?  · Wear shoes that:  ? Have a low heel. Do not wear high heels.  ? Have rubber bottoms.  ? Are comfortable and fit you well.  ? Are closed at the toe. Do not wear open-toe  sandals.  · Use tools that help you move around (mobility aids) if they are needed. These include:  ? Canes.  ? Walkers.  ? Scooters.  ? Crutches.  · Review your medicines with your doctor. Some medicines can make you feel dizzy. This can increase your chance of falling.  Ask your doctor what other things you can do to help prevent falls.  Where to find more information  · Centers for Disease Control and Prevention, [place]: https://cdc.gov  · National [place] on Aging: https://nc7oejv.[place].nih.gov  Contact a doctor if:  · You are afraid of falling at home.  · You feel weak, drowsy, or dizzy at home.  · You fall at home.  Summary  · There are many simple things that you can do to make your home safe and to help prevent falls.  · Ways to make your home safe include removing tripping hazards and installing grab bars in the bathroom.  · Ask for help when making these changes in your home.  This information is not intended to replace advice given to you by your health care provider. Make sure you discuss any questions you have with your health care provider.  Document Released: 10/14/2010 Document Revised: 04/09/2020 Document Reviewed: 08/02/2018  Elsevier Patient Education © 2020 [employer]

## 2020-09-22 NOTE — PROGRESS NOTES
Subjective   Philly Person is a 75 y.o. female.   Chief Complaint   Patient presents with   • Medicare Wellness-subsequent   • Hypertension   • Hyperlipidemia   • Diabetes       History of Present Illness     #1 diabetes type 2 - diagnosed in 2001. Patient did diabetes education at the time of diagnosis. She is on metformin 1000 mg twice a day and glimepiride 2 mg, 2 tb in AM, 1.5 tablet in PM.  She does not check sugars at home.  No symptomatic low sugars.   A1c is at 6.9 from  6.5 FBS 127.     She had eye exam in 5/ 2020. She is on ACE inhibitor and statin. She has a history of microalbuminuria.   She had Pneumovax in 2009, she had Prevnar 13 in 12/2015.      She saw Dr. Millard for diabetes, but she prefers to follow-up with us. It is easier for her to follow-up on it with us. She agrees to see Dr. Millard if diabetes gets out of control.      #2 HTN- on amlodipine 10 mg a day, atenolol 100 mg a day and lisinopril 20 mg 2 tablets taken daily. She is also on furosemide 20 mg a day. She takes no additional potassium. She reports no side effects.  She rides a bike twice a week for 25 to 30 minutes.  Kidney tests are stable. Sodium and potassium normal.        #3 hypercholesterolemia- on atorvastatin 10 mg a day. She takes it everyday. No side effects. No muscle aches, no muscle cramps.     The following portions of the patient's history were reviewed and updated as appropriate: allergies, current medications, past family history, past medical history, past social history, past surgical history and problem list.    Review of Systems   Constitutional: Negative for chills and fever.   Respiratory: Negative for cough and shortness of breath.    Cardiovascular: Negative for chest pain.   Neurological: Negative for light-headedness.   Psychiatric/Behavioral: Negative.          Objective   Wt Readings from Last 3 Encounters:   09/22/20 114 kg (251 lb)   03/16/20 112 kg (247 lb)   09/09/19 108 kg (239 lb)      Vitals:     09/22/20 1259   BP: 138/62   Pulse: 80   Temp: 97 °F (36.1 °C)   SpO2: 97%     Temp Readings from Last 3 Encounters:   09/22/20 97 °F (36.1 °C)   03/16/20 98.2 °F (36.8 °C)   09/09/19 98.6 °F (37 °C)     BP Readings from Last 3 Encounters:   09/22/20 138/62   03/16/20 132/80   09/09/19 135/60     Pulse Readings from Last 3 Encounters:   09/22/20 80   03/16/20 73   09/09/19 67     Body mass index is 46.9 kg/m².    Physical Exam  Constitutional:       Appearance: She is well-developed. She is obese.   HENT:      Head: Normocephalic and atraumatic.   Neck:      Musculoskeletal: Neck supple.      Thyroid: No thyromegaly.      Vascular: No carotid bruit.   Cardiovascular:      Rate and Rhythm: Normal rate and regular rhythm.      Pulses: Normal pulses.      Heart sounds: Normal heart sounds.   Pulmonary:      Effort: Pulmonary effort is normal.      Breath sounds: Normal breath sounds.   Skin:     General: Skin is warm and dry.   Neurological:      Mental Status: She is alert and oriented to person, place, and time.   Psychiatric:         Behavior: Behavior normal.         Assessment/Plan   Philly was seen today for medicare wellness-subsequent, hypertension, hyperlipidemia and diabetes.    Diagnoses and all orders for this visit:    Medicare annual wellness visit, subsequent    Type 2 diabetes mellitus with microalbuminuria, without long-term current use of insulin (CMS/[facility])    Benign essential hypertension    Hypercholesterolemia    Other orders  -     metFORMIN (GLUCOPHAGE) 1000 MG tablet; Take 1 tablet by mouth 2 (Two) Times a Day With Meals.  -     glimepiride (AMARYL) 2 MG tablet; Take 2 tb in AM and 1.5 tb in PM  -     furosemide (LASIX) 20 MG tablet; Take 1 tablet by mouth Daily.  -     atorvastatin (LIPITOR) 10 MG tablet; Take 1 tablet by mouth Every Night.  -     atenolol (TENORMIN) 100 MG tablet; Take 1 tablet by mouth Daily.  -     amLODIPine (NORVASC) 10 MG tablet; Take 1 tablet by mouth Every Night.  -      lisinopril (PRINIVIL,ZESTRIL) 20 MG tablet; Take 2 tablets by mouth Daily.        #1  Diabetes type 2-continue current treatment.  Follow-up in 6 months.  2.  Hypertension-continue current treatment.  Follow-up in 6 months.  3.  Hyperlipidemia-continue current treatment.  Follow-up in 6 months.

## 2021-02-10 ENCOUNTER — TELEPHONE (OUTPATIENT)
Dept: INTERNAL MEDICINE | Facility: CLINIC | Age: 76
End: 2021-02-10

## 2021-02-10 RX ORDER — GLIMEPIRIDE 2 MG/1
TABLET ORAL
Qty: 315 TABLET | Refills: 0 | Status: SHIPPED | OUTPATIENT
Start: 2021-02-10 | End: 2021-04-06 | Stop reason: SDUPTHER

## 2021-02-10 RX ORDER — ATORVASTATIN CALCIUM 10 MG/1
10 TABLET, FILM COATED ORAL NIGHTLY
Qty: 90 TABLET | Refills: 0 | Status: SHIPPED | OUTPATIENT
Start: 2021-02-10 | End: 2021-04-06 | Stop reason: SDUPTHER

## 2021-02-10 RX ORDER — AMLODIPINE BESYLATE 10 MG/1
10 TABLET ORAL NIGHTLY
Qty: 90 TABLET | Refills: 0 | Status: SHIPPED | OUTPATIENT
Start: 2021-02-10 | End: 2021-04-06 | Stop reason: SDUPTHER

## 2021-02-10 RX ORDER — LISINOPRIL 20 MG/1
40 TABLET ORAL DAILY
Qty: 180 TABLET | Refills: 0 | Status: SHIPPED | OUTPATIENT
Start: 2021-02-10 | End: 2021-04-06 | Stop reason: SDUPTHER

## 2021-02-10 RX ORDER — FUROSEMIDE 20 MG/1
20 TABLET ORAL DAILY
Qty: 90 TABLET | Refills: 0 | Status: SHIPPED | OUTPATIENT
Start: 2021-02-10 | End: 2021-04-06 | Stop reason: SDUPTHER

## 2021-02-10 RX ORDER — ATENOLOL 100 MG/1
100 TABLET ORAL DAILY
Qty: 90 TABLET | Refills: 0 | Status: SHIPPED | OUTPATIENT
Start: 2021-02-10 | End: 2021-04-06 | Stop reason: SDUPTHER

## 2021-02-10 NOTE — TELEPHONE ENCOUNTER
Caller: Philly Person    Relationship: Self    Best call back number: [phone]     Medication needed:   Requested Prescriptions     Pending Prescriptions Disp Refills   • lisinopril (PRINIVIL,ZESTRIL) 20 MG tablet 180 tablet 1     Sig: Take 2 tablets by mouth Daily.   • glimepiride (AMARYL) 2 MG tablet 315 tablet 1     Sig: Take 2 tb in AM and 1.5 tb in PM   • atorvastatin (LIPITOR) 10 MG tablet 90 tablet 1     Sig: Take 1 tablet by mouth Every Night.   • furosemide (LASIX) 20 MG tablet 90 tablet 1     Sig: Take 1 tablet by mouth Daily.   • metFORMIN (GLUCOPHAGE) 1000 MG tablet 180 tablet 1     Sig: Take 1 tablet by mouth 2 (Two) Times a Day With Meals.   • atenolol (TENORMIN) 100 MG tablet 90 tablet 1     Sig: Take 1 tablet by mouth Daily.   • amLODIPine (NORVASC) 10 MG tablet 90 tablet 1     Sig: Take 1 tablet by mouth Every Night.       When do you need the refill by: ASAP    What details did the patient provide when requesting the medication: patient has a 2 week supply of medication left     Does the patient have less than a 3 day supply:  [] Yes  [x] No    What is the patient's preferred pharmacy: [facility] - [facility] [address] [facility] - [phone] [facility] [phone] FX       Patients last OV 9/22/20  Patients next OV 3/29/21

## 2021-03-09 ENCOUNTER — TELEPHONE (OUTPATIENT)
Dept: INTERNAL MEDICINE | Facility: CLINIC | Age: 76
End: 2021-03-09

## 2021-03-09 DIAGNOSIS — R80.9 TYPE 2 DIABETES MELLITUS WITH MICROALBUMINURIA, WITHOUT LONG-TERM CURRENT USE OF INSULIN (HCC): Primary | ICD-10-CM

## 2021-03-09 DIAGNOSIS — E11.29 TYPE 2 DIABETES MELLITUS WITH MICROALBUMINURIA, WITHOUT LONG-TERM CURRENT USE OF INSULIN (HCC): Primary | ICD-10-CM

## 2021-03-09 DIAGNOSIS — M1A.0710 CHRONIC GOUT OF RIGHT FOOT, UNSPECIFIED CAUSE: ICD-10-CM

## 2021-03-09 DIAGNOSIS — E78.00 HYPERCHOLESTEROLEMIA: ICD-10-CM

## 2021-03-09 DIAGNOSIS — I10 BENIGN ESSENTIAL HYPERTENSION: ICD-10-CM

## 2021-03-09 NOTE — TELEPHONE ENCOUNTER
PATIENT CALLED AND STATED SHE WILL BE GETTING LABS DONE BEFORE APPT ON 04/06. PATIENT PLANS ON GOING TO [facility] FOR LABS ON 03/23 AND WOULD LIKE TO MAKE SURE LAB ORDERS ARE PLACED     PLEASE ADVISE     [phone]

## 2021-04-06 ENCOUNTER — OFFICE VISIT (OUTPATIENT)
Dept: INTERNAL MEDICINE | Facility: CLINIC | Age: 76
End: 2021-04-06

## 2021-04-06 VITALS
SYSTOLIC BLOOD PRESSURE: 136 MMHG | OXYGEN SATURATION: 95 % | HEIGHT: 61 IN | HEART RATE: 84 BPM | WEIGHT: 253 LBS | DIASTOLIC BLOOD PRESSURE: 70 MMHG | TEMPERATURE: 96.9 F | BODY MASS INDEX: 47.77 KG/M2

## 2021-04-06 DIAGNOSIS — I10 BENIGN ESSENTIAL HYPERTENSION: ICD-10-CM

## 2021-04-06 DIAGNOSIS — N18.32 STAGE 3B CHRONIC KIDNEY DISEASE (HCC): ICD-10-CM

## 2021-04-06 DIAGNOSIS — M1A.0720 CHRONIC GOUT OF LEFT FOOT, UNSPECIFIED CAUSE: ICD-10-CM

## 2021-04-06 DIAGNOSIS — R80.9 TYPE 2 DIABETES MELLITUS WITH MICROALBUMINURIA, WITHOUT LONG-TERM CURRENT USE OF INSULIN (HCC): Primary | ICD-10-CM

## 2021-04-06 DIAGNOSIS — C43.59 MALIGNANT MELANOMA OF TORSO EXCLUDING BREAST (HCC): ICD-10-CM

## 2021-04-06 DIAGNOSIS — E78.00 HYPERCHOLESTEROLEMIA: ICD-10-CM

## 2021-04-06 DIAGNOSIS — E11.29 TYPE 2 DIABETES MELLITUS WITH MICROALBUMINURIA, WITHOUT LONG-TERM CURRENT USE OF INSULIN (HCC): Primary | ICD-10-CM

## 2021-04-06 PROCEDURE — 99214 OFFICE O/P EST MOD 30 MIN: CPT | Performed by: FAMILY MEDICINE

## 2021-04-06 RX ORDER — FUROSEMIDE 20 MG/1
20 TABLET ORAL DAILY
Qty: 90 TABLET | Refills: 1 | Status: SHIPPED | OUTPATIENT
Start: 2021-04-06 | End: 2021-10-08 | Stop reason: SDUPTHER

## 2021-04-06 RX ORDER — LISINOPRIL 20 MG/1
40 TABLET ORAL DAILY
Qty: 180 TABLET | Refills: 1 | Status: SHIPPED | OUTPATIENT
Start: 2021-04-06 | End: 2021-06-28

## 2021-04-06 RX ORDER — ATORVASTATIN CALCIUM 10 MG/1
10 TABLET, FILM COATED ORAL NIGHTLY
Qty: 90 TABLET | Refills: 1 | Status: SHIPPED | OUTPATIENT
Start: 2021-04-06 | End: 2021-09-27

## 2021-04-06 RX ORDER — ATENOLOL 100 MG/1
100 TABLET ORAL DAILY
Qty: 90 TABLET | Refills: 1 | Status: SHIPPED | OUTPATIENT
Start: 2021-04-06 | End: 2021-11-24

## 2021-04-06 RX ORDER — GLIMEPIRIDE 2 MG/1
TABLET ORAL
Qty: 315 TABLET | Refills: 1 | Status: SHIPPED | OUTPATIENT
Start: 2021-04-06 | End: 2021-10-08 | Stop reason: SDUPTHER

## 2021-04-06 RX ORDER — AMLODIPINE BESYLATE 10 MG/1
10 TABLET ORAL NIGHTLY
Qty: 90 TABLET | Refills: 1 | Status: SHIPPED | OUTPATIENT
Start: 2021-04-06 | End: 2021-10-08 | Stop reason: SDUPTHER

## 2021-04-06 NOTE — PROGRESS NOTES
Subjective   Philly Person is a 75 y.o. female.   Chief Complaint   Patient presents with   • Hypertension   • Hyperlipidemia   • Diabetes       History of Present Illness     #1 diabetes type 2 - diagnosed in 2001. Patient did diabetes education at the time of diagnosis. She is on metformin 1000 mg twice a day and glimepiride 2 mg, 2 tb in AM, 1.5 tablet in PM.  She does not check sugars at home.  No symptomatic low sugars.   A1c is at  6.7 from 6.9. FBS 109.  She was not able to void.   She had eye exam in 5/ 2020. She is on ACE inhibitor and statin. She has a history of microalbuminuria.   She had Pneumovax in 2009, she had Prevnar 13 in 12/2015.      She saw Dr. Millard for diabetes, but she prefers to follow-up with us. It is easier for her to follow-up on it with us. She agrees to see Dr. Millard if diabetes gets out of control.      #2 HTN- on amlodipine 10 mg a day, atenolol 100 mg a day and lisinopril 20 mg 2 tablets taken daily. She is also on furosemide 20 mg a day. She takes no additional potassium. She reports no side effects.  He does not exercise.  Creatinine is 1.49, GFR 34.  Potassium 3.9.      #3 hypercholesterolemia- on atorvastatin 10 mg a day. She takes it everyday. No side effects. No muscle aches, no muscle cramps.  LDL 55, HDL 36.    #4 gout-she did not have any problems for years.  Few weeks ago she had left foot pain that reminded her of the gout,  it was mild and responded to one tablet of over-the-counter anti-inflammatory medication.  Uric acid is at 8.7.  She is on no medications for it and she prefers not to start them.    5.  Melanoma-she was diagnosed with another melanoma in February 2021.  It was on her left upper back.  It was early stage.  She follows up with dermatologist every 3 months.    The following portions of the patient's history were reviewed and updated as appropriate: allergies, current medications, past family history, past medical history, past social history, past  surgical history and problem list.    Review of Systems   Constitutional: Negative for chills and fever.   Respiratory: Negative for shortness of breath and wheezing.    Cardiovascular: Negative for chest pain.         Objective   Wt Readings from Last 3 Encounters:   04/06/21 115 kg (253 lb)   09/22/20 114 kg (251 lb)   03/16/20 112 kg (247 lb)      Vitals:    04/06/21 1301   BP: 140/70   Pulse: 84   Temp: 96.9 °F (36.1 °C)   SpO2: 95%     Temp Readings from Last 3 Encounters:   04/06/21 96.9 °F (36.1 °C)   09/22/20 97 °F (36.1 °C)   03/16/20 98.2 °F (36.8 °C)     BP Readings from Last 3 Encounters:   04/06/21 140/70   09/22/20 138/62   03/16/20 132/80     Pulse Readings from Last 3 Encounters:   04/06/21 84   09/22/20 80   03/16/20 73     Body mass index is 47.28 kg/m².    Physical Exam  Constitutional:       Appearance: She is well-developed. She is obese.   HENT:      Head: Normocephalic and atraumatic.   Neck:      Thyroid: No thyromegaly.      Vascular: No carotid bruit.   Cardiovascular:      Rate and Rhythm: Normal rate and regular rhythm.      Heart sounds: Normal heart sounds.   Pulmonary:      Effort: Pulmonary effort is normal.      Breath sounds: Normal breath sounds.   Musculoskeletal:      Cervical back: Neck supple.   Skin:     General: Skin is warm and dry.   Neurological:      Mental Status: She is alert and oriented to person, place, and time.   Psychiatric:         Behavior: Behavior normal.         Assessment/Plan   Diagnoses and all orders for this visit:    1. Type 2 diabetes mellitus with microalbuminuria, without long-term current use of insulin (CMS/[facility]) (Primary)    2. Stage 3b chronic kidney disease (CMS/HCC)  -     Basic Metabolic Panel; Future  -     MicroAlbumin, Urine, Random - Urine, Clean Catch; Future  -     Urinalysis With Microscopic If Indicated (No Culture) - Urine, Clean Catch; Future  -     Basic Metabolic Panel; Future    3. Benign essential hypertension    4.  Hypercholesterolemia    5. Chronic gout of left foot, unspecified cause    6. Malignant melanoma of torso excluding breast (CMS/HCC)    Other orders  -     glimepiride (AMARYL) 2 MG tablet; Take 2 tb in AM and 1.5 tb in PM  Dispense: 315 tablet; Refill: 1  -     metFORMIN (GLUCOPHAGE) 1000 MG tablet; Take 1 tablet by mouth 2 (Two) Times a Day With Meals.  Dispense: 180 tablet; Refill: 1  -     atorvastatin (LIPITOR) 10 MG tablet; Take 1 tablet by mouth Every Night.  Dispense: 90 tablet; Refill: 1  -     amLODIPine (NORVASC) 10 MG tablet; Take 1 tablet by mouth Every Night.  Dispense: 90 tablet; Refill: 1  -     atenolol (TENORMIN) 100 MG tablet; Take 1 tablet by mouth Daily.  Dispense: 90 tablet; Refill: 1  -     furosemide (LASIX) 20 MG tablet; Take 1 tablet by mouth Daily.  Dispense: 90 tablet; Refill: 1  -     lisinopril (PRINIVIL,ZESTRIL) 20 MG tablet; Take 2 tablets by mouth Daily.  Dispense: 180 tablet; Refill: 1        #1 DM type II-continue current treatment.  Follow-up in 6 months.  2.  Hypertension-continue current treatment.  Follow-up in 6 months.  3.  Hyperlipidemia-continue current treatment.  Exercise can help with improving HDL.  Follow-up in 6 months.  4.  Chronic kidney disease-avoid NSAIDs.  We will recheck labs in 10 days.  Further recommendation depending on test results.  5.  Gout-patient chooses no preventive medications.  We will continue to monitor.  Follow-up as needed.  6.  Melanoma-follow-up per her dermatologist.

## 2021-04-19 DIAGNOSIS — N18.32 STAGE 3B CHRONIC KIDNEY DISEASE (HCC): Primary | ICD-10-CM

## 2021-05-13 ENCOUNTER — TRANSCRIBE ORDERS (OUTPATIENT)
Dept: ADMINISTRATIVE | Facility: HOSPITAL | Age: 76
End: 2021-05-13

## 2021-05-13 DIAGNOSIS — I10 ESSENTIAL HYPERTENSION: ICD-10-CM

## 2021-05-13 DIAGNOSIS — N18.30 CKD (CHRONIC KIDNEY DISEASE), SYMPTOM MANAGEMENT ONLY, STAGE 3 (MODERATE) (HCC): Primary | ICD-10-CM

## 2021-05-14 ENCOUNTER — APPOINTMENT (OUTPATIENT)
Dept: WOMENS IMAGING | Facility: HOSPITAL | Age: 76
End: 2021-05-14

## 2021-05-14 PROCEDURE — 77067 SCR MAMMO BI INCL CAD: CPT | Performed by: RADIOLOGY

## 2021-05-14 PROCEDURE — 77063 BREAST TOMOSYNTHESIS BI: CPT | Performed by: RADIOLOGY

## 2021-06-03 ENCOUNTER — HOSPITAL ENCOUNTER (OUTPATIENT)
Dept: CARDIOLOGY | Facility: HOSPITAL | Age: 76
Discharge: HOME | End: 2021-06-03

## 2021-06-03 ENCOUNTER — HOSPITAL ENCOUNTER (OUTPATIENT)
Dept: ULTRASOUND IMAGING | Facility: HOSPITAL | Age: 76
Discharge: HOME | End: 2021-06-03

## 2021-06-03 ENCOUNTER — APPOINTMENT (OUTPATIENT)
Dept: ULTRASOUND IMAGING | Facility: HOSPITAL | Age: 76
End: 2021-06-03

## 2021-06-03 ENCOUNTER — APPOINTMENT (OUTPATIENT)
Dept: CARDIOLOGY | Facility: HOSPITAL | Age: 76
End: 2021-06-03

## 2021-06-03 DIAGNOSIS — I10 ESSENTIAL HYPERTENSION: ICD-10-CM

## 2021-06-03 DIAGNOSIS — N18.30 CKD (CHRONIC KIDNEY DISEASE), SYMPTOM MANAGEMENT ONLY, STAGE 3 (MODERATE) (HCC): ICD-10-CM

## 2021-06-03 LAB
BH CV ECHO MEAS - DIST REN A EDV LEFT: 19.3 CM/SEC
BH CV ECHO MEAS - DIST REN A PSV LEFT: 123 CM/SEC
BH CV ECHO MEAS - DIST REN A RI LEFT: 0.84
BH CV ECHO MEAS - MID REN A EDV LEFT: 22.3 CM/SEC
BH CV ECHO MEAS - MID REN A PSV LEFT: 121 CM/SEC
BH CV ECHO MEAS - MID REN A RI LEFT: 0.82
BH CV ECHO MEAS - PROX REN A EDV LEFT: 13.9 CM/SEC
BH CV ECHO MEAS - PROX REN A PSV LEFT: 117 CM/SEC
BH CV ECHO MEAS - PROX REN A RI LEFT: 0.88
BH CV VAS BP LEFT ARM: NORMAL MMHG
BH CV VAS BP RIGHT ARM: NORMAL MMHG
BH CV VAS RENAL AORTIC MID EDV: 28.2 CM/S
BH CV VAS RENAL AORTIC MID PSV: 131 CM/S
BH CV VAS RENAL HILUM LEFT EDV: 7.47 CM/S
BH CV VAS RENAL HILUM LEFT PSV: 28.2 CM/S
BH CV VAS RENAL HILUM RIGHT EDV: 6.64 CM/S
BH CV VAS RENAL HILUM RIGHT PSV: 23.2 CM/S
BH CV XLRA MEAS - KID L LEFT: 12.1 CM
BH CV XLRA MEAS - RENAL A ORG RI LEFT: 0.85
BH CV XLRA MEAS DIST REN A EDV RIGHT: 19.4 CM/SEC
BH CV XLRA MEAS DIST REN A PSV RIGHT: 63.8 CM/SEC
BH CV XLRA MEAS DIST REN A RI RIGHT: 0.7
BH CV XLRA MEAS KID L RIGHT: 11.3 CM
BH CV XLRA MEAS KID W RIGHT: 5.5 CM
BH CV XLRA MEAS MID REN A EDV RIGHT: 18.4 CM/SEC
BH CV XLRA MEAS MID REN A PSV RIGHT: 137 CM/SEC
BH CV XLRA MEAS MID REN A RI RIGHT: 0.87
BH CV XLRA MEAS PROX REN A EDV RIGHT: -28.5 CM/SEC
BH CV XLRA MEAS PROX REN A PSV RIGHT: -116 CM/SEC
BH CV XLRA MEAS PROX REN A RI RIGHT: 0.75
BH CV XLRA MEAS RAR LEFT: 0.94
BH CV XLRA MEAS RAR RIGHT: 1.05
BH CV XLRA MEAS RENAL A ORG EDV LEFT: 11.1 CM/SEC
BH CV XLRA MEAS RENAL A ORG EDV RIGHT: 23.5 CM/SEC
BH CV XLRA MEAS RENAL A ORG PSV LEFT: 73.1 CM/SEC
BH CV XLRA MEAS RENAL A ORG PSV RIGHT: 109 CM/SEC
BH CV XLRA MEAS RENAL A ORG RI RIGHT: 0.78
LEFT KIDNEY WIDTH: 6 CM
LEFT RENAL UPPER PARENCHYMA MAX: 25.7 CM/S
LEFT RENAL UPPER PARENCHYMA MIN: 4.98 CM/S
LEFT RENAL UPPER PARENCHYMA RI: 0.81
RIGHT RENAL UPPER PARENCHYMA MAX: 19.9 CM/S
RIGHT RENAL UPPER PARENCHYMA MIN: 7.05 CM/S
RIGHT RENAL UPPER PARENCHYMA RI: 0.65

## 2021-06-03 PROCEDURE — 76775 US EXAM ABDO BACK WALL LIM: CPT

## 2021-06-03 PROCEDURE — 93975 VASCULAR STUDY: CPT

## 2021-06-18 ENCOUNTER — TELEPHONE (OUTPATIENT)
Dept: INTERNAL MEDICINE | Facility: CLINIC | Age: 76
End: 2021-06-18

## 2021-06-18 NOTE — TELEPHONE ENCOUNTER
Caller: Philly Person    Relationship to patient: Self    Best call back number: [phone]    Patient is needing: PATIENT STATES SHE HAS BEEN TAKING OFF METFORMIN AND NEEDS TO KNOW WHAT OTHER MEDICATIONS SHE CAN TAKE OTHER THEN THE METFORMIN FOR HER DIABETES , SHE WOULD LIKE TO DISCUSS FURTHER WITH

## 2021-06-18 NOTE — TELEPHONE ENCOUNTER
PATIENT CALLING NEEDING A PRESCRIPTION FOR ONE TOUCH ULTRA MINI STRIPS. SHE STATED THE NEUROLOGIST IS WANTING HER TO CHECK HER SUGAR. PATIENT STATED THAT THE NEUROLOGIST  HAS STOPPED HER METFORMIN.    PHARMACY:[facility] [address] [place] [address] [address] [facility] - [phone]  - [phone]   [phone]    PLEASE ADVISE   [phone]

## 2021-06-18 NOTE — TELEPHONE ENCOUNTER
HER NEPHROLOGIST STOP HER METFORMIN WHICH SHE WILL DISCUSS WITH DR MUKHERJEE ON Monday BUT SHE DON'T HAVE ANY TEST STRIPS AND THEY TOLD HER TO MONITOR HER BG SINCE SHE IS OFF THE METFORMIN. WILL YOU SENT THIS TO HER PHARMACY PLEASE.

## 2021-06-25 ENCOUNTER — TELEPHONE (OUTPATIENT)
Dept: INTERNAL MEDICINE | Facility: CLINIC | Age: 76
End: 2021-06-25

## 2021-06-28 ENCOUNTER — OFFICE VISIT (OUTPATIENT)
Dept: INTERNAL MEDICINE | Facility: CLINIC | Age: 76
End: 2021-06-28

## 2021-06-28 VITALS
BODY MASS INDEX: 46.82 KG/M2 | WEIGHT: 248 LBS | HEIGHT: 61 IN | OXYGEN SATURATION: 97 % | SYSTOLIC BLOOD PRESSURE: 138 MMHG | HEART RATE: 79 BPM | TEMPERATURE: 97.5 F | DIASTOLIC BLOOD PRESSURE: 80 MMHG

## 2021-06-28 DIAGNOSIS — I10 BENIGN ESSENTIAL HYPERTENSION: ICD-10-CM

## 2021-06-28 DIAGNOSIS — E11.65 UNCONTROLLED TYPE 2 DIABETES MELLITUS WITH HYPERGLYCEMIA (HCC): Primary | ICD-10-CM

## 2021-06-28 PROCEDURE — 99214 OFFICE O/P EST MOD 30 MIN: CPT | Performed by: FAMILY MEDICINE

## 2021-06-28 RX ORDER — HYDRALAZINE HYDROCHLORIDE 10 MG/1
10 TABLET, FILM COATED ORAL 3 TIMES DAILY
COMMUNITY
Start: 2021-05-14 | End: 2021-10-08

## 2021-06-28 RX ORDER — GLIMEPIRIDE 2 MG/1
2 TABLET ORAL DAILY
COMMUNITY
End: 2021-10-08 | Stop reason: SDUPTHER

## 2021-06-28 NOTE — PROGRESS NOTES
Subjective   Philly Person is a 75 y.o. female.   Chief Complaint   Patient presents with   • Diabetes     need medication to replace metformin   • Hypertension       History of Present Illness     #1 diabetes type 2 - diagnosed in 2001. Patient did diabetes education at the time of diagnosis. She was on metformin 1000 mg twice a day and glimepiride 2 mg, 2 tb in AM, 1.5 tablet in PM.  In April refer patient to nephrologist due to worsening of kidney tests.  It continued to get worse and she underwent kidney biopsy few days ago.  She is scheduled with her nephrologist for follow-up on biopsy on the July 7.  Her nephrologist stopped Metformin and lisinopril.  She continues glimepiride but increase the dose to 4 mg twice a day.  Blood sugars stay in upper 180s to lower 200s.         #2 HTN- on amlodipine 10 mg a day, atenolol 100 mg a day, furosemide 20 mg a day and off lisinopril 20 mg 2 tablets daily.  She was started on hydralazine at 20 mg 3 times a day by her nephrologist.  She takes it as prescribed.  She reports no side effects.   Creatinine on 4/16 was 1.57, GFR 32.    The following portions of the patient's history were reviewed and updated as appropriate: allergies, current medications, past medical history, past social history and problem list.    Review of Systems   Respiratory: Negative for shortness of breath.    Cardiovascular: Negative for chest pain.   Neurological: Negative for headaches.   Psychiatric/Behavioral: The patient is nervous/anxious.          Objective   Wt Readings from Last 3 Encounters:   06/28/21 112 kg (248 lb)   04/06/21 115 kg (253 lb)   09/22/20 114 kg (251 lb)      Vitals:    06/28/21 1525   BP: 138/80   Pulse: 79   Temp: 97.5 °F (36.4 °C)   SpO2: 97%     Temp Readings from Last 3 Encounters:   06/28/21 97.5 °F (36.4 °C)   04/06/21 96.9 °F (36.1 °C)   09/22/20 97 °F (36.1 °C)     BP Readings from Last 3 Encounters:   06/28/21 138/80   04/06/21 136/70   09/22/20 138/62      Pulse Readings from Last 3 Encounters:   06/28/21 79   04/06/21 84   09/22/20 80     Body mass index is 46.34 kg/m².    Physical Exam  Constitutional:       Appearance: She is well-developed. She is obese.   HENT:      Head: Normocephalic and atraumatic.   Neck:      Thyroid: No thyromegaly.      Vascular: No carotid bruit.   Cardiovascular:      Rate and Rhythm: Normal rate and regular rhythm.      Heart sounds: Normal heart sounds.   Pulmonary:      Effort: Pulmonary effort is normal.      Breath sounds: Normal breath sounds.   Musculoskeletal:      Cervical back: Neck supple.   Skin:     General: Skin is warm and dry.   Neurological:      Mental Status: She is alert and oriented to person, place, and time.   Psychiatric:         Behavior: Behavior normal.         Assessment/Plan   Diagnoses and all orders for this visit:    1. Uncontrolled type 2 diabetes mellitus with hyperglycemia (CMS/HCC) (Primary)    2. Benign essential hypertension    Other orders  -     SITagliptin (Januvia) 50 MG tablet; Take 1 tablet by mouth Daily.  Dispense: 30 tablet; Refill: 2        #1 diabetes type 2-uncontrolled.  We are starting Januvia at 50 mg a day.  We cannot go higher with the dose because of kidney function.  Patient is given sample of 100 mg tablets and she will use half tablet a day.  We are sending prescription to pharmacy.  Follow-up in 3 months, or sooner if problems.  2.  Hypertension-continue current treatment.  Follow-up in 3 months.

## 2021-07-22 ENCOUNTER — TELEPHONE (OUTPATIENT)
Dept: INTERNAL MEDICINE | Facility: CLINIC | Age: 76
End: 2021-07-22

## 2021-07-22 NOTE — TELEPHONE ENCOUNTER
Caller: Philly Person    Relationship: Self    Best call back number: [phone] (H)  What is the best time to reach you: AFTER 11 AM    Who are you requesting to speak with MD YOUNG MUKHERJEE'S MA    Do you know the name of the person who called:PATIENT    What was the call regarding: PATIENT WOULD LIKE A CALL BACK REGARDING HER SITagliptin (Januvia) 50 MG tablet. PLEASE ADVISE    Do you require a callback: YES

## 2021-09-27 RX ORDER — ATORVASTATIN CALCIUM 10 MG/1
10 TABLET, FILM COATED ORAL NIGHTLY
Qty: 90 TABLET | Refills: 1 | Status: SHIPPED | OUTPATIENT
Start: 2021-09-27 | End: 2022-02-11 | Stop reason: SDUPTHER

## 2021-09-28 DIAGNOSIS — N18.32 STAGE 3B CHRONIC KIDNEY DISEASE (HCC): ICD-10-CM

## 2021-10-01 LAB
BUN SERPL-MCNC: 32 MG/DL (ref 8–23)
BUN/CREAT SERPL: 18.6 (ref 7–25)
CALCIUM SERPL-MCNC: 9.8 MG/DL (ref 8.6–10.5)
CHLORIDE SERPL-SCNC: 103 MMOL/L (ref 98–107)
CO2 SERPL-SCNC: 25.6 MMOL/L (ref 22–29)
CREAT SERPL-MCNC: 1.72 MG/DL (ref 0.57–1)
GLUCOSE SERPL-MCNC: 93 MG/DL (ref 65–99)
POTASSIUM SERPL-SCNC: 3.7 MMOL/L (ref 3.5–5.2)
SODIUM SERPL-SCNC: 141 MMOL/L (ref 136–145)

## 2021-10-08 ENCOUNTER — OFFICE VISIT (OUTPATIENT)
Dept: INTERNAL MEDICINE | Facility: CLINIC | Age: 76
End: 2021-10-08

## 2021-10-08 VITALS
OXYGEN SATURATION: 99 % | HEIGHT: 61 IN | WEIGHT: 242 LBS | DIASTOLIC BLOOD PRESSURE: 72 MMHG | TEMPERATURE: 97.7 F | SYSTOLIC BLOOD PRESSURE: 126 MMHG | HEART RATE: 71 BPM | BODY MASS INDEX: 45.69 KG/M2

## 2021-10-08 DIAGNOSIS — R68.89 SENSATION OF FEELING COLD: ICD-10-CM

## 2021-10-08 DIAGNOSIS — I10 BENIGN ESSENTIAL HYPERTENSION: ICD-10-CM

## 2021-10-08 DIAGNOSIS — E11.29 TYPE 2 DIABETES MELLITUS WITH MICROALBUMINURIA, WITHOUT LONG-TERM CURRENT USE OF INSULIN (HCC): Primary | ICD-10-CM

## 2021-10-08 DIAGNOSIS — R80.9 TYPE 2 DIABETES MELLITUS WITH MICROALBUMINURIA, WITHOUT LONG-TERM CURRENT USE OF INSULIN (HCC): Primary | ICD-10-CM

## 2021-10-08 DIAGNOSIS — E78.00 HYPERCHOLESTEROLEMIA: ICD-10-CM

## 2021-10-08 DIAGNOSIS — Z23 NEED FOR INFLUENZA VACCINATION: ICD-10-CM

## 2021-10-08 PROCEDURE — 90662 IIV NO PRSV INCREASED AG IM: CPT | Performed by: FAMILY MEDICINE

## 2021-10-08 PROCEDURE — G0439 PPPS, SUBSEQ VISIT: HCPCS | Performed by: FAMILY MEDICINE

## 2021-10-08 PROCEDURE — G0008 ADMIN INFLUENZA VIRUS VAC: HCPCS | Performed by: FAMILY MEDICINE

## 2021-10-08 PROCEDURE — 99214 OFFICE O/P EST MOD 30 MIN: CPT | Performed by: FAMILY MEDICINE

## 2021-10-08 RX ORDER — HYDRALAZINE HYDROCHLORIDE 25 MG/1
25 TABLET, FILM COATED ORAL 3 TIMES DAILY
COMMUNITY
Start: 2021-10-04

## 2021-10-08 RX ORDER — AMLODIPINE BESYLATE 10 MG/1
10 TABLET ORAL NIGHTLY
Qty: 90 TABLET | Refills: 1 | Status: SHIPPED | OUTPATIENT
Start: 2021-10-08 | End: 2022-02-11 | Stop reason: SDUPTHER

## 2021-10-08 RX ORDER — FUROSEMIDE 20 MG/1
20 TABLET ORAL DAILY
Qty: 90 TABLET | Refills: 1 | Status: SHIPPED | OUTPATIENT
Start: 2021-10-08 | End: 2022-02-11 | Stop reason: SDUPTHER

## 2021-10-08 RX ORDER — GLIMEPIRIDE 2 MG/1
TABLET ORAL
Qty: 315 TABLET | Refills: 1 | Status: SHIPPED | OUTPATIENT
Start: 2021-10-08 | End: 2022-02-11 | Stop reason: SDUPTHER

## 2021-10-08 NOTE — PROGRESS NOTES
Subjective   Philly Person is a 76 y.o. female.   Chief Complaint   Patient presents with   • Medicare Wellness-subsequent       History of Present Illness     #1 diabetes type 2 - diagnosed in 2001. Patient did diabetes education at the time of diagnosis. She was on metformin 1000 mg twice a day and glimepiride 2 mg, 2 tb in AM, 1.5 tablet in PM.  In April refer patient to nephrologist due to worsening of kidney tests.  It continued to get worse and she underwent kidney biopsy.  Her nephrologist stopped Metformin and lisinopril.    She is on glimepiride 4 mg twice a day and Januvia 50 mg a day. She takes it every day. She has no side effects. FBS 93.      #2 HTN- on amlodipine 10 mg a day, atenolol 100 mg a day, furosemide 20 mg a day and off lisinopril 20 mg 2 tablets daily.  She was started on hydralazine at 20 mg 3 times a day by her nephrologist.  She takes it as prescribed.  She reports no side effects.   Creatinine 1.72, GFR 29.    #3 hypercholesterolemia- on atorvastatin 10 mg a day. She takes it everyday. No side effects. No muscle aches, no muscle cramps.     #4 Sensation of being called-she feels like that for the last few months. She would like to have her thyroid test. She works on weight loss. She decreased portion size. She eats less sweets, no soda, less bread. She lost 11 pounds in 6 months    The following portions of the patient's history were reviewed and updated as appropriate: allergies, current medications, past family history, past medical history, past social history, past surgical history and problem list.    Review of Systems   Constitutional: Negative.    Respiratory: Negative.    Cardiovascular: Negative for chest pain.   Psychiatric/Behavioral: Negative.          Objective   Wt Readings from Last 3 Encounters:   10/08/21 110 kg (242 lb)   06/28/21 112 kg (248 lb)   04/06/21 115 kg (253 lb)      Vitals:    10/08/21 1139   BP: 126/72   Pulse: 71   Temp: 97.7 °F (36.5 °C)   SpO2: 99%      Temp Readings from Last 3 Encounters:   10/08/21 97.7 °F (36.5 °C)   06/28/21 97.5 °F (36.4 °C)   04/06/21 96.9 °F (36.1 °C)     BP Readings from Last 3 Encounters:   10/08/21 126/72   06/28/21 138/80   04/06/21 136/70     Pulse Readings from Last 3 Encounters:   10/08/21 71   06/28/21 79   04/06/21 84     Body mass index is 45.22 kg/m².    Physical Exam  Constitutional:       Appearance: She is well-developed. She is obese.      Comments: Walks with a cane.   HENT:      Head: Normocephalic and atraumatic.   Neck:      Thyroid: No thyromegaly.      Vascular: No carotid bruit.   Cardiovascular:      Rate and Rhythm: Normal rate and regular rhythm.      Heart sounds: Normal heart sounds.   Pulmonary:      Effort: Pulmonary effort is normal.      Breath sounds: Normal breath sounds.   Musculoskeletal:      Cervical back: Neck supple.   Skin:     General: Skin is warm and dry.   Neurological:      Mental Status: She is alert and oriented to person, place, and time.   Psychiatric:         Behavior: Behavior normal.         Assessment/Plan   Diagnoses and all orders for this visit:    1. Type 2 diabetes mellitus with microalbuminuria, without long-term current use of insulin (HCC) (Primary)  -     Cancel: Hemoglobin A1c  -     Hemoglobin A1c  -     Thyroid Cascade Profile    2. Benign essential hypertension  -     Hemoglobin A1c  -     Thyroid Cascade Profile    3. Hypercholesterolemia  -     Hemoglobin A1c  -     Thyroid Cascade Profile    4. Sensation of feeling cold  -     Cancel: Thyroid Cascade Profile  -     Hemoglobin A1c  -     Thyroid Cascade Profile    Other orders  -     glimepiride (AMARYL) 2 MG tablet; Take 2 tb in AM and 2 tb in PM  Dispense: 315 tablet; Refill: 1  -     amLODIPine (NORVASC) 10 MG tablet; Take 1 tablet by mouth Every Night.  Dispense: 90 tablet; Refill: 1  -     furosemide (LASIX) 20 MG tablet; Take 1 tablet by mouth Daily.  Dispense: 90 tablet; Refill: 1  -     SITagliptin (Januvia) 50  MG tablet; Take 1 tablet by mouth Daily.  Dispense: 30 tablet; Refill: 2        #1 DM type II-we are checking A1c today. We will continue current treatment. Follow-up in 6 months.  2. Hypertension-continue current treatment. Follow-up in 6 months.  3. Hyperlipidemia-continue current treatment. Follow-up in 6 months.  4. Sensation of being cold-we are checking TSH today.

## 2021-10-08 NOTE — PATIENT INSTRUCTIONS
Medicare Wellness  Personal Prevention Plan of Service     Date of Office Visit:  10/08/2021  Encounter Provider:  Shazia Cardoza MD  Place of Service:  [facility]  Patient Name: Philly Person  :  1945    As part of the Medicare Wellness portion of your visit today, we are providing you with this personalized preventive plan of services (PPPS). This plan is based upon recommendations of the [place] Preventive Services Task Force (USPSTF) and the Advisory Committee on Immunization Practices (ACIP).    This lists the preventive care services that should be considered, and provides dates of when you are due. Items listed as completed are up-to-date and do not require any further intervention.    Health Maintenance   Topic Date Due   • DXA SCAN  Never done   • Pneumococcal Vaccine 65+ (2 of 2 - PPSV23) 2019   • COLORECTAL CANCER SCREENING  2020   • HEMOGLOBIN A1C  2021   • TDAP/TD VACCINES (2 - Td or Tdap) 2022 (Originally 3/18/2018)   • ZOSTER VACCINE (2 of 3) 2022 (Originally 2008)   • DIABETIC EYE EXAM  2021   • LIPID PANEL  2022   • URINE MICROALBUMIN  2022   • ANNUAL WELLNESS VISIT  10/08/2022   • MAMMOGRAM  2023   • HEPATITIS C SCREENING  Completed   • COVID-19 Vaccine  Completed   • INFLUENZA VACCINE  Completed       Orders Placed This Encounter   Procedures   • Hemoglobin A1c     Order Specific Question:   Release to patient     Answer:   Immediate   • Thyroid Cascade Profile     Order Specific Question:   Release to patient     Answer:   Immediate       Return in about 6 months (around 2022).      Calorie Counting for Weight Loss  Calories are units of energy. Your body needs a certain number of calories from food to keep going throughout the day. When you eat or drink more calories than your body needs, your body stores the extra calories mostly as fat. When you eat or drink fewer calories than your  body needs, your body burns fat to get the energy it needs.  Calorie counting means keeping track of how many calories you eat and drink each day. Calorie counting can be helpful if you need to lose weight. If you eat fewer calories than your body needs, you should lose weight. Ask your health care provider what a healthy weight is for you.  For calorie counting to work, you will need to eat the right number of calories each day to lose a healthy amount of weight per week. A dietitian can help you figure out how many calories you need in a day and will suggest ways to reach your calorie goal.  · A healthy amount of weight to lose each week is usually 1-2 lb (0.5-0.9 kg). This usually means that your daily calorie intake should be reduced by 500-750 calories.  · Eating 1,200-1,500 calories a day can help most women lose weight.  · Eating 1,500-1,800 calories a day can help most men lose weight.  What do I need to know about calorie counting?  Work with your health care provider or dietitian to determine how many calories you should get each day. To meet your daily calorie goal, you will need to:  · Find out how many calories are in each food that you would like to eat. Try to do this before you eat.  · Decide how much of the food you plan to eat.  · Keep a food log. Do this by writing down what you ate and how many calories it had.  To successfully lose weight, it is important to balance calorie counting with a healthy lifestyle that includes regular activity.  Where do I find calorie information?    The number of calories in a food can be found on a Nutrition Facts label. If a food does not have a Nutrition Facts label, try to look up the calories online or ask your dietitian for help.  Remember that calories are listed per serving. If you choose to have more than one serving of a food, you will have to multiply the calories per serving by the number of servings you plan to eat. For example, the label on a package  of bread might say that a serving size is 1 slice and that there are 90 calories in a serving. If you eat 1 slice, you will have eaten 90 calories. If you eat 2 slices, you will have eaten 180 calories.  How do I keep a food log?  After each time that you eat, record the following in your food log as soon as possible:  · What you ate. Be sure to include toppings, sauces, and other extras on the food.  · How much you ate. This can be measured in cups, ounces, or number of items.  · How many calories were in each food and drink.  · The total number of calories in the food you ate.  Keep your food log near you, such as in a pocket-sized notebook or on an oralia or website on your mobile phone. Some programs will calculate calories for you and show you how many calories you have left to meet your daily goal.  What are some portion-control tips?  · Know how many calories are in a serving. This will help you know how many servings you can have of a certain food.  · Use a measuring cup to measure serving sizes. You could also try weighing out portions on a kitchen scale. With time, you will be able to estimate serving sizes for some foods.  · Take time to put servings of different foods on your favorite plates or in your favorite bowls and cups so you know what a serving looks like.  · Try not to eat straight from a food's packaging, such as from a bag or box. Eating straight from the package makes it hard to see how much you are eating and can lead to overeating. Put the amount you would like to eat in a cup or on a plate to make sure you are eating the right portion.  · Use smaller plates, glasses, and bowls for smaller portions and to prevent overeating.  · Try not to multitask. For example, avoid watching TV or using your computer while eating. If it is time to eat, sit down at a table and enjoy your food. This will help you recognize when you are full. It will also help you be more mindful of what and how much you are  eating.  What are tips for following this plan?  Reading food labels  · Check the calorie count compared with the serving size. The serving size may be smaller than what you are used to eating.  · Check the source of the calories. Try to choose foods that are high in protein, fiber, and vitamins, and low in saturated fat, trans fat, and sodium.  Shopping  · Read nutrition labels while you shop. This will help you make healthy decisions about which foods to buy.  · Pay attention to nutrition labels for low-fat or fat-free foods. These foods sometimes have the same number of calories or more calories than the full-fat versions. They also often have added sugar, starch, or salt to make up for flavor that was removed with the fat.  · Make a grocery list of lower-calorie foods and stick to it.  Cooking  · Try to cook your favorite foods in a healthier way. For example, try baking instead of frying.  · Use low-fat dairy products.  Meal planning  · Use more fruits and vegetables. One-half of your plate should be fruits and vegetables.  · Include lean proteins, such as chicken, turkey, and fish.  Lifestyle  Each week, aim to do one of the following:  · 150 minutes of moderate exercise, such as walking.  · 75 minutes of vigorous exercise, such as running.  General information  · Know how many calories are in the foods you eat most often. This will help you calculate calorie counts faster.  · Find a way of tracking calories that works for you. Get creative. Try different apps or programs if writing down calories does not work for you.  What foods should I eat?    · Eat nutritious foods. It is better to have a nutritious, high-calorie food, such as an avocado, than a food with few nutrients, such as a bag of potato chips.  · Use your calories on foods and drinks that will fill you up and will not leave you hungry soon after eating.  ? Examples of foods that fill you up are nuts and nut butters, vegetables, lean proteins, and  high-fiber foods such as whole grains. High-fiber foods are foods with more than 5 g of fiber per serving.  · Pay attention to calories in drinks. Low-calorie drinks include water and unsweetened drinks.  The items listed above may not be a complete list of foods and beverages you can eat. Contact a dietitian for more information.  What foods should I limit?  Limit foods or drinks that are not good sources of vitamins, minerals, or protein or that are high in unhealthy fats. These include:  · Candy.  · Other sweets.  · Sodas, specialty coffee drinks, alcohol, and juice.  The items listed above may not be a complete list of foods and beverages you should avoid. Contact a dietitian for more information.  How do I count calories when eating out?  · Pay attention to portions. Often, portions are much larger when eating out. Try these tips to keep portions smaller:  ? Consider sharing a meal instead of getting your own.  ? If you get your own meal, eat only half of it. Before you start eating, ask for a container and put half of your meal into it.  ? When available, consider ordering smaller portions from the menu instead of full portions.  · Pay attention to your food and drink choices. Knowing the way food is cooked and what is included with the meal can help you eat fewer calories.  ? If calories are listed on the menu, choose the lower-calorie options.  ? Choose dishes that include vegetables, fruits, whole grains, low-fat dairy products, and lean proteins.  ? Choose items that are boiled, broiled, grilled, or steamed. Avoid items that are buttered, battered, fried, or served with cream sauce. Items labeled as crispy are usually fried, unless stated otherwise.  ? Choose water, low-fat milk, unsweetened iced tea, or other drinks without added sugar. If you want an alcoholic beverage, choose a lower-calorie option, such as a glass of wine or light beer.  ? Ask for dressings, sauces, and syrups on the side. These are  usually high in calories, so you should limit the amount you eat.  ? If you want a salad, choose a garden salad and ask for grilled meats. Avoid extra toppings such as santiago, cheese, or fried items. Ask for the dressing on the side, or ask for olive oil and vinegar or lemon to use as dressing.  · Estimate how many servings of a food you are given. Knowing serving sizes will help you be aware of how much food you are eating at restaurants.  Where to find more information  · Centers for Disease Control and Prevention: www.cdc.gov  · [place] Department of Agriculture: myplate.gov  Summary  · Calorie counting means keeping track of how many calories you eat and drink each day. If you eat fewer calories than your body needs, you should lose weight.  · A healthy amount of weight to lose per week is usually 1-2 lb (0.5-0.9 kg). This usually means reducing your daily calorie intake by 500-750 calories.  · The number of calories in a food can be found on a Nutrition Facts label. If a food does not have a Nutrition Facts label, try to look up the calories online or ask your dietitian for help.  · Use smaller plates, glasses, and bowls for smaller portions and to prevent overeating.  · Use your calories on foods and drinks that will fill you up and not leave you hungry shortly after a meal.  This information is not intended to replace advice given to you by your health care provider. Make sure you discuss any questions you have with your health care provider.  Document Revised: 01/28/2021 Document Reviewed: 01/28/2021  Elsevier Patient Education © 2021 [employer]  Exercise Information for Aging Adults  Staying physically active is important as you age. The four types of exercises that are best for older adults are endurance, strength, balance, and flexibility. Contact your health care provider before you start any exercise routine. Ask your health care provider what activities are safe for you.  What are the risks?  Risks  associated with exercising include:  · Overdoing it. This may lead to sore muscles or fatigue.  · Falls.  · Injuries.  · Dehydration.  How to do these exercises  Endurance exercises  Endurance (aerobic) exercises raise your breathing rate and heart rate. Increasing your endurance helps you to do everyday tasks and stay healthy. By improving the health of your body system that includes your heart, lungs, and blood vessels (circulatory system), you may also delay or prevent diseases such as heart disease, diabetes, and bone loss (osteoporosis). Types of endurance exercises include:  · Sports.  · Indoor activities, such as using gym equipment, doing water aerobics, or dancing.  · Outdoor activities, such as biking or jogging.  · Tasks around the house, such as gardening, yard work, and heavy household chores like cleaning.  · Walking, such as hiking or walking around your neighborhood.  When doing endurance exercises, make sure you:  · Are aware of your surroundings.  · Use safety equipment as directed.  · Dress in layers when exercising outdoors.  · Drink plenty of water to stay well hydrated.  Build up endurance slowly. Start with 10 minutes at a time, and gradually build up to doing 30 minutes at a time. Unless your health care provider gave you different instructions, aim to exercise for a total of 150 minutes a week. Spread out that time so you are working on endurance on 3 or more days a week.  Strength exercises  Lifting, pulling, or pushing weights helps to strengthen muscles. Having stronger muscles makes it easier to do everyday activities, such as getting up from a chair, climbing stairs, carrying groceries, and playing with grandchildren. Strength exercises include arm and leg exercises that may be done:  · With weights.  · Without weights (using your own body weight).  · With a resistance band.  When doing strength exercises:  · Move smoothly and steadily. Do not suddenly thrust or jerk the weights, the  resistance band, or your body.  · Start with no weights or with light weights, and gradually add more weight over time. Eventually, aim to use weights that are hard or very hard for you to lift. This means that you are able to do 8 repetitions with the weight, and the last few repetitions are very challenging.  · Lift or push weights into position for 3 seconds, hold the position for 1 second, and then take 3 seconds to return to your starting position.  · Breathe out (exhale) during difficult movements, like lifting or pushing weights. Breathe in (inhale) to relax your muscles before the next repetition.  · Consider alternating arms or legs, especially when you first start strength exercises.  · Expect some slight muscle soreness after each session.  Do strength exercises on 2 or more days a week, for 30 minutes at a time. Avoid exercising the same muscle groups two days in a row. For example, if you work on your leg muscles one day, work on your arm muscles the next day. When you can do two sets of 10-15 repetitions with a certain weight, increase the amount of weight.  Balance  Balance exercises can help to prevent falls. Balance exercises include:  · Standing on one foot.  · Heel-to-toe walk.  · Balance walk.  · Lalito chi.  Make sure you have something sturdy to hold onto while doing balance exercises, such as a sturdy chair. As your balance improves, challenge yourself by holding onto the chair with one hand instead of two, and then with no hands. Trying exercises with your eyes closed also challenges your balance, but be sure to have a sturdy surface (like a countertop) close by in case you need it.  Do balance exercises as often as you want, or as often as directed by your health care provider. Strength exercises for the lower body also help to improve balance.  Flexibility    Flexibility exercises improve how far you can bend, straighten, move, or rotate parts of your body (range of motion). These exercises  "also help you to do everyday activities such as getting dressed or reaching for objects. Flexibility exercises include stretching different parts of the body, and they may be done in a standing or seated position or on the floor.  When stretching, make sure you:  · Keep a slight bend in your arms and legs. Avoid completely straightening (\"locking\") your joints.  · Do not stretch so far that you feel pain. You should feel a mild stretching feeling. You may try stretching farther as you become more flexible over time.  · Relax and breathe between stretches.  · Hold onto something sturdy for balance as needed.  Hold each stretch for 10-30 seconds. Repeat each stretch 3-5 times.  General safety tips  · Exercise in well-lit areas.  · Do not hold your breath during exercises or stretches.  · Warm up before exercising, and cool down after exercising. This can help prevent injury.  · Drink plenty of water during exercise or any activity that makes you sweat.  · Use smooth, steady movements. Do not use sudden, jerking movements, especially when lifting weights or doing flexibility exercises.  · If you are not sure if an exercise is safe for you, or you are not sure how to do an exercise, talk with your health care provider. This is especially important if you have had surgery on muscles, bones, or joints (orthopedic surgery).  Where to find more information  You can find more information about exercise for older adults from:  · Your local health department, fitness center, or community center. These facilities may have programs for aging adults.  · National [place] on Aging: www.[place].nih.gov  · National [place] on Aging: www.n[place].org  Summary  · Staying physically active is important as you age.  · Make sure to contact your health care provider before you start any exercise routine. Ask your health care provider what activities are safe for you.  · Doing endurance, strength, balance, and flexibility exercises can help to " delay or prevent certain diseases, such as heart disease, diabetes, and bone loss (osteoporosis).  This information is not intended to replace advice given to you by your health care provider. Make sure you discuss any questions you have with your health care provider.  Document Revised: 04/07/2021 Document Reviewed: 04/07/2021  Elsevier Patient Education © 2021 [employer]

## 2021-10-08 NOTE — PROGRESS NOTES
The ABCs of the Annual Wellness Visit  Subsequent Medicare Wellness Visit    Chief Complaint   Patient presents with   • Medicare Wellness-subsequent   • Diabetes   • Hypertension      Subjective    History of Present Illness:  Philly Person is a 76 y.o. female who presents for a Subsequent Medicare Wellness Visit.    The following portions of the patient's history were reviewed and   updated as appropriate: allergies, current medications, past family history, past medical history, past social history, past surgical history and problem list.    Compared to one year ago, the patient feels her physical   health is the same.    Compared to one year ago, the patient feels her mental   health is the same.    Recent Hospitalizations:  She was not admitted to the hospital during the last year.       Current Medical Providers:  Patient Care Team:  Shazia Cardoza MD as PCP - General (Family Medicine)  Provider, No Known as PCP - Family Medicine    Outpatient Medications Prior to Visit   Medication Sig Dispense Refill   • atenolol (TENORMIN) 100 MG tablet Take 1 tablet by mouth Daily. 90 tablet 1   • atorvastatin (LIPITOR) 10 MG tablet TAKE 1 TABLET BY MOUTH EVERY NIGHT. 90 tablet 1   • Cholecalciferol (VITAMIN D) 2000 units capsule Take 2,000 Units by mouth Daily.     • coenzyme Q10 100 MG capsule Take 200 mg by mouth Every Evening. WILL HOLD FOR SURGDRY     • Cranberry 500 MG capsule Take 500 mg by mouth Daily. WILL HOLD FOR SURGERY     • glucose blood test strip Use as instructed to test blood sugar once daily . Uncontrolled diabetes e11.65 100 each 2   • hydrALAZINE (APRESOLINE) 25 MG tablet Take 25 mg by mouth 3 (Three) Times a Day.     • Multiple Vitamins-Minerals (MULTIVITAMIN ADULT PO) Take 1 tablet by mouth Daily.     • vitamin B-6 (PYRIDOXINE) 100 MG tablet Take 100 mg by mouth Daily.     • vitamin C (ASCORBIC ACID) 500 MG tablet Take 500 mg by mouth Every Night.     • amLODIPine (NORVASC) 10 MG tablet Take 1  "tablet by mouth Every Night. 90 tablet 1   • furosemide (LASIX) 20 MG tablet Take 1 tablet by mouth Daily. 90 tablet 1   • glimepiride (AMARYL) 2 MG tablet Take 2 tb in AM and 1.5 tb in PM (Patient taking differently: Take 2 tb in AM and 2 tb in PM) 315 tablet 1   • SITagliptin (Januvia) 50 MG tablet Take 1 tablet by mouth Daily. 30 tablet 2   • glimepiride (AMARYL) 2 MG tablet Take 2 mg by mouth Daily.     • hydrALAZINE (APRESOLINE) 10 MG tablet Take 10 mg by mouth 3 (Three) Times a Day. Two tablets 3 times a day       No facility-administered medications prior to visit.       No opioid medication identified on active medication list. I have reviewed chart for other potential  high risk medication/s and harmful drug interactions in the elderly.          Aspirin is not on active medication list.  Aspirin use is not indicated based on review of current medical condition/s. Risk of harm outweighs potential benefits.  .    Patient Active Problem List   Diagnosis   • Elevated liver enzymes   • Anemia   • Arthritis   • Benign essential hypertension   • Hypercholesterolemia   • Microalbuminuria   • Nonalcoholic fatty liver disease   • Calculus of kidney   • Adiposity   • Type 2 diabetes mellitus (HCC)   • Squamous cell carcinoma of skin   • Melanoma (HCC)   • Basal cell carcinoma   • DJD (degenerative joint disease) of knee     Advance Care Planning  Advance Directive is not on file.  ACP discussion was held with the patient during this visit. Patient has an advance directive (not in EMR), copy requested.          Objective    Vitals:    10/08/21 1139   BP: 126/72   Pulse: 71   Temp: 97.7 °F (36.5 °C)   SpO2: 99%   Weight: 110 kg (242 lb)   Height: 155.8 cm (61.34\")     BMI Readings from Last 1 Encounters:   10/08/21 45.22 kg/m²   BMI is above normal parameters. Recommendations include: educational material and exercise counseling    Does the patient have evidence of cognitive impairment? No    Physical Exam  Lab Results "   Component Value Date    GLU 93 09/30/2021            HEALTH RISK ASSESSMENT    Smoking Status:  Social History     Tobacco Use   Smoking Status Never Smoker   Smokeless Tobacco Never Used     Alcohol Consumption:  Social History     Substance and Sexual Activity   Alcohol Use Yes    Comment: ON OCC     Fall Risk Screen:    WILDER Fall Risk Assessment was completed, and patient is at MODERATE risk for falls. Assessment completed on:10/8/2021    Depression Screening:  PHQ-2/PHQ-9 Depression Screening 10/8/2021   Little interest or pleasure in doing things 0   Feeling down, depressed, or hopeless 0   Trouble falling or staying asleep, or sleeping too much 2   Feeling tired or having little energy 1   Poor appetite or overeating 0   Feeling bad about yourself - or that you are a failure or have let yourself or your family down 0   Trouble concentrating on things, such as reading the newspaper or watching television 0   Moving or speaking so slowly that other people could have noticed. Or the opposite - being so fidgety or restless that you have been moving around a lot more than usual 0   Thoughts that you would be better off dead, or of hurting yourself in some way 0   Total Score 3   If you checked off any problems, how difficult have these problems made it for you to do your work, take care of things at home, or get along with other people? Not difficult at all       Health Habits and Functional and Cognitive Screening:  Functional & Cognitive Status 10/8/2021   Do you have difficulty preparing food and eating? No   Do you have difficulty bathing yourself, getting dressed or grooming yourself? No   Do you have difficulty using the toilet? No   Do you have difficulty moving around from place to place? No   Do you have trouble with steps or getting out of a bed or a chair? No   Current Diet Well Balanced Diet   Dental Exam Up to date   Eye Exam Up to date   Exercise (times per week) 0 times per week   Current  Exercises Include No Regular Exercise   Current Exercise Activities Include -   Do you need help using the phone?  No   Are you deaf or do you have serious difficulty hearing?  No   Do you need help with transportation? No   Do you need help shopping? No   Do you need help preparing meals?  No   Do you need help with housework?  No   Do you need help with laundry? No   Do you need help taking your medications? No   Do you need help managing money? No   Do you ever drive or ride in a car without wearing a seat belt? No   Have you felt unusual stress, anger or loneliness in the last month? No   Who do you live with? Spouse   If you need help, do you have trouble finding someone available to you? No   Have you been bothered in the last four weeks by sexual problems? No   Do you have difficulty concentrating, remembering or making decisions? No       Age-appropriate Screening Schedule:  Refer to the list below for future screening recommendations based on patient's age, sex and/or medical conditions. Orders for these recommended tests are listed in the plan section. The patient has been provided with a written plan.    Health Maintenance   Topic Date Due   • DXA SCAN  Never done   • HEMOGLOBIN A1C  09/29/2021   • TDAP/TD VACCINES (2 - Td or Tdap) 08/01/2022 (Originally 3/18/2018)   • ZOSTER VACCINE (2 of 3) 08/01/2022 (Originally 5/13/2008)   • DIABETIC EYE EXAM  12/02/2021   • LIPID PANEL  03/29/2022   • URINE MICROALBUMIN  04/16/2022   • MAMMOGRAM  05/14/2023   • INFLUENZA VACCINE  Completed              Assessment/Plan   CMS Preventative Services Quick Reference  Risk Factors Identified During Encounter  Cardiovascular Disease  Immunizations Discussed/Encouraged (specific Immunizations; Tdap, Influenza and Shingrix  Inadequate Social Support, Isolation, Loneliness, Lack of Transportation, Financial Difficulties, or Caregiver Stress   Obesity/Overweight      She is getting flu vaccine today.  She had Covid vaccine x2  and the bonus.  She had both pneumonia vaccines. Pneumovax was at the age of 64.  She is advised to get Shingrix vaccine and tetanus vaccine.  She is overdue for colonoscopy. We discussed Cologuard versus colonoscopy. She would like to do colonoscopy. She has paperwork at home and is going to mail it.  She reports that she is up-to-date with mammogram and bone density. She has it done via her GYN.  Information on calorie count to lose weight provided.  She has living will at home and is going to bring it to next office visit. She compares that her  Ronny is her healthcare surrogate.      The above risks/problems have been discussed with the patient.  Follow up actions/plans if indicated are seen below in the Assessment/Plan Section.  Pertinent information has been shared with the patient in the After Visit Summary.    Diagnoses and all orders for this visit:    1. Type 2 diabetes mellitus with microalbuminuria, without long-term current use of insulin (HCC) (Primary)  -     Cancel: Hemoglobin A1c  -     Hemoglobin A1c  -     Thyroid Cascade Profile    2. Benign essential hypertension  -     Hemoglobin A1c  -     Thyroid Cascade Profile    3. Hypercholesterolemia  -     Hemoglobin A1c  -     Thyroid Cascade Profile    4. Sensation of feeling cold  -     Cancel: Thyroid Cascade Profile  -     Hemoglobin A1c  -     Thyroid Cascade Profile    Other orders  -     glimepiride (AMARYL) 2 MG tablet; Take 2 tb in AM and 2 tb in PM  Dispense: 315 tablet; Refill: 1  -     amLODIPine (NORVASC) 10 MG tablet; Take 1 tablet by mouth Every Night.  Dispense: 90 tablet; Refill: 1  -     furosemide (LASIX) 20 MG tablet; Take 1 tablet by mouth Daily.  Dispense: 90 tablet; Refill: 1  -     SITagliptin (Januvia) 50 MG tablet; Take 1 tablet by mouth Daily.  Dispense: 30 tablet; Refill: 2        Follow Up:   Return in about 6 months (around 4/8/2022).     An After Visit Summary and PPPS were made available to the  patient.

## 2021-10-09 LAB
HBA1C MFR BLD: 6.86 % (ref 4.8–5.6)
TSH SERPL DL<=0.005 MIU/L-ACNC: 1.87 UIU/ML (ref 0.45–4.5)

## 2021-11-24 RX ORDER — ATENOLOL 100 MG/1
TABLET ORAL
Qty: 90 TABLET | Refills: 1 | Status: SHIPPED | OUTPATIENT
Start: 2021-11-24 | End: 2022-02-11 | Stop reason: SDUPTHER

## 2022-02-11 ENCOUNTER — TELEPHONE (OUTPATIENT)
Dept: INTERNAL MEDICINE | Facility: CLINIC | Age: 77
End: 2022-02-11

## 2022-02-11 RX ORDER — ATORVASTATIN CALCIUM 10 MG/1
10 TABLET, FILM COATED ORAL NIGHTLY
Qty: 90 TABLET | Refills: 1 | Status: SHIPPED | OUTPATIENT
Start: 2022-02-11 | End: 2022-02-15 | Stop reason: SDUPTHER

## 2022-02-11 RX ORDER — FUROSEMIDE 20 MG/1
20 TABLET ORAL DAILY
Qty: 90 TABLET | Refills: 1 | Status: SHIPPED | OUTPATIENT
Start: 2022-02-11 | End: 2022-02-15 | Stop reason: SDUPTHER

## 2022-02-11 RX ORDER — AMLODIPINE BESYLATE 10 MG/1
10 TABLET ORAL NIGHTLY
Qty: 90 TABLET | Refills: 1 | Status: SHIPPED | OUTPATIENT
Start: 2022-02-11 | End: 2022-02-15 | Stop reason: SDUPTHER

## 2022-02-11 RX ORDER — GLIMEPIRIDE 2 MG/1
TABLET ORAL
Qty: 315 TABLET | Refills: 1 | Status: SHIPPED | OUTPATIENT
Start: 2022-02-11 | End: 2022-02-15 | Stop reason: SDUPTHER

## 2022-02-11 RX ORDER — ATENOLOL 100 MG/1
100 TABLET ORAL DAILY
Qty: 90 TABLET | Refills: 1 | Status: SHIPPED | OUTPATIENT
Start: 2022-02-11 | End: 2022-02-15 | Stop reason: SDUPTHER

## 2022-02-11 NOTE — TELEPHONE ENCOUNTER
Caller: Philly Person    Relationship: Self    Best call back number: [phone]    Requested Prescriptions:   Requested Prescriptions     Pending Prescriptions Disp Refills   • glimepiride (AMARYL) 2 MG tablet 315 tablet 1     Sig: Take 2 tb in AM and 2 tb in PM   • atenolol (TENORMIN) 100 MG tablet 90 tablet 1     Sig: Take 1 tablet by mouth Daily.   • furosemide (LASIX) 20 MG tablet 90 tablet 1     Sig: Take 1 tablet by mouth Daily.   • amLODIPine (NORVASC) 10 MG tablet 90 tablet 1     Sig: Take 1 tablet by mouth Every Night.   • atorvastatin (LIPITOR) 10 MG tablet 90 tablet 1     Sig: Take 1 tablet by mouth Every Night.        Pharmacy where request should be sent:  [facility]  [phone]  PTS MEMBER ID NUMBER  [id number]   GROUP NUMBER  [id number]  RX BIN NUMBER  [id number]    Additional details provided by patient: PATIENT SAYS THAT THE AMLODIPINE SHOULD BE 5MG NOT 10MG    Does the patient have less than a 3 day supply:  [] Yes  [x] No    Micah Serrano   02/11/22 13:57 EST

## 2022-02-11 NOTE — TELEPHONE ENCOUNTER
Caller: Philly Person    Relationship: Self    Best call back number: [phone] PLEASE ADVISE     Requested Prescriptions:   Requested Prescriptions      No prescriptions requested or ordered in this encounter      SITagliptin (Januvia) 50 MG tablet         Pharmacy where request should be sent:  N/A    Additional details provided by patient: PATIENT WAS GIVEN SAMPLES OF ABOVE MEDICATION AND HER OUT OF POCKET EXPENSE IS GOING TO BE OVER 500.00.  PATIENT WILL BE IN THE AREA ON Tuesday FEB  15 AND WOULD LIKE TO PICK MORE SAMPLES UP.    Does the patient have less than a 3 day supply:  [] Yes  [x] No    Micah You Rep   02/11/22 14:06 EST

## 2022-02-15 ENCOUNTER — TELEPHONE (OUTPATIENT)
Dept: INTERNAL MEDICINE | Facility: CLINIC | Age: 77
End: 2022-02-15

## 2022-02-15 RX ORDER — AMLODIPINE BESYLATE 10 MG/1
10 TABLET ORAL NIGHTLY
Qty: 90 TABLET | Refills: 1 | Status: CANCELLED | OUTPATIENT
Start: 2022-02-15

## 2022-02-15 NOTE — TELEPHONE ENCOUNTER
Patient's  came in to give us new RX ([facility]) card to scan. She can no longer use [facility]. So these 3 RX's need to be for 90 day and sent to need to be sent to her new mail order.    furosemide (LASIX) 20 MG tablet  glimepiride (AMARYL) 2 MG tablet    SITagliptin (Januvia) 50 MG tablet

## 2022-02-22 ENCOUNTER — TELEPHONE (OUTPATIENT)
Dept: INTERNAL MEDICINE | Facility: CLINIC | Age: 77
End: 2022-02-22

## 2022-02-22 RX ORDER — AMLODIPINE BESYLATE 10 MG/1
10 TABLET ORAL NIGHTLY
Qty: 90 TABLET | Refills: 1 | Status: SHIPPED | OUTPATIENT
Start: 2022-02-22 | End: 2022-04-19

## 2022-02-22 RX ORDER — FUROSEMIDE 20 MG/1
20 TABLET ORAL DAILY
Qty: 90 TABLET | Refills: 1 | Status: SHIPPED | OUTPATIENT
Start: 2022-02-22 | End: 2022-02-22 | Stop reason: SDUPTHER

## 2022-02-22 RX ORDER — AMLODIPINE BESYLATE 10 MG/1
10 TABLET ORAL NIGHTLY
Qty: 90 TABLET | Refills: 1 | Status: SHIPPED | OUTPATIENT
Start: 2022-02-22 | End: 2022-02-22 | Stop reason: SDUPTHER

## 2022-02-22 RX ORDER — ATENOLOL 100 MG/1
100 TABLET ORAL DAILY
Qty: 90 TABLET | Refills: 1 | Status: SHIPPED | OUTPATIENT
Start: 2022-02-22 | End: 2022-02-22 | Stop reason: SDUPTHER

## 2022-02-22 RX ORDER — ATORVASTATIN CALCIUM 10 MG/1
10 TABLET, FILM COATED ORAL NIGHTLY
Qty: 90 TABLET | Refills: 1 | Status: SHIPPED | OUTPATIENT
Start: 2022-02-22 | End: 2022-06-17

## 2022-02-22 RX ORDER — FUROSEMIDE 20 MG/1
20 TABLET ORAL DAILY
Qty: 90 TABLET | Refills: 1 | Status: SHIPPED | OUTPATIENT
Start: 2022-02-22 | End: 2022-10-07

## 2022-02-22 RX ORDER — ATORVASTATIN CALCIUM 10 MG/1
10 TABLET, FILM COATED ORAL NIGHTLY
Qty: 90 TABLET | Refills: 1 | Status: SHIPPED | OUTPATIENT
Start: 2022-02-22 | End: 2022-02-22 | Stop reason: SDUPTHER

## 2022-02-22 RX ORDER — ATENOLOL 100 MG/1
100 TABLET ORAL DAILY
Qty: 90 TABLET | Refills: 1 | Status: SHIPPED | OUTPATIENT
Start: 2022-02-22 | End: 2022-09-08

## 2022-02-22 RX ORDER — GLIMEPIRIDE 2 MG/1
TABLET ORAL
Qty: 315 TABLET | Refills: 1 | Status: SHIPPED | OUTPATIENT
Start: 2022-02-22 | End: 2022-03-28 | Stop reason: SDUPTHER

## 2022-02-22 RX ORDER — GLIMEPIRIDE 2 MG/1
TABLET ORAL
Qty: 315 TABLET | Refills: 1 | Status: SHIPPED | OUTPATIENT
Start: 2022-02-22 | End: 2022-02-22 | Stop reason: SDUPTHER

## 2022-02-22 NOTE — TELEPHONE ENCOUNTER
Caller: Philly Person    Relationship: Self    Best call back number:     What is the best time to reach you:     Who are you requesting to speak with (clinical staff, provider,  specific staff member):    Do you know the name of the person who called:    What was the call regarding: PATIENT IS RETURNING A CALL TO [facility].  I REACHED THE OFFICE AND WAS TOLD THAT SHE WAS NOT IN THE OFFICE AND TO PUT A MESSAGE BACK TO THE CLINICAL POOL SO THAT MAYBE SOMEONE ELSE COULD POSSIBLY CALL THE PATIENT.     Do you require a callback: YES

## 2022-03-28 NOTE — TELEPHONE ENCOUNTER
Caller: Philly Person    Relationship: Self    Best call back number: [phone] (H)    Requested Prescriptions:   Requested Prescriptions     Pending Prescriptions Disp Refills   • glimepiride (AMARYL) 2 MG tablet 315 tablet 1     Sig: Take 2 tb in AM and 2 tb in PM        [facility] [address] [phone] [facility] [phone] FX     Additional details provided by patient: PATIENT HAS 2 DAYS LEFT    Does the patient have less than a 3 day supply:  [x] Yes  [] No    Micah Moya Rep   03/28/22 15:42 EDT

## 2022-03-29 RX ORDER — GLIMEPIRIDE 2 MG/1
TABLET ORAL
Qty: 315 TABLET | Refills: 1 | Status: SHIPPED | OUTPATIENT
Start: 2022-03-29 | End: 2022-04-13 | Stop reason: DRUGHIGH

## 2022-04-11 DIAGNOSIS — E11.29 TYPE 2 DIABETES MELLITUS WITH MICROALBUMINURIA, WITHOUT LONG-TERM CURRENT USE OF INSULIN: Primary | ICD-10-CM

## 2022-04-11 DIAGNOSIS — R80.9 TYPE 2 DIABETES MELLITUS WITH MICROALBUMINURIA, WITHOUT LONG-TERM CURRENT USE OF INSULIN: Primary | ICD-10-CM

## 2022-04-11 DIAGNOSIS — E78.00 HYPERCHOLESTEROLEMIA: ICD-10-CM

## 2022-04-13 LAB
ALBUMIN SERPL-MCNC: 3.8 G/DL (ref 3.7–4.7)
ALBUMIN/GLOB SERPL: 1.2 {RATIO} (ref 1.2–2.2)
ALP SERPL-CCNC: 130 IU/L (ref 44–121)
ALT SERPL-CCNC: 33 IU/L (ref 0–32)
AST SERPL-CCNC: 44 IU/L (ref 0–40)
BILIRUB SERPL-MCNC: 1.1 MG/DL (ref 0–1.2)
BUN SERPL-MCNC: 52 MG/DL (ref 8–27)
BUN/CREAT SERPL: 25 (ref 12–28)
CALCIUM SERPL-MCNC: 9.7 MG/DL (ref 8.7–10.3)
CHLORIDE SERPL-SCNC: 104 MMOL/L (ref 96–106)
CHOLEST SERPL-MCNC: 113 MG/DL (ref 100–199)
CO2 SERPL-SCNC: 20 MMOL/L (ref 20–29)
CREAT SERPL-MCNC: 2.06 MG/DL (ref 0.57–1)
EGFRCR SERPLBLD CKD-EPI 2021: 24 ML/MIN/1.73
GLOBULIN SER CALC-MCNC: 3.2 G/DL (ref 1.5–4.5)
GLUCOSE SERPL-MCNC: 163 MG/DL (ref 65–99)
HBA1C MFR BLD: 7.5 % (ref 4.8–5.6)
HDLC SERPL-MCNC: 49 MG/DL
LDLC SERPL CALC-MCNC: 49 MG/DL (ref 0–99)
LDLC/HDLC SERPL: 1 RATIO (ref 0–3.2)
POTASSIUM SERPL-SCNC: 4.3 MMOL/L (ref 3.5–5.2)
PROT SERPL-MCNC: 7 G/DL (ref 6–8.5)
SODIUM SERPL-SCNC: 138 MMOL/L (ref 134–144)
TRIGL SERPL-MCNC: 69 MG/DL (ref 0–149)
VLDLC SERPL CALC-MCNC: 15 MG/DL (ref 5–40)

## 2022-04-13 RX ORDER — GLIMEPIRIDE 4 MG/1
4 TABLET ORAL 2 TIMES DAILY
Qty: 180 TABLET | Refills: 0 | Status: SHIPPED | OUTPATIENT
Start: 2022-04-13 | End: 2022-07-15

## 2022-04-19 ENCOUNTER — OFFICE VISIT (OUTPATIENT)
Dept: INTERNAL MEDICINE | Facility: CLINIC | Age: 77
End: 2022-04-19

## 2022-04-19 VITALS
TEMPERATURE: 97.5 F | HEART RATE: 76 BPM | OXYGEN SATURATION: 98 % | WEIGHT: 246 LBS | DIASTOLIC BLOOD PRESSURE: 80 MMHG | SYSTOLIC BLOOD PRESSURE: 120 MMHG | HEIGHT: 61 IN | BODY MASS INDEX: 46.44 KG/M2

## 2022-04-19 DIAGNOSIS — E11.65 UNCONTROLLED TYPE 2 DIABETES MELLITUS WITH HYPERGLYCEMIA: Primary | ICD-10-CM

## 2022-04-19 DIAGNOSIS — Z12.11 SCREENING FOR COLON CANCER: ICD-10-CM

## 2022-04-19 DIAGNOSIS — E78.00 HYPERCHOLESTEROLEMIA: ICD-10-CM

## 2022-04-19 DIAGNOSIS — I10 BENIGN ESSENTIAL HYPERTENSION: ICD-10-CM

## 2022-04-19 PROCEDURE — 99214 OFFICE O/P EST MOD 30 MIN: CPT | Performed by: FAMILY MEDICINE

## 2022-04-19 RX ORDER — AMLODIPINE BESYLATE 5 MG/1
5 TABLET ORAL DAILY
COMMUNITY
End: 2023-04-06 | Stop reason: SDUPTHER

## 2022-04-19 RX ORDER — LISINOPRIL 20 MG/1
20 TABLET ORAL
COMMUNITY
Start: 2022-04-14 | End: 2023-04-14

## 2022-04-19 NOTE — PROGRESS NOTES
Subjective   Philly Person is a 76 y.o. female.   Chief Complaint   Patient presents with   • Hypertension   • Diabetes   • Hyperlipidemia       History of Present Illness       #1 diabetes type 2 - diagnosed in 2001. Patient did diabetes education at the time of diagnosis. She is on Januvia 50 mg a day and glimepiride 4 mg twice a day.  He takes every day as prescribed.  She does not check sugars at home.  , A1c 7.5.  She thinks it is worse because of the stress.  Her son was in the hospital for more than 2 weeks.  She was not eating good at that time.  She is off metformin due to chronic kidney disease.  She follows up with nephrologist.  She has biopsy-proven diabetic nephropathy with extensive glomerulonephrosis  and moderate interstitial fibrosis.     #2 HTN- on amlodipine 5 mg a day, atenolol 100 mg a day, furosemide 20 mg a day, lisinopril 20 mg a day  and  hydralazine at 25 mg 3 times a day. She takes it as prescribed.  She reports no side effects.   Creatinine  2.06 from 1.72, GFR 24.  She saw her nephrologist on 4/14.  They are going to repeat renal tests in 2 weeks.  She has no chest pain, no shortness of breath, no lightheadedness.     #3 hypercholesterolemia- on atorvastatin 10 mg a day. She takes it everyday. No side effects. No muscle aches, no muscle cramps.  LDL 49, HDL 49, AST 44 from 57, ALT 33 from 37    The following portions of the patient's history were reviewed and updated as appropriate: allergies, current medications, past family history, past medical history, past social history, past surgical history and problem list.    Review of Systems   Respiratory: Negative for shortness of breath.    Cardiovascular: Negative for chest pain and palpitations.   Genitourinary: Negative for dysuria, hematuria and urgency.   Neurological: Negative for light-headedness.         Objective   Wt Readings from Last 3 Encounters:   04/19/22 112 kg (246 lb)   10/08/21 110 kg (242 lb)   06/28/21 112 kg  (248 lb)      Vitals:    04/19/22 1314   BP: 120/80   Pulse: 76   Temp: 97.5 °F (36.4 °C)   SpO2: 98%     Temp Readings from Last 3 Encounters:   04/19/22 97.5 °F (36.4 °C)   10/08/21 97.7 °F (36.5 °C)   06/28/21 97.5 °F (36.4 °C)     BP Readings from Last 3 Encounters:   04/19/22 120/80   10/08/21 126/72   06/28/21 138/80     Pulse Readings from Last 3 Encounters:   04/19/22 76   10/08/21 71   06/28/21 79     Body mass index is 45.97 kg/m².    Physical Exam  Constitutional:       Appearance: She is well-developed. She is obese.   HENT:      Head: Normocephalic and atraumatic.   Neck:      Thyroid: No thyromegaly.      Vascular: No carotid bruit.   Cardiovascular:      Rate and Rhythm: Normal rate and regular rhythm.      Heart sounds: Normal heart sounds.   Pulmonary:      Effort: Pulmonary effort is normal.      Breath sounds: Normal breath sounds.   Musculoskeletal:      Cervical back: Neck supple.   Skin:     General: Skin is warm and dry.   Neurological:      Mental Status: She is alert and oriented to person, place, and time.   Psychiatric:         Behavior: Behavior normal.         Assessment/Plan   Diagnoses and all orders for this visit:    1. Uncontrolled type 2 diabetes mellitus with hyperglycemia (HCC) (Primary)  -     Basic Metabolic Panel; Future  -     Hemoglobin A1c; Future  -     MicroAlbumin, Urine, Random - Urine, Clean Catch; Future    2. Benign essential hypertension    3. Hypercholesterolemia    4. Screening for colon cancer  -     Cologuard - Stool, Per Rectum; Future    Other orders  -     SITagliptin (Januvia) 50 MG tablet; Take 1 tablet by mouth Daily.  Dispense: 90 tablet; Refill: 1        1.  DM type II-uncontrolled.  Patient would like to work on improving her diet before adding more medications.  We will continue current treatment.  She will work on improving her diet and will have labs done in 3 months before office visit.  2.  Hypertension-continue current treatment.  Follow-up in 3  months.  3.  Hyperlipidemia-continue current treatment.  Follow-up in 6 months.

## 2022-05-19 ENCOUNTER — OFFICE VISIT (OUTPATIENT)
Dept: INTERNAL MEDICINE | Facility: CLINIC | Age: 77
End: 2022-05-19

## 2022-05-19 ENCOUNTER — TELEPHONE (OUTPATIENT)
Dept: INTERNAL MEDICINE | Facility: CLINIC | Age: 77
End: 2022-05-19

## 2022-05-19 ENCOUNTER — APPOINTMENT (OUTPATIENT)
Dept: WOMENS IMAGING | Facility: HOSPITAL | Age: 77
End: 2022-05-19

## 2022-05-19 VITALS
HEART RATE: 83 BPM | TEMPERATURE: 97.5 F | BODY MASS INDEX: 45.88 KG/M2 | HEIGHT: 61 IN | WEIGHT: 243 LBS | SYSTOLIC BLOOD PRESSURE: 140 MMHG | OXYGEN SATURATION: 98 % | DIASTOLIC BLOOD PRESSURE: 68 MMHG

## 2022-05-19 DIAGNOSIS — N18.4 STAGE 4 CHRONIC KIDNEY DISEASE: ICD-10-CM

## 2022-05-19 DIAGNOSIS — R19.5 POSITIVE COLORECTAL CANCER SCREENING USING COLOGUARD TEST: ICD-10-CM

## 2022-05-19 DIAGNOSIS — N30.01 ACUTE CYSTITIS WITH HEMATURIA: Primary | ICD-10-CM

## 2022-05-19 LAB
BILIRUB BLD-MCNC: NEGATIVE MG/DL
CLARITY, POC: ABNORMAL
COLOR UR: YELLOW
EXPIRATION DATE: ABNORMAL
GLUCOSE UR STRIP-MCNC: NEGATIVE MG/DL
KETONES UR QL: NEGATIVE
LEUKOCYTE EST, POC: ABNORMAL
Lab: ABNORMAL
NITRITE UR-MCNC: POSITIVE MG/ML
PH UR: 6 [PH] (ref 5–8)
PROT UR STRIP-MCNC: ABNORMAL MG/DL
RBC # UR STRIP: ABNORMAL /UL
SP GR UR: 1.01 (ref 1–1.03)
UROBILINOGEN UR QL: NORMAL

## 2022-05-19 PROCEDURE — 99213 OFFICE O/P EST LOW 20 MIN: CPT | Performed by: NURSE PRACTITIONER

## 2022-05-19 PROCEDURE — 77063 BREAST TOMOSYNTHESIS BI: CPT | Performed by: RADIOLOGY

## 2022-05-19 PROCEDURE — 77067 SCR MAMMO BI INCL CAD: CPT | Performed by: RADIOLOGY

## 2022-05-19 PROCEDURE — 81003 URINALYSIS AUTO W/O SCOPE: CPT | Performed by: NURSE PRACTITIONER

## 2022-05-19 RX ORDER — CIPROFLOXACIN 250 MG/1
250 TABLET, FILM COATED ORAL 2 TIMES DAILY
Qty: 10 TABLET | Refills: 0 | Status: SHIPPED | OUTPATIENT
Start: 2022-05-19 | End: 2022-05-24

## 2022-05-19 NOTE — TELEPHONE ENCOUNTER
Caller: Philly Person    Relationship: Self    Best call back number: [phone]    What is the provider, practice or medical service name: DR. ANTONETTE CARRASCO  [facility]     What is the office phone number: [phone]    Any additional details: PT HAD APPT WITH APRN TODAY AND WAS TOLD TO CALL THE OFFICE WITH INFO ON WHERE SHE WANTED HER GASTRO REFERRAL SENT.

## 2022-05-19 NOTE — PROGRESS NOTES
Subjective   Philly Person is a 76 y.o. female.     Chief Complaint   Patient presents with   • Dysuria     Pt c/o burning, cloudy urination X 2 days.        History of Present Illness   She is here today with c/o dysuria and cloudy appearing urine. Symptoms started 2 days ago. She noticed malodorous urine. She is trying to hydrate well with fluids. She takes a daily cranberry supplement.   Denies any hematuria, fever, chills, flank pain, nausea, vomiting, change in vaginal discharge.   Lats UTI was several years ago.   She has previously been treated with Bactrim with improvement.    The following portions of the patient's history were reviewed and updated as appropriate: allergies, current medications, past family history, past medical history, past social history, past surgical history and problem list.    Review of Systems   Constitutional: Negative for chills, fatigue and fever.   Respiratory: Negative for cough, chest tightness, shortness of breath and wheezing.    Cardiovascular: Positive for leg swelling. Negative for chest pain and palpitations.   Gastrointestinal: Negative for nausea and vomiting.   Genitourinary: Positive for dysuria, frequency and urgency. Negative for difficulty urinating, flank pain, hematuria, pelvic pain, pelvic pressure, urinary incontinence and vaginal discharge.       Objective   Physical Exam  Constitutional:       Appearance: She is well-developed.   Neck:      Thyroid: No thyroid mass, thyromegaly or thyroid tenderness.      Vascular: No carotid bruit.      Trachea: Trachea normal.   Cardiovascular:      Rate and Rhythm: Normal rate and regular rhythm.      Chest Wall: PMI is not displaced.      Pulses:           Radial pulses are 2+ on the right side and 2+ on the left side.        Dorsalis pedis pulses are 2+ on the right side and 2+ on the left side.        Posterior tibial pulses are 2+ on the right side and 2+ on the left side.      Heart sounds: S1 normal and S2  normal.      Comments: Trace bilateral edema    Pulmonary:      Effort: Pulmonary effort is normal.      Breath sounds: Normal breath sounds.   Abdominal:      General: Bowel sounds are normal. There is no distension or abdominal bruit. There are no signs of injury.      Palpations: Abdomen is soft.      Tenderness: There is no abdominal tenderness. There is no right CVA tenderness, left CVA tenderness, guarding or rebound.   Musculoskeletal:      Right lower leg: Edema present.      Left lower leg: Edema present.   Lymphadenopathy:      Head:      Right side of head: No submental, submandibular, tonsillar or occipital adenopathy.      Left side of head: No submental, submandibular, tonsillar or occipital adenopathy.      Cervical: No cervical adenopathy.   Skin:     General: Skin is warm and dry.      Capillary Refill: Capillary refill takes less than 2 seconds.      Nails: There is no clubbing.   Neurological:      Mental Status: She is alert and oriented to person, place, and time.   Psychiatric:         Attention and Perception: Attention normal.         Mood and Affect: Mood and affect normal.         Speech: Speech normal.         Behavior: Behavior normal.         Thought Content: Thought content normal.         Cognition and Memory: Cognition normal.         Vitals:    05/19/22 0951   BP: 140/68   Pulse: 83   Temp: 97.5 °F (36.4 °C)   SpO2: 98%      Body mass index is 45.41 kg/m².    Assessment & Plan   Diagnoses and all orders for this visit:    1. Acute cystitis with hematuria (Primary)  -     POCT urinalysis dipstick, automated  -     Urine Culture - Urine, Urine, Clean Catch  -     ciprofloxacin (CIPRO) 250 MG tablet; Take 1 tablet by mouth 2 (Two) Times a Day for 5 days.  Dispense: 10 tablet; Refill: 0  -     Basic Metabolic Panel; Future  -     Urinalysis With Culture If Indicated -; Future    2. Stage 4 chronic kidney disease (HCC)  -     Basic Metabolic Panel; Future  -     Urinalysis With Culture If  Indicated -; Future    3. Positive colorectal cancer screening using Cologuard test      1. Acute cystitis with hematuria- U/A positive for leuks, blood, nitrites and protein. Urine sent for culture. Start ciprofloxacin 250 mg BID x 5 days secondary to stage 4 CKD. Hydrate well with fluids. Take antibiotic separate from vitamins and supplements. Watch blood sugar closely while taking antibiotic secondary to risk of hypoglycemia. Re-check U/A and BMP in 10 days.  2. CKD stage 4- GFR currently 26 with creatinine of 1.98, improved from a few weeks ago. Re-check BMP in 10 days after completion of antibiotic.   3. Positive cologuard- discussed results with her in office. Will place referral to GI for c-scope. She will notify me this afternoon for the GI specialist she wants to see.

## 2022-05-20 DIAGNOSIS — R19.5 POSITIVE COLORECTAL CANCER SCREENING USING COLOGUARD TEST: Primary | ICD-10-CM

## 2022-05-26 LAB
BACTERIA UR CULT: ABNORMAL
BACTERIA UR CULT: ABNORMAL
OTHER ANTIBIOTIC SUSC ISLT: ABNORMAL

## 2022-06-06 DIAGNOSIS — N30.01 ACUTE CYSTITIS WITH HEMATURIA: ICD-10-CM

## 2022-06-06 DIAGNOSIS — N18.4 STAGE 4 CHRONIC KIDNEY DISEASE: ICD-10-CM

## 2022-06-17 RX ORDER — ATORVASTATIN CALCIUM 10 MG/1
TABLET, FILM COATED ORAL
Qty: 90 TABLET | Refills: 1 | Status: SHIPPED | OUTPATIENT
Start: 2022-06-17 | End: 2023-02-09

## 2022-07-05 ENCOUNTER — OFFICE (AMBULATORY)
Dept: URBAN - METROPOLITAN AREA CLINIC 75 | Facility: CLINIC | Age: 77
End: 2022-07-05

## 2022-07-05 VITALS
OXYGEN SATURATION: 98 % | HEIGHT: 62 IN | SYSTOLIC BLOOD PRESSURE: 118 MMHG | WEIGHT: 245.2 LBS | HEART RATE: 77 BPM | DIASTOLIC BLOOD PRESSURE: 82 MMHG

## 2022-07-05 DIAGNOSIS — N18.9 CHRONIC KIDNEY DISEASE, UNSPECIFIED: ICD-10-CM

## 2022-07-05 DIAGNOSIS — R19.5 OTHER FECAL ABNORMALITIES: ICD-10-CM

## 2022-07-05 PROCEDURE — 99204 OFFICE O/P NEW MOD 45 MIN: CPT | Performed by: INTERNAL MEDICINE

## 2022-07-05 RX ORDER — ONDANSETRON 4 MG/1
12 TABLET, ORALLY DISINTEGRATING ORAL
Qty: 3 | Refills: 0 | Status: ACTIVE
Start: 2022-07-05

## 2022-07-13 VITALS
SYSTOLIC BLOOD PRESSURE: 151 MMHG | RESPIRATION RATE: 21 BRPM | SYSTOLIC BLOOD PRESSURE: 154 MMHG | DIASTOLIC BLOOD PRESSURE: 66 MMHG | DIASTOLIC BLOOD PRESSURE: 61 MMHG | RESPIRATION RATE: 28 BRPM | DIASTOLIC BLOOD PRESSURE: 63 MMHG | SYSTOLIC BLOOD PRESSURE: 144 MMHG | HEIGHT: 62 IN | RESPIRATION RATE: 18 BRPM | TEMPERATURE: 97 F | HEART RATE: 72 BPM | OXYGEN SATURATION: 96 % | HEART RATE: 52 BPM | WEIGHT: 245 LBS | HEART RATE: 66 BPM | DIASTOLIC BLOOD PRESSURE: 62 MMHG | SYSTOLIC BLOOD PRESSURE: 166 MMHG | DIASTOLIC BLOOD PRESSURE: 67 MMHG | HEART RATE: 58 BPM | SYSTOLIC BLOOD PRESSURE: 146 MMHG | HEART RATE: 64 BPM | RESPIRATION RATE: 27 BRPM | SYSTOLIC BLOOD PRESSURE: 152 MMHG | SYSTOLIC BLOOD PRESSURE: 149 MMHG | OXYGEN SATURATION: 97 % | DIASTOLIC BLOOD PRESSURE: 64 MMHG | SYSTOLIC BLOOD PRESSURE: 139 MMHG | TEMPERATURE: 98.1 F | OXYGEN SATURATION: 95 % | HEART RATE: 69 BPM | RESPIRATION RATE: 26 BRPM | DIASTOLIC BLOOD PRESSURE: 56 MMHG | RESPIRATION RATE: 20 BRPM | RESPIRATION RATE: 25 BRPM | RESPIRATION RATE: 14 BRPM | OXYGEN SATURATION: 99 % | DIASTOLIC BLOOD PRESSURE: 48 MMHG | HEART RATE: 67 BPM | HEART RATE: 70 BPM | OXYGEN SATURATION: 98 % | SYSTOLIC BLOOD PRESSURE: 136 MMHG

## 2022-07-14 ENCOUNTER — OFFICE (AMBULATORY)
Dept: URBAN - METROPOLITAN AREA PATHOLOGY 4 | Facility: PATHOLOGY | Age: 77
End: 2022-07-14

## 2022-07-14 ENCOUNTER — AMBULATORY SURGICAL CENTER (AMBULATORY)
Dept: URBAN - METROPOLITAN AREA SURGERY 17 | Facility: SURGERY | Age: 77
End: 2022-07-14

## 2022-07-14 DIAGNOSIS — D12.0 BENIGN NEOPLASM OF CECUM: ICD-10-CM

## 2022-07-14 DIAGNOSIS — R19.5 OTHER FECAL ABNORMALITIES: ICD-10-CM

## 2022-07-14 DIAGNOSIS — Z12.11 ENCOUNTER FOR SCREENING FOR MALIGNANT NEOPLASM OF COLON: ICD-10-CM

## 2022-07-14 DIAGNOSIS — K55.20 ANGIODYSPLASIA OF COLON WITHOUT HEMORRHAGE: ICD-10-CM

## 2022-07-14 DIAGNOSIS — K63.5 POLYP OF COLON: ICD-10-CM

## 2022-07-14 DIAGNOSIS — K57.30 DIVERTICULOSIS OF LARGE INTESTINE WITHOUT PERFORATION OR ABS: ICD-10-CM

## 2022-07-14 DIAGNOSIS — D12.3 BENIGN NEOPLASM OF TRANSVERSE COLON: ICD-10-CM

## 2022-07-14 DIAGNOSIS — D12.2 BENIGN NEOPLASM OF ASCENDING COLON: ICD-10-CM

## 2022-07-14 LAB
GI HISTOLOGY: A. SELECT: (no result)
GI HISTOLOGY: B. UNSPECIFIED: (no result)
GI HISTOLOGY: C. SELECT: (no result)
GI HISTOLOGY: D. SELECT: (no result)
GI HISTOLOGY: E. SELECT: (no result)
GI HISTOLOGY: PDF REPORT: (no result)

## 2022-07-14 PROCEDURE — 88305 TISSUE EXAM BY PATHOLOGIST: CPT | Performed by: INTERNAL MEDICINE

## 2022-07-14 PROCEDURE — 45380 COLONOSCOPY AND BIOPSY: CPT | Mod: 59,PT | Performed by: INTERNAL MEDICINE

## 2022-07-14 PROCEDURE — 45385 COLONOSCOPY W/LESION REMOVAL: CPT | Mod: PT | Performed by: INTERNAL MEDICINE

## 2022-07-14 NOTE — SERVICEHPINOTES
Jenni Cooley  is a  76  female   who presents today for a  Colonoscopy   for   the indications listed below. The updated Patient Profile was reviewed prior to the procedure, in conjunction with the Physical Exam, including medical conditions, surgical procedures, medications, allergies, family history and social history. See Physical Exam time stamp below for date and time of HPI completion.Pre-operatively, I reviewed the indication(s) for the procedure, the risks of the procedure [including but not limited to: unexpected bleeding possibly requiring hospitalization and/or unplanned repeat procedures, perforation possibly requiring surgical treatment, missed lesions and complications of sedation/MAC (also explained by anesthesia staff)]. I have evaluated the patient for risks associated with the planned anesthesia and the procedure to be performed and find the patient an acceptable candidate for IV sedation.Multiple opportunities were provided for any questions or concerns, and all questions were answered satisfactorily before any anesthesia was administered. We will proceed with the planned procedure.br

## 2022-07-15 RX ORDER — GLIMEPIRIDE 4 MG/1
TABLET ORAL
Qty: 180 TABLET | Refills: 0 | Status: SHIPPED | OUTPATIENT
Start: 2022-07-15 | End: 2022-10-07

## 2022-07-19 ENCOUNTER — OFFICE VISIT (OUTPATIENT)
Dept: INTERNAL MEDICINE | Facility: CLINIC | Age: 77
End: 2022-07-19

## 2022-07-19 VITALS
TEMPERATURE: 97.1 F | DIASTOLIC BLOOD PRESSURE: 80 MMHG | HEART RATE: 79 BPM | WEIGHT: 246 LBS | SYSTOLIC BLOOD PRESSURE: 132 MMHG | BODY MASS INDEX: 46.44 KG/M2 | HEIGHT: 61 IN | OXYGEN SATURATION: 98 %

## 2022-07-19 DIAGNOSIS — M1A.0720 CHRONIC GOUT OF LEFT FOOT, UNSPECIFIED CAUSE: ICD-10-CM

## 2022-07-19 DIAGNOSIS — E11.65 UNCONTROLLED TYPE 2 DIABETES MELLITUS WITH HYPERGLYCEMIA: Primary | ICD-10-CM

## 2022-07-19 DIAGNOSIS — I10 BENIGN ESSENTIAL HYPERTENSION: ICD-10-CM

## 2022-07-19 PROCEDURE — 99214 OFFICE O/P EST MOD 30 MIN: CPT | Performed by: FAMILY MEDICINE

## 2022-07-19 RX ORDER — DULAGLUTIDE 0.75 MG/.5ML
0.75 INJECTION, SOLUTION SUBCUTANEOUS WEEKLY
Qty: 4 PEN | Refills: 2 | Status: SHIPPED | OUTPATIENT
Start: 2022-07-19 | End: 2022-11-02

## 2022-07-19 NOTE — PROGRESS NOTES
Subjective   Philly Person is a 76 y.o. female.   Chief Complaint   Patient presents with   • Hypertension   • Diabetes   • Gout       History of Present Illness     #1 diabetes type 2 - diagnosed in 2001. Patient did diabetes education at the time of diagnosis. She is on Januvia 50 mg a day and glimepiride 4 mg twice a day. She takes it every day as prescribed.  At last office visit we talked about improvement of diet to help sugars.  She gave up chocolate, but she had ice cream. She does not check sugars at home.  No symptoms of low sugars. , A1c 7.7. from 7.5.      She is off metformin due to chronic kidney disease.  She follows up with nephrologist.  She has biopsy-proven diabetic nephropathy with extensive glomerulonephrosis  and moderate interstitial fibrosis.      #2 HTN- on amlodipine 5 mg a day, atenolol 100 mg a day, furosemide 20 mg a day, lisinopril 20 mg a day  and  hydralazine at 25 mg 3 times a day. She takes it as prescribed.  She reports no side effects.   Creatinine 2.2, GFR 23.  She is scheduled with her nephrologist on 8/11.     #3 gout-she did not have any problems for years.  She did not want to start preventive medication for a while, now she considers it.    The following portions of the patient's history were reviewed and updated as appropriate: allergies, current medications, past medical history, past social history and problem list.    Review of Systems   Constitutional: Negative.    Respiratory: Negative for shortness of breath.    Cardiovascular: Negative for chest pain.   Endocrine: Negative for polydipsia, polyphagia and polyuria.   Musculoskeletal: Positive for arthralgias.         Objective   Wt Readings from Last 3 Encounters:   07/19/22 112 kg (246 lb)   05/19/22 110 kg (243 lb)   04/19/22 112 kg (246 lb)      Vitals:    07/19/22 1459   BP: 132/80   Pulse: 79   Temp: 97.1 °F (36.2 °C)   SpO2: 98%     Temp Readings from Last 3 Encounters:   07/19/22 97.1 °F (36.2 °C)    05/19/22 97.5 °F (36.4 °C)   04/19/22 97.5 °F (36.4 °C)     BP Readings from Last 3 Encounters:   07/19/22 132/80   05/19/22 140/68   04/19/22 120/80     Pulse Readings from Last 3 Encounters:   07/19/22 79   05/19/22 83   04/19/22 76     Body mass index is 45.97 kg/m².    Physical Exam  Constitutional:       Appearance: She is well-developed.      Comments: Walks with a cane.   HENT:      Head: Normocephalic and atraumatic.   Neck:      Thyroid: No thyromegaly.      Vascular: No carotid bruit.   Cardiovascular:      Rate and Rhythm: Normal rate and regular rhythm.      Pulses: Normal pulses.      Heart sounds: Normal heart sounds.   Pulmonary:      Effort: Pulmonary effort is normal.      Breath sounds: Normal breath sounds.   Musculoskeletal:      Cervical back: Neck supple.   Skin:     General: Skin is warm and dry.   Neurological:      Mental Status: She is alert and oriented to person, place, and time.   Psychiatric:         Behavior: Behavior normal.         Assessment & Plan   Diagnoses and all orders for this visit:    1. Uncontrolled type 2 diabetes mellitus with hyperglycemia (HCC) (Primary)  -     Basic Metabolic Panel; Future  -     Hemoglobin A1c; Future    2. Benign essential hypertension    3. Chronic gout of left foot, unspecified cause  -     Uric Acid; Future    Other orders  -     Dulaglutide (Trulicity) 0.75 MG/0.5ML solution pen-injector; Inject 0.75 mg under the skin into the appropriate area as directed 1 (One) Time Per Week.  Dispense: 4 pen; Refill: 2        1.  DM type II-worsening. Limited options due to chronic kidney disease stage IV.  We can not use SGLT2 inhibitors.  We are adding Trulicity 0.75 mg/wk. side effects including GI symptoms discussed.  Risks of thyroid cancer discussed.  No personal or family history of thyroid cancer.  Patient is advised to notify if fullness in the neck, difficulty swallowing, persistent raspy voice.  If tolerated well we can consider increasing dose  on Trulicity and discontinuation of Januvia.  Labs in 3 months.  She wants to discuss it with her nephrologist before committing to starting it.    2.  Hypertension- continue current treatment.  Follow-up in 3 to 6 months.    3.  Gout-check uric acid.  Follow-up in 3 months.

## 2022-08-16 ENCOUNTER — TRANSCRIBE ORDERS (OUTPATIENT)
Dept: ADMINISTRATIVE | Facility: HOSPITAL | Age: 77
End: 2022-08-16

## 2022-08-16 DIAGNOSIS — Z01.810 PRE-OPERATIVE CARDIOVASCULAR EXAMINATION: Primary | ICD-10-CM

## 2022-08-30 ENCOUNTER — APPOINTMENT (OUTPATIENT)
Dept: CARDIOLOGY | Facility: HOSPITAL | Age: 77
End: 2022-08-30

## 2022-09-02 ENCOUNTER — HOSPITAL ENCOUNTER (OUTPATIENT)
Dept: CARDIOLOGY | Facility: HOSPITAL | Age: 77
Discharge: HOME | End: 2022-09-02
Admitting: HOSPITALIST

## 2022-09-02 DIAGNOSIS — Z01.810 PRE-OPERATIVE CARDIOVASCULAR EXAMINATION: ICD-10-CM

## 2022-09-02 LAB
BH CV UPPER VENOUS LEFT AXILLARY AUGMENT: NORMAL
BH CV UPPER VENOUS LEFT AXILLARY COMPRESS: NORMAL
BH CV UPPER VENOUS LEFT AXILLARY PHASIC: NORMAL
BH CV UPPER VENOUS LEFT AXILLARY SPONT: NORMAL
BH CV UPPER VENOUS LEFT BASILIC FOREARM COMPRESS: NORMAL
BH CV UPPER VENOUS LEFT BASILIC UPPER COMPRESS: NORMAL
BH CV UPPER VENOUS LEFT CEPHALIC FOREARM COMPRESS: NORMAL
BH CV UPPER VENOUS LEFT CEPHALIC UPPER COMPRESS: NORMAL
BH CV UPPER VENOUS LEFT INTERNAL JUGULAR AUGMENT: NORMAL
BH CV UPPER VENOUS LEFT INTERNAL JUGULAR COMPRESS: NORMAL
BH CV UPPER VENOUS LEFT INTERNAL JUGULAR PHASIC: NORMAL
BH CV UPPER VENOUS LEFT INTERNAL JUGULAR SPONT: NORMAL
BH CV UPPER VENOUS LEFT SUBCLAVIAN AUGMENT: NORMAL
BH CV UPPER VENOUS LEFT SUBCLAVIAN COMPRESS: NORMAL
BH CV UPPER VENOUS LEFT SUBCLAVIAN PHASIC: NORMAL
BH CV UPPER VENOUS LEFT SUBCLAVIAN SPONT: NORMAL
BH CV UPPER VENOUS RIGHT AXILLARY AUGMENT: NORMAL
BH CV UPPER VENOUS RIGHT AXILLARY COMPRESS: NORMAL
BH CV UPPER VENOUS RIGHT AXILLARY PHASIC: NORMAL
BH CV UPPER VENOUS RIGHT AXILLARY SPONT: NORMAL
BH CV UPPER VENOUS RIGHT BASILIC FOREARM COMPRESS: NORMAL
BH CV UPPER VENOUS RIGHT BASILIC UPPER COMPRESS: NORMAL
BH CV UPPER VENOUS RIGHT CEPHALIC FOREARM COMPRESS: NORMAL
BH CV UPPER VENOUS RIGHT CEPHALIC UPPER COMPRESS: NORMAL
BH CV UPPER VENOUS RIGHT INTERNAL JUGULAR AUGMENT: NORMAL
BH CV UPPER VENOUS RIGHT INTERNAL JUGULAR COMPRESS: NORMAL
BH CV UPPER VENOUS RIGHT INTERNAL JUGULAR PHASIC: NORMAL
BH CV UPPER VENOUS RIGHT INTERNAL JUGULAR SPONT: NORMAL
BH CV UPPER VENOUS RIGHT SUBCLAVIAN AUGMENT: NORMAL
BH CV UPPER VENOUS RIGHT SUBCLAVIAN COMPRESS: NORMAL
BH CV UPPER VENOUS RIGHT SUBCLAVIAN PHASIC: NORMAL
BH CV UPPER VENOUS RIGHT SUBCLAVIAN SPONT: NORMAL
BH CV VAS MEAS BASILIC ANTECUBITAL FOSSA LEFT: 0.11 CM
BH CV VAS MEAS BASILIC ANTECUBITAL FOSSA RIGHT: 0.15 CM
BH CV VAS MEAS BASILIC FOREARM LEFT - DIST: 0.08 CM
BH CV VAS MEAS BASILIC FOREARM LEFT - MID: 0.08 CM
BH CV VAS MEAS BASILIC FOREARM LEFT - PROX: 0.1 CM
BH CV VAS MEAS BASILIC FOREARM RIGHT - DIST: 0.06 CM
BH CV VAS MEAS BASILIC FOREARM RIGHT - MID: 0.13 CM
BH CV VAS MEAS BASILIC FOREARM RIGHT - PROX: 0.1 CM
BH CV VAS MEAS BASILIC UPPER ARM LEFT - DIST: 0.16 CM
BH CV VAS MEAS BASILIC UPPER ARM LEFT - MID: 0.2 CM
BH CV VAS MEAS BASILIC UPPER ARM LEFT - PROX: 0.31 CM
BH CV VAS MEAS BASILIC UPPER ARM RIGHT - DIST: 0.21 CM
BH CV VAS MEAS BASILIC UPPER ARM RIGHT - MID: 0.24 CM
BH CV VAS MEAS BASILIC UPPER ARM RIGHT - PROX: 0.24 CM
BH CV VAS MEAS CEPHALIC ANTECUBITAL FOSSA LEFT: 0.2 CM
BH CV VAS MEAS CEPHALIC ANTECUBITAL FOSSA RIGHT: 0.64 CM
BH CV VAS MEAS CEPHALIC FOREARM LEFT - DIST: 0.14 CM
BH CV VAS MEAS CEPHALIC FOREARM LEFT - MID: 0.15 CM
BH CV VAS MEAS CEPHALIC FOREARM LEFT - PROX: 0.15 CM
BH CV VAS MEAS CEPHALIC FOREARM RIGHT - DIST: 0.28 CM
BH CV VAS MEAS CEPHALIC FOREARM RIGHT - MID: 0.23 CM
BH CV VAS MEAS CEPHALIC FOREARM RIGHT - PROX: 0.21 CM
BH CV VAS MEAS CEPHALIC UPPER ARM LEFT - DIST: 0.25 CM
BH CV VAS MEAS CEPHALIC UPPER ARM LEFT - MID: 0.3 CM
BH CV VAS MEAS CEPHALIC UPPER ARM LEFT - PROX: 0.26 CM
BH CV VAS MEAS CEPHALIC UPPER ARM RIGHT - DIST: 0.49 CM
BH CV VAS MEAS CEPHALIC UPPER ARM RIGHT - MID: 0.43 CM
BH CV VAS MEAS CEPHALIC UPPER ARM RIGHT - PROX: 0.45 CM
BH CV VAS MEAS RADIAL UPPER ARM LEFT - DIST: 0.24 CM
BH CV VAS MEAS RADIAL UPPER ARM LEFT - MID: 0.24 CM
BH CV VAS MEAS RADIAL UPPER ARM LEFT - PROX: 0.23 CM
BH CV VAS MEAS RADIAL UPPER ARM RIGHT - DIST: 0.21 CM
BH CV VAS MEAS RADIAL UPPER ARM RIGHT - MID: 0.23 CM
BH CV VAS MEAS RADIAL UPPER ARM RIGHT - PROX: 0.22 CM
MAXIMAL PREDICTED HEART RATE: 142 BPM
STRESS TARGET HR: 121 BPM
UPPER ARTERIAL LEFT ARM BRACHIAL LENGTH: 0.36 CM
UPPER ARTERIAL RIGHT ARM BRACHIAL LENGTH: 0.35 CM

## 2022-09-02 PROCEDURE — 93970 EXTREMITY STUDY: CPT

## 2022-09-08 RX ORDER — ATENOLOL 100 MG/1
TABLET ORAL
Qty: 90 TABLET | Refills: 1 | Status: SHIPPED | OUTPATIENT
Start: 2022-09-08 | End: 2023-03-29

## 2022-10-07 RX ORDER — GLIMEPIRIDE 4 MG/1
TABLET ORAL
Qty: 180 TABLET | Refills: 0 | Status: SHIPPED | OUTPATIENT
Start: 2022-10-07 | End: 2023-01-12

## 2022-10-07 RX ORDER — FUROSEMIDE 20 MG/1
TABLET ORAL
Qty: 90 TABLET | Refills: 1 | Status: SHIPPED | OUTPATIENT
Start: 2022-10-07

## 2022-11-02 ENCOUNTER — OFFICE VISIT (OUTPATIENT)
Dept: INTERNAL MEDICINE | Facility: CLINIC | Age: 77
End: 2022-11-02

## 2022-11-02 VITALS
WEIGHT: 238 LBS | HEART RATE: 69 BPM | HEIGHT: 61 IN | OXYGEN SATURATION: 98 % | TEMPERATURE: 96.8 F | DIASTOLIC BLOOD PRESSURE: 74 MMHG | SYSTOLIC BLOOD PRESSURE: 130 MMHG | BODY MASS INDEX: 44.93 KG/M2

## 2022-11-02 DIAGNOSIS — I10 BENIGN ESSENTIAL HYPERTENSION: ICD-10-CM

## 2022-11-02 DIAGNOSIS — E11.65 UNCONTROLLED TYPE 2 DIABETES MELLITUS WITH HYPERGLYCEMIA: Primary | ICD-10-CM

## 2022-11-02 PROCEDURE — 99214 OFFICE O/P EST MOD 30 MIN: CPT | Performed by: FAMILY MEDICINE

## 2022-11-02 RX ORDER — ALLOPURINOL 100 MG/1
100 TABLET ORAL
COMMUNITY
Start: 2022-08-11 | End: 2023-02-13 | Stop reason: DRUGHIGH

## 2022-11-02 NOTE — PROGRESS NOTES
Subjective   Philly Person is a 77 y.o. female.   Chief Complaint   Patient presents with   • Hyperlipidemia   • Diabetes       History of Present Illness     #1 diabetes type 2 - diagnosed in 2001. Patient did diabetes education at the time of diagnosis.  At last office visit we prescribed Trulicity and discontinue Januvia, but patient wanted to work on improving her diet prior to starting Trulicity.  She just continued Januvia 50 mg a day and glimepiride 4 mg twice a day.   She decreased ice cream and chocolate.  She lost 8 pounds. She does not check sugars at home.  No symptoms of low sugars. , A1c  7.1 from 7.7.     She is off metformin due to chronic kidney disease.  She follows up with nephrologist.  She has biopsy-proven diabetic nephropathy with extensive glomerulonephrosis  and moderate interstitial fibrosis.      #2 HTN- on amlodipine 5 mg a day, atenolol 100 mg a day, furosemide 20 mg a day, lisinopril 20 mg a day  and  hydralazine at 25 mg 3 times a day. She takes it as prescribed.  She reports no side effects.   Creatinine 0.8, GFR 28.5.  No chest pain, no shortness of breath, no palpitations, lightheadedness.    The following portions of the patient's history were reviewed and updated as appropriate: allergies, current medications, past medical history, past social history and problem list.    Review of Systems   Constitutional: Negative for fatigue.   Cardiovascular: Negative for chest pain and palpitations.   Endocrine: Negative for polydipsia, polyphagia and polyuria.   Skin: Negative for pallor.   Neurological: Negative for dizziness, tremors, seizures, speech difficulty, weakness, light-headedness and headaches.   Psychiatric/Behavioral: Negative for confusion. The patient is not nervous/anxious.          Objective   Wt Readings from Last 3 Encounters:   11/02/22 108 kg (238 lb)   07/19/22 112 kg (246 lb)   05/19/22 110 kg (243 lb)      Vitals:    11/02/22 1121   BP: 142/80   Pulse: 69    Temp: 96.8 °F (36 °C)   SpO2: 98%     Temp Readings from Last 3 Encounters:   11/02/22 96.8 °F (36 °C)   07/19/22 97.1 °F (36.2 °C)   05/19/22 97.5 °F (36.4 °C)     BP Readings from Last 3 Encounters:   11/02/22 142/80   07/19/22 132/80   05/19/22 140/68     Pulse Readings from Last 3 Encounters:   11/02/22 69   07/19/22 79   05/19/22 83     Body mass index is 44.47 kg/m².    Physical Exam  Constitutional:       Appearance: She is well-developed.   HENT:      Head: Normocephalic and atraumatic.   Neck:      Thyroid: No thyromegaly.      Vascular: No carotid bruit.   Cardiovascular:      Rate and Rhythm: Normal rate and regular rhythm.      Heart sounds: Normal heart sounds.   Pulmonary:      Effort: Pulmonary effort is normal.      Breath sounds: Normal breath sounds.   Musculoskeletal:      Cervical back: Neck supple.   Neurological:      Mental Status: She is alert and oriented to person, place, and time.   Psychiatric:         Behavior: Behavior normal.         Assessment & Plan   Diagnoses and all orders for this visit:    1. Uncontrolled type 2 diabetes mellitus with hyperglycemia (HCC) (Primary)    2. Benign essential hypertension        1.  Diabetes type 2-uncontrolled.  She will work on losing weight.  She will continue current medications.  We will check labs in 3 months and if it continues to be elevated she will consider Trulicity.  Follow-up in 3 months.  2.  Hypertension-continue current treatment.  Follow-up in 3 to 6 months.

## 2023-01-12 RX ORDER — GLIMEPIRIDE 4 MG/1
TABLET ORAL
Qty: 180 TABLET | Refills: 0 | Status: SHIPPED | OUTPATIENT
Start: 2023-01-12

## 2023-02-01 DIAGNOSIS — E11.65 UNCONTROLLED TYPE 2 DIABETES MELLITUS WITH HYPERGLYCEMIA: Primary | ICD-10-CM

## 2023-02-01 DIAGNOSIS — M1A.0710 CHRONIC GOUT OF RIGHT FOOT, UNSPECIFIED CAUSE: ICD-10-CM

## 2023-02-01 DIAGNOSIS — E78.00 HYPERCHOLESTEROLEMIA: ICD-10-CM

## 2023-02-01 DIAGNOSIS — E11.29 TYPE 2 DIABETES MELLITUS WITH MICROALBUMINURIA, WITHOUT LONG-TERM CURRENT USE OF INSULIN: ICD-10-CM

## 2023-02-01 DIAGNOSIS — R80.9 TYPE 2 DIABETES MELLITUS WITH MICROALBUMINURIA, WITHOUT LONG-TERM CURRENT USE OF INSULIN: ICD-10-CM

## 2023-02-02 LAB
ALBUMIN SERPL-MCNC: 3.7 G/DL (ref 3.5–5.2)
ALBUMIN/GLOB SERPL: 1.1 G/DL
ALP SERPL-CCNC: 201 U/L (ref 39–117)
ALT SERPL-CCNC: 33 U/L (ref 1–33)
AST SERPL-CCNC: 44 U/L (ref 1–32)
BILIRUB SERPL-MCNC: 1 MG/DL (ref 0–1.2)
BUN SERPL-MCNC: 44 MG/DL (ref 8–23)
BUN/CREAT SERPL: 22.1 (ref 7–25)
CALCIUM SERPL-MCNC: 9.8 MG/DL (ref 8.6–10.5)
CHLORIDE SERPL-SCNC: 102 MMOL/L (ref 98–107)
CHOLEST SERPL-MCNC: 120 MG/DL (ref 0–200)
CO2 SERPL-SCNC: 25 MMOL/L (ref 22–29)
CREAT SERPL-MCNC: 1.99 MG/DL (ref 0.57–1)
EGFRCR SERPLBLD CKD-EPI 2021: 25.3 ML/MIN/1.73
GLOBULIN SER CALC-MCNC: 3.3 GM/DL
GLUCOSE SERPL-MCNC: 186 MG/DL (ref 65–99)
HBA1C MFR BLD: 8.5 % (ref 4.8–5.6)
HDLC SERPL-MCNC: 43 MG/DL (ref 40–60)
LDLC SERPL CALC-MCNC: 61 MG/DL (ref 0–100)
LDLC/HDLC SERPL: 1.4 {RATIO}
POTASSIUM SERPL-SCNC: 4.6 MMOL/L (ref 3.5–5.2)
PROT SERPL-MCNC: 7 G/DL (ref 6–8.5)
SODIUM SERPL-SCNC: 138 MMOL/L (ref 136–145)
TRIGL SERPL-MCNC: 83 MG/DL (ref 0–150)
URATE SERPL-MCNC: 8.3 MG/DL (ref 2.4–5.7)
VLDLC SERPL CALC-MCNC: 16 MG/DL (ref 5–40)

## 2023-02-08 ENCOUNTER — OFFICE VISIT (OUTPATIENT)
Dept: INTERNAL MEDICINE | Facility: CLINIC | Age: 78
End: 2023-02-08
Payer: MEDICARE

## 2023-02-08 VITALS
OXYGEN SATURATION: 98 % | HEIGHT: 61 IN | TEMPERATURE: 97.1 F | WEIGHT: 242 LBS | BODY MASS INDEX: 45.69 KG/M2 | DIASTOLIC BLOOD PRESSURE: 76 MMHG | HEART RATE: 72 BPM | SYSTOLIC BLOOD PRESSURE: 128 MMHG

## 2023-02-08 DIAGNOSIS — I10 BENIGN ESSENTIAL HYPERTENSION: ICD-10-CM

## 2023-02-08 DIAGNOSIS — G89.29 CHRONIC LEFT SHOULDER PAIN: ICD-10-CM

## 2023-02-08 DIAGNOSIS — E78.00 HYPERCHOLESTEROLEMIA: ICD-10-CM

## 2023-02-08 DIAGNOSIS — E11.65 UNCONTROLLED TYPE 2 DIABETES MELLITUS WITH HYPERGLYCEMIA: Primary | ICD-10-CM

## 2023-02-08 DIAGNOSIS — M25.512 CHRONIC LEFT SHOULDER PAIN: ICD-10-CM

## 2023-02-08 PROCEDURE — 99214 OFFICE O/P EST MOD 30 MIN: CPT | Performed by: FAMILY MEDICINE

## 2023-02-08 RX ORDER — DULAGLUTIDE 0.75 MG/.5ML
0.75 INJECTION, SOLUTION SUBCUTANEOUS WEEKLY
Qty: 3 ML | Refills: 2 | Status: SHIPPED | OUTPATIENT
Start: 2023-02-08

## 2023-02-08 NOTE — PROGRESS NOTES
Subjective   Philly Person is a 77 y.o. female.   Chief Complaint   Patient presents with   • Diabetes   • Hypertension   • Hyperlipidemia       History of Present Illness     #1 diabetes type 2 - diagnosed in 2001. Patient did diabetes education at the time of diagnosis.  She is on Januvia 50 mg a day and glimepiride 4 mg twice a day.  She did not change her diet.  No change in physical activity.  No symptoms of low sugars.  No recent treatment with prednisone.  , A1c  8.5 from 7.1 from 7.7.     She is off metformin due to chronic kidney disease.  She follows up with nephrologist.  She has biopsy-proven diabetic nephropathy with extensive glomerulonephrosis  and moderate interstitial fibrosis.  She will have shunt placed in about a month.      #2 HTN- on amlodipine 5 mg a day, atenolol 100 mg a day, furosemide 20 mg a day, lisinopril 20 mg a day  and  hydralazine at 25 mg 3 times a day. She takes it as prescribed.  She reports no side effects.   Creatinine 1.99, GFR 25.3.    #3 hypercholesterolemia- on atorvastatin 10 mg a day. She takes it everyday. No side effects. No muscle aches, no muscle cramps.  LDL 61, HDL 43, AST 44 , ALT 33.    #4 Gout -she is on allopurinol at 100 mg a day.  It is managed by her nephrologist.  Uric acid is 8.3.    #5  Left shoulder pain-started months ago.  There was no known injury.  Pain is dull, on and off, worse with raising arm.  Worse with putting seatbelt on.  Intensity 7 out of 10.  No redness, no swelling associated.  Nothing was tried to make it better yet.    The following portions of the patient's history were reviewed and updated as appropriate: allergies, current medications, past family history, past medical history, past social history, past surgical history and problem list.    Review of Systems   Respiratory: Negative.    Cardiovascular: Negative.          Objective   Wt Readings from Last 3 Encounters:   02/08/23 110 kg (242 lb)   11/02/22 108 kg (238 lb)    07/19/22 112 kg (246 lb)      Vitals:    02/08/23 1305   BP: 150/90   Pulse: 72   Temp: 97.1 °F (36.2 °C)   SpO2: 98%     Temp Readings from Last 3 Encounters:   02/08/23 97.1 °F (36.2 °C)   11/02/22 96.8 °F (36 °C)   07/19/22 97.1 °F (36.2 °C)     BP Readings from Last 3 Encounters:   02/08/23 150/90   11/02/22 130/74   07/19/22 132/80     Pulse Readings from Last 3 Encounters:   02/08/23 72   11/02/22 69   07/19/22 79     Body mass index is 45.22 kg/m².    Physical Exam  Constitutional:       Appearance: She is well-developed.   Neck:      Thyroid: No thyromegaly.      Vascular: No carotid bruit.   Cardiovascular:      Rate and Rhythm: Normal rate and regular rhythm.      Heart sounds: Normal heart sounds.   Pulmonary:      Effort: Pulmonary effort is normal.      Breath sounds: Normal breath sounds.   Musculoskeletal:      Comments: No TTP over shoulders.  No erythema.  Limited extension and internal rotation in left shoulder.  Muscle strength 5/5 x UE.   Skin:     General: Skin is warm and dry.   Neurological:      Mental Status: She is alert and oriented to person, place, and time.   Psychiatric:         Behavior: Behavior normal.         Assessment & Plan   Diagnoses and all orders for this visit:    1. Uncontrolled type 2 diabetes mellitus with hyperglycemia (HCC) (Primary)    2. Benign essential hypertension    3. Hypercholesterolemia    4. Chronic left shoulder pain  -     XR Shoulder 2+ View Left  -     Ambulatory Referral to Physical Therapy    Other orders  -     Dulaglutide (Trulicity) 0.75 MG/0.5ML solution pen-injector; Inject 0.75 mg under the skin into the appropriate area as directed 1 (One) Time Per Week.  Dispense: 3 mL; Refill: 2        1.  Diabetes type 2-uncontrolled.  It is worsening.  We are stopping Januvia.  We are starting Trulicity 0.75 mg a week.  Risks discussed with patient.  Discussed with patient dose-related and duration dependent increase in thyroid cancer incidence observed in  rodents but human relevance unknown.  Patient has no history of thyroid cancer.  She will watch for symptoms like neck mass, problems with swallowing or hoarseness.  Follow-up in 3 months.  Labs before office visit.  2.  Hypertension-continue current treatment for 3-6 months.  3 hyperlipidemia-continue current treatment.  Follow-up in 6 months.  4.  Gout-uric acid above 6.  She will double dose of allopurinol to a total of 200 mg a day.  5.  Left shoulder pain- check x-ray.  Patient cannot take NSAIDs due to chronic kidney disease.  Will use Tylenol as needed and I am referring her to physical therapy.  Follow-up as needed.

## 2023-02-09 RX ORDER — ATORVASTATIN CALCIUM 10 MG/1
TABLET, FILM COATED ORAL
Qty: 90 TABLET | Refills: 1 | Status: SHIPPED | OUTPATIENT
Start: 2023-02-09

## 2023-02-10 ENCOUNTER — TELEPHONE (OUTPATIENT)
Dept: INTERNAL MEDICINE | Facility: CLINIC | Age: 78
End: 2023-02-10
Payer: MEDICARE

## 2023-02-10 RX ORDER — ALLOPURINOL 200 MG/1
200 TABLET ORAL ONCE
Qty: 90 TABLET | Refills: 1 | Status: SHIPPED | OUTPATIENT
Start: 2023-02-10 | End: 2023-02-10

## 2023-02-10 NOTE — TELEPHONE ENCOUNTER
Caller: Philly Person    Relationship: Self    Best call back number: [phone]    What is the best time to reach you: ANY    Who are you requesting to speak with (clinical staff, provider,  specific staff member): CLINICAL STAFF    Do you know the name of the person who called: PATIENT    What was the call regarding: SHE FORGOT THAT SHE WANTED HER  TO GET A XRAY OF HER SHOULDER AND MADE A FOLLOW UP APPOINTMENT.  CAN SHE JUST GO ON Tuesday OR DOES SHE NEED TO MAKE AN APPOINTMENT.  SHE INCREASED HER allopurinol (ZYLOPRIM) 100 MG tablet  TO 200MG.  SHE NEEDS A NEW SCRIPT FOR THAT.  [facility]      Do you require a callback: YES

## 2023-02-13 RX ORDER — ALLOPURINOL 200 MG/1
1 TABLET ORAL DAILY
Qty: 90 TABLET | Refills: 1 | Status: SHIPPED | OUTPATIENT
Start: 2023-02-13 | End: 2023-02-14 | Stop reason: RX

## 2023-02-14 ENCOUNTER — HOSPITAL ENCOUNTER (OUTPATIENT)
Dept: GENERAL RADIOLOGY | Facility: HOSPITAL | Age: 78
Discharge: HOME | End: 2023-02-14
Admitting: FAMILY MEDICINE
Payer: MEDICARE

## 2023-02-14 PROCEDURE — 73030 X-RAY EXAM OF SHOULDER: CPT

## 2023-02-14 RX ORDER — ALLOPURINOL 100 MG/1
100 TABLET ORAL 2 TIMES DAILY
Qty: 180 TABLET | Refills: 1 | Status: SHIPPED | OUTPATIENT
Start: 2023-02-14

## 2023-03-29 RX ORDER — ATENOLOL 100 MG/1
TABLET ORAL
Qty: 90 TABLET | Refills: 1 | Status: SHIPPED | OUTPATIENT
Start: 2023-03-29

## 2023-04-06 RX ORDER — AMLODIPINE BESYLATE 5 MG/1
5 TABLET ORAL DAILY
Qty: 90 TABLET | Refills: 1 | Status: SHIPPED | OUTPATIENT
Start: 2023-04-06 | End: 2023-04-07 | Stop reason: SDUPTHER

## 2023-04-06 RX ORDER — AMLODIPINE BESYLATE 10 MG/1
TABLET ORAL
Qty: 90 TABLET | Refills: 1 | OUTPATIENT
Start: 2023-04-06

## 2023-04-07 RX ORDER — AMLODIPINE BESYLATE 5 MG/1
5 TABLET ORAL DAILY
Qty: 90 TABLET | Refills: 1 | Status: SHIPPED | OUTPATIENT
Start: 2023-04-07

## 2023-04-12 RX ORDER — GLIMEPIRIDE 4 MG/1
TABLET ORAL
Qty: 180 TABLET | Refills: 0 | Status: SHIPPED | OUTPATIENT
Start: 2023-04-12

## 2023-04-25 DIAGNOSIS — R80.9 TYPE 2 DIABETES MELLITUS WITH MICROALBUMINURIA, WITHOUT LONG-TERM CURRENT USE OF INSULIN: ICD-10-CM

## 2023-04-25 DIAGNOSIS — E11.29 TYPE 2 DIABETES MELLITUS WITH MICROALBUMINURIA, WITHOUT LONG-TERM CURRENT USE OF INSULIN: ICD-10-CM

## 2023-04-25 DIAGNOSIS — E11.65 UNCONTROLLED TYPE 2 DIABETES MELLITUS WITH HYPERGLYCEMIA: Primary | ICD-10-CM

## 2023-04-25 DIAGNOSIS — N18.4 STAGE 4 CHRONIC KIDNEY DISEASE: ICD-10-CM

## 2023-04-25 DIAGNOSIS — E78.00 HYPERCHOLESTEROLEMIA: ICD-10-CM

## 2023-04-25 DIAGNOSIS — M1A.0710 CHRONIC GOUT OF RIGHT FOOT, UNSPECIFIED CAUSE: ICD-10-CM

## 2023-04-26 RX ORDER — FUROSEMIDE 20 MG/1
TABLET ORAL
Qty: 90 TABLET | Refills: 1 | Status: SHIPPED | OUTPATIENT
Start: 2023-04-26

## 2023-04-27 LAB
ALBUMIN SERPL-MCNC: 3.8 G/DL (ref 3.5–5.2)
ALBUMIN/GLOB SERPL: 1.1 G/DL
ALP SERPL-CCNC: 187 U/L (ref 39–117)
ALT SERPL-CCNC: 34 U/L (ref 1–33)
AST SERPL-CCNC: 47 U/L (ref 1–32)
BILIRUB SERPL-MCNC: 1.1 MG/DL (ref 0–1.2)
BUN SERPL-MCNC: 46 MG/DL (ref 8–23)
BUN/CREAT SERPL: 22.9 (ref 7–25)
CALCIUM SERPL-MCNC: 10 MG/DL (ref 8.6–10.5)
CHLORIDE SERPL-SCNC: 105 MMOL/L (ref 98–107)
CHOLEST SERPL-MCNC: 110 MG/DL (ref 0–200)
CO2 SERPL-SCNC: 22.3 MMOL/L (ref 22–29)
CREAT SERPL-MCNC: 2.01 MG/DL (ref 0.57–1)
EGFRCR SERPLBLD CKD-EPI 2021: 25 ML/MIN/1.73
ERYTHROCYTE [DISTWIDTH] IN BLOOD BY AUTOMATED COUNT: 13.3 % (ref 12.3–15.4)
GLOBULIN SER CALC-MCNC: 3.4 GM/DL
GLUCOSE SERPL-MCNC: 130 MG/DL (ref 65–99)
HBA1C MFR BLD: 7.5 % (ref 4.8–5.6)
HCT VFR BLD AUTO: 33.2 % (ref 34–46.6)
HDLC SERPL-MCNC: 43 MG/DL (ref 40–60)
HGB BLD-MCNC: 11.1 G/DL (ref 12–15.9)
LDLC SERPL CALC-MCNC: 46 MG/DL (ref 0–100)
LDLC/HDLC SERPL: 1.02 {RATIO}
MCH RBC QN AUTO: 29.7 PG (ref 26.6–33)
MCHC RBC AUTO-ENTMCNC: 33.4 G/DL (ref 31.5–35.7)
MCV RBC AUTO: 88.8 FL (ref 79–97)
PLATELET # BLD AUTO: 137 10*3/MM3 (ref 140–450)
POTASSIUM SERPL-SCNC: 4 MMOL/L (ref 3.5–5.2)
PROT SERPL-MCNC: 7.2 G/DL (ref 6–8.5)
RBC # BLD AUTO: 3.74 10*6/MM3 (ref 3.77–5.28)
SODIUM SERPL-SCNC: 140 MMOL/L (ref 136–145)
TRIGL SERPL-MCNC: 115 MG/DL (ref 0–150)
URATE SERPL-MCNC: 5.6 MG/DL (ref 2.4–5.7)
VLDLC SERPL CALC-MCNC: 21 MG/DL (ref 5–40)
WBC # BLD AUTO: 6.45 10*3/MM3 (ref 3.4–10.8)

## 2023-05-02 ENCOUNTER — OFFICE VISIT (OUTPATIENT)
Dept: INTERNAL MEDICINE | Facility: CLINIC | Age: 78
End: 2023-05-02
Payer: MEDICARE

## 2023-05-02 VITALS
TEMPERATURE: 97.7 F | BODY MASS INDEX: 45.69 KG/M2 | HEART RATE: 88 BPM | WEIGHT: 242 LBS | HEIGHT: 61 IN | OXYGEN SATURATION: 98 % | SYSTOLIC BLOOD PRESSURE: 132 MMHG | DIASTOLIC BLOOD PRESSURE: 62 MMHG

## 2023-05-02 DIAGNOSIS — I10 BENIGN ESSENTIAL HYPERTENSION: ICD-10-CM

## 2023-05-02 DIAGNOSIS — M1A.0710 CHRONIC GOUT OF RIGHT FOOT, UNSPECIFIED CAUSE: ICD-10-CM

## 2023-05-02 DIAGNOSIS — E11.65 UNCONTROLLED TYPE 2 DIABETES MELLITUS WITH HYPERGLYCEMIA: Primary | ICD-10-CM

## 2023-05-02 DIAGNOSIS — E78.00 HYPERCHOLESTEROLEMIA: ICD-10-CM

## 2023-05-02 PROCEDURE — 1159F MED LIST DOCD IN RCRD: CPT | Performed by: FAMILY MEDICINE

## 2023-05-02 PROCEDURE — 99214 OFFICE O/P EST MOD 30 MIN: CPT | Performed by: FAMILY MEDICINE

## 2023-05-02 PROCEDURE — 3078F DIAST BP <80 MM HG: CPT | Performed by: FAMILY MEDICINE

## 2023-05-02 PROCEDURE — 1160F RVW MEDS BY RX/DR IN RCRD: CPT | Performed by: FAMILY MEDICINE

## 2023-05-02 PROCEDURE — 3075F SYST BP GE 130 - 139MM HG: CPT | Performed by: FAMILY MEDICINE

## 2023-05-02 RX ORDER — DULAGLUTIDE 0.75 MG/.5ML
0.75 INJECTION, SOLUTION SUBCUTANEOUS WEEKLY
Qty: 6 ML | Refills: 2 | Status: SHIPPED | OUTPATIENT
Start: 2023-05-02 | End: 2023-05-02

## 2023-05-02 NOTE — PROGRESS NOTES
Subjective   Philly Person is a 77 y.o. female.   Chief Complaint   Patient presents with   • Hyperlipidemia   • Hypertension   • Gout   • Diabetes       History of Present Illness     #1 diabetes type 2 - diagnosed in 2001. Patient did diabetes education at the time of diagnosis.  She is on Trulicity 0.75 mg a week (we started it at last office visit) and glimepiride 4 mg twice a day.  We discontinued Januvia at last office visit. She improved her diet.  No symptoms of low sugars. , A1c 7.5 from 8.5 from 7.1 from 7.7.     She is off metformin due to chronic kidney disease.  She follows up with nephrologist.  She has biopsy-proven diabetic nephropathy with extensive glomerulonephrosis  and moderate interstitial fibrosis.  She had shunt placed on 4/4/2023.      #2 HTN- on amlodipine 5 mg a day, atenolol 100 mg a day, furosemide 20 mg a day, lisinopril 20 mg a day  and  hydralazine at 25 mg 3 times a day. She takes it as prescribed.  She reports no side effects.  Creatinine 2.01 from 1.99, GFR 25.0 from 25.3.  Hemoglobin is 11.1 from 11.8.  She follows up on it with her nephrologist on it.  She was started on iron     #3 hypercholesterolemia- on atorvastatin 10 mg a day. She takes it everyday. No side effects. No muscle aches, no muscle cramps.  LDL 46, HDL 43, AST 47 , ALT      #4 Gout -she is on allopurinol at 200 mg a day.  We increased dose at last office visit.  Uric acid is 5.6 from 8.3.    The following portions of the patient's history were reviewed and updated as appropriate: allergies, current medications, past family history, past medical history, past social history, past surgical history and problem list.    Review of Systems   Respiratory: Negative for shortness of breath.    Cardiovascular: Negative for chest pain and palpitations.   Genitourinary: Negative for hematuria.   Neurological: Negative for light-headedness.         Objective   Wt Readings from Last 3 Encounters:   05/02/23 110 kg  (242 lb)   02/08/23 110 kg (242 lb)   11/02/22 108 kg (238 lb)      Vitals:    05/02/23 1304   BP: 132/62   Pulse: 88   Temp: 97.7 °F (36.5 °C)   SpO2: 98%     Temp Readings from Last 3 Encounters:   05/02/23 97.7 °F (36.5 °C)   02/08/23 97.1 °F (36.2 °C)   11/02/22 96.8 °F (36 °C)     BP Readings from Last 3 Encounters:   05/02/23 132/62   02/08/23 128/76   11/02/22 130/74     Pulse Readings from Last 3 Encounters:   05/02/23 88   02/08/23 72   11/02/22 69     Body mass index is 45.22 kg/m².    Physical Exam  Constitutional:       Appearance: She is well-developed.   Cardiovascular:      Rate and Rhythm: Normal rate and regular rhythm.      Heart sounds: Normal heart sounds.   Pulmonary:      Effort: Pulmonary effort is normal.      Breath sounds: Normal breath sounds.   Skin:     General: Skin is warm and dry.   Neurological:      Mental Status: She is alert and oriented to person, place, and time.   Psychiatric:         Behavior: Behavior normal.         Assessment & Plan   Diagnoses and all orders for this visit:    1. Uncontrolled type 2 diabetes mellitus with hyperglycemia (Primary)    2. Benign essential hypertension    3. Hypercholesterolemia    4. Chronic gout of right foot, unspecified cause    Other orders  -     glucose blood test strip; Use as instructed to test blood sugar once daily . Uncontrolled diabetes e11.65  Dispense: 100 each; Refill: 2  -     Dulaglutide 1.5 MG/0.5ML solution pen-injector; Inject 1.5 mg under the skin into the appropriate area as directed Every 7 (Seven) Days.  Dispense: 2 mL; Refill: 2        1.  Diabetes type 2-uncontrolled, but better.  We are increasing dose of Trulicity to 1.5 mg a week.  No change in other medications.  Follow-up in 3 months.  Labs before office visit.  2.  Hypertension-continue current treatment.  Follow-up in 3 to 6 months.  3.  Hyperlipidemia-continue current treatment.  Follow-up in 6 months.  4.  Gout-continue current treatment.  Follow-up in 6 to  12 months.

## 2023-07-27 RX ORDER — DULAGLUTIDE 1.5 MG/.5ML
INJECTION, SOLUTION SUBCUTANEOUS
Qty: 2 ML | Refills: 2 | Status: SHIPPED | OUTPATIENT
Start: 2023-07-27

## 2023-08-03 RX ORDER — ATORVASTATIN CALCIUM 10 MG/1
TABLET, FILM COATED ORAL
Qty: 90 TABLET | Refills: 1 | Status: SHIPPED | OUTPATIENT
Start: 2023-08-03

## 2023-08-08 DIAGNOSIS — E11.65 UNCONTROLLED TYPE 2 DIABETES MELLITUS WITH HYPERGLYCEMIA: ICD-10-CM

## 2023-08-08 DIAGNOSIS — E78.00 HYPERCHOLESTEROLEMIA: Primary | ICD-10-CM

## 2023-08-10 LAB
ALBUMIN SERPL-MCNC: 3.5 G/DL (ref 3.5–5.2)
ALBUMIN/CREAT UR: 1092 MG/G CREAT (ref 0–29)
ALBUMIN/GLOB SERPL: 1.1 G/DL
ALP SERPL-CCNC: 173 U/L (ref 39–117)
ALT SERPL-CCNC: 38 U/L (ref 1–33)
AST SERPL-CCNC: 62 U/L (ref 1–32)
BILIRUB SERPL-MCNC: 1.6 MG/DL (ref 0–1.2)
BUN SERPL-MCNC: 32 MG/DL (ref 8–23)
BUN/CREAT SERPL: 17.6 (ref 7–25)
CALCIUM SERPL-MCNC: 9.5 MG/DL (ref 8.6–10.5)
CHLORIDE SERPL-SCNC: 103 MMOL/L (ref 98–107)
CHOLEST SERPL-MCNC: 117 MG/DL (ref 0–200)
CO2 SERPL-SCNC: 25.8 MMOL/L (ref 22–29)
CREAT SERPL-MCNC: 1.82 MG/DL (ref 0.57–1)
CREAT UR-MCNC: 57.9 MG/DL
EGFRCR SERPLBLD CKD-EPI 2021: 28.2 ML/MIN/1.73
ERYTHROCYTE [DISTWIDTH] IN BLOOD BY AUTOMATED COUNT: 13.5 % (ref 12.3–15.4)
GLOBULIN SER CALC-MCNC: 3.3 GM/DL
GLUCOSE SERPL-MCNC: 99 MG/DL (ref 65–99)
HBA1C MFR BLD: 7.4 % (ref 4.8–5.6)
HCT VFR BLD AUTO: 38.2 % (ref 34–46.6)
HDLC SERPL-MCNC: 46 MG/DL (ref 40–60)
HGB BLD-MCNC: 12.3 G/DL (ref 12–15.9)
LDLC SERPL CALC-MCNC: 53 MG/DL (ref 0–100)
LDLC/HDLC SERPL: 1.12 {RATIO}
MCH RBC QN AUTO: 29.6 PG (ref 26.6–33)
MCHC RBC AUTO-ENTMCNC: 32.2 G/DL (ref 31.5–35.7)
MCV RBC AUTO: 92 FL (ref 79–97)
MICROALBUMIN UR-MCNC: 632.2 UG/ML
PLATELET # BLD AUTO: 138 10*3/MM3 (ref 140–450)
POTASSIUM SERPL-SCNC: 4.1 MMOL/L (ref 3.5–5.2)
PROT SERPL-MCNC: 6.8 G/DL (ref 6–8.5)
RBC # BLD AUTO: 4.15 10*6/MM3 (ref 3.77–5.28)
SODIUM SERPL-SCNC: 140 MMOL/L (ref 136–145)
TRIGL SERPL-MCNC: 97 MG/DL (ref 0–150)
VLDLC SERPL CALC-MCNC: 18 MG/DL (ref 5–40)
WBC # BLD AUTO: 7.72 10*3/MM3 (ref 3.4–10.8)

## 2023-08-16 ENCOUNTER — OFFICE VISIT (OUTPATIENT)
Dept: INTERNAL MEDICINE | Facility: CLINIC | Age: 78
End: 2023-08-16
Payer: MEDICARE

## 2023-08-16 VITALS
OXYGEN SATURATION: 99 % | DIASTOLIC BLOOD PRESSURE: 80 MMHG | TEMPERATURE: 98.4 F | SYSTOLIC BLOOD PRESSURE: 130 MMHG | BODY MASS INDEX: 45.31 KG/M2 | HEIGHT: 61 IN | HEART RATE: 84 BPM | WEIGHT: 240 LBS

## 2023-08-16 DIAGNOSIS — E11.65 UNCONTROLLED TYPE 2 DIABETES MELLITUS WITH HYPERGLYCEMIA: Primary | ICD-10-CM

## 2023-08-16 DIAGNOSIS — R74.8 ELEVATED LIVER ENZYMES: ICD-10-CM

## 2023-08-16 DIAGNOSIS — I10 BENIGN ESSENTIAL HYPERTENSION: ICD-10-CM

## 2023-08-16 PROCEDURE — 3079F DIAST BP 80-89 MM HG: CPT | Performed by: FAMILY MEDICINE

## 2023-08-16 PROCEDURE — 99214 OFFICE O/P EST MOD 30 MIN: CPT | Performed by: FAMILY MEDICINE

## 2023-08-16 PROCEDURE — 3075F SYST BP GE 130 - 139MM HG: CPT | Performed by: FAMILY MEDICINE

## 2023-08-16 RX ORDER — FUROSEMIDE 20 MG/1
20 TABLET ORAL DAILY
Qty: 90 TABLET | Refills: 1 | Status: SHIPPED | OUTPATIENT
Start: 2023-08-16

## 2023-08-16 RX ORDER — GLIMEPIRIDE 4 MG/1
4 TABLET ORAL 2 TIMES DAILY
Qty: 180 TABLET | Refills: 0 | Status: SHIPPED | OUTPATIENT
Start: 2023-08-16

## 2023-08-16 NOTE — PROGRESS NOTES
Subjective   Philly Person is a 77 y.o. female.   Chief Complaint   Patient presents with    Diabetes       History of Present Illness     #1 diabetes type 2 - diagnosed in 2001. Patient did diabetes education at the time of diagnosis.  She is on Trulicity 0.75 mg a week and glimepiride 4 mg twice a day.  She has no side effects.  No nausea, no vomiting, no abdominal pain.  Her appetite is decreased since we started Trulicity.  She feels full earlier.  She lost 2 pounds from May.  FBS 99, A1c 7.4 from 7.5 from 8.5 from 7.1 from 7.7.  Microalbumin 1092.  She has a known chronic kidney disease.      She is off metformin due to chronic kidney disease.  She follows up with nephrologist.  She has biopsy-proven diabetic nephropathy with extensive glomerulonephrosis  and moderate interstitial fibrosis.  She had shunt placed on 4/4/2023.      #2 HTN- on amlodipine 5 mg a day, atenolol 100 mg a day, furosemide 20 mg a day, lisinopril 20 mg a day  and  hydralazine at 25 mg 3 times a day. She takes it as prescribed.  She reports no side effects.  Creatinine 1.8 from 2.01 from 1.99, GFR 98.2 from 25.0 from 25.3.  Hemoglobin is 12.3 from 11.1 from 11.8.  She follows up on it with her nephrologist on it.      #3 LFTs elevation-she has known fatty liver disease.  AST at 62, ALT 38, bilirubin 1.6.  She uses Tylenol sometimes.    Review of Systems   Respiratory: Negative.     Cardiovascular: Negative.    Gastrointestinal: Negative.  Negative for abdominal pain, nausea and vomiting.   Neurological:  Negative for light-headedness.       Objective   Wt Readings from Last 3 Encounters:   08/16/23 109 kg (240 lb)   05/02/23 110 kg (242 lb)   02/08/23 110 kg (242 lb)      Vitals:    08/16/23 1305   BP: 130/80   Pulse: 84   Temp: 98.4 øF (36.9 øC)   SpO2: 99%     Temp Readings from Last 3 Encounters:   08/16/23 98.4 øF (36.9 øC)   05/02/23 97.7 øF (36.5 øC)   02/08/23 97.1 øF (36.2 øC)     BP Readings from Last 3 Encounters:   08/16/23  130/80   05/02/23 132/62   02/08/23 128/76     Pulse Readings from Last 3 Encounters:   08/16/23 84   05/02/23 88   02/08/23 72     Body mass index is 44.85 kg/mý.    Physical Exam  Constitutional:       Appearance: She is well-developed.   Neck:      Thyroid: No thyromegaly.      Vascular: No carotid bruit.   Cardiovascular:      Rate and Rhythm: Normal rate and regular rhythm.      Heart sounds: Normal heart sounds.   Pulmonary:      Effort: Pulmonary effort is normal.      Breath sounds: Normal breath sounds.   Skin:     General: Skin is warm and dry.   Neurological:      Mental Status: She is alert.   Psychiatric:         Behavior: Behavior normal.       Assessment & Plan   Diagnoses and all orders for this visit:    1. Uncontrolled type 2 diabetes mellitus with hyperglycemia (Primary)    2. Elevated liver enzymes  -     Hepatic Function Panel; Future    3. Benign essential hypertension    Other orders  -     glimepiride (AMARYL) 4 MG tablet; Take 1 tablet by mouth 2 (Two) Times a Day.  Dispense: 180 tablet; Refill: 0  -     furosemide (LASIX) 20 MG tablet; Take 1 tablet by mouth Daily.  Dispense: 90 tablet; Refill: 1  -     Dulaglutide 3 MG/0.5ML solution pen-injector; Inject 0.5 mL under the skin into the appropriate area as directed Every 7 (Seven) Days.  Dispense: 6 mL; Refill: 0        1.  Diabetes type 2-uncontrolled.  We are increasing dose of Trulicity to 3 mg a week.  No change in other medications.  Follow-up in 3 months.  Labs before office visit.  2.  Hypertension-continue current treatment.  Follow-up in 6 months.  3.  LFTs elevation-worsening.  Patient will stop using Tylenol and we will recheck labs in 1 month.

## 2023-08-22 RX ORDER — ALLOPURINOL 100 MG/1
TABLET ORAL
Qty: 180 TABLET | Refills: 0 | Status: SHIPPED | OUTPATIENT
Start: 2023-08-22

## 2023-09-14 RX ORDER — ATENOLOL 100 MG/1
TABLET ORAL
Qty: 90 TABLET | Refills: 1 | Status: SHIPPED | OUTPATIENT
Start: 2023-09-14

## 2023-11-01 DIAGNOSIS — E78.00 HYPERCHOLESTEROLEMIA: ICD-10-CM

## 2023-11-01 DIAGNOSIS — E11.65 UNCONTROLLED TYPE 2 DIABETES MELLITUS WITH HYPERGLYCEMIA: Primary | ICD-10-CM

## 2023-11-02 LAB
ALBUMIN SERPL-MCNC: 3.6 G/DL (ref 3.5–5.2)
ALBUMIN/GLOB SERPL: 1.1 G/DL
ALP SERPL-CCNC: 203 U/L (ref 39–117)
ALT SERPL-CCNC: 52 U/L (ref 1–33)
AST SERPL-CCNC: 71 U/L (ref 1–32)
BILIRUB SERPL-MCNC: 1.3 MG/DL (ref 0–1.2)
BUN SERPL-MCNC: 38 MG/DL (ref 8–23)
BUN/CREAT SERPL: 19.9 (ref 7–25)
CALCIUM SERPL-MCNC: 10.1 MG/DL (ref 8.6–10.5)
CHLORIDE SERPL-SCNC: 105 MMOL/L (ref 98–107)
CHOLEST SERPL-MCNC: 123 MG/DL (ref 0–200)
CO2 SERPL-SCNC: 26.1 MMOL/L (ref 22–29)
CREAT SERPL-MCNC: 1.91 MG/DL (ref 0.57–1)
EGFRCR SERPLBLD CKD-EPI 2021: 26.4 ML/MIN/1.73
GLOBULIN SER CALC-MCNC: 3.3 GM/DL
GLUCOSE SERPL-MCNC: 121 MG/DL (ref 65–99)
HBA1C MFR BLD: 7.2 % (ref 4.8–5.6)
HDLC SERPL-MCNC: 48 MG/DL (ref 40–60)
LDLC SERPL CALC-MCNC: 60 MG/DL (ref 0–100)
LDLC/HDLC SERPL: 1.25 {RATIO}
POTASSIUM SERPL-SCNC: 4.4 MMOL/L (ref 3.5–5.2)
PROT SERPL-MCNC: 6.9 G/DL (ref 6–8.5)
SODIUM SERPL-SCNC: 141 MMOL/L (ref 136–145)
TRIGL SERPL-MCNC: 74 MG/DL (ref 0–150)
VLDLC SERPL CALC-MCNC: 15 MG/DL (ref 5–40)

## 2023-11-16 RX ORDER — DULAGLUTIDE 3 MG/.5ML
INJECTION, SOLUTION SUBCUTANEOUS
Qty: 2 ML | OUTPATIENT
Start: 2023-11-16

## 2023-11-28 RX ORDER — ALLOPURINOL 100 MG/1
TABLET ORAL
Qty: 180 TABLET | Refills: 0 | Status: SHIPPED | OUTPATIENT
Start: 2023-11-28

## 2023-11-29 ENCOUNTER — OFFICE VISIT (OUTPATIENT)
Dept: INTERNAL MEDICINE | Facility: CLINIC | Age: 78
End: 2023-11-29
Payer: MEDICARE

## 2023-11-29 VITALS
TEMPERATURE: 96.6 F | DIASTOLIC BLOOD PRESSURE: 60 MMHG | SYSTOLIC BLOOD PRESSURE: 116 MMHG | WEIGHT: 233 LBS | HEIGHT: 61 IN | HEART RATE: 89 BPM | OXYGEN SATURATION: 96 % | BODY MASS INDEX: 43.99 KG/M2

## 2023-11-29 DIAGNOSIS — E11.65 UNCONTROLLED TYPE 2 DIABETES MELLITUS WITH HYPERGLYCEMIA: Primary | ICD-10-CM

## 2023-11-29 DIAGNOSIS — R74.8 ELEVATED LIVER ENZYMES: ICD-10-CM

## 2023-11-29 DIAGNOSIS — I10 BENIGN ESSENTIAL HYPERTENSION: ICD-10-CM

## 2023-11-29 PROCEDURE — 1160F RVW MEDS BY RX/DR IN RCRD: CPT | Performed by: FAMILY MEDICINE

## 2023-11-29 PROCEDURE — 1159F MED LIST DOCD IN RCRD: CPT | Performed by: FAMILY MEDICINE

## 2023-11-29 PROCEDURE — 99214 OFFICE O/P EST MOD 30 MIN: CPT | Performed by: FAMILY MEDICINE

## 2023-11-29 PROCEDURE — 3074F SYST BP LT 130 MM HG: CPT | Performed by: FAMILY MEDICINE

## 2023-11-29 PROCEDURE — 3078F DIAST BP <80 MM HG: CPT | Performed by: FAMILY MEDICINE

## 2023-11-29 RX ORDER — GLIMEPIRIDE 4 MG/1
4 TABLET ORAL 2 TIMES DAILY
Qty: 180 TABLET | Refills: 0 | Status: SHIPPED | OUTPATIENT
Start: 2023-11-29 | End: 2023-11-29 | Stop reason: SDUPTHER

## 2023-11-29 RX ORDER — ZOLPIDEM TARTRATE 5 MG/1
5 TABLET ORAL
COMMUNITY

## 2023-11-29 RX ORDER — FUROSEMIDE 20 MG/1
20 TABLET ORAL DAILY
Qty: 90 TABLET | Refills: 1 | Status: SHIPPED | OUTPATIENT
Start: 2023-11-29 | End: 2023-11-29 | Stop reason: SDUPTHER

## 2023-11-29 RX ORDER — FUROSEMIDE 20 MG/1
20 TABLET ORAL DAILY
Qty: 90 TABLET | Refills: 1 | Status: SHIPPED | OUTPATIENT
Start: 2023-11-29

## 2023-11-29 RX ORDER — DULAGLUTIDE 3 MG/.5ML
INJECTION, SOLUTION SUBCUTANEOUS
Qty: 2 ML | Refills: 4 | Status: SHIPPED | OUTPATIENT
Start: 2023-11-29

## 2023-11-29 RX ORDER — GLIMEPIRIDE 4 MG/1
4 TABLET ORAL 2 TIMES DAILY
Qty: 180 TABLET | Refills: 0 | Status: SHIPPED | OUTPATIENT
Start: 2023-11-29

## 2023-11-29 RX ORDER — LISINOPRIL 40 MG/1
TABLET ORAL
COMMUNITY
Start: 2023-10-07

## 2023-11-29 RX ORDER — DAPAGLIFLOZIN 5 MG/1
5 TABLET, FILM COATED ORAL
COMMUNITY
Start: 2023-10-26

## 2023-11-29 NOTE — PROGRESS NOTES
Subjective   Philly Person is a 78 y.o. female.   Chief Complaint   Patient presents with    Diabetes    Hypertension       History of Present Illness     #1 diabetes type 2 - diagnosed in 2001. Patient did diabetes education at the time of diagnosis.  She is on Trulicity 3 mg a week (we increased dose at last office visit) and glimepiride 4 mg twice a day.  She has no side effects.  No nausea, no vomiting, no abdominal pain.  Her appetite is decreased since we started Trulicity.  She feels full earlier.  She eats healthier.  She lost 7 pounds in the last 3 months.  , A1c 7.2 from 7.4 from 7.5 from 8.5 from 7.1 from 7.7. She has a known chronic kidney disease.  She followed up with her endocrinologist in October 2023.     She is off metformin due to chronic kidney disease.  She follows up with nephrologist.  She has biopsy-proven diabetic nephropathy with extensive glomerulonephrosis  and moderate interstitial fibrosis.  She had shunt placed on 4/4/2023.      #2 HTN- on atenolol 100 mg a day, furosemide 20 mg a day, lisinopril 20 mg a day  and  hydralazine at 25 mg 3 times a day. She takes it as prescribed.  She reports no side effects.  Creatinine 1.91 from 1.8 from 2.01 from 1.99, GFR 26.4 from 25.0 from 25.3.     #3 LFTs elevation-she has known fatty liver disease.  AST at 71 from 36 from 62, ALT 52 from 29 from 38.  She uses no Tylenol.  She does not drink alcohol.    Review of Systems   Respiratory: Negative.     Cardiovascular: Negative.          Objective   Wt Readings from Last 3 Encounters:   11/29/23 106 kg (233 lb)   08/16/23 109 kg (240 lb)   05/02/23 110 kg (242 lb)      Vitals:    11/29/23 0944   BP: 116/60   Pulse: 89   Temp: 96.6 °F (35.9 °C)   SpO2: 96%     Temp Readings from Last 3 Encounters:   11/29/23 96.6 °F (35.9 °C)   08/16/23 98.4 °F (36.9 °C)   05/02/23 97.7 °F (36.5 °C)     BP Readings from Last 3 Encounters:   11/29/23 116/60   08/16/23 130/80   05/02/23 132/62     Pulse  Readings from Last 3 Encounters:   11/29/23 89   08/16/23 84   05/02/23 88     Body mass index is 43.54 kg/m².    Physical Exam  Constitutional:       Appearance: She is well-developed.   Neck:      Vascular: No carotid bruit.   Cardiovascular:      Rate and Rhythm: Normal rate and regular rhythm.      Heart sounds: Normal heart sounds.   Pulmonary:      Effort: Pulmonary effort is normal.      Breath sounds: Normal breath sounds.   Skin:     General: Skin is warm and dry.   Neurological:      Mental Status: She is alert.         Assessment & Plan   Diagnoses and all orders for this visit:    1. Uncontrolled type 2 diabetes mellitus with hyperglycemia (Primary)    2. Benign essential hypertension    3. Elevated liver enzymes        Diabetes type 2-better.  No change in medications at this time.  She will try to decrease carbs more.  Labs in 3 months before office visit.    Hypertension-continue current treatment.  Follow-up in 3 months.    LFTs elevation-weight loss would be beneficial, will continue to monitor.  Follow-up in 3 months.

## 2024-01-10 ENCOUNTER — TELEPHONE (OUTPATIENT)
Dept: INTERNAL MEDICINE | Facility: CLINIC | Age: 79
End: 2024-01-10
Payer: MEDICARE

## 2024-01-10 NOTE — TELEPHONE ENCOUNTER
Left message. If she can not find it or if the mail order is out she will need to change medications

## 2024-01-10 NOTE — TELEPHONE ENCOUNTER
Caller: Philly Person    Relationship to patient: Self    Best call back number: [phone]    Patient is needing: PT STATES THAT EVERY PHARMACY SHE HAS CHECKED WITH IS OUT OF STOCK OF Trulicity 3 MG/0.5ML solution pen-injector. WANTS TO KNOW WHAT PCP RECOMMENDS. PLEASE ADVISE.

## 2024-01-12 RX ORDER — DULAGLUTIDE 3 MG/.5ML
3 INJECTION, SOLUTION SUBCUTANEOUS WEEKLY
Qty: 2 ML | Refills: 4 | Status: SHIPPED | OUTPATIENT
Start: 2024-01-12

## 2024-01-17 ENCOUNTER — TELEPHONE (OUTPATIENT)
Dept: INTERNAL MEDICINE | Facility: CLINIC | Age: 79
End: 2024-01-17

## 2024-02-01 RX ORDER — ATORVASTATIN CALCIUM 10 MG/1
TABLET, FILM COATED ORAL
Qty: 90 TABLET | Refills: 1 | Status: SHIPPED | OUTPATIENT
Start: 2024-02-01

## 2024-02-19 ENCOUNTER — TELEPHONE (OUTPATIENT)
Dept: INTERNAL MEDICINE | Facility: CLINIC | Age: 79
End: 2024-02-19

## 2024-02-19 DIAGNOSIS — E11.65 UNCONTROLLED TYPE 2 DIABETES MELLITUS WITH HYPERGLYCEMIA: Primary | ICD-10-CM

## 2024-02-19 DIAGNOSIS — E78.00 HYPERCHOLESTEROLEMIA: ICD-10-CM

## 2024-02-19 NOTE — TELEPHONE ENCOUNTER
Caller: Philly Person    Relationship: Self    Best call back number: [phone]    What orders are you requesting (i.e. lab or imaging): URINE SPECIMEN     In what timeframe would the patient need to come in: PT HAS LAB APPT 2/21/24    Additional notes: PT WANTS TO KNOW IF PCP WILL ADD ON A URINE TEST BECAUSE SHE THINKS SHE HAS A UTI.

## 2024-02-20 DIAGNOSIS — E11.65 UNCONTROLLED TYPE 2 DIABETES MELLITUS WITH HYPERGLYCEMIA: Primary | ICD-10-CM

## 2024-02-22 LAB
ALBUMIN SERPL-MCNC: 3.3 G/DL (ref 3.5–5.2)
ALBUMIN/GLOB SERPL: 1 G/DL
ALP SERPL-CCNC: 186 U/L (ref 39–117)
ALT SERPL-CCNC: 40 U/L (ref 1–33)
AST SERPL-CCNC: 48 U/L (ref 1–32)
BILIRUB SERPL-MCNC: 1 MG/DL (ref 0–1.2)
BUN SERPL-MCNC: 43 MG/DL (ref 8–23)
BUN/CREAT SERPL: 21.4 (ref 7–25)
CALCIUM SERPL-MCNC: 8.9 MG/DL (ref 8.6–10.5)
CHLORIDE SERPL-SCNC: 103 MMOL/L (ref 98–107)
CHOLEST SERPL-MCNC: 102 MG/DL (ref 0–200)
CO2 SERPL-SCNC: 17.9 MMOL/L (ref 22–29)
CREAT SERPL-MCNC: 2.01 MG/DL (ref 0.57–1)
EGFRCR SERPLBLD CKD-EPI 2021: 24.8 ML/MIN/1.73
GLOBULIN SER CALC-MCNC: 3.3 GM/DL
GLUCOSE SERPL-MCNC: 137 MG/DL (ref 65–99)
HBA1C MFR BLD: 7.4 % (ref 4.8–5.6)
HDLC SERPL-MCNC: 43 MG/DL (ref 40–60)
LDLC SERPL CALC-MCNC: 42 MG/DL (ref 0–100)
LDLC/HDLC SERPL: 0.97 {RATIO}
POTASSIUM SERPL-SCNC: 3.6 MMOL/L (ref 3.5–5.2)
PROT SERPL-MCNC: 6.6 G/DL (ref 6–8.5)
SODIUM SERPL-SCNC: 137 MMOL/L (ref 136–145)
TRIGL SERPL-MCNC: 86 MG/DL (ref 0–150)
VLDLC SERPL CALC-MCNC: 17 MG/DL (ref 5–40)

## 2024-02-23 LAB
APPEARANCE UR: ABNORMAL
BACTERIA #/AREA URNS HPF: ABNORMAL /HPF
BACTERIA UR CULT: NORMAL
BACTERIA UR CULT: NORMAL
BILIRUB UR QL STRIP: NEGATIVE
CASTS URNS QL MICRO: ABNORMAL /LPF
COLOR UR: YELLOW
EPI CELLS #/AREA URNS HPF: ABNORMAL /HPF (ref 0–10)
GLUCOSE UR QL STRIP: NEGATIVE
HGB UR QL STRIP: ABNORMAL
KETONES UR QL STRIP: NEGATIVE
LEUKOCYTE ESTERASE UR QL STRIP: ABNORMAL
MICRO URNS: ABNORMAL
NITRITE UR QL STRIP: NEGATIVE
PH UR STRIP: 5.5 [PH] (ref 5–7.5)
PROT UR QL STRIP: ABNORMAL
RBC #/AREA URNS HPF: ABNORMAL /HPF (ref 0–2)
SP GR UR STRIP: 1.01 (ref 1–1.03)
URINALYSIS REFLEX: ABNORMAL
UROBILINOGEN UR STRIP-MCNC: 0.2 MG/DL (ref 0.2–1)
WBC #/AREA URNS HPF: >30 /HPF (ref 0–5)

## 2024-02-28 ENCOUNTER — OFFICE VISIT (OUTPATIENT)
Dept: INTERNAL MEDICINE | Facility: CLINIC | Age: 79
End: 2024-02-28
Payer: MEDICARE

## 2024-02-28 VITALS
DIASTOLIC BLOOD PRESSURE: 70 MMHG | SYSTOLIC BLOOD PRESSURE: 110 MMHG | HEART RATE: 78 BPM | WEIGHT: 235 LBS | BODY MASS INDEX: 44.37 KG/M2 | TEMPERATURE: 96.4 F | OXYGEN SATURATION: 98 % | HEIGHT: 61 IN

## 2024-02-28 DIAGNOSIS — E11.65 UNCONTROLLED TYPE 2 DIABETES MELLITUS WITH HYPERGLYCEMIA: Primary | ICD-10-CM

## 2024-02-28 DIAGNOSIS — I10 BENIGN ESSENTIAL HYPERTENSION: ICD-10-CM

## 2024-02-28 DIAGNOSIS — M1A.0710 CHRONIC GOUT OF RIGHT FOOT, UNSPECIFIED CAUSE: ICD-10-CM

## 2024-02-28 DIAGNOSIS — E78.00 HYPERCHOLESTEROLEMIA: ICD-10-CM

## 2024-02-28 PROCEDURE — 99214 OFFICE O/P EST MOD 30 MIN: CPT | Performed by: FAMILY MEDICINE

## 2024-02-28 PROCEDURE — 3074F SYST BP LT 130 MM HG: CPT | Performed by: FAMILY MEDICINE

## 2024-02-28 PROCEDURE — 3078F DIAST BP <80 MM HG: CPT | Performed by: FAMILY MEDICINE

## 2024-02-28 RX ORDER — ALLOPURINOL 100 MG/1
100 TABLET ORAL 2 TIMES DAILY
Qty: 180 TABLET | Refills: 0 | Status: SHIPPED | OUTPATIENT
Start: 2024-02-28

## 2024-02-28 RX ORDER — ATENOLOL 100 MG/1
100 TABLET ORAL DAILY
Qty: 90 TABLET | Refills: 1 | Status: SHIPPED | OUTPATIENT
Start: 2024-02-28

## 2024-02-28 RX ORDER — DULAGLUTIDE 3 MG/.5ML
3 INJECTION, SOLUTION SUBCUTANEOUS WEEKLY
Qty: 2 ML | Refills: 4 | Status: SHIPPED | OUTPATIENT
Start: 2024-02-28

## 2024-02-28 RX ORDER — FUROSEMIDE 20 MG/1
20 TABLET ORAL DAILY
Qty: 90 TABLET | Refills: 1 | Status: SHIPPED | OUTPATIENT
Start: 2024-02-28

## 2024-02-28 RX ORDER — GLIMEPIRIDE 4 MG/1
4 TABLET ORAL 2 TIMES DAILY
Qty: 180 TABLET | Refills: 0 | Status: SHIPPED | OUTPATIENT
Start: 2024-02-28

## 2024-02-28 NOTE — PROGRESS NOTES
Subjective   Philly Person is a 78 y.o. female.   Chief Complaint   Patient presents with    Hyperlipidemia    Hypertension    Diabetes       History of Present Illness     Diabetes type 2 - diagnosed in 2001. Patient did diabetes education at the time of diagnosis.  She is on Trulicity 3 mg a week  and glimepiride 4 mg twice a day.  She was on Farxiga that was prescribed by her nephrologist, but they had to discontinue it because of the cost.    She has no side effects.  No nausea, no vomiting, no abdominal pain.  Her appetite is decreased since we started Trulicity.  She feels full earlier.  She eats healthier.  Weight is stable from last office visit.   from 121, A1c 7.4 from 7.2 from 7.4 .   She has a known chronic kidney disease.      She is off metformin due to chronic kidney disease.  She follows up with nephrologist.  She has biopsy-proven diabetic nephropathy with extensive glomerulonephrosis  and moderate interstitial fibrosis.  She had shunt placed on 4/4/2023.     HTN- on atenolol 100 mg a day, furosemide 20 mg a day, lisinopril 40 mg a day (rx by nephro) and hydralazine at 25 mg 3 times a day (rx by nephro) . She takes it as prescribed.  She reports no side effects.  Creatinine 2.01 from 1.91 from 1.8 from 2.01 from 1.99, GFR 24.8 from 26.4 from 25.0 from 25.3.  She is less physically active due to cold weather.     HLP-on atorvastatin at 10 mg a day.  She takes it every day.  She reports no side effects.  LDL 42, HDL 43.  he has known fatty liver disease.  AST at 48 from 71 from 36 from 62, ALT 40 from 52 from 29 from 38.  She uses no Tylenol.  She does not drink alcohol.    Gout -she is on allopurinol at 200 mg a day.  No flareups from last appointment.    Review of Systems   Respiratory:  Negative for shortness of breath.    Cardiovascular:  Negative for chest pain and palpitations.   Musculoskeletal:  Negative for myalgias.   Neurological:  Negative for light-headedness.         Objective    Wt Readings from Last 3 Encounters:   02/28/24 107 kg (235 lb)   11/29/23 106 kg (233 lb)   08/16/23 109 kg (240 lb)      Vitals:    02/28/24 1247   BP: 110/70   Pulse: 78   Temp: 96.4 °F (35.8 °C)   SpO2: 98%     Temp Readings from Last 3 Encounters:   02/28/24 96.4 °F (35.8 °C)   11/29/23 96.6 °F (35.9 °C)   08/16/23 98.4 °F (36.9 °C)     BP Readings from Last 3 Encounters:   02/28/24 110/70   11/29/23 116/60   08/16/23 130/80     Pulse Readings from Last 3 Encounters:   02/28/24 78   11/29/23 89   08/16/23 84     Body mass index is 43.91 kg/m².    Physical Exam  Constitutional:       Appearance: She is well-developed.   Cardiovascular:      Rate and Rhythm: Normal rate and regular rhythm.      Heart sounds: Normal heart sounds.   Pulmonary:      Effort: Pulmonary effort is normal.      Breath sounds: Normal breath sounds.   Skin:     General: Skin is warm and dry.   Neurological:      Mental Status: She is alert.         Assessment & Plan   Diagnoses and all orders for this visit:    1. Uncontrolled type 2 diabetes mellitus with hyperglycemia (Primary)  -     Basic Metabolic Panel; Future  -     Hemoglobin A1c; Future    2. Benign essential hypertension  -     Basic Metabolic Panel; Future    3. Hypercholesterolemia    4. Chronic gout of right foot, unspecified cause  -     Uric Acid; Future    Other orders  -     atenolol (TENORMIN) 100 MG tablet; Take 1 tablet by mouth Daily.  Dispense: 90 tablet; Refill: 1  -     furosemide (LASIX) 20 MG tablet; Take 1 tablet by mouth Daily.  Dispense: 90 tablet; Refill: 1  -     glimepiride (AMARYL) 4 MG tablet; Take 1 tablet by mouth 2 (Two) Times a Day.  Dispense: 180 tablet; Refill: 0  -     Dulaglutide (Trulicity) 3 MG/0.5ML solution pen-injector; Inject 0.5 mL under the skin into the appropriate area as directed 1 (One) Time Per Week.  Dispense: 2 mL; Refill: 4        Diabetes type 2-uncontrolled.  Will continue current medications.  Patient will work on decreasing  carbs and increasing physical activity.  Follow-up in 3 months.  Labs before office visit.    Hypertension-continue current treatment.  Follow-up in 3 to 6 months.    Hyperlipidemia-continue current treatment.  Follow-up in 6 months.    Gout-continue current treatment.  Follow-up 2 months.  Labs before office visit.

## 2024-05-10 RX ORDER — ATORVASTATIN CALCIUM 10 MG/1
TABLET, FILM COATED ORAL
Qty: 90 TABLET | Refills: 1 | OUTPATIENT
Start: 2024-05-10

## 2024-06-05 RX ORDER — ALLOPURINOL 100 MG/1
100 TABLET ORAL 2 TIMES DAILY
Qty: 180 TABLET | Refills: 0 | Status: SHIPPED | OUTPATIENT
Start: 2024-06-05

## 2024-07-03 ENCOUNTER — OFFICE VISIT (OUTPATIENT)
Dept: INTERNAL MEDICINE | Facility: CLINIC | Age: 79
End: 2024-07-03
Payer: MEDICARE

## 2024-07-03 VITALS
BODY MASS INDEX: 46.07 KG/M2 | HEART RATE: 72 BPM | OXYGEN SATURATION: 98 % | TEMPERATURE: 96.8 F | HEIGHT: 61 IN | SYSTOLIC BLOOD PRESSURE: 137 MMHG | WEIGHT: 244 LBS | DIASTOLIC BLOOD PRESSURE: 52 MMHG

## 2024-07-03 DIAGNOSIS — E11.65 UNCONTROLLED TYPE 2 DIABETES MELLITUS WITH HYPERGLYCEMIA: Primary | ICD-10-CM

## 2024-07-03 DIAGNOSIS — M1A.0710 CHRONIC GOUT OF RIGHT FOOT, UNSPECIFIED CAUSE: ICD-10-CM

## 2024-07-03 DIAGNOSIS — I10 BENIGN ESSENTIAL HYPERTENSION: ICD-10-CM

## 2024-07-03 PROCEDURE — 99214 OFFICE O/P EST MOD 30 MIN: CPT | Performed by: FAMILY MEDICINE

## 2024-07-03 PROCEDURE — 1159F MED LIST DOCD IN RCRD: CPT | Performed by: FAMILY MEDICINE

## 2024-07-03 PROCEDURE — 1126F AMNT PAIN NOTED NONE PRSNT: CPT | Performed by: FAMILY MEDICINE

## 2024-07-03 PROCEDURE — 1160F RVW MEDS BY RX/DR IN RCRD: CPT | Performed by: FAMILY MEDICINE

## 2024-07-03 PROCEDURE — 3075F SYST BP GE 130 - 139MM HG: CPT | Performed by: FAMILY MEDICINE

## 2024-07-03 PROCEDURE — 3078F DIAST BP <80 MM HG: CPT | Performed by: FAMILY MEDICINE

## 2024-07-03 RX ORDER — GLIMEPIRIDE 4 MG/1
4 TABLET ORAL 2 TIMES DAILY
Qty: 180 TABLET | Refills: 0 | Status: SHIPPED | OUTPATIENT
Start: 2024-07-03

## 2024-07-03 RX ORDER — ATORVASTATIN CALCIUM 10 MG/1
10 TABLET, FILM COATED ORAL NIGHTLY
Qty: 90 TABLET | Refills: 1 | Status: SHIPPED | OUTPATIENT
Start: 2024-07-03

## 2024-07-03 RX ORDER — FUROSEMIDE 20 MG/1
20 TABLET ORAL DAILY
Qty: 90 TABLET | Refills: 1 | Status: SHIPPED | OUTPATIENT
Start: 2024-07-03

## 2024-07-03 RX ORDER — ALLOPURINOL 100 MG/1
100 TABLET ORAL 2 TIMES DAILY
Qty: 180 TABLET | Refills: 3 | Status: SHIPPED | OUTPATIENT
Start: 2024-07-03

## 2024-07-03 RX ORDER — ATENOLOL 100 MG/1
100 TABLET ORAL DAILY
Qty: 90 TABLET | Refills: 1 | Status: SHIPPED | OUTPATIENT
Start: 2024-07-03

## 2024-07-03 RX ORDER — BLOOD-GLUCOSE METER
EACH MISCELLANEOUS
Qty: 1 KIT | Refills: 0 | Status: SHIPPED | OUTPATIENT
Start: 2024-07-03

## 2024-07-03 NOTE — PROGRESS NOTES
Subjective   Philly Person is a 78 y.o. female.   Chief Complaint   Patient presents with    Hypertension    Diabetes    Gout       History of Present Illness     Diabetes type 2 - diagnosed in 2001. Patient did diabetes education at the time of diagnosis.  She was on Trulicity 3 mg a week and glimepiride 4 mg twice a day.  She stopped Ozempic 3 weeks ago.  First she could not get it, but then the cost was prohibitive.  She is in the donut hole.  She also mentions that she was constipated on Trulicity.  She continues to take glimepiride as prescribed.  She was on Farxiga that was prescribed by her nephrologist, but they had to discontinue it because of the cost.  She was evaluated by her nephrologist in May 2024 and they decided not to restart it due to low EGFR.  She gained 10 pounds from February.  She does not test sugars at home.  FBS 89, A1c 7.9 from 7.4 from 7.2 from 7.4 .   She has a known chronic kidney disease.   Last eye exam 7/2024.  She is on statin.  She is on ACE inhibitor.     She is off metformin due to chronic kidney disease.  She follows up with nephrologist.  She has biopsy-proven diabetic nephropathy with extensive glomerulonephrosis  and moderate interstitial fibrosis.  She had shunt placed on 4/4/2023.  Last visit with nephrologist May 2024.  Creatinine 2.3, GFR 21.1.     HTN- on atenolol 100 mg a day, furosemide 20 mg a day, lisinopril 40 mg a day (rx by nephro) and hydralazine at 25 mg 3 times a day (rx by nephro) . She takes it as prescribed.  She reports no side effects.     Gout -she is on allopurinol at 200 mg a day.  No flareups from last appointment.  Uric acid 5.1.    Review of Systems   Respiratory: Negative.     Cardiovascular: Negative.          Objective   Wt Readings from Last 3 Encounters:   07/03/24 111 kg (244 lb)   02/28/24 107 kg (235 lb)   11/29/23 106 kg (233 lb)      Vitals:    07/03/24 1021   BP: 137/52   Pulse: 72   Temp: 96.8 °F (36 °C)   SpO2: 98%     Temp Readings  from Last 3 Encounters:   07/03/24 96.8 °F (36 °C)   02/28/24 96.4 °F (35.8 °C)   11/29/23 96.6 °F (35.9 °C)     BP Readings from Last 3 Encounters:   07/03/24 137/52   02/28/24 110/70   11/29/23 116/60     Pulse Readings from Last 3 Encounters:   07/03/24 72   02/28/24 78   11/29/23 89     Body mass index is 45.6 kg/m².    Physical Exam  Constitutional:       Appearance: She is well-developed.   Neck:      Thyroid: No thyromegaly.      Vascular: No carotid bruit.   Cardiovascular:      Rate and Rhythm: Normal rate and regular rhythm.      Heart sounds: Normal heart sounds.   Pulmonary:      Effort: Pulmonary effort is normal.      Breath sounds: Normal breath sounds.   Neurological:      Mental Status: She is alert.         Assessment & Plan   Diagnoses and all orders for this visit:    1. Uncontrolled type 2 diabetes mellitus with hyperglycemia (Primary)  -     Ambulatory Referral to Diabetic Education  -     Ambulatory Referral to Endocrinology    2. Benign essential hypertension    3. Chronic gout of right foot, unspecified cause    Other orders  -     atorvastatin (LIPITOR) 10 MG tablet; Take 1 tablet by mouth Every Night.  Dispense: 90 tablet; Refill: 1  -     allopurinol (ZYLOPRIM) 100 MG tablet; Take 1 tablet by mouth 2 (Two) Times a Day.  Dispense: 180 tablet; Refill: 3  -     atenolol (TENORMIN) 100 MG tablet; Take 1 tablet by mouth Daily.  Dispense: 90 tablet; Refill: 1  -     furosemide (LASIX) 20 MG tablet; Take 1 tablet by mouth Daily.  Dispense: 90 tablet; Refill: 1  -     glimepiride (AMARYL) 4 MG tablet; Take 1 tablet by mouth 2 (Two) Times a Day.  Dispense: 180 tablet; Refill: 0        Diabetes type 2-worsening and uncontrolled.  Limited options with medications due to advanced chronic kidney disease.  Will continue glimepiride at current dose.  We discussed insulin, but patient prefers not to start it at this point.  I am referring patient to diabetes education.  I am referring patient to  endocrinologist.    Hypertension-continue current treatment.  Follow-up in 3 months.    Gout-continue current treatment.  Follow-up in 12 months.

## 2024-07-10 RX ORDER — GLIMEPIRIDE 4 MG/1
4 TABLET ORAL 2 TIMES DAILY
Qty: 180 TABLET | Refills: 0 | Status: SHIPPED | OUTPATIENT
Start: 2024-07-10

## 2024-07-17 ENCOUNTER — OFFICE VISIT (OUTPATIENT)
Dept: ENDOCRINOLOGY | Age: 79
End: 2024-07-17
Payer: MEDICARE

## 2024-07-17 VITALS
SYSTOLIC BLOOD PRESSURE: 126 MMHG | WEIGHT: 244 LBS | HEART RATE: 65 BPM | OXYGEN SATURATION: 98 % | BODY MASS INDEX: 46.07 KG/M2 | HEIGHT: 61 IN | DIASTOLIC BLOOD PRESSURE: 68 MMHG

## 2024-07-17 DIAGNOSIS — R80.9 TYPE 2 DIABETES MELLITUS WITH DIABETIC MICROALBUMINURIA, WITHOUT LONG-TERM CURRENT USE OF INSULIN: Primary | ICD-10-CM

## 2024-07-17 DIAGNOSIS — E11.29 TYPE 2 DIABETES MELLITUS WITH DIABETIC MICROALBUMINURIA, WITHOUT LONG-TERM CURRENT USE OF INSULIN: Primary | ICD-10-CM

## 2024-07-17 DIAGNOSIS — N18.4 CKD (CHRONIC KIDNEY DISEASE) STAGE 4, GFR 15-29 ML/MIN: ICD-10-CM

## 2024-07-17 NOTE — PROGRESS NOTES
Referring provider: Shazia Cardoza MD     Chief complaint/Reason for consult:  T2DM    HPI:   - 78 year old female here for management of diabetes mellitus type 2  - Has had diabetes for 15 years years  - Complications include diabetic retinopathy, CKD 4  - Is currently taking glimiperide 4 mg bid  - Stopped Trulicity due to cost in June 2024  - Stopped metformin  - Denies hypoglycemia  - Has not been monitoring her BG    The following portions of the patient's history were reviewed and updated as appropriate: allergies, current medications, past family history, past medical history, past social history, past surgical history, and problem list.      Objective     Vitals:    07/17/24 1432   BP: 126/68   Pulse: 65   SpO2: 98%        Physical Exam  Vitals reviewed.   Constitutional:       Appearance: Normal appearance.   HENT:      Head: Normocephalic and atraumatic.   Eyes:      General: No scleral icterus.  Pulmonary:      Effort: Pulmonary effort is normal. No respiratory distress.   Neurological:      Mental Status: She is alert.      Gait: Gait normal.   Psychiatric:         Mood and Affect: Mood normal.         Behavior: Behavior normal.         Thought Content: Thought content normal.         Judgment: Judgment normal.         Assessment & Plan   T2DM complicated by retinopathy and CKD 4  - Cont. Glimiperide 4 mg daily  - Asked her to monitor her BG and give me an update in 1-2 weeks  - Will start basal insulin if BG levels are above goal  - Return appt. Will be decided after reviewing lab work

## 2024-08-01 RX ORDER — ATORVASTATIN CALCIUM 10 MG/1
10 TABLET, FILM COATED ORAL NIGHTLY
Qty: 90 TABLET | Refills: 1 | Status: SHIPPED | OUTPATIENT
Start: 2024-08-01

## 2024-08-05 ENCOUNTER — TELEPHONE (OUTPATIENT)
Dept: INTERNAL MEDICINE | Facility: CLINIC | Age: 79
End: 2024-08-05

## 2024-08-05 NOTE — TELEPHONE ENCOUNTER
Caller: Philly Person    Relationship: Self    Best call back number: [phone]     What medication are you requesting:       What are your current symptoms:      How long have you been experiencing symptoms:      Have you had these symptoms before:    [] Yes  [] No    Have you been treated for these symptoms before:   [] Yes  [] No    If a prescription is needed, what is your preferred pharmacy and phone number: [facility] [id number] - [place], [place] - [address] AT [facility] [address] [facility] [phone] [facility] [phone] FX     Additional notes: PATIENT CALLED STATED SHE HAS HAD CONSTIPATION FOR PAST WEEK OR SO.  WANTS TO KNOW WHAT SHE CAN GET OVER THE COUNTER THAT WILL NOT AFFECT HER KIDNEYS.  PLEASE CALL AND LET HER KNOW.      ALSO, HAS DENTIST APPT AT THE END OF AUGUST NEEDS TO KNOW IF SHE NEEDS TO TAKE AN ANTIBIOTIC AN HOUR BEFORE SHE SEES HIM.  WHAT DOES SHE NEED TO DO.

## 2024-08-06 DIAGNOSIS — R80.9 TYPE 2 DIABETES MELLITUS WITH DIABETIC MICROALBUMINURIA, WITHOUT LONG-TERM CURRENT USE OF INSULIN: Primary | ICD-10-CM

## 2024-08-06 DIAGNOSIS — E11.29 TYPE 2 DIABETES MELLITUS WITH DIABETIC MICROALBUMINURIA, WITHOUT LONG-TERM CURRENT USE OF INSULIN: Primary | ICD-10-CM

## 2024-08-06 RX ORDER — PEN NEEDLE, DIABETIC 30 GX3/16"
1 NEEDLE, DISPOSABLE MISCELLANEOUS DAILY
Qty: 100 EACH | Refills: 3 | Status: SHIPPED | OUTPATIENT
Start: 2024-08-06

## 2024-08-09 NOTE — TELEPHONE ENCOUNTER
Patient aware. She also stated that she had a total knee replacement 5 years ago and has been prescribed an antibiotic before dental procedures ever since then. Patient is wanting to know if she will need to take the antibiotic again before her dental appointment at the end of the month or not.

## 2024-08-12 RX ORDER — INSULIN DEGLUDEC 100 U/ML
10 INJECTION, SOLUTION SUBCUTANEOUS DAILY
Qty: 15 ML | Refills: 4 | Status: SHIPPED | OUTPATIENT
Start: 2024-08-12

## 2024-08-23 RX ORDER — ALLOPURINOL 100 MG/1
100 TABLET ORAL 2 TIMES DAILY
Qty: 180 TABLET | Refills: 0 | Status: SHIPPED | OUTPATIENT
Start: 2024-08-23

## 2024-08-27 RX ORDER — ALLOPURINOL 100 MG/1
100 TABLET ORAL 2 TIMES DAILY
Qty: 180 TABLET | Refills: 0 | Status: SHIPPED | OUTPATIENT
Start: 2024-08-27

## 2024-09-03 ENCOUNTER — OFFICE (AMBULATORY)
Dept: URBAN - METROPOLITAN AREA CLINIC 76 | Facility: CLINIC | Age: 79
End: 2024-09-03

## 2024-09-03 ENCOUNTER — OFFICE (AMBULATORY)
Age: 79
End: 2024-09-03

## 2024-09-03 VITALS
HEIGHT: 62 IN | SYSTOLIC BLOOD PRESSURE: 128 MMHG | HEIGHT: 62 IN | SYSTOLIC BLOOD PRESSURE: 128 MMHG | WEIGHT: 240 LBS | DIASTOLIC BLOOD PRESSURE: 74 MMHG | HEIGHT: 62 IN | DIASTOLIC BLOOD PRESSURE: 74 MMHG | SYSTOLIC BLOOD PRESSURE: 128 MMHG | DIASTOLIC BLOOD PRESSURE: 74 MMHG | DIASTOLIC BLOOD PRESSURE: 74 MMHG | OXYGEN SATURATION: 97 % | DIASTOLIC BLOOD PRESSURE: 74 MMHG | HEART RATE: 61 BPM | HEART RATE: 61 BPM | OXYGEN SATURATION: 97 % | HEART RATE: 61 BPM | HEART RATE: 61 BPM | HEART RATE: 61 BPM | OXYGEN SATURATION: 97 % | WEIGHT: 240 LBS | OXYGEN SATURATION: 97 % | WEIGHT: 240 LBS | DIASTOLIC BLOOD PRESSURE: 74 MMHG | OXYGEN SATURATION: 97 % | OXYGEN SATURATION: 97 % | HEIGHT: 62 IN | HEIGHT: 62 IN | HEIGHT: 62 IN | HEART RATE: 61 BPM | HEART RATE: 61 BPM | HEIGHT: 62 IN | WEIGHT: 240 LBS | SYSTOLIC BLOOD PRESSURE: 128 MMHG | WEIGHT: 240 LBS | SYSTOLIC BLOOD PRESSURE: 128 MMHG | DIASTOLIC BLOOD PRESSURE: 74 MMHG | OXYGEN SATURATION: 97 % | WEIGHT: 240 LBS | SYSTOLIC BLOOD PRESSURE: 128 MMHG | WEIGHT: 240 LBS | SYSTOLIC BLOOD PRESSURE: 128 MMHG

## 2024-09-03 DIAGNOSIS — R14.0 ABDOMINAL DISTENSION (GASEOUS): ICD-10-CM

## 2024-09-03 DIAGNOSIS — K59.00 CONSTIPATION, UNSPECIFIED: ICD-10-CM

## 2024-09-03 PROBLEM — K63.5 POLYP OF COLON: Status: ACTIVE | Noted: 2022-07-14

## 2024-09-03 PROCEDURE — 99214 OFFICE O/P EST MOD 30 MIN: CPT | Performed by: NURSE PRACTITIONER

## 2024-10-28 DIAGNOSIS — Z00.00 MEDICARE ANNUAL WELLNESS VISIT, SUBSEQUENT: Primary | ICD-10-CM

## 2024-10-28 DIAGNOSIS — E11.65 UNCONTROLLED TYPE 2 DIABETES MELLITUS WITH HYPERGLYCEMIA: ICD-10-CM

## 2024-10-28 DIAGNOSIS — I10 BENIGN ESSENTIAL HYPERTENSION: ICD-10-CM

## 2024-10-28 DIAGNOSIS — E78.00 HYPERCHOLESTEROLEMIA: ICD-10-CM

## 2024-10-29 LAB
ALBUMIN SERPL-MCNC: 2.9 G/DL (ref 3.5–5.2)
ALBUMIN/GLOB SERPL: 0.8 G/DL
ALP SERPL-CCNC: 176 U/L (ref 39–117)
ALT SERPL-CCNC: 29 U/L (ref 1–33)
AST SERPL-CCNC: 48 U/L (ref 1–32)
BILIRUB SERPL-MCNC: 1 MG/DL (ref 0–1.2)
BUN SERPL-MCNC: 54 MG/DL (ref 8–23)
BUN/CREAT SERPL: 18.8 (ref 7–25)
CALCIUM SERPL-MCNC: 8.8 MG/DL (ref 8.6–10.5)
CHLORIDE SERPL-SCNC: 106 MMOL/L (ref 98–107)
CHOLEST SERPL-MCNC: 101 MG/DL (ref 0–200)
CO2 SERPL-SCNC: 23.5 MMOL/L (ref 22–29)
CREAT SERPL-MCNC: 2.88 MG/DL (ref 0.57–1)
EGFRCR SERPLBLD CKD-EPI 2021: 16 ML/MIN/1.73
ERYTHROCYTE [DISTWIDTH] IN BLOOD BY AUTOMATED COUNT: 14.8 % (ref 12.3–15.4)
GLOBULIN SER CALC-MCNC: 3.6 GM/DL
GLUCOSE SERPL-MCNC: 75 MG/DL (ref 65–99)
HBA1C MFR BLD: 5.6 % (ref 4.8–5.6)
HCT VFR BLD AUTO: 32 % (ref 34–46.6)
HDLC SERPL-MCNC: 36 MG/DL (ref 40–60)
HGB BLD-MCNC: 10.2 G/DL (ref 12–15.9)
LDLC SERPL CALC-MCNC: 50 MG/DL (ref 0–100)
LDLC/HDLC SERPL: 1.42 {RATIO}
MCH RBC QN AUTO: 29.9 PG (ref 26.6–33)
MCHC RBC AUTO-ENTMCNC: 31.9 G/DL (ref 31.5–35.7)
MCV RBC AUTO: 93.8 FL (ref 79–97)
PLATELET # BLD AUTO: 151 10*3/MM3 (ref 140–450)
POTASSIUM SERPL-SCNC: 4.1 MMOL/L (ref 3.5–5.2)
PROT SERPL-MCNC: 6.5 G/DL (ref 6–8.5)
RBC # BLD AUTO: 3.41 10*6/MM3 (ref 3.77–5.28)
SODIUM SERPL-SCNC: 139 MMOL/L (ref 136–145)
TRIGL SERPL-MCNC: 70 MG/DL (ref 0–150)
TSH SERPL DL<=0.005 MIU/L-ACNC: 3.51 UIU/ML (ref 0.45–4.5)
VLDLC SERPL CALC-MCNC: 15 MG/DL (ref 5–40)
WBC # BLD AUTO: 7.12 10*3/MM3 (ref 3.4–10.8)

## 2024-11-04 ENCOUNTER — HOSPITAL ENCOUNTER (OUTPATIENT)
Dept: CT IMAGING | Facility: HOSPITAL | Age: 79
Discharge: HOME | End: 2024-11-04
Admitting: FAMILY MEDICINE
Payer: MEDICARE

## 2024-11-04 ENCOUNTER — OFFICE VISIT (OUTPATIENT)
Dept: INTERNAL MEDICINE | Facility: CLINIC | Age: 79
End: 2024-11-04
Payer: MEDICARE

## 2024-11-04 VITALS
OXYGEN SATURATION: 94 % | HEART RATE: 67 BPM | DIASTOLIC BLOOD PRESSURE: 60 MMHG | BODY MASS INDEX: 47.2 KG/M2 | WEIGHT: 250 LBS | TEMPERATURE: 97.1 F | SYSTOLIC BLOOD PRESSURE: 130 MMHG | HEIGHT: 61 IN

## 2024-11-04 DIAGNOSIS — I10 BENIGN ESSENTIAL HYPERTENSION: ICD-10-CM

## 2024-11-04 DIAGNOSIS — E78.00 HYPERCHOLESTEROLEMIA: ICD-10-CM

## 2024-11-04 DIAGNOSIS — Z23 NEED FOR INFLUENZA VACCINATION: ICD-10-CM

## 2024-11-04 DIAGNOSIS — Z00.00 MEDICARE ANNUAL WELLNESS VISIT, SUBSEQUENT: Primary | ICD-10-CM

## 2024-11-04 DIAGNOSIS — R20.2 PARESTHESIA OF RIGHT LEG: ICD-10-CM

## 2024-11-04 PROCEDURE — 70450 CT HEAD/BRAIN W/O DYE: CPT

## 2024-11-04 RX ORDER — LISINOPRIL 20 MG/1
20 TABLET ORAL DAILY
COMMUNITY

## 2024-11-04 NOTE — PROGRESS NOTES
Subjective   The ABCs of the Annual Wellness Visit  Medicare Wellness Visit      Philly Person is a 79 y.o. patient who presents for a Medicare Wellness Visit.    The following portions of the patient's history were reviewed and   updated as appropriate: allergies, current medications, past family history, past medical history, past social history, past surgical history, and problem list.    Compared to one year ago, the patient's physical   health is the same.  Compared to one year ago, the patient's mental   health is the same.    Recent Hospitalizations:  She was not admitted to the hospital during the last year.     Current Medical Providers:  Patient Care Team:  Shazia Cardoza MD as PCP - General (Family Medicine)    Outpatient Medications Prior to Visit   Medication Sig Dispense Refill    allopurinol (ZYLOPRIM) 100 MG tablet TAKE 1 TABLET BY MOUTH 2 TIMES A  tablet 0    atenolol (TENORMIN) 100 MG tablet Take 1 tablet by mouth Daily. 90 tablet 1    atorvastatin (LIPITOR) 10 MG tablet TAKE ONE TABLET BY MOUTH ONCE NIGHTLY 90 tablet 1    Blood Glucose Monitoring Suppl (Sidecaroger Premium Blood Glucose) w/Device kit Use to test blood sugar once daily E11.9 -diabetes 1 kit 0    coenzyme Q10 100 MG capsule Take 2 capsules by mouth Every Evening. WILL HOLD FOR SURGDRY      Cranberry 500 MG capsule Take 500 mg by mouth Daily. WILL HOLD FOR SURGERY      furosemide (LASIX) 20 MG tablet Take 1 tablet by mouth Daily. 90 tablet 1    glimepiride (AMARYL) 4 MG tablet TAKE 1 TABLET BY MOUTH TWICE A  tablet 0    glucose blood test strip Use as instructed to test blood sugar once daily . Uncontrolled diabetes e11.65 100 each 2    glucose blood test strip Use as instructed to test blood sugar once daily for diabetes E11.9 100 each 12    hydrALAZINE (APRESOLINE) 25 MG tablet Take 1 tablet by mouth 3 (Three) Times a Day.      insulin degludec (Tresiba FlexTouch) 100 UNIT/ML solution pen-injector injection Inject  "10 Units under the skin into the appropriate area as directed Daily. 15 mL 4    Insulin Pen Needle (Pen Needles) 32G X 4 MM misc Use 1 each Daily. 100 each 3    Multiple Vitamins-Minerals (MULTIVITAMIN ADULT PO) Take 1 tablet by mouth Daily.      vitamin B-6 (PYRIDOXINE) 100 MG tablet Take 1 tablet by mouth Daily.      vitamin C (ASCORBIC ACID) 500 MG tablet Take 1 tablet by mouth Every Night.      zolpidem (AMBIEN) 5 MG tablet Take 1 tablet by mouth.      lisinopril (PRINIVIL,ZESTRIL) 40 MG tablet       lisinopril (PRINIVIL,ZESTRIL) 20 MG tablet Take 1 tablet by mouth Daily.       No facility-administered medications prior to visit.     No opioid medication identified on active medication list. I have reviewed chart for other potential  high risk medication/s and harmful drug interactions in the elderly.      Aspirin is not on active medication list.  Aspirin use is not indicated based on review of current medical condition/s. Risk of harm outweighs potential benefits.  .    Patient Active Problem List   Diagnosis    Elevated liver enzymes    Anemia    Arthritis    Benign essential hypertension    Hypercholesterolemia    Microalbuminuria    Nonalcoholic fatty liver disease    Calculus of kidney    Adiposity    Type 2 diabetes mellitus    Squamous cell carcinoma of skin    Melanoma    Basal cell carcinoma    DJD (degenerative joint disease) of knee     Advance Care Planning Advance Directive is not on file.  ACP discussion was held with the patient during this visit. Patient has an advance directive (not in EMR), copy requested.            Objective   Vitals:    11/04/24 1442   BP: 130/60   BP Location: Left arm   Patient Position: Sitting   Cuff Size: Large Adult   Pulse: 67   Temp: 97.1 °F (36.2 °C)   SpO2: 94%   Weight: 113 kg (250 lb)   Height: 155.8 cm (61.34\")   PainSc: 0-No pain       Estimated body mass index is 46.72 kg/m² as calculated from the following:    Height as of this encounter: 155.8 cm " "(61.34\").    Weight as of this encounter: 113 kg (250 lb).    Class 3 Severe Obesity (BMI >=40). Obesity-related health conditions include the following: hypertension, diabetes mellitus, dyslipidemias, and osteoarthritis. Obesity is worsening. BMI is is above average; BMI management plan is completed. We discussed portion control and increasing exercise.       Does the patient have evidence of cognitive impairment? No  Lab Results   Component Value Date    CHLPL 101 10/28/2024    TRIG 70 10/28/2024    HDL 36 (L) 10/28/2024    LDL 50 10/28/2024    VLDL 15 10/28/2024    HGBA1C 5.60 10/28/2024                                                                                               Health  Risk Assessment    Smoking Status:  Social History     Tobacco Use   Smoking Status Never   Smokeless Tobacco Never     Alcohol Consumption:  Social History     Substance and Sexual Activity   Alcohol Use Yes    Comment: ON OCC       Fall Risk Screen  STEADI Fall Risk Assessment was completed, and patient is at HIGH risk for falls. Assessment completed on:2024    Depression Screenin/4/2024     2:00 PM   PHQ-2/PHQ-9 Depression Screening   Little interest or pleasure in doing things Not at all   Feeling down, depressed, or hopeless Not at all     Health Habits and Functional and Cognitive Screenin/4/2024     2:00 PM   Functional & Cognitive Status   Do you have difficulty preparing food and eating? No   Do you have difficulty bathing yourself, getting dressed or grooming yourself? No   Do you have difficulty using the toilet? No   Do you have difficulty moving around from place to place? No   Do you have trouble with steps or getting out of a bed or a chair? No   Current Diet Diabetic Diet   Dental Exam Up to date   Eye Exam Up to date   Exercise (times per week) 0 times per week   Current Exercises Include No Regular Exercise   Do you need help using the phone?  No   Are you deaf or do you have serious " difficulty hearing?  No   Do you need help to go to places out of walking distance? Yes   Do you need help shopping? No   Do you need help preparing meals?  No   Do you need help with housework?  No   Do you need help with laundry? No   Do you need help taking your medications? No   Do you need help managing money? No   Do you ever drive or ride in a car without wearing a seat belt? No   Have you felt unusual stress, anger or loneliness in the last month? No   Who do you live with? Spouse   If you need help, do you have trouble finding someone available to you? No   Have you been bothered in the last four weeks by sexual problems? No   Do you have difficulty concentrating, remembering or making decisions? No           Age-appropriate Screening Schedule:  Refer to the list below for future screening recommendations based on patient's age, sex and/or medical conditions. Orders for these recommended tests are listed in the plan section. The patient has been provided with a written plan.    Health Maintenance List  Health Maintenance   Topic Date Due    ANNUAL WELLNESS VISIT  10/08/2022    COVID-19 Vaccine (7 - 2024-25 season) 11/06/2024 (Originally 9/1/2024)    ZOSTER VACCINE (2 of 3) 01/05/2026 (Originally 5/13/2008)    TDAP/TD VACCINES (2 - Td or Tdap) 10/26/2026 (Originally 3/18/2018)    HEMOGLOBIN A1C  04/28/2025    DIABETIC EYE EXAM  07/03/2025    COLORECTAL CANCER SCREENING  07/14/2025    URINE MICROALBUMIN  09/26/2025    LIPID PANEL  10/28/2025    BMI FOLLOWUP  11/04/2025    MAMMOGRAM  07/23/2026    DXA SCAN  08/21/2026    RSV Vaccine - Adults  Completed    INFLUENZA VACCINE  Completed    Pneumococcal Vaccine 65+  Completed                                                                                                                                                CMS Preventative Services Quick Reference  Risk Factors Identified During Encounter  Fall Risk-High or Moderate: Discussed Fall Prevention in the  home, Information on Fall Prevention Shared in After Visit Summary, and Sit to Stand Exercise Information Shared in After Visit Summary  Immunizations Discussed/Encouraged: Tdap, Influenza, Shingrix, and COVID19  Inactivity/Sedentary: Sit to stand exercise, exercise while sitting.    Information on healthy heart diet added to discharge summary.  Information on sit to stand exercise added to discharge summary.  Information on exercise while sitting added to discharge summary.  Information on fall prevention added to discharge summary.  Patient is up-to-date with cancer screening.  We discussed recommendations for vaccinations including flu vaccine, COVID-vaccine, Shingrix and tetanus vaccine.  Patient is getting flu vaccine today.  Patient has living will and we requested copy.      The above risks/problems have been discussed with the patient.  Pertinent information has been shared with the patient in the After Visit Summary.  An After Visit Summary and PPPS were made available to the patient.    Follow Up:   Next Medicare Wellness visit to be scheduled in 1 year.     Assessment & Plan  Need for influenza vaccination    Orders:    Fluzone High-Dose 65+yrs    Paresthesia of right leg    Orders:    CT Head Without Contrast         Follow Up:   Return in about 6 months (around 5/4/2025).

## 2024-11-04 NOTE — PATIENT INSTRUCTIONS
Medicare Wellness  Personal Prevention Plan of Service     Date of Office Visit:    Encounter Provider:  Shazia Cardoza MD  Place of Service:  [facility] INTERNAL MEDICINE  Patient Name: Philly Person  :  1945    As part of the Medicare Wellness portion of your visit today, we are providing you with this personalized preventive plan of services (PPPS). This plan is based upon recommendations of the [place] Preventive Services Task Force (USPSTF) and the Advisory Committee on Immunization Practices (ACIP).    This lists the preventive care services that should be considered, and provides dates of when you are due. Items listed as completed are up-to-date and do not require any further intervention.    Health Maintenance   Topic Date Due    ANNUAL WELLNESS VISIT  10/08/2022    COVID-19 Vaccine ( season) 2024 (Originally 2024)    ZOSTER VACCINE (2 of 3) 2026 (Originally 2008)    TDAP/TD VACCINES (2 - Td or Tdap) 10/26/2026 (Originally 3/18/2018)    HEMOGLOBIN A1C  2025    DIABETIC EYE EXAM  2025    COLORECTAL CANCER SCREENING  2025    URINE MICROALBUMIN  2025    LIPID PANEL  10/28/2025    BMI FOLLOWUP  2025    MAMMOGRAM  2026    DXA SCAN  2026    RSV Vaccine - Adults  Completed    INFLUENZA VACCINE  Completed    Pneumococcal Vaccine 65+  Completed       Orders Placed This Encounter   Procedures    CT Head Without Contrast     Order Specific Question:   Reason for Exam:     Answer:   numbness of rt leg when pt woke up     Order Specific Question:   Release to patient     Answer:   Routine Release [[id number]]    Fluzone High-Dose 65+yrs       Return in about 6 months (around 2025).

## 2024-11-04 NOTE — PROGRESS NOTES
Subjective   Philly Person is a 79 y.o. female.   Chief Complaint   Patient presents with    Medicare Wellness-subsequent    Numbness     Right leg     Hypertension       History of Present Illness     HTN- on atenolol 100 mg a day, furosemide 20 mg a day, lisinopril 20 mg a day (rx by nephro) and hydralazine at 25 mg 3 times a day (rx by nephro) . She takes it as prescribed.  She reports no side effects.  Creatinine at 2.88, GFR 16.0.  She follows up with nephrologist every 3 months.  She is on no NSAIDs.    HLP-on atorvastatin at 10 mg a day.  She takes it every day.  She reports no side effects.  LDL 50 from 42, HDL 36 from 43.  She has known fatty liver disease.  AST at 48 from 48 from 71 from 36 from 62, ALT 29 from 40 from 52 from 29 from 38.  She uses no Tylenol.  She does not drink alcohol.    Numbness of right leg.  It affects whole leg.  She woke up with it this morning.  She has no muscle weakness, no headache.  Speech is normal.  No back pain.  No neck pain.  She was sitting in a chair right before this her son was in the hospital, she slept well last night.    Review of Systems   Respiratory:  Negative for shortness of breath.    Cardiovascular:  Negative for chest pain and palpitations.   Musculoskeletal:  Negative for back pain.   Neurological:  Positive for numbness. Negative for dizziness, facial asymmetry, speech difficulty, weakness, light-headedness and headaches.         Objective   Wt Readings from Last 3 Encounters:   11/04/24 113 kg (250 lb)   07/17/24 111 kg (244 lb)   07/03/24 111 kg (244 lb)      Vitals:    11/04/24 1442   BP: 130/60   Pulse: 67   Temp: 97.1 °F (36.2 °C)   SpO2: 94%     Temp Readings from Last 3 Encounters:   11/04/24 97.1 °F (36.2 °C)   07/03/24 96.8 °F (36 °C)   02/28/24 96.4 °F (35.8 °C)     BP Readings from Last 3 Encounters:   11/04/24 130/60   07/17/24 126/68   07/03/24 137/52     Pulse Readings from Last 3 Encounters:   11/04/24 67   07/17/24 65   07/03/24 72      Body mass index is 46.72 kg/m².    Physical Exam  Constitutional:       Appearance: She is well-developed.      Comments: She walks with a cane.   Eyes:      Extraocular Movements: Extraocular movements intact.      Pupils: Pupils are equal, round, and reactive to light.   Neck:      Vascular: No carotid bruit.   Cardiovascular:      Rate and Rhythm: Normal rate and regular rhythm.      Heart sounds: Normal heart sounds.   Pulmonary:      Effort: Pulmonary effort is normal.      Breath sounds: Normal breath sounds.   Skin:     General: Skin is warm and dry.   Neurological:      General: No focal deficit present.      Mental Status: She is alert and oriented to person, place, and time.      Motor: No weakness.      Gait: Gait normal.      Deep Tendon Reflexes: Reflexes normal.   Psychiatric:         Behavior: Behavior normal.         Assessment & Plan   Diagnoses and all orders for this visit:    1. Medicare annual wellness visit, subsequent (Primary)    2. Need for influenza vaccination  -     Fluzone High-Dose 65+yrs    3. Paresthesia of right leg  -     Cancel: CT Head Without Contrast; Future  -     CT Head Without Contrast    4. Benign essential hypertension    5. Hypercholesterolemia        Paresthesia of right leg-we are ordering stat head CT.    Hypertension-continue current treatment.  Follow-up in 3-6 months.    Hyperlipidemia-continue current treatment and follow-up in 3-6 months.

## 2024-11-13 ENCOUNTER — APPOINTMENT (OUTPATIENT)
Dept: CARDIOLOGY | Facility: HOSPITAL | Age: 79
End: 2024-11-13
Payer: MEDICARE

## 2024-11-13 ENCOUNTER — HOSPITAL ENCOUNTER (INPATIENT)
Facility: HOSPITAL | Age: 79
LOS: 9 days | Discharge: HOME | End: 2024-11-22
Attending: STUDENT IN AN ORGANIZED HEALTH CARE EDUCATION/TRAINING PROGRAM | Admitting: INTERNAL MEDICINE
Payer: MEDICARE

## 2024-11-13 ENCOUNTER — APPOINTMENT (OUTPATIENT)
Dept: CT IMAGING | Facility: HOSPITAL | Age: 79
End: 2024-11-13
Payer: MEDICARE

## 2024-11-13 DIAGNOSIS — K74.60 CIRRHOSIS OF LIVER WITH ASCITES, UNSPECIFIED HEPATIC CIRRHOSIS TYPE: ICD-10-CM

## 2024-11-13 DIAGNOSIS — R01.1 HEART MURMUR: ICD-10-CM

## 2024-11-13 DIAGNOSIS — R18.8 CIRRHOSIS OF LIVER WITH ASCITES, UNSPECIFIED HEPATIC CIRRHOSIS TYPE: ICD-10-CM

## 2024-11-13 DIAGNOSIS — D64.9 ANEMIA, UNSPECIFIED TYPE: ICD-10-CM

## 2024-11-13 DIAGNOSIS — K76.9 NONALCOHOLIC LIVER DISEASE, CHRONIC: ICD-10-CM

## 2024-11-13 DIAGNOSIS — R18.8 OTHER ASCITES: Primary | ICD-10-CM

## 2024-11-13 DIAGNOSIS — N18.9 CHRONIC KIDNEY DISEASE, UNSPECIFIED CKD STAGE: ICD-10-CM

## 2024-11-13 PROBLEM — K57.30 DVRTCLOS OF LG INT W/O PERFORATION OR ABSCESS W/O BLEEDING: Status: ACTIVE | Noted: 2022-07-14

## 2024-11-13 PROBLEM — N18.4 STAGE 4 CHRONIC KIDNEY DISEASE: Status: ACTIVE | Noted: 2021-12-07

## 2024-11-13 PROBLEM — I12.9 RENAL HYPERTENSION: Status: ACTIVE | Noted: 2021-12-07

## 2024-11-13 PROBLEM — E55.9 VITAMIN D DEFICIENCY: Status: ACTIVE | Noted: 2022-04-14

## 2024-11-13 PROBLEM — R19.5 OTHER FECAL ABNORMALITIES: Status: ACTIVE | Noted: 2024-11-13

## 2024-11-13 PROBLEM — I77.0: Chronic | Status: ACTIVE | Noted: 2024-11-13

## 2024-11-13 PROBLEM — K63.5 POLYP OF COLON: Status: ACTIVE | Noted: 2022-07-14

## 2024-11-13 PROBLEM — K55.20 ANGIODYSPLASIA OF COLON WITHOUT HEMORRHAGE: Status: ACTIVE | Noted: 2022-07-14

## 2024-11-13 PROBLEM — E78.5 DYSLIPIDEMIA: Status: ACTIVE | Noted: 2021-12-07

## 2024-11-13 PROBLEM — Z87.39 H/O: GOUT: Status: ACTIVE | Noted: 2021-12-07

## 2024-11-13 LAB
ALBUMIN SERPL-MCNC: 3 G/DL (ref 3.5–5.2)
ALBUMIN/GLOB SERPL: 0.9 G/DL
ALP SERPL-CCNC: 155 U/L (ref 39–117)
ALT SERPL W P-5'-P-CCNC: 20 U/L (ref 1–33)
AMMONIA BLD-SCNC: 12 UMOL/L (ref 11–51)
ANION GAP SERPL CALCULATED.3IONS-SCNC: 14.6 MMOL/L (ref 5–15)
AORTIC DIMENSIONLESS INDEX: 0.41 (DI)
APTT PPP: 31.5 SECONDS (ref 22.7–35.4)
ASCENDING AORTA: 2.6 CM
AST SERPL-CCNC: 43 U/L (ref 1–32)
BACTERIA UR QL AUTO: ABNORMAL /HPF
BASOPHILS # BLD AUTO: 0.07 10*3/MM3 (ref 0–0.2)
BASOPHILS NFR BLD AUTO: 1.1 % (ref 0–1.5)
BH CV ECHO MEAS - ACS: 1.38 CM
BH CV ECHO MEAS - AO MAX PG: 23.8 MMHG
BH CV ECHO MEAS - AO MEAN PG: 13.2 MMHG
BH CV ECHO MEAS - AO ROOT DIAM: 2.6 CM
BH CV ECHO MEAS - AO V2 MAX: 243.7 CM/SEC
BH CV ECHO MEAS - AO V2 VTI: 68.8 CM
BH CV ECHO MEAS - AVA(I,D): 1.24 CM2
BH CV ECHO MEAS - EDV(CUBED): 131.9 ML
BH CV ECHO MEAS - EDV(MOD-SP2): 84 ML
BH CV ECHO MEAS - EDV(MOD-SP4): 135 ML
BH CV ECHO MEAS - EF(MOD-BP): 62.2 %
BH CV ECHO MEAS - EF(MOD-SP2): 67.9 %
BH CV ECHO MEAS - EF(MOD-SP4): 60 %
BH CV ECHO MEAS - ESV(CUBED): 35.4 ML
BH CV ECHO MEAS - ESV(MOD-SP2): 27 ML
BH CV ECHO MEAS - ESV(MOD-SP4): 54 ML
BH CV ECHO MEAS - FS: 35.5 %
BH CV ECHO MEAS - IVS/LVPW: 0.98 CM
BH CV ECHO MEAS - IVSD: 1.12 CM
BH CV ECHO MEAS - LAT PEAK E' VEL: 7.6 CM/SEC
BH CV ECHO MEAS - LV MASS(C)D: 221.8 GRAMS
BH CV ECHO MEAS - LV MAX PG: 4.9 MMHG
BH CV ECHO MEAS - LV MEAN PG: 2.26 MMHG
BH CV ECHO MEAS - LV V1 MAX: 111 CM/SEC
BH CV ECHO MEAS - LV V1 VTI: 28.5 CM
BH CV ECHO MEAS - LVIDD: 5.1 CM
BH CV ECHO MEAS - LVIDS: 3.3 CM
BH CV ECHO MEAS - LVOT AREA: 3 CM2
BH CV ECHO MEAS - LVOT DIAM: 1.95 CM
BH CV ECHO MEAS - LVPWD: 1.14 CM
BH CV ECHO MEAS - MED PEAK E' VEL: 7.2 CM/SEC
BH CV ECHO MEAS - MR MAX PG: 73.7 MMHG
BH CV ECHO MEAS - MR MAX VEL: 429.4 CM/SEC
BH CV ECHO MEAS - MV A DUR: 0.2 SEC
BH CV ECHO MEAS - MV A MAX VEL: 119.2 CM/SEC
BH CV ECHO MEAS - MV DEC SLOPE: 441.9 CM/SEC2
BH CV ECHO MEAS - MV DEC TIME: 0.21 SEC
BH CV ECHO MEAS - MV E MAX VEL: 79 CM/SEC
BH CV ECHO MEAS - MV E/A: 0.66
BH CV ECHO MEAS - MV MAX PG: 6.7 MMHG
BH CV ECHO MEAS - MV MEAN PG: 3.3 MMHG
BH CV ECHO MEAS - MV P1/2T: 81.9 MSEC
BH CV ECHO MEAS - MV V2 VTI: 40.2 CM
BH CV ECHO MEAS - MVA(P1/2T): 2.7 CM2
BH CV ECHO MEAS - MVA(VTI): 2.12 CM2
BH CV ECHO MEAS - PA ACC TIME: 0.12 SEC
BH CV ECHO MEAS - PA V2 MAX: 107.2 CM/SEC
BH CV ECHO MEAS - PULM A REVS DUR: 0.17 SEC
BH CV ECHO MEAS - PULM A REVS VEL: 31.6 CM/SEC
BH CV ECHO MEAS - PULM DIAS VEL: 32.1 CM/SEC
BH CV ECHO MEAS - PULM S/D: 0.82
BH CV ECHO MEAS - PULM SYS VEL: 26.2 CM/SEC
BH CV ECHO MEAS - QP/QS: 0.84
BH CV ECHO MEAS - RAP SYSTOLE: 3 MMHG
BH CV ECHO MEAS - RV MAX PG: 3 MMHG
BH CV ECHO MEAS - RV V1 MAX: 87 CM/SEC
BH CV ECHO MEAS - RV V1 VTI: 22.3 CM
BH CV ECHO MEAS - RVOT DIAM: 2.02 CM
BH CV ECHO MEAS - RVSP: 20 MMHG
BH CV ECHO MEAS - SV(LVOT): 85 ML
BH CV ECHO MEAS - SV(MOD-SP2): 57 ML
BH CV ECHO MEAS - SV(MOD-SP4): 81 ML
BH CV ECHO MEAS - SV(RVOT): 71.4 ML
BH CV ECHO MEAS - TR MAX PG: 16.9 MMHG
BH CV ECHO MEAS - TR MAX VEL: 205.5 CM/SEC
BH CV ECHO MEASUREMENTS AVERAGE E/E' RATIO: 10.68
BH CV XLRA - RV BASE: 3.5 CM
BH CV XLRA - RV LENGTH: 7.6 CM
BH CV XLRA - RV MID: 3.5 CM
BH CV XLRA - TDI S': 17.9 CM/SEC
BILIRUB SERPL-MCNC: 1.4 MG/DL (ref 0–1.2)
BILIRUB UR QL STRIP: NEGATIVE
BUN SERPL-MCNC: 59 MG/DL (ref 8–23)
BUN/CREAT SERPL: 21.8 (ref 7–25)
CALCIUM SPEC-SCNC: 9 MG/DL (ref 8.6–10.5)
CHLORIDE SERPL-SCNC: 104 MMOL/L (ref 98–107)
CLARITY UR: ABNORMAL
CO2 SERPL-SCNC: 26.4 MMOL/L (ref 22–29)
COLOR UR: YELLOW
CREAT SERPL-MCNC: 2.71 MG/DL (ref 0.57–1)
D-LACTATE SERPL-SCNC: 1 MMOL/L (ref 0.5–2)
DEPRECATED RDW RBC AUTO: 47.7 FL (ref 37–54)
EGFRCR SERPLBLD CKD-EPI 2021: 17.3 ML/MIN/1.73
EOSINOPHIL # BLD AUTO: 0.31 10*3/MM3 (ref 0–0.4)
EOSINOPHIL NFR BLD AUTO: 4.7 % (ref 0.3–6.2)
ERYTHROCYTE [DISTWIDTH] IN BLOOD BY AUTOMATED COUNT: 14.4 % (ref 12.3–15.4)
GEN 5 2HR TROPONIN T REFLEX: 48 NG/L
GLOBULIN UR ELPH-MCNC: 3.2 GM/DL
GLUCOSE BLDC GLUCOMTR-MCNC: 107 MG/DL (ref 70–130)
GLUCOSE BLDC GLUCOMTR-MCNC: 42 MG/DL (ref 70–130)
GLUCOSE BLDC GLUCOMTR-MCNC: 76 MG/DL (ref 70–130)
GLUCOSE SERPL-MCNC: 59 MG/DL (ref 65–99)
GLUCOSE UR STRIP-MCNC: NEGATIVE MG/DL
HCT VFR BLD AUTO: 29.2 % (ref 34–46.6)
HGB BLD-MCNC: 9.7 G/DL (ref 12–15.9)
HGB UR QL STRIP.AUTO: NEGATIVE
HOLD SPECIMEN: NORMAL
HOLD SPECIMEN: NORMAL
HYALINE CASTS UR QL AUTO: ABNORMAL /LPF
IMM GRANULOCYTES # BLD AUTO: 0.02 10*3/MM3 (ref 0–0.05)
IMM GRANULOCYTES NFR BLD AUTO: 0.3 % (ref 0–0.5)
INR PPP: 1.24 (ref 0.9–1.1)
KETONES UR QL STRIP: NEGATIVE
LEFT ATRIUM VOLUME INDEX: 36.8 ML/M2
LEUKOCYTE ESTERASE UR QL STRIP.AUTO: ABNORMAL
LIPASE SERPL-CCNC: 66 U/L (ref 13–60)
LYMPHOCYTES # BLD AUTO: 1.04 10*3/MM3 (ref 0.7–3.1)
LYMPHOCYTES NFR BLD AUTO: 15.9 % (ref 19.6–45.3)
MCH RBC QN AUTO: 30.5 PG (ref 26.6–33)
MCHC RBC AUTO-ENTMCNC: 33.2 G/DL (ref 31.5–35.7)
MCV RBC AUTO: 91.8 FL (ref 79–97)
MONOCYTES # BLD AUTO: 0.7 10*3/MM3 (ref 0.1–0.9)
MONOCYTES NFR BLD AUTO: 10.7 % (ref 5–12)
NEUTROPHILS NFR BLD AUTO: 4.41 10*3/MM3 (ref 1.7–7)
NEUTROPHILS NFR BLD AUTO: 67.3 % (ref 42.7–76)
NITRITE UR QL STRIP: NEGATIVE
NRBC BLD AUTO-RTO: 0 /100 WBC (ref 0–0.2)
NT-PROBNP SERPL-MCNC: 1133 PG/ML (ref 0–1800)
PH UR STRIP.AUTO: 6 [PH] (ref 5–8)
PLATELET # BLD AUTO: 165 10*3/MM3 (ref 140–450)
PMV BLD AUTO: 11.9 FL (ref 6–12)
POTASSIUM SERPL-SCNC: 4.1 MMOL/L (ref 3.5–5.2)
PROT SERPL-MCNC: 6.2 G/DL (ref 6–8.5)
PROT UR QL STRIP: ABNORMAL
PROTHROMBIN TIME: 15.8 SECONDS (ref 11.7–14.2)
RBC # BLD AUTO: 3.18 10*6/MM3 (ref 3.77–5.28)
RBC # UR STRIP: ABNORMAL /HPF
REF LAB TEST METHOD: ABNORMAL
SINUS: 2.9 CM
SODIUM SERPL-SCNC: 145 MMOL/L (ref 136–145)
SODIUM UR-SCNC: 83 MMOL/L
SP GR UR STRIP: 1.01 (ref 1–1.03)
SQUAMOUS #/AREA URNS HPF: ABNORMAL /HPF
STJ: 1.7 CM
TROPONIN T DELTA: -2 NG/L
TROPONIN T SERPL HS-MCNC: 50 NG/L
TROPONIN T SERPL HS-MCNC: 52 NG/L
UROBILINOGEN UR QL STRIP: ABNORMAL
WBC # UR STRIP: ABNORMAL /HPF
WBC NRBC COR # BLD AUTO: 6.55 10*3/MM3 (ref 3.4–10.8)
WHOLE BLOOD HOLD COAG: NORMAL
WHOLE BLOOD HOLD SPECIMEN: NORMAL

## 2024-11-13 PROCEDURE — 83880 ASSAY OF NATRIURETIC PEPTIDE: CPT | Performed by: PHYSICIAN ASSISTANT

## 2024-11-13 PROCEDURE — 74177 CT ABD & PELVIS W/CONTRAST: CPT

## 2024-11-13 PROCEDURE — 25510000001 IOPAMIDOL 61 % SOLUTION: Performed by: STUDENT IN AN ORGANIZED HEALTH CARE EDUCATION/TRAINING PROGRAM

## 2024-11-13 PROCEDURE — 80053 COMPREHEN METABOLIC PANEL: CPT | Performed by: PHYSICIAN ASSISTANT

## 2024-11-13 PROCEDURE — 84484 ASSAY OF TROPONIN QUANT: CPT | Performed by: INTERNAL MEDICINE

## 2024-11-13 PROCEDURE — 93306 TTE W/DOPPLER COMPLETE: CPT | Performed by: INTERNAL MEDICINE

## 2024-11-13 PROCEDURE — 83690 ASSAY OF LIPASE: CPT | Performed by: PHYSICIAN ASSISTANT

## 2024-11-13 PROCEDURE — 99285 EMERGENCY DEPT VISIT HI MDM: CPT

## 2024-11-13 PROCEDURE — 85730 THROMBOPLASTIN TIME PARTIAL: CPT | Performed by: PHYSICIAN ASSISTANT

## 2024-11-13 PROCEDURE — 85025 COMPLETE CBC W/AUTO DIFF WBC: CPT | Performed by: PHYSICIAN ASSISTANT

## 2024-11-13 PROCEDURE — 87186 SC STD MICRODIL/AGAR DIL: CPT | Performed by: STUDENT IN AN ORGANIZED HEALTH CARE EDUCATION/TRAINING PROGRAM

## 2024-11-13 PROCEDURE — 83605 ASSAY OF LACTIC ACID: CPT | Performed by: PHYSICIAN ASSISTANT

## 2024-11-13 PROCEDURE — 85610 PROTHROMBIN TIME: CPT | Performed by: PHYSICIAN ASSISTANT

## 2024-11-13 PROCEDURE — 82948 REAGENT STRIP/BLOOD GLUCOSE: CPT

## 2024-11-13 PROCEDURE — 81001 URINALYSIS AUTO W/SCOPE: CPT | Performed by: PHYSICIAN ASSISTANT

## 2024-11-13 PROCEDURE — 84484 ASSAY OF TROPONIN QUANT: CPT | Performed by: PHYSICIAN ASSISTANT

## 2024-11-13 PROCEDURE — 87040 BLOOD CULTURE FOR BACTERIA: CPT | Performed by: STUDENT IN AN ORGANIZED HEALTH CARE EDUCATION/TRAINING PROGRAM

## 2024-11-13 PROCEDURE — 82140 ASSAY OF AMMONIA: CPT | Performed by: PHYSICIAN ASSISTANT

## 2024-11-13 PROCEDURE — 87077 CULTURE AEROBIC IDENTIFY: CPT | Performed by: STUDENT IN AN ORGANIZED HEALTH CARE EDUCATION/TRAINING PROGRAM

## 2024-11-13 PROCEDURE — 93306 TTE W/DOPPLER COMPLETE: CPT

## 2024-11-13 PROCEDURE — 82728 ASSAY OF FERRITIN: CPT | Performed by: INTERNAL MEDICINE

## 2024-11-13 PROCEDURE — 87086 URINE CULTURE/COLONY COUNT: CPT | Performed by: STUDENT IN AN ORGANIZED HEALTH CARE EDUCATION/TRAINING PROGRAM

## 2024-11-13 PROCEDURE — 25010000002 CEFTRIAXONE PER 250 MG: Performed by: STUDENT IN AN ORGANIZED HEALTH CARE EDUCATION/TRAINING PROGRAM

## 2024-11-13 PROCEDURE — 82105 ALPHA-FETOPROTEIN SERUM: CPT | Performed by: INTERNAL MEDICINE

## 2024-11-13 PROCEDURE — 36415 COLL VENOUS BLD VENIPUNCTURE: CPT | Performed by: STUDENT IN AN ORGANIZED HEALTH CARE EDUCATION/TRAINING PROGRAM

## 2024-11-13 PROCEDURE — 84300 ASSAY OF URINE SODIUM: CPT | Performed by: INTERNAL MEDICINE

## 2024-11-13 RX ORDER — FUROSEMIDE 40 MG/1
40 TABLET ORAL DAILY
Status: DISCONTINUED | OUTPATIENT
Start: 2024-11-14 | End: 2024-11-14

## 2024-11-13 RX ORDER — LISINOPRIL 20 MG/1
20 TABLET ORAL DAILY
Status: DISCONTINUED | OUTPATIENT
Start: 2024-11-13 | End: 2024-11-14

## 2024-11-13 RX ORDER — NICOTINE POLACRILEX 4 MG
15 LOZENGE BUCCAL
Status: DISCONTINUED | OUTPATIENT
Start: 2024-11-13 | End: 2024-11-22 | Stop reason: HOSPADM

## 2024-11-13 RX ORDER — NITROGLYCERIN 0.4 MG/1
0.4 TABLET SUBLINGUAL
Status: DISCONTINUED | OUTPATIENT
Start: 2024-11-13 | End: 2024-11-22 | Stop reason: HOSPADM

## 2024-11-13 RX ORDER — IBUPROFEN 600 MG/1
1 TABLET ORAL
Status: DISCONTINUED | OUTPATIENT
Start: 2024-11-13 | End: 2024-11-22 | Stop reason: HOSPADM

## 2024-11-13 RX ORDER — CALCIUM CARBONATE 500 MG/1
2 TABLET, CHEWABLE ORAL 2 TIMES DAILY PRN
Status: DISCONTINUED | OUTPATIENT
Start: 2024-11-13 | End: 2024-11-22 | Stop reason: HOSPADM

## 2024-11-13 RX ORDER — INSULIN LISPRO 100 [IU]/ML
2-7 INJECTION, SOLUTION INTRAVENOUS; SUBCUTANEOUS
Status: DISCONTINUED | OUTPATIENT
Start: 2024-11-13 | End: 2024-11-22 | Stop reason: HOSPADM

## 2024-11-13 RX ORDER — SODIUM BICARBONATE 650 MG/1
1300 TABLET ORAL 2 TIMES DAILY
COMMUNITY

## 2024-11-13 RX ORDER — DEXTROSE MONOHYDRATE 25 G/50ML
25 INJECTION, SOLUTION INTRAVENOUS
Status: DISCONTINUED | OUTPATIENT
Start: 2024-11-13 | End: 2024-11-22 | Stop reason: HOSPADM

## 2024-11-13 RX ORDER — ONDANSETRON 4 MG/1
4 TABLET, ORALLY DISINTEGRATING ORAL EVERY 6 HOURS PRN
Status: DISCONTINUED | OUTPATIENT
Start: 2024-11-13 | End: 2024-11-22 | Stop reason: HOSPADM

## 2024-11-13 RX ORDER — ACETAMINOPHEN 160 MG/5ML
650 SOLUTION ORAL EVERY 4 HOURS PRN
Status: DISCONTINUED | OUTPATIENT
Start: 2024-11-13 | End: 2024-11-22 | Stop reason: HOSPADM

## 2024-11-13 RX ORDER — SODIUM CHLORIDE 0.9 % (FLUSH) 0.9 %
10 SYRINGE (ML) INJECTION AS NEEDED
Status: DISCONTINUED | OUTPATIENT
Start: 2024-11-13 | End: 2024-11-22 | Stop reason: HOSPADM

## 2024-11-13 RX ORDER — HYDRALAZINE HYDROCHLORIDE 25 MG/1
25 TABLET, FILM COATED ORAL EVERY 12 HOURS SCHEDULED
Status: DISCONTINUED | OUTPATIENT
Start: 2024-11-13 | End: 2024-11-14

## 2024-11-13 RX ORDER — SODIUM CHLORIDE 0.9 % (FLUSH) 0.9 %
10 SYRINGE (ML) INJECTION EVERY 12 HOURS SCHEDULED
Status: DISCONTINUED | OUTPATIENT
Start: 2024-11-13 | End: 2024-11-22 | Stop reason: HOSPADM

## 2024-11-13 RX ORDER — ONDANSETRON 2 MG/ML
4 INJECTION INTRAMUSCULAR; INTRAVENOUS EVERY 6 HOURS PRN
Status: DISCONTINUED | OUTPATIENT
Start: 2024-11-13 | End: 2024-11-22 | Stop reason: HOSPADM

## 2024-11-13 RX ORDER — NADOLOL 20 MG/1
20 TABLET ORAL
Status: DISCONTINUED | OUTPATIENT
Start: 2024-11-13 | End: 2024-11-22 | Stop reason: HOSPADM

## 2024-11-13 RX ORDER — ACETAMINOPHEN 325 MG/1
650 TABLET ORAL EVERY 4 HOURS PRN
Status: DISCONTINUED | OUTPATIENT
Start: 2024-11-13 | End: 2024-11-22 | Stop reason: HOSPADM

## 2024-11-13 RX ORDER — SODIUM CHLORIDE 9 MG/ML
40 INJECTION, SOLUTION INTRAVENOUS AS NEEDED
Status: DISCONTINUED | OUTPATIENT
Start: 2024-11-13 | End: 2024-11-22 | Stop reason: HOSPADM

## 2024-11-13 RX ORDER — IOPAMIDOL 612 MG/ML
100 INJECTION, SOLUTION INTRAVASCULAR
Status: COMPLETED | OUTPATIENT
Start: 2024-11-13 | End: 2024-11-13

## 2024-11-13 RX ORDER — LACTULOSE 10 G/15ML
20 SOLUTION ORAL 2 TIMES DAILY PRN
Status: DISCONTINUED | OUTPATIENT
Start: 2024-11-13 | End: 2024-11-14

## 2024-11-13 RX ADMIN — SODIUM CHLORIDE 2000 MG: 900 INJECTION INTRAVENOUS at 18:04

## 2024-11-13 RX ADMIN — IOPAMIDOL 85 ML: 612 INJECTION, SOLUTION INTRAVENOUS at 13:47

## 2024-11-13 RX ADMIN — Medication 10 ML: at 21:28

## 2024-11-13 RX ADMIN — HYDRALAZINE HYDROCHLORIDE 25 MG: 25 TABLET ORAL at 21:28

## 2024-11-13 NOTE — ED NOTES
"..Nursing report ED to floor  Philly Person  79 y.o.  female    HPI :  HPI  Stated Reason for Visit: swelling    Chief Complaint  Chief Complaint   Patient presents with    Edema    Abdominal Pain       Admitting doctor:   Cristel Henry MD    Admitting diagnosis:   The primary encounter diagnosis was Other ascites. Diagnoses of Nonalcoholic liver disease, chronic, Heart murmur, Chronic kidney disease, unspecified CKD stage, and Anemia, unspecified type were also pertinent to this visit.    Code status:   Current Code Status       Date Active Code Status Order ID Comments User Context       Prior            Allergies:   Patient has no known allergies.    Isolation:   No active isolations    Intake and Output  No intake or output data in the 24 hours ending 11/13/24 1522    Weight:       11/13/24  1517   Weight: 113 kg (249 lb 1.9 oz)       Most recent vitals:   Vitals:    11/13/24 1401 11/13/24 1402 11/13/24 1431 11/13/24 1517   BP: 152/61  152/68 152/61   Pulse:  70 72 70   Resp:       Temp:       SpO2:  99% 96%    Weight:    113 kg (249 lb 1.9 oz)   Height:    155 cm (61.02\")       Active LDAs/IV Access:   Lines, Drains & Airways       Active LDAs       Name Placement date Placement time Site Days    Peripheral IV 11/13/24 1248 Right Antecubital 11/13/24  1248  Antecubital  less than 1                    Labs (abnormal labs have a star):   Labs Reviewed   COMPREHENSIVE METABOLIC PANEL - Abnormal; Notable for the following components:       Result Value    Glucose 59 (*)     BUN 59 (*)     Creatinine 2.71 (*)     Albumin 3.0 (*)     AST (SGOT) 43 (*)     Alkaline Phosphatase 155 (*)     Total Bilirubin 1.4 (*)     eGFR 17.3 (*)     All other components within normal limits    Narrative:     GFR Normal >60  Chronic Kidney Disease <60  Kidney Failure <15    The GFR formula is only valid for adults with stable renal function between ages 18 and 70.   LIPASE - Abnormal; Notable for the following components: "    Lipase 66 (*)     All other components within normal limits   URINALYSIS W/ MICROSCOPIC IF INDICATED (NO CULTURE) - Abnormal; Notable for the following components:    Appearance, UA Cloudy (*)     Protein, UA 30 mg/dL (1+) (*)     Leuk Esterase, UA Large (3+) (*)     All other components within normal limits   PROTIME-INR - Abnormal; Notable for the following components:    Protime 15.8 (*)     INR 1.24 (*)     All other components within normal limits   CBC WITH AUTO DIFFERENTIAL - Abnormal; Notable for the following components:    RBC 3.18 (*)     Hemoglobin 9.7 (*)     Hematocrit 29.2 (*)     Lymphocyte % 15.9 (*)     All other components within normal limits   SINGLE HS TROPONIN T - Abnormal; Notable for the following components:    HS Troponin T 52 (*)     All other components within normal limits    Narrative:     High Sensitive Troponin T Reference Range:  <14.0 ng/L- Negative Female for AMI  <22.0 ng/L- Negative Male for AMI  >=14 - Abnormal Female indicating possible myocardial injury.  >=22 - Abnormal Male indicating possible myocardial injury.   Clinicians would have to utilize clinical acumen, EKG, Troponin, and serial changes to determine if it is an Acute Myocardial Infarction or myocardial injury due to an underlying chronic condition.        LACTIC ACID, PLASMA - Normal   APTT - Normal   AMMONIA - Normal   BNP (IN-HOUSE) - Normal    Narrative:     This assay is used as an aid in the diagnosis of individuals suspected of having heart failure. It can be used as an aid in the diagnosis of acute decompensated heart failure (ADHF) in patients presenting with signs and symptoms of ADHF to the emergency department (ED). In addition, NT-proBNP of <300 pg/mL indicates ADHF is not likely.    Age Range Result Interpretation  NT-proBNP Concentration (pg/mL:      <50             Positive            >450                   Gray                 300-450                    Negative             <300    50-75            Positive            >900                  Gray                300-900                  Negative            <300      >75             Positive            >1800                  Gray                300-1800                  Negative            <300   RAINBOW DRAW    Narrative:     The following orders were created for panel order [place] Draw.  Procedure                               Abnormality         Status                     ---------                               -----------         ------                     Green Top (Gel)[[id number]]                                  Final result               Lavender Top[[id number]]                                     Final result               Gold Top - SST[[id number]]                                   Final result               Light Blue Top[[id number]]                                   Final result                 Please view results for these tests on the individual orders.   URINALYSIS, MICROSCOPIC ONLY   TROPONIN   CBC AND DIFFERENTIAL    Narrative:     The following orders were created for panel order CBC & Differential.  Procedure                               Abnormality         Status                     ---------                               -----------         ------                     CBC Auto Differential[[id number]]        Abnormal            Final result                 Please view results for these tests on the individual orders.   GREEN TOP   LAVENDER TOP   GOLD TOP - SST   LIGHT BLUE TOP       EKG:   No orders to display       Meds given in ED:   Medications   sodium chloride 0.9 % flush 10 mL (has no administration in time range)   nadolol (CORGARD) tablet 20 mg (has no administration in time range)   iopamidol (ISOVUE-300) 61 % injection 100 mL (85 mL Intravenous Given 11/13/24 1347)       Imaging results:  CT Abdomen Pelvis With Contrast    Result Date: 11/13/2024  1. Hepatic cirrhotic morphology with portal venous hypertension, splenomegaly. Diffuse  intra-abdominal ascites. 2. Cholelithiasis. 3. 14 x 11 mm cystic area within the pancreatic body due to a small cystic lesion or extension of ascitic fluid into the pancreatic parenchyma. 4. 3.3 cm left renal parapelvic cystic lesion is denser than typically seen for a simple cyst and is new when compared to previous CT 10/21/2014. This could represent a hemorrhagic or proteinaceous cyst, though recommend follow-up with multiphasic CT to evaluate for internal enhancement and the possibility of a solid lesion. 5. 4 m nonobstructing left lower pole renal stone. 6. Generalized body wall edema. Radiation dose reduction techniques were utilized, including automated exposure control and exposure modulation based on body size.        Ambulatory status:   - up with assist x1    Social issues:   Social History     Socioeconomic History    Marital status:    Tobacco Use    Smoking status: Never    Smokeless tobacco: Never   Vaping Use    Vaping status: Never Used   Substance and Sexual Activity    Alcohol use: Yes     Comment: ON OCC       Peripheral Neurovascular  Peripheral Neurovascular (Adult)  Peripheral Neurovascular WDL: .WDL except  Additional Documentation: Edema (Group)  Edema  Edema: abdomen, ankle, right, ankle, left, foot, left, foot, right  Abdomen Edema: 3+ (Moderate)  Ankle, Left Edema: 2+ (Mild)  Ankle, Right Edema: 2+ (Mild)  Foot, Left Edema: 2+ (Mild)  Foot, Right Edema: 2+ (Mild)    Neuro Cognitive  Neuro Cognitive (Adult)  Cognitive/Neuro/Behavioral WDL: WDL    Learning  Learning Assessment  Learning Readiness and Ability: no barriers identified    Respiratory  Respiratory WDL  Respiratory WDL: WDL  Breath Sounds  All Lung Fields Breath Sounds: All Fields  All Lung Fields Breath Sounds: Anterior:, Lateral:, equal bilaterally, clear    Abdominal Pain       Pain Assessments  Pain (Adult)  Preferred Pain Scale: FACES (Hollingsworth-Baker FACES Pain Rating Scale)  FACES Pain Rating: Rest: 6-->hurts even  more    NIH Stroke Scale       Yael Fletcher RN  11/13/24 15:22 EST

## 2024-11-13 NOTE — ED PROVIDER NOTES
MD ATTESTATION NOTE    The KENTRELL and I have discussed this patient's history, physical exam, MDM, and treatment plan.  I have reviewed the documentation and personally had a face to face interaction with the patient. I affirm the documentation and agree with the treatment and plan.  The attached note describes my personal findings.      I provided a substantive portion of the medical decision making.        Brief HPI: 79-year-old female, history of hypertension, diabetes, Bond, anemia, presents to the ED for evaluation of worsening abdominal pain and distention.  As well as new lower extremity edema is worsening.    PHYSICAL EXAM  ED Triage Vitals   Temp Heart Rate Resp BP SpO2   11/13/24 1156 11/13/24 1156 11/13/24 1156 11/13/24 1203 11/13/24 1156   97 °F (36.1 °C) 71 16 156/62 97 %      Temp src Heart Rate Source Patient Position BP Location FiO2 (%)   -- -- -- -- --                GENERAL: no acute distress  HENT: nares patent  EYES: no scleral icterus  CV: regular rhythm, normal rate, 4/6 blowing murmur left sternal border  RESPIRATORY: normal effort  ABDOMEN: soft, distended, generalized tenderness  MUSCULOSKELETAL: no deformity, bilateral lower extremity edema  NEURO: alert, moves all extremities, follows commands  PSYCH:  calm, cooperative  SKIN: warm, dry    Vital signs and nursing notes reviewed.        Medical Decision Making:  ED Course as of 11/13/24 1617   Wed Nov 13, 2024   1306 Patient with blowing murmur left sternal border, with her current presentation concern for pulmonary regurgitation causing hepatic congestion and lower extremity edema.  Discussed this with patient family at bedside, echo ordered [DN]   1324 Creatinine(!): 2.71  Stable from x2 weeks ago [DN]   1349 I discussed the patient's CKD and risks of CT contrast. [DN]   1419 HS Troponin T(!): 52 [DN]   1419 proBNP: 1,133.0 [DN]   1420 CT Abdomen Pelvis With Contrast  I reviewed CT images, large volume ascites, abdominal wall anasarca,  splenomegaly [DN]   1446 HS Troponin T(!): 52 [DC]   1506 I discussed the case with MD Elaine with [facility] at this time regarding the patient.  I discussed work-up, results, concerns.  I discussed the consulting provider's desire for tele admit.    [DC]   1616 WBC, UA(!): Too Numerous to Count  IV ceftriaxone x 1 ordered [DN]      ED Course User Index  [DC] Constantino aMrtin, PA  [DN] John Garcia MD Nichols, Dylan J, MD  11/13/24 1307       John Garcia MD  11/13/24 1617

## 2024-11-13 NOTE — H&P
PCP: Shazia Cardoza MD    Chief complaint   Chief Complaint   Patient presents with    Edema    Abdominal Pain       HPI  Patient is a 79 y.o. female presents with a history of Bond, diabetes and CKD 4 with left upper extremity fistula  who presents to the ER secondary to increasing shortness of breath, generalized abdominal pain and distention and weight gain.  Patient sees gastroenterology [facility] and had a heme positive, constipation and started on Linzess and MiraLAX.  Recently started on lactulose.    Patient has noticed that her abdomen has gotten significantly distended over the last week or so but has started gradually over the last couple months.  She has gained 6 to 8 pounds just recently.    With decreased appetite about 2 weeks ago    PAST MEDICAL HISTORY  Past Medical History:   Diagnosis Date    Arthritis     Cataract     LEFT AND RIGHT    Diabetes mellitus     TYPE 2    History of kidney stones     History of melanoma     LEFT ARM    Hyperlipidemia     Hypertension     Left knee pain     Nonalcoholic steatohepatitis     Seasonal allergies    constipation, she recently saw GI doctor where she was prescribed florencio lax and linzess. Pt has a bowel movement 3 x weekly. Okay to take dulcolax. Pt drinks 6 glasses of water daily.     PAST SURGICAL HISTORY  Past Surgical History:   Procedure Laterality Date     SECTION      COLONOSCOPY      CYSTOSCOPY W/ LASER LITHOTRIPSY      REPLACEMENT TOTAL KNEE Right 10/2015    SKIN CANCER EXCISION Left     ARM MELANOMA    TONSILLECTOMY AND ADENOIDECTOMY      TOTAL KNEE ARTHROPLASTY Left 2019    Procedure: LEFT TOTAL KNEE ARTHROPLASTY;  Surgeon: Magdaleno Saleh MD;  Location: [facility];  Service: Orthopedics    UPPER GASTROINTESTINAL ENDOSCOPY         FAMILY HISTORY  Family History   Problem Relation Age of Onset    Bipolar disorder Mother     Hypertension Mother     Heart disease Mother     Stroke Mother     Kidney disease Father     Leukemia  Father     Kidney disease Sister     Thyroid disease Sister     Breast cancer Maternal Aunt     Maljuan Hyperthermia Neg Hx        SOCIAL HISTORY  Social History     Tobacco Use    Smoking status: Never    Smokeless tobacco: Never   Vaping Use    Vaping status: Never Used   Substance Use Topics    Alcohol use: Yes     Comment: ON OCC       MEDICATIONS:  Medications Prior to Admission   Medication Sig Dispense Refill Last Dose/Taking    allopurinol (ZYLOPRIM) 100 MG tablet TAKE 1 TABLET BY MOUTH 2 TIMES A DAY (Patient taking differently: Take 2 tablets by mouth Every Night.) 180 tablet 0 Evening    atenolol (TENORMIN) 100 MG tablet Take 1 tablet by mouth Daily. 90 tablet 1 11/13/2024 Morning    atorvastatin (LIPITOR) 10 MG tablet TAKE ONE TABLET BY MOUTH ONCE NIGHTLY 90 tablet 1 11/12/2024 Evening    coenzyme Q10 100 MG capsule Take 2 capsules by mouth Every Evening.   11/12/2024 Evening    Cranberry 500 MG capsule Take 500 mg by mouth Daily.   11/13/2024 Morning    furosemide (LASIX) 20 MG tablet Take 1 tablet by mouth Daily. 90 tablet 1 11/13/2024 Morning    glimepiride (AMARYL) 4 MG tablet TAKE 1 TABLET BY MOUTH TWICE A  tablet 0 11/13/2024 Morning    hydrALAZINE (APRESOLINE) 25 MG tablet Take 1 tablet by mouth 3 (Three) Times a Day.   11/13/2024 Morning    insulin degludec (Tresiba FlexTouch) 100 UNIT/ML solution pen-injector injection Inject 10 Units under the skin into the appropriate area as directed Daily. 15 mL 4 11/13/2024 Morning    lisinopril (PRINIVIL,ZESTRIL) 20 MG tablet Take 1 tablet by mouth Daily.   11/12/2024 Evening    Multiple Vitamins-Minerals (MULTIVITAMIN ADULT PO) Take 1 tablet by mouth Daily.   11/12/2024    sodium bicarbonate 650 MG tablet Take 2 tablets by mouth 2 (Two) Times a Day.   11/13/2024 Morning    vitamin B-6 (PYRIDOXINE) 100 MG tablet Take 1 tablet by mouth Daily.   11/13/2024 Morning    Blood Glucose Monitoring Suppl ([employer] Premium Blood Glucose) w/Device kit Use to test  blood sugar once daily E11.9 -diabetes 1 kit 0     glucose blood test strip Use as instructed to test blood sugar once daily . Uncontrolled diabetes e11.65 100 each 2     glucose blood test strip Use as instructed to test blood sugar once daily for diabetes E11.9 100 each 12     Insulin Pen Needle (Pen Needles) 32G X 4 MM misc Use 1 each Daily. 100 each 3        Allergies:  Patient has no known allergies.    Review of Systems: Fatigue, weight gain, abdominal distention, lower extremity edema cannot lay flat because abdomen pushes on her chest  Negative for following (except as per HPI):  Constitution: chills, fevers,   Eyes: change of vision, loss of vision and discharge  ENT: ear drainage, ear ringing and facial trauma  Respiratory: cough, pleuritic pain,   Cardiovascular: chest pressure, pain,  palpitations  Gastrointestinal: , diarrhea, nausea, vomiting, pain    Integument: rash and wound  Hematologic / Lymphatic: excessive bleeding and easy bruising  Musculoskeletal: joint pain, joint stiffness, joint swelling and muscle pain  Neurological: headaches, numbness, seizures and tremors  Behavioral / Psych: anxiety, depression and hallucinations         Vital Signs  Temp:  [97 °F (36.1 °C)] 97 °F (36.1 °C)  Heart Rate:  [67-73] 72  Resp:  [16] 16  BP: (152-156)/(61-68) 152/68     There is no height or weight on file to calculate BMI.  99% on room air    Physical Exam:  General Appearance:    Alert, cooperative, in no acute distress, , chronically ill-appearing   Head:    Normocephalic, without obvious abnormality, atraumatic   Eyes:         conjunctivae and sclerae normal, no icterus, PERRLA   ENT:    Ears grossly intact, oral mucosa moist,   Neck:   No adenopathy, supple, trachea midline,    Back:     Normal to inspection, range of motion normal   Lungs:   Decreased breath sounds in the bases,respirations regular, even and                   unlabored    Heart:    Regular rhythm and normal rate, possible murmur  versus referred bruit from AV fistula,    Abdomen:     Normal bowel sounds, no masses,  non-tender, severely distended,    Extremities:   Moves all extremities well, no cyanosis, 2+ edema,  LUE with AV fistula with thrill and bruit           Pulses:   Pulses palpable and equal bilaterally   Skin:   No bleeding, rash, b + occasional ruising    Neurologic:    Psych:   Cranial nerves 2 - 12 grossly intact, sensation grossly intact,     Moves all extremities , equal bilateral strength    Alert and Oriented x 3, Normal Affect    I washed/sanitized my hands before entering the room and immediately upon leaving the room.    LABS:  Admission on 11/13/2024   Component Date Value Ref Range Status    Glucose 11/13/2024 59 (L)  65 - 99 mg/dL Final    BUN 11/13/2024 59 (H)  8 - 23 mg/dL Final    Creatinine 11/13/2024 2.71 (H)  0.57 - 1.00 mg/dL Final    Sodium 11/13/2024 145  136 - 145 mmol/L Final    Potassium 11/13/2024 4.1  3.5 - 5.2 mmol/L Final    Chloride 11/13/2024 104  98 - 107 mmol/L Final    CO2 11/13/2024 26.4  22.0 - 29.0 mmol/L Final    Calcium 11/13/2024 9.0  8.6 - 10.5 mg/dL Final    Total Protein 11/13/2024 6.2  6.0 - 8.5 g/dL Final    Albumin 11/13/2024 3.0 (L)  3.5 - 5.2 g/dL Final    ALT (SGPT) 11/13/2024 20  1 - 33 U/L Final    AST (SGOT) 11/13/2024 43 (H)  1 - 32 U/L Final    Alkaline Phosphatase 11/13/2024 155 (H)  39 - 117 U/L Final    Total Bilirubin 11/13/2024 1.4 (H)  0.0 - 1.2 mg/dL Final    Globulin 11/13/2024 3.2  gm/dL Final    A/G Ratio 11/13/2024 0.9  g/dL Final    BUN/Creatinine Ratio 11/13/2024 21.8  7.0 - 25.0 Final    Anion Gap 11/13/2024 14.6  5.0 - 15.0 mmol/L Final    eGFR 11/13/2024 17.3 (L)  >60.0 mL/min/1.73 Final    Lipase 11/13/2024 66 (H)  13 - 60 U/L Final    Lactate 11/13/2024 1.0  0.5 - 2.0 mmol/L Final    PTT 11/13/2024 31.5  22.7 - 35.4 seconds Final    Protime 11/13/2024 15.8 (H)  11.7 - 14.2 Seconds Final    INR 11/13/2024 1.24 (H)  0.90 - 1.10 Final    Ammonia 11/13/2024 12   11 - 51 umol/L Final    Extra Tube 11/13/2024 Hold for add-ons.   Final    Auto resulted.    Extra Tube 11/13/2024 hold for add-on   Final    Auto resulted    Extra Tube 11/13/2024 Hold for add-ons.   Final    Auto resulted.    Extra Tube 11/13/2024 Hold for add-ons.   Final    Auto resulted    WBC 11/13/2024 6.55  3.40 - 10.80 10*3/mm3 Final    RBC 11/13/2024 3.18 (L)  3.77 - 5.28 10*6/mm3 Final    Hemoglobin 11/13/2024 9.7 (L)  12.0 - 15.9 g/dL Final    Hematocrit 11/13/2024 29.2 (L)  34.0 - 46.6 % Final    MCV 11/13/2024 91.8  79.0 - 97.0 fL Final    MCH 11/13/2024 30.5  26.6 - 33.0 pg Final    MCHC 11/13/2024 33.2  31.5 - 35.7 g/dL Final    RDW 11/13/2024 14.4  12.3 - 15.4 % Final    RDW-SD 11/13/2024 47.7  37.0 - 54.0 fl Final    MPV 11/13/2024 11.9  6.0 - 12.0 fL Final    Platelets 11/13/2024 165  140 - 450 10*3/mm3 Final    Neutrophil % 11/13/2024 67.3  42.7 - 76.0 % Final    Lymphocyte % 11/13/2024 15.9 (L)  19.6 - 45.3 % Final    Monocyte % 11/13/2024 10.7  5.0 - 12.0 % Final    Eosinophil % 11/13/2024 4.7  0.3 - 6.2 % Final    Basophil % 11/13/2024 1.1  0.0 - 1.5 % Final    Immature Grans % 11/13/2024 0.3  0.0 - 0.5 % Final    Neutrophils, Absolute 11/13/2024 4.41  1.70 - 7.00 10*3/mm3 Final    Lymphocytes, Absolute 11/13/2024 1.04  0.70 - 3.10 10*3/mm3 Final    Monocytes, Absolute 11/13/2024 0.70  0.10 - 0.90 10*3/mm3 Final    Eosinophils, Absolute 11/13/2024 0.31  0.00 - 0.40 10*3/mm3 Final    Basophils, Absolute 11/13/2024 0.07  0.00 - 0.20 10*3/mm3 Final    Immature Grans, Absolute 11/13/2024 0.02  0.00 - 0.05 10*3/mm3 Final    nRBC 11/13/2024 0.0  0.0 - 0.2 /100 WBC Final    proBNP 11/13/2024 1,133.0  0.0 - 1,800.0 pg/mL Final    HS Troponin T 11/13/2024 52 (H)  <14 ng/L Final         DIAGNOSTICS:  CT Abdomen Pelvis With Contrast    Result Date: 11/13/2024  1. Hepatic cirrhotic morphology with portal venous hypertension, splenomegaly. Diffuse intra-abdominal ascites. 2. Cholelithiasis. 3. 14 x 11 mm  cystic area within the pancreatic body due to a small cystic lesion or extension of ascitic fluid into the pancreatic parenchyma. 4. 3.3 cm left renal parapelvic cystic lesion is denser than typically seen for a simple cyst and is new when compared to previous CT 10/21/2014. This could represent a hemorrhagic or proteinaceous cyst, though recommend follow-up with multiphasic CT to evaluate for internal enhancement and the possibility of a solid lesion. 5. 4 m nonobstructing left lower pole renal stone. 6. Generalized body wall edema. Radiation dose reduction techniques were utilized, including automated exposure control and exposure modulation based on body size.   This report was finalized on 11/13/2024 5:02 PM by Baldemar Bowen M.D on Workstation: [id number]            Results Review:   I reviewed the patient's new clinical results.  Discussed with ER physician  Old records reviewed / Medical Decision Making High Complexity  I personally viewed and interpreted the patient's EKG/Telemetry data- sinus rhythm      ASSESSMENT AND PLAN    Benign essential hypertension    Nonalcoholic fatty liver disease    Type 2 diabetes mellitus    Stage 4 chronic kidney disease    Murmur    Cirrhosis of liver with ascites    Arteriovenous fistula of left upper extremity  Hypoparathyroidism       Hepatic cirrhotic morphology with portal venous hypertension, splenomegaly. Diffuse intra-abdominal ascites.  -Likely progression of QUINN.  -Will consult her GI docs  -Placed on lactulose  -Will need to be on Lasix and spironolactone to keep ascites off  -Will order ultrasound-guided paracentesis and diagnostic labs  -Will order some albumin but may need to be get calculated once paracentesis is done  -Start nadolol and hold atenolol  -Will likely need to have an EGD at some point to evaluate the portal hypertension to see if any of esophageal varices    CKD 4 with h/o  Metabolic acidosis   - biopsy-proven diabetic nephropathy with  extensive glomerulosclerosis and 60% interstitial fibrosis who is here for follow up evaluation. Patient baseline creatinine is around 2.0 mg/dl.   - start sodium bicarbonate on 10/24/24  -Avoid Nsaids , Stay well hydrated  -Limit salt intake to 2 g per 24 hours       Murmur  -Echo ordered but possibly referred sound from AV fistula      Type 2 diabetes mellitus  --Accu-Cheks  -hypoglycemia protocol  -sliding scale insulin to cover outlier blood sugars  Patient states she is on Lantus 10 units at home she was on 12 but that she was having some hypoglycemia.  Likely due to worsened renal function and recirculating as well as liver dysfunction also could be the recirculate and glimepiride.  Will hold for now    UTI  -Started on Rocephin await cultures     14 x 11 mm cystic area within the pancreatic body due to a small cystic lesion or extension of ascitic fluid into the pancreatic parenchyma.      3.3 cm left renal parapelvic cystic lesion   -is denser than typically seen for a simple cyst and is new when compared to previous CT 10/21/2014. This could represent a hemorrhagic or proteinaceous cyst, though recommend follow-up with multiphasic CT to evaluate for internal enhancement and the possibility of a solid lesion.     Hypertension  Using nadolol instead of the atenolol.  Probably restart lisinopril, and will change hydralazine from 3 times daily to twice daily.     Anemia of CKD  Hb of 11 (09/26/24) Goal of 10-11.5  Not on supplement due to constipation    Chronic medical conditions being monitored- stable, continue medical management  -Hypoparathyroidism     +DVT PROPH:    scd likely will have procedure soon  + CODE STATUS: Full       I discussed the patient's findings and my recommendations with the patient and/or family.  Please reference all orders placed.    Cristel Henry MD  11/13/24  15:08 EST      This document is intended for medical expert use only. Reading of this document by patients and/or  patient's family without participating in medical staff guidance may result in misinterpretation and unintended morbidity. Any interpretation of such data is the responsibility of the patient and/or family member responsible for the patient in concert with their primary or specialist providers, and NOT to be left for sources of online searches such as [employer], [employer] or similar queries. Relying on these approaches to knowledge may result in misinterpretation, misguided goals of care and even death should patients or family members try recommendations outside of the realm of professional medical care in a supervised way.    Dictated utilizing Voice dictation:  Parts of this note may be an electronic transcription/translation of spoke language to printed text using Dragon dictation system.

## 2024-11-13 NOTE — PROGRESS NOTES
Clinical Pharmacy Services: Medication History    Philly Person is a 79 [facility].o. [facility]   Chief Complaint   Patient presents with    Edema    Abdominal Pain       She  has a past medical history of Angiodysplasia of colon without hemorrhage (07/14/2022), Arthritis, Basal cell carcinoma (01/30/2018), Calculus of kidney (03/22/2016), Cataract, Diabetes mellitus, 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 abscess w/o bleeding (07/14/2022), H/O: gout (12/07/2021), History of kidney stones, History of melanoma, Hyperlipidemia, Hypertension, Left knee pain, Melanoma (01/30/2018), Nonalcoholic steatohepatitis, Seasonal allergies, Squamous cell carcinoma of skin (12/09/2016), Stage 4 chronic kidney disease (12/07/2021), and Type 2 diabetes mellitus (03/22/2016).    Allergies as of 11/13/2024    (No Known Allergies)       [facility]   Pharmacy and Phone Number:     Prior to Admission Medications       Prescriptions Last Dose Informant Patient Reported? Taking?    allopurinol (ZYLOPRIM) 100 MG tablet  Pharmacy No Yes    TAKE 1 TABLET BY MOUTH 2 TIMES A DAY    atenolol (TENORMIN) 100 MG tablet  Pharmacy No Yes    Take 1 tablet by mouth Daily.    atorvastatin (LIPITOR) 10 MG tablet  Pharmacy No Yes    TAKE ONE TABLET BY MOUTH ONCE NIGHTLY    coenzyme Q10 100 MG capsule  Family Member Yes Yes    Take 2 capsules by mouth Every Evening.    Cranberry 500 MG capsule  Family Member Yes Yes    Take 500 mg by mouth Daily.    furosemide (LASIX) 20 MG tablet  Pharmacy No Yes    Take 1 tablet by mouth Daily.    glimepiride (AMARYL) 4 MG tablet  Pharmacy No Yes    TAKE 1 TABLET BY MOUTH TWICE A DAY    hydrALAZINE (APRESOLINE) 25 MG tablet  Pharmacy Yes Yes    Take 1 tablet by mouth 3 (Three) Times a Day.    insulin degludec (Tresiba FlexTouch) 100 UNIT/ML solution pen-injector injection  Pharmacy No Yes    Inject 10 Units under the skin into the appropriate  area as directed Daily.    lisinopril (PRINIVIL,ZESTRIL) 20 MG tablet  Pharmacy Yes Yes    Take 1 tablet by mouth Daily.    Multiple Vitamins-Minerals (MULTIVITAMIN ADULT PO)  Family Member Yes Yes    Take 1 tablet by mouth Daily.    vitamin B-6 (PYRIDOXINE) 100 MG tablet  Family Member Yes Yes    Take 1 tablet by mouth Daily.    vitamin C (ASCORBIC ACID) 500 MG tablet  Family Member Yes Yes    Take 1 tablet by mouth Every Night.    Blood Glucose Monitoring Suppl ([employer] vida Ã© feita de [employer] Premium Blood Glucose) w/Device kit   No No    Use to test blood sugar once daily E11.9 -diabetes    glucose blood test strip   No No    Use as instructed to test blood sugar once daily . Uncontrolled diabetes e11.65    glucose blood test strip   No No    Use as instructed to test blood sugar once daily for diabetes E11.9    Insulin Pen Needle (Pen Needles) 32G X 4 MM misc   No No    Use 1 each Daily.              Medication notes:     This medication list is complete to the best of my knowledge as of 11/13/2024    Please call if questions.    Aren Abebe  Medication History Technician   [phone]    11/13/2024 17:31 EST

## 2024-11-13 NOTE — ED PROVIDER NOTES
EMERGENCY DEPARTMENT ENCOUNTER      Room Number:  05/05  PCP: Shazia Cardoza MD  Independent Historians: Patient and Family  Patient Care Team:  Shazia Cardoza MD as PCP - General (Family Medicine)       HPI:  Chief Complaint: Abdominal pain    A complete HPI/ROS/PMH/PSH/SH/FH are unobtainable due to: None    Chronic or social conditions impacting patient care (Social Determinants of Health): None      Context: Philly Person is a 79 y.o. female with a PMH significant for anemia, HTN, DM2, melanoma, DJD, QUINN who presents to the ED c/o acute generalized abdominal pain and distention.  The patient reports that she has had difficulty with bowel movements recently and has been taking Linzess and lactulose prescribed by gastroenterology for constipation.  She is passing liquid stool but continues with abdominal pressure and distention.  She also admits to some bilateral lower extremity edema that has progressed recently.  No history of similar symptoms.  No nausea, vomiting, fever, chills.      Upon review of prior external notes (non-ED) -and- Review of prior external test results outside of this encounter it appears the patient was evaluated in the office with Nephrology on 10/24/24.  The patient had a normal uric acid and magnesium on 09/24/24.      PAST MEDICAL HISTORY  Active Ambulatory Problems     Diagnosis Date Noted    Elevated liver enzymes 03/16/2016    Anemia 03/22/2016    Arthritis 03/22/2016    Benign essential hypertension 03/22/2016    Hypercholesterolemia 03/22/2016    Microalbuminuria 03/22/2016    Nonalcoholic fatty liver disease 03/22/2016    Calculus of kidney 03/22/2016    Adiposity 03/22/2016    Type 2 diabetes mellitus 03/22/2016    Squamous cell carcinoma of skin 12/09/2016    Melanoma 01/30/2018    Basal cell carcinoma 01/30/2018    DJD (degenerative joint disease) of knee 08/05/2019    Angiodysplasia of colon without hemorrhage 07/14/2022    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  abscess w/o bleeding 2022    Dyslipidemia 2021    H/O: gout 2021    History of renal calculi 2016    Other fecal abnormalities 2024    Polyp of colon 2022    Renal hypertension 2021    Vitamin D deficiency 2022     Resolved Ambulatory Problems     Diagnosis Date Noted    Abnormal liver enzymes 2016    Acute renal failure 2016    Hyperkalemia 2016     Past Medical History:   Diagnosis Date    Cataract     Diabetes mellitus     History of kidney stones     History of melanoma     Hyperlipidemia     Hypertension     Left knee pain     Nonalcoholic steatohepatitis     Seasonal allergies     Stage 4 chronic kidney disease 2021         PAST SURGICAL HISTORY  Past Surgical History:   Procedure Laterality Date     SECTION      COLONOSCOPY      CYSTOSCOPY W/ LASER LITHOTRIPSY      REPLACEMENT TOTAL KNEE Right 10/2015    SKIN CANCER EXCISION Left     ARM MELANOMA    TONSILLECTOMY AND ADENOIDECTOMY      TOTAL KNEE ARTHROPLASTY Left 2019    Procedure: LEFT TOTAL KNEE ARTHROPLASTY;  Surgeon: Magdaleno Saleh MD;  Location: [facility];  Service: Orthopedics    UPPER GASTROINTESTINAL ENDOSCOPY           FAMILY HISTORY  Family History   Problem Relation Age of Onset    Bipolar disorder Mother     Hypertension Mother     Heart disease Mother     Stroke Mother     Kidney disease Father     Leukemia Father     Kidney disease Sister     Thyroid disease Sister     Breast cancer Maternal Aunt     Malig Hyperthermia Neg Hx          SOCIAL HISTORY  Social History     Socioeconomic History    Marital status:    Tobacco Use    Smoking status: Never    Smokeless tobacco: Never   Vaping Use    Vaping status: Never Used   Substance and Sexual Activity    Alcohol use: Yes     Comment: ON OCC         ALLERGIES  Patient has no known allergies.      REVIEW OF SYSTEMS  Included in HPI  All systems reviewed and negative except for those discussed in  HPI.      PHYSICAL EXAM    I have reviewed the triage vital signs and nursing notes.    ED Triage Vitals [24 1156]   Temp Heart Rate Resp BP SpO2   97 °F (36.1 °C) 71 16 -- 97 %      Temp src Heart Rate Source Patient Position BP Location FiO2 (%)   -- -- -- -- --       Physical Exam  Constitutional:       General: She is not in acute distress.     Appearance: She is well-developed. She is not ill-appearing.   HENT:      Head: Normocephalic and atraumatic.   Eyes:      General: No scleral icterus.     Conjunctiva/sclera: Conjunctivae normal.   Neck:      Trachea: No tracheal deviation.   Cardiovascular:      Rate and Rhythm: Normal rate and regular rhythm.      Heart sounds: Murmur heard.   Pulmonary:      Effort: Pulmonary effort is normal.      Breath sounds: Normal breath sounds.   Abdominal:      Palpations: Abdomen is soft. There is hepatomegaly.      Tenderness: There is no abdominal tenderness.   Musculoskeletal:         General: No deformity.      Cervical back: Normal range of motion.      Right lower le+      Left lower le+   Lymphadenopathy:      Cervical: No cervical adenopathy.   Skin:     General: Skin is warm and dry.   Neurological:      Mental Status: She is alert and oriented to person, place, and time.   Psychiatric:         Behavior: Behavior normal.         Vital signs and nursing notes reviewed.      PPE: I wore a surgical mask throughout my encounters with the pt. I performed hand hygiene on entry into the pt room and upon exit.     LAB RESULTS  Recent Results (from the past 24 hours)   Comprehensive Metabolic Panel    Collection Time: 24 12:44 PM    Specimen: Blood   Result Value Ref Range    Glucose 59 (L) 65 - 99 mg/dL    BUN 59 (H) 8 - 23 mg/dL    Creatinine 2.71 (H) 0.57 - 1.00 mg/dL    Sodium 145 136 - 145 mmol/L    Potassium 4.1 3.5 - 5.2 mmol/L    Chloride 104 98 - 107 mmol/L    CO2 26.4 22.0 - 29.0 mmol/L    Calcium 9.0 8.6 - 10.5 mg/dL    Total Protein 6.2 6.0 -  8.5 g/dL    Albumin 3.0 (L) 3.5 - 5.2 g/dL    ALT (SGPT) 20 1 - 33 U/L    AST (SGOT) 43 (H) 1 - 32 U/L    Alkaline Phosphatase 155 (H) 39 - 117 U/L    Total Bilirubin 1.4 (H) 0.0 - 1.2 mg/dL    Globulin 3.2 gm/dL    A/G Ratio 0.9 g/dL    BUN/Creatinine Ratio 21.8 7.0 - 25.0    Anion Gap 14.6 5.0 - 15.0 mmol/L    eGFR 17.3 (L) >60.0 mL/min/1.73   Lipase    Collection Time: 11/13/24 12:44 PM    Specimen: Blood   Result Value Ref Range    Lipase 66 (H) 13 - 60 U/L   Lactic Acid, Plasma    Collection Time: 11/13/24 12:44 PM    Specimen: Blood   Result Value Ref Range    Lactate 1.0 0.5 - 2.0 mmol/L   aPTT    Collection Time: 11/13/24 12:44 PM    Specimen: Blood   Result Value Ref Range    PTT 31.5 22.7 - 35.4 seconds   Protime-INR    Collection Time: 11/13/24 12:44 PM    Specimen: Blood   Result Value Ref Range    Protime 15.8 (H) 11.7 - 14.2 Seconds    INR 1.24 (H) 0.90 - 1.10   Ammonia    Collection Time: 11/13/24 12:44 PM    Specimen: Blood   Result Value Ref Range    Ammonia 12 11 - 51 umol/L   Green Top (Gel)    Collection Time: 11/13/24 12:44 PM   Result Value Ref Range    Extra Tube Hold for add-ons.    Lavender Top    Collection Time: 11/13/24 12:44 PM   Result Value Ref Range    Extra Tube hold for add-on    Gold Top - SST    Collection Time: 11/13/24 12:44 PM   Result Value Ref Range    Extra Tube Hold for add-ons.    Light Blue Top    Collection Time: 11/13/24 12:44 PM   Result Value Ref Range    Extra Tube Hold for add-ons.    CBC Auto Differential    Collection Time: 11/13/24 12:44 PM    Specimen: Blood   Result Value Ref Range    WBC 6.55 3.40 - 10.80 10*3/mm3    RBC 3.18 (L) 3.77 - 5.28 10*6/mm3    Hemoglobin 9.7 (L) 12.0 - 15.9 g/dL    Hematocrit 29.2 (L) 34.0 - 46.6 %    MCV 91.8 79.0 - 97.0 fL    MCH 30.5 26.6 - 33.0 pg    MCHC 33.2 31.5 - 35.7 g/dL    RDW 14.4 12.3 - 15.4 %    RDW-SD 47.7 37.0 - 54.0 fl    MPV 11.9 6.0 - 12.0 fL    Platelets 165 140 - 450 10*3/mm3    Neutrophil % 67.3 42.7 - 76.0 %     Lymphocyte % 15.9 (L) 19.6 - 45.3 %    Monocyte % 10.7 5.0 - 12.0 %    Eosinophil % 4.7 0.3 - 6.2 %    Basophil % 1.1 0.0 - 1.5 %    Immature Grans % 0.3 0.0 - 0.5 %    Neutrophils, Absolute 4.41 1.70 - 7.00 10*3/mm3    Lymphocytes, Absolute 1.04 0.70 - 3.10 10*3/mm3    Monocytes, Absolute 0.70 0.10 - 0.90 10*3/mm3    Eosinophils, Absolute 0.31 0.00 - 0.40 10*3/mm3    Basophils, Absolute 0.07 0.00 - 0.20 10*3/mm3    Immature Grans, Absolute 0.02 0.00 - 0.05 10*3/mm3    nRBC 0.0 0.0 - 0.2 /100 WBC   BNP    Collection Time: 11/13/24 12:44 PM    Specimen: Blood   Result Value Ref Range    proBNP 1,133.0 0.0 - 1,800.0 pg/mL   Single High Sensitivity Troponin T    Collection Time: 11/13/24 12:44 PM    Specimen: Blood   Result Value Ref Range    HS Troponin T 52 (H) <14 ng/L   Urinalysis With Microscopic If Indicated (No Culture) - Urine, Clean Catch    Collection Time: 11/13/24  2:32 PM    Specimen: Urine, Clean Catch   Result Value Ref Range    Color, UA Yellow Yellow, Straw    Appearance, UA Cloudy (A) Clear    pH, UA 6.0 5.0 - 8.0    Specific Gravity, UA 1.008 1.005 - 1.030    Glucose, UA Negative Negative    Ketones, UA Negative Negative    Bilirubin, UA Negative Negative    Blood, UA Negative Negative    Protein, UA 30 mg/dL (1+) (A) Negative    Leuk Esterase, UA Large (3+) (A) Negative    Nitrite, UA Negative Negative    Urobilinogen, UA 0.2 E.U./dL 0.2 - 1.0 E.U./dL         RADIOLOGY  CT Abdomen Pelvis With Contrast    Result Date: 11/13/2024  CT ABDOMEN PELVIS W CONTRAST-  HISTORY: 79 years of age, Female.  Generalized abdominal distention, probable ascites, hepatomegaly  TECHNIQUE:  CT includes axial imaging from the lung bases to the trochanters with intravenous contrast and without use of oral contrast. Data reconstructed in coronal and sagittal planes. Radiation dose reduction techniques were utilized, including automated exposure control and exposure modulation based on body size.  COMPARISON: CT abdomen  10/21/2014  FINDINGS: Calcified right lower lobe nodule. Calcified right infrahilar lymph nodes.  Moderate ascites. Hepatic cirrhotic morphology with evidence for portal venous hypertension and splenomegaly. Hepatic and splenic calcifications are present. Multiple gallstones. Gallbladder mostly decompressed.  Adrenal glands within normal limits. Anterior-inferior pancreatic body there is focal fluid density within the pancreatic parenchyma measuring 14 x 11 mm. this could represent a small pancreatic cystic lesion or fluid extending into the substance of the parenchyma.  There is a left mid to lower pole renal parapelvic cystic lesion that contains internal density that is denser than typically seen for a simple cyst and measures 3.1 x 3.3 cm. There is a 4 mm left lower pole nonobstructing stone. No hydronephrosis. Urinary bladder partially decompressed. Generalized body wall edema. Advanced multilevel degenerative disc disease within the thoracolumbar spine. At L1-L2, there is a posterior disc osteophyte complex with moderate canal stenosis.  Colonic diverticulosis. No bowel dilatation or evidence of bowel obstruction. No evidence for appendicitis. Uterus and adnexal structures appear within normal limits. Atherosclerotic calcifications are present involving the abdominal aorta and iliac vasculature.      1. Hepatic cirrhotic morphology with portal venous hypertension, splenomegaly. Diffuse intra-abdominal ascites. 2. Cholelithiasis. 3. 14 x 11 mm cystic area within the pancreatic body due to a small cystic lesion or extension of ascitic fluid into the pancreatic parenchyma. 4. 3.3 cm left renal parapelvic cystic lesion is denser than typically seen for a simple cyst and is new when compared to previous CT 10/21/2014. This could represent a hemorrhagic or proteinaceous cyst, though recommend follow-up with multiphasic CT to evaluate for internal enhancement and the possibility of a solid lesion. 5. 4 m  nonobstructing left lower pole renal stone. 6. Generalized body wall edema. Radiation dose reduction techniques were utilized, including automated exposure control and exposure modulation based on body size.          MEDICATIONS GIVEN IN ER  Medications   sodium chloride 0.9 % flush 10 mL (has no administration in time range)   iopamidol (ISOVUE-300) 61 % injection 100 mL (85 mL Intravenous Given 11/13/24 1347)         ORDERS PLACED DURING THIS VISIT:  Orders Placed This Encounter   Procedures    CT Abdomen Pelvis With Contrast    [facility] Draw    Comprehensive Metabolic Panel    Lipase    Urinalysis With Microscopic If Indicated (No Culture) - Urine, Clean Catch    Lactic Acid, Plasma    aPTT    Protime-INR    Ammonia    CBC Auto Differential    BNP    Single High Sensitivity Troponin T    Urinalysis, Microscopic Only - Urine, Clean Catch    High Sensitivity Troponin T    [facility] (on-call MD unless specified) Details    ADULT TRANSTHORACIC ECHO COMPLETE W/ CONT IF NECESSARY PER PROTOCOL    Insert Peripheral IV    Inpatient Admission    CBC & Differential    Green Top (Gel)    Lavender Top    Gold Top - SST    Light Blue Top         OUTPATIENT MEDICATION MANAGEMENT:  Current Facility-Administered Medications Ordered in Epic   Medication Dose Route Frequency Provider Last Rate Last Admin    sodium chloride 0.9 % flush 10 mL  10 mL Intravenous PRN Constantino Martin PA         Current Outpatient Medications Ordered in Epic   Medication Sig Dispense Refill    allopurinol (ZYLOPRIM) 100 MG tablet TAKE 1 TABLET BY MOUTH 2 TIMES A  tablet 0    atenolol (TENORMIN) 100 MG tablet Take 1 tablet by mouth Daily. 90 tablet 1    atorvastatin (LIPITOR) 10 MG tablet TAKE ONE TABLET BY MOUTH ONCE NIGHTLY 90 tablet 1    Blood Glucose Monitoring Suppl ([employer] Premium Blood Glucose) w/Device kit Use to test blood sugar once daily E11.9 -diabetes 1 kit 0    coenzyme Q10 100 MG capsule Take 2 capsules by mouth Every Evening. WILL HOLD  FOR SURGDRY      Cranberry 500 MG capsule Take 500 mg by mouth Daily. WILL HOLD FOR SURGERY      furosemide (LASIX) 20 MG tablet Take 1 tablet by mouth Daily. 90 tablet 1    glimepiride (AMARYL) 4 MG tablet TAKE 1 TABLET BY MOUTH TWICE A  tablet 0    glucose blood test strip Use as instructed to test blood sugar once daily . Uncontrolled diabetes e11.65 100 each 2    glucose blood test strip Use as instructed to test blood sugar once daily for diabetes E11.9 100 each 12    hydrALAZINE (APRESOLINE) 25 MG tablet Take 1 tablet by mouth 3 (Three) Times a Day.      insulin degludec (Tresiba FlexTouch) 100 UNIT/ML solution pen-injector injection Inject 10 Units under the skin into the appropriate area as directed Daily. 15 mL 4    Insulin Pen Needle (Pen Needles) 32G X 4 MM misc Use 1 each Daily. 100 each 3    lisinopril (PRINIVIL,ZESTRIL) 20 MG tablet Take 1 tablet by mouth Daily.      Multiple Vitamins-Minerals (MULTIVITAMIN ADULT PO) Take 1 tablet by mouth Daily.      vitamin B-6 (PYRIDOXINE) 100 MG tablet Take 1 tablet by mouth Daily.      vitamin C (ASCORBIC ACID) 500 MG tablet Take 1 tablet by mouth Every Night.      zolpidem (AMBIEN) 5 MG tablet Take 1 tablet by mouth.             PROGRESS, DATA ANALYSIS, CONSULTS, AND MEDICAL DECISION MAKING  All labs have been independently interpreted by me.  All radiology studies have been reviewed by me. All EKG's have been independently viewed and interpreted by me.  Discussion below represents my analysis of pertinent findings related to patient's condition, differential diagnosis, treatment plan and final disposition.    Patient presentation and evaluation most consistent with abdominal ascites requiring admission for further evaluation for possible cardiac etiology causing hepatic congestion.  Patient agreeable with all questions answered.    DIFFERENTIAL DIAGNOSIS INCLUDE BUT NOT LIMITED TO:     QUINN, ascites, CHF, pulmonary hypertension, portal  hypertension    Clinical Scores: N/A      ED Course as of 11/14/24 0555   Wed Nov 13, 2024   1306 Patient with blowing murmur left sternal border, with her current presentation concern for pulmonary regurgitation causing hepatic congestion and lower extremity edema.  Discussed this with patient family at bedside, echo ordered [DN]   1324 Creatinine(!): 2.71  Stable from x2 weeks ago [DN]   1349 I discussed the patient's CKD and risks of CT contrast. [DN]   1419 HS Troponin T(!): 52 [DN]   1419 proBNP: 1,133.0 [DN]   1420 CT Abdomen Pelvis With Contrast  I reviewed CT images, large volume ascites, abdominal wall anasarca, splenomegaly [DN]   1446 HS Troponin T(!): 52 [DC]   1506 I discussed the case with MD Elaine with [facility] at this time regarding the patient.  I discussed work-up, results, concerns.  I discussed the consulting provider's desire for tele admit.    [DC]   1616 WBC, UA(!): Too Numerous to Count  IV ceftriaxone x 1 ordered [DN]      ED Course User Index  [DC] Constantino Martin PA  [DN] John Gracia MD            1514 I rechecked the patient.  I discussed the patient's labs, radiology findings (including all incidental findings), diagnosis, and plan for admission. The patient understands and agrees with the plan.      AS OF 15:10 EST VITALS:    BP - 152/68  HR - 72  TEMP - 97 °F (36.1 °C)  O2 SATS - 96%    COMPLEXITY OF CARE  The patient requires admission.      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    Note Disclaimer: At [facility], we believe that sharing information builds trust and better relationships. You are receiving this note because you recently visited [facility]. It is possible you will see health information before a provider has talked with you about it. This kind of information can be easy to misunderstand. To help you fully understand what it means for your health, we urge you to discuss this note with your provider.         Constantino Martin PA  11/15/24 0948

## 2024-11-14 ENCOUNTER — INPATIENT HOSPITAL (AMBULATORY)
Age: 79
End: 2024-11-14
Payer: COMMERCIAL

## 2024-11-14 ENCOUNTER — APPOINTMENT (OUTPATIENT)
Dept: ULTRASOUND IMAGING | Facility: HOSPITAL | Age: 79
End: 2024-11-14
Payer: MEDICARE

## 2024-11-14 ENCOUNTER — INPATIENT HOSPITAL (AMBULATORY)
Dept: URBAN - METROPOLITAN AREA HOSPITAL 113 | Facility: HOSPITAL | Age: 79
End: 2024-11-14
Payer: COMMERCIAL

## 2024-11-14 DIAGNOSIS — N18.4 CHRONIC KIDNEY DISEASE, STAGE 4 (SEVERE): ICD-10-CM

## 2024-11-14 DIAGNOSIS — K59.00 CONSTIPATION, UNSPECIFIED: ICD-10-CM

## 2024-11-14 DIAGNOSIS — K76.6 PORTAL HYPERTENSION: ICD-10-CM

## 2024-11-14 DIAGNOSIS — R18.8 OTHER ASCITES: ICD-10-CM

## 2024-11-14 DIAGNOSIS — K74.69 OTHER CIRRHOSIS OF LIVER: ICD-10-CM

## 2024-11-14 LAB
ALBUMIN FLD-MCNC: 1.1 G/DL
ALBUMIN SERPL-MCNC: 2.7 G/DL (ref 3.5–5.2)
ALBUMIN/GLOB SERPL: 1 G/DL
ALP SERPL-CCNC: 139 U/L (ref 39–117)
ALPHA-FETOPROTEIN: <2 NG/ML (ref 0–8.3)
ALT SERPL W P-5'-P-CCNC: 20 U/L (ref 1–33)
AMYLASE FLD-CCNC: 17 U/L
ANION GAP SERPL CALCULATED.3IONS-SCNC: 12 MMOL/L (ref 5–15)
APPEARANCE FLD: ABNORMAL
AST SERPL-CCNC: 37 U/L (ref 1–32)
BASOPHILS # BLD AUTO: 0.04 10*3/MM3 (ref 0–0.2)
BASOPHILS NFR BLD AUTO: 0.7 % (ref 0–1.5)
BILIRUB SERPL-MCNC: 0.9 MG/DL (ref 0–1.2)
BUN SERPL-MCNC: 58 MG/DL (ref 8–23)
BUN/CREAT SERPL: 23.8 (ref 7–25)
CALCIUM SPEC-SCNC: 8.4 MG/DL (ref 8.6–10.5)
CHLORIDE SERPL-SCNC: 102 MMOL/L (ref 98–107)
CO2 SERPL-SCNC: 24 MMOL/L (ref 22–29)
COLOR FLD: YELLOW
CREAT SERPL-MCNC: 2.44 MG/DL (ref 0.57–1)
DEPRECATED RDW RBC AUTO: 49.8 FL (ref 37–54)
EGFRCR SERPLBLD CKD-EPI 2021: 19.6 ML/MIN/1.73
EOSINOPHIL # BLD AUTO: 0.18 10*3/MM3 (ref 0–0.4)
EOSINOPHIL NFR BLD AUTO: 3.3 % (ref 0.3–6.2)
EOSINOPHIL NFR FLD MANUAL: 1 %
ERYTHROCYTE [DISTWIDTH] IN BLOOD BY AUTOMATED COUNT: 14.6 % (ref 12.3–15.4)
FERRITIN SERPL-MCNC: 299 NG/ML (ref 13–150)
FERRITIN SERPL-MCNC: 378 NG/ML (ref 13–150)
GLOBULIN UR ELPH-MCNC: 2.8 GM/DL
GLUCOSE BLDC GLUCOMTR-MCNC: 156 MG/DL (ref 70–130)
GLUCOSE BLDC GLUCOMTR-MCNC: 184 MG/DL (ref 70–130)
GLUCOSE BLDC GLUCOMTR-MCNC: 74 MG/DL (ref 70–130)
GLUCOSE BLDC GLUCOMTR-MCNC: 90 MG/DL (ref 70–130)
GLUCOSE FLD-MCNC: 97 MG/DL
GLUCOSE SERPL-MCNC: 94 MG/DL (ref 65–99)
HAV IGM SERPL QL IA: NORMAL
HBV CORE IGM SERPL QL IA: NORMAL
HBV SURFACE AG SERPL QL IA: NORMAL
HCT VFR BLD AUTO: 26.4 % (ref 34–46.6)
HCV AB SER QL: NORMAL
HGB BLD-MCNC: 8.3 G/DL (ref 12–15.9)
IMM GRANULOCYTES # BLD AUTO: 0.02 10*3/MM3 (ref 0–0.05)
IMM GRANULOCYTES NFR BLD AUTO: 0.4 % (ref 0–0.5)
INR PPP: 1.29 (ref 0.9–1.1)
IRON 24H UR-MRATE: 47 MCG/DL (ref 37–145)
IRON SATN MFR SERPL: 25 % (ref 20–50)
LDH FLD-CCNC: 65 U/L
LYMPHOCYTES # BLD AUTO: 1.08 10*3/MM3 (ref 0.7–3.1)
LYMPHOCYTES NFR BLD AUTO: 19.8 % (ref 19.6–45.3)
LYMPHOCYTES NFR FLD MANUAL: 52 %
MAGNESIUM SERPL-MCNC: 2.1 MG/DL (ref 1.6–2.4)
MCH RBC QN AUTO: 29.3 PG (ref 26.6–33)
MCHC RBC AUTO-ENTMCNC: 31.4 G/DL (ref 31.5–35.7)
MCV RBC AUTO: 93.3 FL (ref 79–97)
METHOD: ABNORMAL
MONOCYTES # BLD AUTO: 0.59 10*3/MM3 (ref 0.1–0.9)
MONOCYTES NFR BLD AUTO: 10.8 % (ref 5–12)
MONOCYTES NFR FLD: 22 %
MONOS+MACROS NFR FLD: 22 %
NEUTROPHILS NFR BLD AUTO: 3.55 10*3/MM3 (ref 1.7–7)
NEUTROPHILS NFR BLD AUTO: 65 % (ref 42.7–76)
NEUTROPHILS NFR FLD MANUAL: 3 %
NUC CELL # FLD: 314 /MM3
PLATELET # BLD AUTO: 106 10*3/MM3 (ref 140–450)
PMV BLD AUTO: 13.3 FL (ref 6–12)
POTASSIUM SERPL-SCNC: 3.5 MMOL/L (ref 3.5–5.2)
PROT FLD-MCNC: 1.8 G/DL
PROT SERPL-MCNC: 5.5 G/DL (ref 6–8.5)
PROTHROMBIN TIME: 16.3 SECONDS (ref 11.7–14.2)
RBC # BLD AUTO: 2.83 10*6/MM3 (ref 3.77–5.28)
RBC # FLD AUTO: 4823 /MM3
SODIUM SERPL-SCNC: 138 MMOL/L (ref 136–145)
TIBC SERPL-MCNC: 186 MCG/DL (ref 298–536)
TRANSFERRIN SERPL-MCNC: 125 MG/DL (ref 200–360)
WBC NRBC COR # BLD AUTO: 5.46 10*3/MM3 (ref 3.4–10.8)

## 2024-11-14 PROCEDURE — 88305 TISSUE EXAM BY PATHOLOGIST: CPT | Performed by: INTERNAL MEDICINE

## 2024-11-14 PROCEDURE — 83735 ASSAY OF MAGNESIUM: CPT | Performed by: INTERNAL MEDICINE

## 2024-11-14 PROCEDURE — 99222 1ST HOSP IP/OBS MODERATE 55: CPT | Performed by: INTERNAL MEDICINE

## 2024-11-14 PROCEDURE — 82150 ASSAY OF AMYLASE: CPT | Performed by: INTERNAL MEDICINE

## 2024-11-14 PROCEDURE — 83540 ASSAY OF IRON: CPT | Performed by: STUDENT IN AN ORGANIZED HEALTH CARE EDUCATION/TRAINING PROGRAM

## 2024-11-14 PROCEDURE — 88112 CYTOPATH CELL ENHANCE TECH: CPT | Performed by: INTERNAL MEDICINE

## 2024-11-14 PROCEDURE — 82042 OTHER SOURCE ALBUMIN QUAN EA: CPT | Performed by: INTERNAL MEDICINE

## 2024-11-14 PROCEDURE — 85610 PROTHROMBIN TIME: CPT | Performed by: INTERNAL MEDICINE

## 2024-11-14 PROCEDURE — 25010000002 BUMETANIDE PER 0.5 MG: Performed by: HOSPITALIST

## 2024-11-14 PROCEDURE — 82948 REAGENT STRIP/BLOOD GLUCOSE: CPT

## 2024-11-14 PROCEDURE — 87205 SMEAR GRAM STAIN: CPT | Performed by: INTERNAL MEDICINE

## 2024-11-14 PROCEDURE — 63710000001 INSULIN LISPRO (HUMAN) PER 5 UNITS: Performed by: INTERNAL MEDICINE

## 2024-11-14 PROCEDURE — 89051 BODY FLUID CELL COUNT: CPT | Performed by: INTERNAL MEDICINE

## 2024-11-14 PROCEDURE — 97530 THERAPEUTIC ACTIVITIES: CPT

## 2024-11-14 PROCEDURE — 82945 GLUCOSE OTHER FLUID: CPT | Performed by: INTERNAL MEDICINE

## 2024-11-14 PROCEDURE — 87070 CULTURE OTHR SPECIMN AEROBIC: CPT | Performed by: INTERNAL MEDICINE

## 2024-11-14 PROCEDURE — 80053 COMPREHEN METABOLIC PANEL: CPT | Performed by: INTERNAL MEDICINE

## 2024-11-14 PROCEDURE — 87075 CULTR BACTERIA EXCEPT BLOOD: CPT | Performed by: INTERNAL MEDICINE

## 2024-11-14 PROCEDURE — 25010000002 CEFTRIAXONE PER 250 MG: Performed by: INTERNAL MEDICINE

## 2024-11-14 PROCEDURE — 85025 COMPLETE CBC W/AUTO DIFF WBC: CPT | Performed by: INTERNAL MEDICINE

## 2024-11-14 PROCEDURE — 25010000002 LIDOCAINE 1 % SOLUTION: Performed by: RADIOLOGY

## 2024-11-14 PROCEDURE — 97162 PT EVAL MOD COMPLEX 30 MIN: CPT

## 2024-11-14 PROCEDURE — 0W9G3ZZ DRAINAGE OF PERITONEAL CAVITY, PERCUTANEOUS APPROACH: ICD-10-PCS | Performed by: HOSPITALIST

## 2024-11-14 PROCEDURE — 82247 BILIRUBIN TOTAL: CPT | Performed by: INTERNAL MEDICINE

## 2024-11-14 PROCEDURE — 84157 ASSAY OF PROTEIN OTHER: CPT | Performed by: INTERNAL MEDICINE

## 2024-11-14 PROCEDURE — 83615 LACTATE (LD) (LDH) ENZYME: CPT | Performed by: INTERNAL MEDICINE

## 2024-11-14 PROCEDURE — 82728 ASSAY OF FERRITIN: CPT | Performed by: STUDENT IN AN ORGANIZED HEALTH CARE EDUCATION/TRAINING PROGRAM

## 2024-11-14 PROCEDURE — 76942 ECHO GUIDE FOR BIOPSY: CPT

## 2024-11-14 PROCEDURE — 84466 ASSAY OF TRANSFERRIN: CPT | Performed by: STUDENT IN AN ORGANIZED HEALTH CARE EDUCATION/TRAINING PROGRAM

## 2024-11-14 PROCEDURE — 80074 ACUTE HEPATITIS PANEL: CPT | Performed by: INTERNAL MEDICINE

## 2024-11-14 RX ORDER — LUBIPROSTONE 8 UG/1
8 CAPSULE ORAL 2 TIMES DAILY WITH MEALS
Status: DISCONTINUED | OUTPATIENT
Start: 2024-11-14 | End: 2024-11-15

## 2024-11-14 RX ORDER — PANTOPRAZOLE SODIUM 40 MG/10ML
40 INJECTION, POWDER, LYOPHILIZED, FOR SOLUTION INTRAVENOUS 2 TIMES DAILY
Status: DISCONTINUED | OUTPATIENT
Start: 2024-11-14 | End: 2024-11-19

## 2024-11-14 RX ORDER — BUMETANIDE 0.25 MG/ML
1 INJECTION, SOLUTION INTRAMUSCULAR; INTRAVENOUS ONCE
Status: COMPLETED | OUTPATIENT
Start: 2024-11-14 | End: 2024-11-14

## 2024-11-14 RX ORDER — LACTULOSE 10 G/15ML
20 SOLUTION ORAL 2 TIMES DAILY
Status: DISCONTINUED | OUTPATIENT
Start: 2024-11-14 | End: 2024-11-15

## 2024-11-14 RX ORDER — HYDRALAZINE HYDROCHLORIDE 50 MG/1
50 TABLET, FILM COATED ORAL EVERY 8 HOURS SCHEDULED
Status: DISCONTINUED | OUTPATIENT
Start: 2024-11-14 | End: 2024-11-22 | Stop reason: HOSPADM

## 2024-11-14 RX ORDER — ALBUMIN (HUMAN) 12.5 G/50ML
50 SOLUTION INTRAVENOUS ONCE AS NEEDED
Status: DISCONTINUED | OUTPATIENT
Start: 2024-11-14 | End: 2024-11-22 | Stop reason: HOSPADM

## 2024-11-14 RX ORDER — BUMETANIDE 0.25 MG/ML
1 INJECTION, SOLUTION INTRAMUSCULAR; INTRAVENOUS EVERY 12 HOURS
Status: DISCONTINUED | OUTPATIENT
Start: 2024-11-14 | End: 2024-11-14

## 2024-11-14 RX ORDER — LIDOCAINE HYDROCHLORIDE 10 MG/ML
10 INJECTION, SOLUTION INFILTRATION; PERINEURAL ONCE
Status: COMPLETED | OUTPATIENT
Start: 2024-11-14 | End: 2024-11-14

## 2024-11-14 RX ADMIN — LUBIPROSTONE 8 MCG: 8 CAPSULE, GELATIN COATED ORAL at 18:23

## 2024-11-14 RX ADMIN — LACTULOSE 20 G: 20 SOLUTION ORAL at 11:52

## 2024-11-14 RX ADMIN — INSULIN LISPRO 2 UNITS: 100 INJECTION, SOLUTION INTRAVENOUS; SUBCUTANEOUS at 17:17

## 2024-11-14 RX ADMIN — PANTOPRAZOLE SODIUM 40 MG: 40 INJECTION, POWDER, FOR SOLUTION INTRAVENOUS at 21:25

## 2024-11-14 RX ADMIN — HYDRALAZINE HYDROCHLORIDE 50 MG: 50 TABLET ORAL at 21:25

## 2024-11-14 RX ADMIN — HYDRALAZINE HYDROCHLORIDE 50 MG: 50 TABLET ORAL at 15:13

## 2024-11-14 RX ADMIN — BUMETANIDE 1 MG: 0.25 INJECTION INTRAMUSCULAR; INTRAVENOUS at 11:54

## 2024-11-14 RX ADMIN — INSULIN LISPRO 2 UNITS: 100 INJECTION, SOLUTION INTRAVENOUS; SUBCUTANEOUS at 21:26

## 2024-11-14 RX ADMIN — CEFTRIAXONE 2000 MG: 2 INJECTION, POWDER, FOR SOLUTION INTRAMUSCULAR; INTRAVENOUS at 17:16

## 2024-11-14 RX ADMIN — Medication 10 ML: at 12:05

## 2024-11-14 RX ADMIN — LIDOCAINE HYDROCHLORIDE 6 ML: 10 INJECTION, SOLUTION INFILTRATION; PERINEURAL at 09:19

## 2024-11-14 RX ADMIN — PANTOPRAZOLE SODIUM 40 MG: 40 INJECTION, POWDER, FOR SOLUTION INTRAVENOUS at 11:52

## 2024-11-14 RX ADMIN — Medication 10 ML: at 21:26

## 2024-11-14 RX ADMIN — LACTULOSE 20 G: 20 SOLUTION ORAL at 21:25

## 2024-11-14 RX ADMIN — NADOLOL 20 MG: 20 TABLET ORAL at 13:35

## 2024-11-14 NOTE — PLAN OF CARE
Goal Outcome Evaluation:  Plan of Care Reviewed With: patient        Progress: improving     Pt is a 79 y.o. female with h/o DMII, HTN, Bond and CKD4  who was admitted to [facility] on 11/13/2024 with c/o SOB, abdominal pain and abdominal distention. Patient is independent at baseline, with use of a cane outside of her home and no AD inside of her home, and lives with her  in a house w/ 3 [address]. Today, pt required SBA/SV for transfers without an AD, and ambulated 12' with CG, R HHA and additional 12' w/o AD and SBA. Pt went for a paracentesis earlier this AM where 7150mL was removed. Pt reports feeling improvement in her breathing and at her abdomen. During the session pt had a BM that she states is d/t her Lactulose. Pt required assist w/ dressing and pericare; RN present assisting as well. Pt may benefit from skilled PT services acutely to address their impaired strength, balance and endurance, as well as, improve their level of independence prior to discharge. SBAR w/ RN. Rec home w/ assist upon DC.      Anticipated Discharge Disposition (PT): home, home with assist

## 2024-11-14 NOTE — CASE MANAGEMENT/SOCIAL WORK
Discharge Planning Assessment  [facility]     Patient Name: Philly Person  MRN: [id number]  Today's Date: 11/14/2024    Admit Date: 11/13/2024    Plan: home with spouse; follow for needs   Discharge Needs Assessment       Row Name 11/14/24 1401       Living Environment    People in Home spouse    Current Living Arrangements home    Potentially Unsafe Housing Conditions none    In the past 12 months has the electric, gas, oil, or water company threatened to shut off services in your home? No    Primary Care Provided by self    Provides Primary Care For no one    Family Caregiver if Needed spouse    Quality of Family Relationships helpful;involved    Able to Return to Prior Arrangements yes       Resource/Environmental Concerns    Resource/Environmental Concerns none    Transportation Concerns none       Transportation Needs    In the past 12 months, has lack of transportation kept you from medical appointments or from getting medications? no    In the past 12 months, has lack of transportation kept you from meetings, work, or from getting things needed for daily living? No       Food Insecurity    Within the past 12 months, you worried that your food would run out before you got the money to buy more. Never true    Within the past 12 months, the food you bought just didn't last and you didn't have money to get more. Never true       Transition Planning    Patient/Family Anticipates Transition to home with family    Patient/Family Anticipated Services at Transition none    Transportation Anticipated family or friend will provide       Discharge Needs Assessment    Equipment Currently Used at Home walker, standard;cane, quad;commode;shower chair;glucometer;wheelchair    Equipment Needed After Discharge none    Provided Post Acute Provider List? Refused    Refused Provider List Comment declined                   Discharge Plan       Row Name 11/14/24 1401       Plan    Plan home with spouse; follow for needs     Patient/Family in Agreement with Plan yes    Plan Comments Spoke with pt bedside. Confirmed facesheet correct. Explained role of CCP. Pt reports she lives with her spouse. She is IADLs still drives and has walker, cane and bath bench at home. She has used HH in past after knee surgery no SNF. She plans home, denies needs. Verified PCP. CCP to follow.                  Continued Care and Services - Admitted Since 11/13/2024    No active coordination exists for this encounter.       Expected Discharge Date and Time       Expected Discharge Date Expected Discharge Time    Nov 18, 2024            Demographic Summary    No documentation.                  Functional Status    No documentation.                  Psychosocial    No documentation.                  Abuse/Neglect    No documentation.                  Legal    No documentation.                  Substance Abuse    No documentation.                  Patient Forms    No documentation.                     MANUEL Bond

## 2024-11-14 NOTE — CONSULTS
[facility]   Consult Note    Patient Name: Philly Person  : 1945  MRN: [id number]  Primary Care Physician:  Shazia Cardoza MD  Referring Physician: No ref. provider found  Date of admission: 2024    Inpatient Gastroenterology Consult  Consult performed by: Brendon Pina MD  Consult ordered by: Cristel Henry MD        Subjective   Subjective     Reason for Consult/ Chief Complaint: cirrhosis , ascites    History of Present Illness  Philly Person is a 79 y.o. female with history of chronic constipation presented with worsening abdominal swelling, weight gain. She has been found to have some anasarca and ascites.Imaging suggested she has cirrhosis.  She does not use etoh,  no known  autoimmune disease. She has had advanced renal failure ( lucikily not on dialysis. )    Review of Systems   Constitutional:  Positive for unexpected weight change.   Gastrointestinal:  Positive for abdominal distention, abdominal pain and constipation.   Neurological:  Positive for weakness.        Personal History     Past Medical History:   Diagnosis Date    Angiodysplasia of colon without hemorrhage 2022    Arteriovenous fistula of left upper extremity 2024    Arthritis     Basal cell carcinoma 2018.      Calculus of kidney 2016    Cataract     LEFT AND RIGHT    Diabetes mellitus     TYPE 2    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 abscess w/o bleeding 2022    H/O: gout 2021    History of kidney stones     History of melanoma     LEFT ARM    Hyperlipidemia     Hypertension     Left knee pain     Melanoma 2018.      Nonalcoholic steatohepatitis     Seasonal allergies     Squamous cell carcinoma of skin 2016    New one in 2019 -left leg.      Stage 4 chronic kidney disease 2021    Progressive decline in kidney function since 2019.   most recent creatinine 1.85.    Etiology of kidney disease is likely associated with diabetic  nephrosclerosis. biopsy-proven   No recent history of NSAID use.    Renal duplex US negative for renal artery stenosis.    kidney biopsy consistent to his diabetic nephropathy with extensive global sclerosis and 60% interstitial fibrosis.  It was noted     Type 2 diabetes mellitus 2016       Past Surgical History:   Procedure Laterality Date     SECTION      COLONOSCOPY      CYSTOSCOPY W/ LASER LITHOTRIPSY      REPLACEMENT TOTAL KNEE Right 10/2015    SKIN CANCER EXCISION Left     ARM MELANOMA    TONSILLECTOMY AND ADENOIDECTOMY      TOTAL KNEE ARTHROPLASTY Left 2019    Procedure: LEFT TOTAL KNEE ARTHROPLASTY;  Surgeon: Magdaleno Saleh MD;  Location: [facility];  Service: Orthopedics    UPPER GASTROINTESTINAL ENDOSCOPY         Family History: family history includes Bipolar disorder in her mother; Breast cancer in her maternal aunt; Heart disease in her mother; Hypertension in her mother; Kidney disease in her father and sister; Leukemia in her father; Stroke in her mother; Thyroid disease in her sister. Otherwise pertinent FHx was reviewed and not pertinent to current issue.    Social History:  reports that she has never smoked. She has never used smokeless tobacco. She reports current alcohol use. She reports that she does not use drugs.    Home Medications:   Cranberry, [employer] Premium Blood Glucose, Pen Needles, allopurinol, atenolol, atorvastatin, coenzyme Q10, furosemide, glimepiride, glucose blood, hydrALAZINE, insulin degludec, lisinopril, multivitamin with minerals, sodium bicarbonate, and vitamin B-6    Allergies:  No Known Allergies    Objective    Objective     Vitals:  Temp:  [97 °F (36.1 °C)-98.3 °F (36.8 °C)] 98.1 °F (36.7 °C)  Heart Rate:  [64-74] 69  Resp:  [16] 16  BP: (137-164)/(52-74) 144/66    Physical Exam  HENT:      Head: Atraumatic.      Right Ear: External ear normal.      Left Ear: External ear normal.      Mouth/Throat:      Pharynx: Oropharynx is clear.   Eyes:       Conjunctiva/sclera: Conjunctivae normal.   Cardiovascular:      Rate and Rhythm: Normal rate.      Pulses: Normal pulses.   Pulmonary:      Effort: Pulmonary effort is normal.      Breath sounds: Examination of the right-lower field reveals decreased breath sounds. Examination of the left-lower field reveals decreased breath sounds.   Abdominal:      General: Bowel sounds are normal.      Palpations: There is shifting dullness and fluid wave.   Musculoskeletal:      Right lower leg: Edema present.      Left lower leg: Edema present.   Neurological:      Mental Status: She is alert.      Cranial Nerves: Cranial nerve deficit present.         Result Review    Result Review:  I have personally reviewed the results from the time of this admission to 11/14/2024 09:10 EST and agree with these findings:  []  Laboratory list / accordion  []  Microbiology  []  Radiology  []  EKG/Telemetry   []  Cardiology/Vascular   []  Pathology  []  Old records  []  Other:  Most notable findings include:       Assessment & Plan   Assessment / Plan     Brief Patient Summary:  Philly Person is a 79 y.o. female who   Cirrhotic ascites   Cirrhosis likely secondary to QUINN   Fluid overload ,  Renal failure   Constipation.     Active Hospital Problems:  Active Hospital Problems    Diagnosis     Murmur     Cirrhosis of liver with ascites     Arteriovenous fistula of left upper extremity     Stage 4 chronic kidney disease     Nonalcoholic fatty liver disease     Type 2 diabetes mellitus     Benign essential hypertension      Plan:  case reviewed with renal medicine and greatly appreciate input and renal will manage diuretics  Will check serologist to exclude viral, autoimmune disease. Check cole fetoprotein  Add amitiza for constipation       Brendon Pina MD

## 2024-11-14 NOTE — SIGNIFICANT NOTE
11/14/24 1100   OTHER   Discipline physical therapist   Rehab Time/Intention   Session Not Performed patient unavailable for evaluation  (Pt CANDY this AM for Paracentesis. PT may follow up later this PM, time permitting, or next service date for PT tx as approrpiate.)   Therapy Assessment/Plan (PT)   Criteria for Skilled Interventions Met (PT) yes   Recommendation   PT - Next Appointment 11/15/24

## 2024-11-14 NOTE — CONSULTS
[facility] Consult Note      Patient Name: Philly Person  : 1945  MRN: [id number]  Primary Care Physician:  Shazia Cardoza MD  Referring Physician: No ref. provider found  Date of admission: 2024    Subjective     Reason for Consult: CKD stage IV    HPI:   Philly Person is a 79 y.o. female known to have a history of Bond type 2 diabetes mellitus, history of hypertension, history of CKD stage IV secondary to biopsy-proven diabetic nephrosclerosis with 60%/fibrosis of kidney biopsy who is followed by me in clinic who presented to the hospital with worsening shortness of breath and generalized edema associated with 8 pounds of weight gain.  Noted increased abdominal girth.  -In ER noted to be hypertensive with systolic blood pressure around 150.  Sodium was 145, potassium 4.1, creatinine 2.7.  Patient underwent CT scan of the abdomen pelvis with IV contrast showing the presence of diffuse ascites, liver cirrhosis, and left renal cyst.  Patient was started on Lasix 40 mg p.o. daily and nephrology was consulted for management.      Review of Systems:   14 point review of systems is otherwise negative except for mentioned above on HPI    Personal History     Past Medical History:   Diagnosis Date    Angiodysplasia of colon without hemorrhage 2022    Arteriovenous fistula of left upper extremity 2024    Arthritis     Basal cell carcinoma 2018.      Calculus of kidney 2016    Cataract     LEFT AND RIGHT    Diabetes mellitus     TYPE 2    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 abscess w/o bleeding 2022    H/O: gout 2021    History of kidney stones     History of melanoma     LEFT ARM    Hyperlipidemia     Hypertension     Left knee pain     Melanoma 2018.      Nonalcoholic steatohepatitis     Seasonal allergies     Squamous cell carcinoma of skin 2016    New one in 2019 -left leg.      Stage 4 chronic  kidney disease 2021    Progressive decline in kidney function since 2019.   most recent creatinine 1.85.    Etiology of kidney disease is likely associated with diabetic nephrosclerosis. biopsy-proven   No recent history of NSAID use.    Renal duplex US negative for renal artery stenosis.    kidney biopsy consistent to his diabetic nephropathy with extensive global sclerosis and 60% interstitial fibrosis.  It was noted     Type 2 diabetes mellitus 2016       Past Surgical History:   Procedure Laterality Date     SECTION      COLONOSCOPY      CYSTOSCOPY W/ LASER LITHOTRIPSY      REPLACEMENT TOTAL KNEE Right 10/2015    SKIN CANCER EXCISION Left     ARM MELANOMA    TONSILLECTOMY AND ADENOIDECTOMY      TOTAL KNEE ARTHROPLASTY Left 2019    Procedure: LEFT TOTAL KNEE ARTHROPLASTY;  Surgeon: Magdaleno Saleh MD;  Location: [facility];  Service: Orthopedics    UPPER GASTROINTESTINAL ENDOSCOPY         Family History: family history includes Bipolar disorder in her mother; Breast cancer in her maternal aunt; Heart disease in her mother; Hypertension in her mother; Kidney disease in her father and sister; Leukemia in her father; Stroke in her mother; Thyroid disease in her sister.    Social History:  reports that she has never smoked. She has never used smokeless tobacco. She reports current alcohol use. She reports that she does not use drugs.    Home Medications:  Prior to Admission medications    Medication Sig Start Date End Date Taking? Authorizing Provider   allopurinol (ZYLOPRIM) 100 MG tablet TAKE 1 TABLET BY MOUTH 2 TIMES A DAY  Patient taking differently: Take 2 tablets by mouth Every Night. 24  Yes Shazia Cardoza MD   atenolol (TENORMIN) 100 MG tablet Take 1 tablet by mouth Daily. 7/3/24  Yes Shazia Cardoza MD   atorvastatin (LIPITOR) 10 MG tablet TAKE ONE TABLET BY MOUTH ONCE NIGHTLY 24  Yes Shazia Cardoza MD   coenzyme Q10 100 MG capsule Take 2 capsules by mouth Every  Evening.   Yes Myah Ch MD   Cranberry 500 MG capsule Take 500 mg by mouth Daily.   Yes Myah Ch MD   furosemide (LASIX) 20 MG tablet Take 1 tablet by mouth Daily. 7/3/24  Yes Shazia Cardoza MD   glimepiride (AMARYL) 4 MG tablet TAKE 1 TABLET BY MOUTH TWICE A DAY 7/10/24  Yes Shazia Cardoza MD   hydrALAZINE (APRESOLINE) 25 MG tablet Take 1 tablet by mouth 3 (Three) Times a Day. 10/4/21  Yes Myah Ch MD   insulin degludec (Tresiba FlexTouch) 100 UNIT/ML solution pen-injector injection Inject 10 Units under the skin into the appropriate area as directed Daily. 8/12/24  Yes Venkat Frost DO   lisinopril (PRINIVIL,ZESTRIL) 20 MG tablet Take 1 tablet by mouth Daily.   Yes Myah Ch MD   Multiple Vitamins-Minerals (MULTIVITAMIN ADULT PO) Take 1 tablet by mouth Daily.   Yes Myah Ch MD   sodium bicarbonate 650 MG tablet Take 2 tablets by mouth 2 (Two) Times a Day.   Yes Myah Ch MD   vitamin B-6 (PYRIDOXINE) 100 MG tablet Take 1 tablet by mouth Daily.   Yes Myah Ch MD   Blood Glucose Monitoring Suppl ([employer] Premium Blood Glucose) w/Device kit Use to test blood sugar once daily E11.9 -diabetes 7/3/24   Shazia Cardoza MD   glucose blood test strip Use as instructed to test blood sugar once daily . Uncontrolled diabetes e11.65 5/2/23   Shazia Cardoza MD   glucose blood test strip Use as instructed to test blood sugar once daily for diabetes E11.9 7/3/24   Shazia Cardoza MD   Insulin Pen Needle (Pen Needles) 32G X 4 MM misc Use 1 each Daily. 8/6/24   Venkat Frost DO       Allergies:  No Known Allergies    Objective     Vitals:   Temp:  [97 °F (36.1 °C)-98.3 °F (36.8 °C)] 98.1 °F (36.7 °C)  Heart Rate:  [64-74] 74  Resp:  [16] 16  BP: (137-164)/(52-74) 158/68  No intake or output data in the 24 hours ending 11/14/24 0758    Physical Exam:   Constitutional: ill looking   HEENT: Sclera anicteric, no conjunctival  injection  Neck: Supple, no thyromegaly, no lymphadenopathy, trachea at midline, no JVD  Respiratory: Clear to auscultation bilaterally, nonlabored respiration  Cardiovascular: RRR, no murmurs, no rubs or gallops, no carotid bruit  Gastrointestinal: Positive bowel sounds, abdomen is soft, nontender , distended  : No palpable bladder  Musculoskeletal: 1+ pitting edema bilateral lower extremity no clubbing or cyanosis  Psychiatric: Appropriate affect, cooperative  Neurologic: Oriented x3, moving all extremities, normal speech and mental status  Skin: Warm and dry       Scheduled Meds:     cefTRIAXone, 2,000 mg, Intravenous, Q24H  furosemide, 40 mg, Oral, Daily  hydrALAZINE, 25 mg, Oral, Q12H  insulin glargine, 8 Units, Subcutaneous, Daily  insulin lispro, 2-7 Units, Subcutaneous, 4x Daily AC & at Bedtime  lactulose, 20 g, Oral, BID  [Held by provider] lisinopril, 20 mg, Oral, Daily  nadolol, 20 mg, Oral, Q24H  pantoprazole, 40 mg, Intravenous, BID  sodium chloride, 10 mL, Intravenous, Q12H      IV Meds:        Results Reviewed:   I have personally reviewed the results from the time of this admission to 11/14/2024 07:58 EST     Lab Results   Component Value Date    GLUCOSE 94 11/14/2024    CALCIUM 8.4 (L) 11/14/2024     11/14/2024    K 3.5 11/14/2024    CO2 24.0 11/14/2024     11/14/2024    BUN 58 (H) 11/14/2024    CREATININE 2.44 (H) 11/14/2024    EGFRIFAFRI 35 (L) 09/30/2021    EGFRIFNONA 29 (L) 09/30/2021    BCR 23.8 11/14/2024    ANIONGAP 12.0 11/14/2024      Lab Results   Component Value Date    MG 2.1 11/14/2024    ALBUMIN 2.7 (L) 11/14/2024           Assessment / Plan     ASSESSMENT:  CKD stage IV with recent baseline creatinine between 2 up to 2.3 mg/dL secondary to biopsy-proven diabetic nephrosclerosis with 60% interstitial fibrosis creatinine slightly up on admission 2.7 currently back to baseline.  Urine albumin to creatinine ratio 541 mg/g consistent with diabetic nephrosclerosis.  Does have  matured left upper extremity AV fistula with good thrill  History of type 2 diabetes mellitus with CKD  Hypertension with CKD.  Blood pressure uncontrolled  QUINN cirrhosis complicated by ascites  Volume overload  Anemia of CKD  Left renal cyst.  Complex needs to be followed in outpatient setting    PLAN:  Patient is at risk of contrast-induced nephropathy with diuresis but she is currently hypervolemic  Agree with gentle diuresis for now will Bumex 1 mg IV daily  Hold lisinopril for now  Increase hydralazine to 50 mg 3 times daily  Will check urine protein to creatinine ratio, urinalysis  No indication for dialysis today  Check iron studies  Agree with paracentesis      Thank you for involving us in the care of Philly Person.  Please feel free to call with any questions.    Christy Flores MD  11/14/24  07:58 [facility]    [facility]  [phone]    Parts of this note may be an electronic transcription/translation of spoken language to printed text using the Dragon dictation system.

## 2024-11-14 NOTE — PLAN OF CARE
Goal Outcome Evaluation:            Patient alert and oriented, denies pain, up to bathroom with assistance- incontinence of bowl and bladder- multiple BM's with c/o abdomen discomfort described as bubbling. Patient had paracentesis today- 7150 cc removed (14 lbs). No acute distress noted, will continue to monitor.

## 2024-11-14 NOTE — PROGRESS NOTES
Name: Philly Person ADMIT: 2024   : 1945  PCP: Shazia Cardoza MD    MRN: [id number] LOS: 1 days   AGE/SEX: 79 y.o. female  [facility]     Subjective   Subjective   2024  Patient seen and examined at bedside, she is without distress. Paracentesis done this morning.  She is resting in bed without distress.        Objective   Objective   Vital Signs  Temp:  [97.3 °F (36.3 °C)-98.3 °F (36.8 °C)] 97.3 °F (36.3 °C)  Heart Rate:  [64-74] 72  Resp:  [16-18] 18  BP: (137-164)/(52-74) 164/71  SpO2:  [96 %-100 %] 100 %  on   ;   Device (Oxygen Therapy): room air  Body mass index is 50.99 kg/m².  Physical Exam  Constitutional:       General: She is not in acute distress.     Appearance: Normal appearance. She is not toxic-appearing.   HENT:      Head: Normocephalic and atraumatic.      Nose: Nose normal. No congestion.      Mouth/Throat:      Pharynx: Oropharynx is clear. No oropharyngeal exudate.   Eyes:      General: No scleral icterus.  Cardiovascular:      Rate and Rhythm: Normal rate and regular rhythm.      Heart sounds: No murmur heard.     No friction rub. No gallop.   Pulmonary:      Effort: No respiratory distress.      Breath sounds: No wheezing or rales.   Abdominal:      General: There is no distension.      Tenderness: There is no abdominal tenderness. There is no guarding.   Musculoskeletal:         General: No swelling, deformity or signs of injury.      Cervical back: Normal range of motion. No rigidity.      Comments: Left arm fistula present   Skin:     Coloration: Skin is not jaundiced.      Findings: No bruising or lesion.   Neurological:      General: No focal deficit present.      Mental Status: She is alert and oriented to person, place, and time.      Motor: No weakness.       Results Review     I reviewed the patient's new clinical results.  Results from last 7 days   Lab Units 24  0424 24  1244   WBC 10*3/mm3 5.46 6.55   HEMOGLOBIN g/dL 8.3* 9.7*   PLATELETS  10*3/mm3 106* 165     Results from last 7 days   Lab Units 11/14/24  0424 11/13/24  1244   SODIUM mmol/L 138 145   POTASSIUM mmol/L 3.5 4.1   CHLORIDE mmol/L 102 104   CO2 mmol/L 24.0 26.4   BUN mg/dL 58* 59*   CREATININE mg/dL 2.44* 2.71*   GLUCOSE mg/dL 94 59*   EGFR mL/min/1.73 19.6* 17.3*     Results from last 7 days   Lab Units 11/14/24  0424 11/13/24  1244   ALBUMIN g/dL 2.7* 3.0*   BILIRUBIN mg/dL 0.9 1.4*   ALK PHOS U/L 139* 155*   AST (SGOT) U/L 37* 43*   ALT (SGPT) U/L 20 20     Results from last 7 days   Lab Units 11/14/24  0424 11/13/24  1244   CALCIUM mg/dL 8.4* 9.0   ALBUMIN g/dL 2.7* 3.0*   MAGNESIUM mg/dL 2.1  --      Results from last 7 days   Lab Units 11/13/24  1244   LACTATE mmol/L 1.0     Glucose   Date/Time Value Ref Range Status   11/14/2024 1126 74 70 - 130 mg/dL Final   11/14/2024 0620 90 70 - 130 mg/dL Final   11/13/2024 2029 107 70 - 130 mg/dL Final   11/13/2024 1941 76 70 - 130 mg/dL Final   11/13/2024 1857 42 (C) 70 - 130 mg/dL Final       US Paracentesis    Result Date: 11/14/2024  Ultrasound-guided paracentesis as described  This report was finalized on 11/14/2024 10:27 AM by Dr. Hung Michelle M.D on Workstation: [id number]      CT Abdomen Pelvis With Contrast    Result Date: 11/13/2024  1. Hepatic cirrhotic morphology with portal venous hypertension, splenomegaly. Diffuse intra-abdominal ascites. 2. Cholelithiasis. 3. 14 x 11 mm cystic area within the pancreatic body due to a small cystic lesion or extension of ascitic fluid into the pancreatic parenchyma. 4. 3.3 cm left renal parapelvic cystic lesion is denser than typically seen for a simple cyst and is new when compared to previous CT 10/21/2014. This could represent a hemorrhagic or proteinaceous cyst, though recommend follow-up with multiphasic CT to evaluate for internal enhancement and the possibility of a solid lesion. 5. 4 m nonobstructing left lower pole renal stone. 6. Generalized body wall edema. Radiation dose reduction  techniques were utilized, including automated exposure control and exposure modulation based on body size.   This report was finalized on 11/13/2024 5:02 PM by Baldemar Bowen M.D on Workstation: [id number]       I have personally reviewed all medications:  Scheduled Medications  cefTRIAXone, 2,000 mg, Intravenous, Q24H  hydrALAZINE, 50 mg, Oral, Q8H  insulin glargine, 8 Units, Subcutaneous, Daily  insulin lispro, 2-7 Units, Subcutaneous, 4x Daily AC & at Bedtime  lactulose, 20 g, Oral, BID  nadolol, 20 mg, Oral, Q24H  pantoprazole, 40 mg, Intravenous, BID  sodium chloride, 10 mL, Intravenous, Q12H    Infusions   Diet  Diet: Cardiac, Diabetic; Low Sodium (2g); Consistent Carbohydrate; Fluid Consistency: Thin (IDDSI 0)    I have personally reviewed:  [x]  Laboratory   [x]  Microbiology   [x]  Radiology   [x]  EKG/Telemetry  [x]  Cardiology/Vascular   []  Pathology    []  Records       Assessment/Plan     Active Hospital Problems    Diagnosis  POA    Murmur [R01.1]  Yes    Cirrhosis of liver with ascites [K74.60, R18.8]  Yes    Arteriovenous fistula of left upper extremity [I77.0]  Yes    Stage 4 chronic kidney disease [N18.4]  Yes    Nonalcoholic fatty liver disease [K76.0]  Yes    Type 2 diabetes mellitus [E11.9]  Yes    Benign essential hypertension [I10]  Yes      Resolved Hospital Problems   No resolved problems to display.       79 y.o. female admitted with <principal problem not specified>.    #Decompensated Cirrhosis with ascites  #Bond Cirrhosis    - CT a/p shows hepatic cirrhosis with ascites    - MELD 18    - start lactulose    - hold off on Xifaxin as not encephalopathic and stable    - Lactulose BID, titrate to 2-3 stools daily    - Nadolol 20mg PO q24h    - paracentesis ordered, > 7L removed, labs pending    - gentle diuresis due to renal fxn per nephrology    - GI consulted    #UTI    - urine culture growing gram negative bacilli    - blood cultures    - Rocephin 1g IV daily     #CKD4    - biopsy  proven diabetic nephropathy with glomerulosclerosis    - baseline cr is ~2.0, was 2.71 on admission     - matured left AV fistulas present, has not had to use    - nephrology following    - no indication for HD    - hold lisinopril    - defer diuresis to nephrology    #Left renal cyst    - noted on CT a/p    - will need otpt follow up    #AS    - echo on 11/13/24 shows mild AS    - EF 62% with normal diastolic function, small pericardial effusion present    #DM    -ISS    - Lantus at reduced dose of 8u SC daily    #Anemia of CKD    - hgb 8.3, base around 10.0    - iron studies suggest anemia of chronic disease    #GERD     - PPI BID    #HTN    - Hydralazine increased to 50mg PO tID        SCDs for DVT prophylaxis.  Full code.  Discussed with patient.  Anticipate discharge home in 1-2 days.  Expected Discharge Date: 11/18/2024; Expected Discharge Time:       Jae Chandra DO  [facility]  11/14/24  14:25 EST

## 2024-11-14 NOTE — CONSULTS
visited pt and her spouse in pt room this morning secondary to a referral in the chart for a pastoral care visit.  In the room, the pt's spouse informed me that they both wanted to fill out advanced directives.     This  reviewed the documents with pt and spouse line by line and answered all questions.  Pt and spouse both indicated their choices for the document and filled them out themselves.  gabby Cast V witnessed the signatures and performed notary services.      Pt's AD was faxed to HIM and placed in HIM outbox.  Copy placed in chart.  Original and 2 copies provided to pt.      Pt's spouse AD was also faxed to HIM and placed in HIM outbox.  Copies and original to him as well.

## 2024-11-14 NOTE — THERAPY EVALUATION
Patient Name: Philly Person  : 1945    MRN: [id number]                              Today's Date: 2024       Admit Date: 2024    Visit Dx:     ICD-10-CM ICD-9-CM   1. Other ascites  R18.8 789.59   2. Nonalcoholic liver disease, chronic  K76.9 571.9   3. Heart murmur  R01.1 785.2   4. Chronic kidney disease, unspecified CKD stage  N18.9 585.9   5. Anemia, unspecified type  D64.9 285.9     Patient Active Problem List   Diagnosis    Elevated liver enzymes    Anemia    Arthritis    Benign essential hypertension    Hypercholesterolemia    Microalbuminuria    Nonalcoholic fatty liver disease    Calculus of kidney    Adiposity    Type 2 diabetes mellitus    Squamous cell carcinoma of skin    Melanoma    Basal cell carcinoma    DJD (degenerative joint disease) of knee    Angiodysplasia of colon without hemorrhage    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 abscess w/o bleeding    Dyslipidemia    H/O: gout    History of renal calculi    Other fecal abnormalities    Polyp of colon    Renal hypertension    Stage 4 chronic kidney disease    Vitamin D deficiency    Murmur    Cirrhosis of liver with ascites    Arteriovenous fistula of left upper extremity     Past Medical History:   Diagnosis Date    Angiodysplasia of colon without hemorrhage 2022    Arteriovenous fistula of left upper extremity 2024    Arthritis     Basal cell carcinoma 2018.      Calculus of kidney 2016    Cataract     LEFT AND RIGHT    Diabetes mellitus     TYPE 2    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 abscess w/o bleeding 2022    H/O: gout 2021    History of kidney stones     History of melanoma     LEFT ARM    Hyperlipidemia     Hypertension     Left knee pain     Melanoma 2018.      Nonalcoholic steatohepatitis     Seasonal allergies     Squamous cell carcinoma of skin 2016    New one in 2019 -left leg.      Stage 4 chronic kidney disease 2021    Progressive  decline in kidney function since 2019.   most recent creatinine 1.85.    Etiology of kidney disease is likely associated with diabetic nephrosclerosis. biopsy-proven   No recent history of NSAID use.    Renal duplex US negative for renal artery stenosis.    kidney biopsy consistent to his diabetic nephropathy with extensive global sclerosis and 60% interstitial fibrosis.  It was noted     Type 2 diabetes mellitus 2016     Past Surgical History:   Procedure Laterality Date     SECTION      COLONOSCOPY      CYSTOSCOPY W/ LASER LITHOTRIPSY      REPLACEMENT TOTAL KNEE Right 10/2015    SKIN CANCER EXCISION Left     ARM MELANOMA    TONSILLECTOMY AND ADENOIDECTOMY      TOTAL KNEE ARTHROPLASTY Left 2019    Procedure: LEFT TOTAL KNEE ARTHROPLASTY;  Surgeon: Magdaleno Saleh MD;  Location: [facility];  Service: Orthopedics    UPPER GASTROINTESTINAL ENDOSCOPY        General Information       Row Name 24 142          Physical Therapy Time and Intention    Document Type evaluation  -MG     Mode of Treatment individual therapy;physical therapy  -MG       Row Name 24 142          General Information    Patient Profile Reviewed yes  -MG     Prior Level of Function independent:  No AD inside the house. Uses a cane outside of the home.  -MG     Existing Precautions/Restrictions fall  -MG     Barriers to Rehab none identified  -MG       Row Name 24 1422          Living Environment    People in Home spouse  -MG       Row Name 24 142          Home Main Entrance    Number of Stairs, Main Entrance three  -MG     Stair Railings, Main Entrance railings safe and in good condition;railing on left side (ascending)  -MG       Row Name 24 142          Stairs Within Home, Primary    Stairs, Within Home, Primary Stays on the main floor  -MG       Row Name 24 1422          Cognition    Orientation Status (Cognition) oriented x 4  -MG       Row Name 24 142          Safety  Issues/Impairments Affecting Functional Mobility    Safety Issues Affecting Function (Mobility) safety precaution awareness  -MG     Impairments Affecting Function (Mobility) balance;endurance/activity tolerance;strength  -MG     Comment, Safety Issues/Impairments (Mobility) Gait belt and non-skid socks donned.  -MG               User Key  (r) = Recorded By, (t) = Taken By, (c) = Cosigned By      Initials Name Provider Type    MG Lula Ramirez, PT Physical Therapist                   Mobility       Row Name 11/14/24 1425          Sit-Stand Transfer    Sit-Stand [place] (Transfers) supervision  -MG     Comment, (Sit-Stand Transfer) No AD. x1 chair, x1 commode w/ grab bar.  -MG       Row Name 11/14/24 1425          Gait/Stairs (Locomotion)    [place] Level (Gait) contact guard  -MG     Assistive Device (Gait) other (see comments)  R HHA  -MG     Distance in Feet (Gait) 12  x 2  -MG     Deviations/Abnormal Patterns (Gait) gait speed decreased  -MG     Bilateral Gait Deviations heel strike decreased  -MG     Comment, (Gait/Stairs) Amb 12' to BR w/ R HHA. Amb 12' back to chair w/o AD and SBA. Slow, cautious pace, but no overt LOB.  -MG               User Key  (r) = Recorded By, (t) = Taken By, (c) = Cosigned By      Initials Name Provider Type    MG Lula Ramirez, PT Physical Therapist                   Obj/Interventions       Row Name 11/14/24 1428          Range of Motion Comprehensive    General Range of Motion no range of motion deficits identified  -MG       Row Name 11/14/24 1428          Strength Comprehensive (MMT)    General Manual Muscle Testing (MMT) Assessment lower extremity strength deficits identified  -MG     Comment, General Manual Muscle Testing (MMT) Assessment Generalized weakness. Grossly 4+/5.  -MG       Row Name 11/14/24 1428          Balance    Comment, Balance Standing w/ CG/SBA at commode for brief doffing/donning and clean up. Pt initially able to perform pericare, but unable to  reach to complete. RN present and completed pericare while this PT assisted w/ standing balance.  -MG       Row Name 11/14/24 1428          Sensory Assessment (Somatosensory)    Sensory Assessment (Somatosensory) sensation intact  States having some new onset N/T to RLE with standing/ambulating that started one week ago.  -MG               User Key  (r) = Recorded By, (t) = Taken By, (c) = Cosigned By      Initials Name Provider Type    Lula Schroeder, PT Physical Therapist                   Goals/Plan       Row Name 11/14/24 1431          Bed Mobility Goal 1 (PT)    Activity/Assistive Device (Bed Mobility Goal 1, PT) bed mobility activities, all  -MG     [place] Level/Cues Needed (Bed Mobility Goal 1, PT) modified independence  -MG     Time Frame (Bed Mobility Goal 1, PT) 1 week  -MG       Row Name 11/14/24 1431          Transfer Goal 1 (PT)    Activity/Assistive Device (Transfer Goal 1, PT) transfers, all  -MG     [place] Level/Cues Needed (Transfer Goal 1, PT) supervision required;standby assist  -MG     Time Frame (Transfer Goal 1, PT) 1 week  -MG       Row Name 11/14/24 1431          Gait Training Goal 1 (PT)    Activity/Assistive Device (Gait Training Goal 1, PT) gait (walking locomotion)  -MG     [place] Level (Gait Training Goal 1, PT) standby assist;supervision required  -MG     Distance (Gait Training Goal 1, PT) 80  -MG     Time Frame (Gait Training Goal 1, PT) 1 week  -MG       Row Name 11/14/24 1431          Therapy Assessment/Plan (PT)    Planned Therapy Interventions (PT) balance training;bed mobility training;gait training;home exercise program;patient/family education;stretching;strengthening;stair training;ROM (range of motion);neuromuscular re-education;motor coordination training;postural re-education;transfer training  -MG               User Key  (r) = Recorded By, (t) = Taken By, (c) = Cosigned By      Initials Name Provider Type    Lula Schroeder PT Physical Therapist                    Clinical Impression       Row Name 11/14/24 1430          Pain    Pretreatment Pain Rating 0/10 - no pain  -MG     Posttreatment Pain Rating 0/10 - no pain  -MG       Row Name 11/14/24 1430          Plan of Care Review    Plan of Care Reviewed With patient  -MG     Progress improving  -MG     Outcome Evaluation Pt is a 79 y.o. female with h/o DMII, HTN, Bond and CKD4  who was admitted to [facility] on 11/13/2024 with c/o SOB, abdominal pain and abdominal distention. Patient is independent at baseline, with use of a cane outside of her home and no AD inside of her home, and lives with her  in a house w/ 3 [address]. Today, pt required SBA/SV for transfers without an AD, and ambulated 12' with CG, R HHA and additional 12' w/o AD and SBA. Pt went for a paracentesis earlier this AM where 7150mL was removed. Pt reports feeling improvement in her breathing and at her abdomen. During the session pt had a BM that she states is d/t her Lactulose. Pt required assist w/ dressing and pericare; RN present assisting as well. Pt may benefit from skilled PT services acutely to address their impaired strength, balance and endurance, as well as, improve their level of independence prior to discharge. SBAR w/ RN. Rec home w/ assist upon DC.  -MG       Row Name 11/14/24 1430          Therapy Assessment/Plan (PT)    Rehab Potential (PT) good  -MG     Criteria for Skilled Interventions Met (PT) yes  -MG     Therapy Frequency (PT) 5 times/wk  -MG       Row Name 11/14/24 1430          Vital Signs    Pretreatment Heart Rate (beats/min) 71  -MG     Posttreatment Heart Rate (beats/min) 78  -MG     Pre SpO2 (%) 98  -MG     O2 Delivery Pre Treatment room air  -MG     O2 Delivery Intra Treatment room air  -MG     Post SpO2 (%) 97  -MG     O2 Delivery Post Treatment room air  -MG     Pre Patient Position Sitting  -MG     Intra Patient Position Standing  -MG     Post Patient Position Sitting  -MG       Row Name 11/14/24 1430           Positioning and Restraints    Pre-Treatment Position sitting in chair/recliner  -MG     Post Treatment Position chair  -MG     In Chair notified nsg;sitting;call light within reach;encouraged to call for assist;exit alarm on;with nsg  -MG               User Key  (r) = Recorded By, (t) = Taken By, (c) = Cosigned By      Initials Name Provider Type    Lula Schroeder, PT Physical Therapist                   Outcome Measures       Row Name 11/14/24 1431 11/14/24 0808       How much help from another person do you currently need...    Turning from your back to your side while in flat bed without using bedrails? 4  -MG 3  -VJ    Moving from lying on back to sitting on the side of a flat bed without bedrails? 3  -MG 3  -VJ    Moving to and from a bed to a chair (including a wheelchair)? 3  -MG 2  -VJ    Standing up from a chair using your arms (e.g., wheelchair, bedside chair)? 3  -MG 2  -VJ    Climbing 3-5 steps with a railing? 2  -MG 2  -VJ    To walk in hospital room? 3  -MG 2  -VJ    AM-PAC 6 Clicks Score (PT) 18  -MG 14  -VJ              User Key  (r) = Recorded By, (t) = Taken By, (c) = Cosigned By      Initials Name Provider Type    Sussy Carmona, RN Registered Nurse    Lula Schroeder, PT Physical Therapist                                 Physical Therapy Education       Title: PT OT SLP Therapies (In Progress)       Topic: Physical Therapy (In Progress)       Point: Mobility training (Not Started)       Learner Progress:  Not documented in this visit.              Point: Home exercise program (Not Started)       Learner Progress:  Not documented in this visit.              Point: Body mechanics (Done)       Learning Progress Summary            Patient Acceptance, E, VU by MG at 11/14/2024 1431                      Point: Precautions (Done)       Learning Progress Summary            Patient Acceptance, E, VU by  at 11/14/2024 1431                                      User Key       Initials  Effective Dates Name Provider Type Discipline    MG 05/24/22 -  Lula Ramirez, PT Physical Therapist PT                  PT Recommendation and Plan  Planned Therapy Interventions (PT): balance training, bed mobility training, gait training, home exercise program, patient/family education, stretching, strengthening, stair training, ROM (range of motion), neuromuscular re-education, motor coordination training, postural re-education, transfer training  Progress: improving  Outcome Evaluation: Pt is a 79 y.o. female with h/o DMII, HTN, Bond and CKD4  who was admitted to [facility] on 11/13/2024 with c/o SOB, abdominal pain and abdominal distention. Patient is independent at baseline, with use of a cane outside of her home and no AD inside of her home, and lives with her  in a house w/ 3 [address]. Today, pt required SBA/SV for transfers without an AD, and ambulated 12' with CG, R HHA and additional 12' w/o AD and SBA. Pt went for a paracentesis earlier this AM where 7150mL was removed. Pt reports feeling improvement in her breathing and at her abdomen. During the session pt had a BM that she states is d/t her Lactulose. Pt required assist w/ dressing and pericare; RN present assisting as well. Pt may benefit from skilled PT services acutely to address their impaired strength, balance and endurance, as well as, improve their level of independence prior to discharge. SBAR w/ RN. Rec home w/ assist upon DC.     Time Calculation:         PT Charges       Row Name 11/14/24 1431 11/14/24 1100          Time Calculation    Start Time 1319  -MG --     Stop Time 1339  -MG --     Time Calculation (min) 20 min  -MG --     PT Received On 11/14/24  -MG --     PT - Next Appointment 11/15/24  -MG 11/15/24  -MG     PT Goal Re-Cert Due Date 11/28/24  -MG --        Time Calculation- PT    Total Timed Code Minutes- PT 10 minute(s)  -MG --               User Key  (r) = Recorded By, (t) = Taken By, (c) = Cosigned By      Initials Name Provider  Type    MG Lula Ramirez, PT Physical Therapist                  Therapy Charges for Today       Code Description Service Date Service Provider Modifiers Qty    74170689458 HC PT EVAL MOD COMPLEXITY 3 11/14/2024 Lula Ramirez, PT GP 1    38868439835 HC PT THERAPEUTIC ACT EA 15 MIN 11/14/2024 Lula Ramirez, PT GP 1            PT G-Codes  AM-PAC 6 Clicks Score (PT): 18  PT Discharge Summary  Anticipated Discharge Disposition (PT): home, home with assist    Lula Ramirez, PT  11/14/2024

## 2024-11-14 NOTE — PLAN OF CARE
Goal Outcome Evaluation:   Patient alert admitted at shift change. Incont care and grace care provided. Barrier cream applied grace area.  at bedside. Self turn. No c/o pain or nausea noted. No acute distress noted. Patient understands to have a light breakfast and npo after for paracentesis procedure in am. Will continue to monitor

## 2024-11-15 LAB
ALBUMIN SERPL-MCNC: 2.3 G/DL (ref 3.5–5.2)
ALBUMIN/GLOB SERPL: 0.8 G/DL
ALP SERPL-CCNC: 121 U/L (ref 39–117)
ALT SERPL W P-5'-P-CCNC: 17 U/L (ref 1–33)
ANION GAP SERPL CALCULATED.3IONS-SCNC: 11.2 MMOL/L (ref 5–15)
AST SERPL-CCNC: 32 U/L (ref 1–32)
BACTERIA SPEC AEROBE CULT: ABNORMAL
BASOPHILS # BLD AUTO: 0.04 10*3/MM3 (ref 0–0.2)
BASOPHILS NFR BLD AUTO: 0.8 % (ref 0–1.5)
BILIRUB SERPL-MCNC: 0.6 MG/DL (ref 0–1.2)
BUN SERPL-MCNC: 59 MG/DL (ref 8–23)
BUN/CREAT SERPL: 19.6 (ref 7–25)
CALCIUM SPEC-SCNC: 8.3 MG/DL (ref 8.6–10.5)
CHLORIDE SERPL-SCNC: 106 MMOL/L (ref 98–107)
CO2 SERPL-SCNC: 23.8 MMOL/L (ref 22–29)
CREAT SERPL-MCNC: 3.01 MG/DL (ref 0.57–1)
CYTO UR: NORMAL
DEPRECATED RDW RBC AUTO: 46.5 FL (ref 37–54)
EGFRCR SERPLBLD CKD-EPI 2021: 15.2 ML/MIN/1.73
EOSINOPHIL # BLD AUTO: 0.21 10*3/MM3 (ref 0–0.4)
EOSINOPHIL NFR BLD AUTO: 4.2 % (ref 0.3–6.2)
ERYTHROCYTE [DISTWIDTH] IN BLOOD BY AUTOMATED COUNT: 14.4 % (ref 12.3–15.4)
GLOBULIN UR ELPH-MCNC: 2.9 GM/DL
GLUCOSE BLDC GLUCOMTR-MCNC: 107 MG/DL (ref 70–130)
GLUCOSE BLDC GLUCOMTR-MCNC: 156 MG/DL (ref 70–130)
GLUCOSE BLDC GLUCOMTR-MCNC: 172 MG/DL (ref 70–130)
GLUCOSE BLDC GLUCOMTR-MCNC: 81 MG/DL (ref 70–130)
GLUCOSE SERPL-MCNC: 78 MG/DL (ref 65–99)
HCT VFR BLD AUTO: 25.7 % (ref 34–46.6)
HGB BLD-MCNC: 8.6 G/DL (ref 12–15.9)
IMM GRANULOCYTES # BLD AUTO: 0.01 10*3/MM3 (ref 0–0.05)
IMM GRANULOCYTES NFR BLD AUTO: 0.2 % (ref 0–0.5)
LAB AP CASE REPORT: NORMAL
LYMPHOCYTES # BLD AUTO: 1.41 10*3/MM3 (ref 0.7–3.1)
LYMPHOCYTES NFR BLD AUTO: 28 % (ref 19.6–45.3)
MCH RBC QN AUTO: 30.3 PG (ref 26.6–33)
MCHC RBC AUTO-ENTMCNC: 33.5 G/DL (ref 31.5–35.7)
MCV RBC AUTO: 90.5 FL (ref 79–97)
MONOCYTES # BLD AUTO: 0.57 10*3/MM3 (ref 0.1–0.9)
MONOCYTES NFR BLD AUTO: 11.3 % (ref 5–12)
NEUTROPHILS NFR BLD AUTO: 2.79 10*3/MM3 (ref 1.7–7)
NEUTROPHILS NFR BLD AUTO: 55.5 % (ref 42.7–76)
PATH REPORT.FINAL DX SPEC: NORMAL
PATH REPORT.GROSS SPEC: NORMAL
PHOSPHATE SERPL-MCNC: 3.5 MG/DL (ref 2.5–4.5)
PLATELET # BLD AUTO: 111 10*3/MM3 (ref 140–450)
PMV BLD AUTO: 12.6 FL (ref 6–12)
POTASSIUM SERPL-SCNC: 3.6 MMOL/L (ref 3.5–5.2)
PROT SERPL-MCNC: 5.2 G/DL (ref 6–8.5)
RBC # BLD AUTO: 2.84 10*6/MM3 (ref 3.77–5.28)
SODIUM SERPL-SCNC: 141 MMOL/L (ref 136–145)
WBC NRBC COR # BLD AUTO: 5.03 10*3/MM3 (ref 3.4–10.8)

## 2024-11-15 PROCEDURE — 82465 ASSAY BLD/SERUM CHOLESTEROL: CPT | Performed by: INTERNAL MEDICINE

## 2024-11-15 PROCEDURE — 84478 ASSAY OF TRIGLYCERIDES: CPT | Performed by: INTERNAL MEDICINE

## 2024-11-15 PROCEDURE — 25010000002 ALBUMIN HUMAN 25% PER 50 ML: Performed by: HOSPITALIST

## 2024-11-15 PROCEDURE — 63710000001 INSULIN GLARGINE PER 5 UNITS: Performed by: INTERNAL MEDICINE

## 2024-11-15 PROCEDURE — 99233 SBSQ HOSP IP/OBS HIGH 50: CPT | Performed by: INTERNAL MEDICINE

## 2024-11-15 PROCEDURE — P9047 ALBUMIN (HUMAN), 25%, 50ML: HCPCS | Performed by: HOSPITALIST

## 2024-11-15 PROCEDURE — 83010 ASSAY OF HAPTOGLOBIN QUANT: CPT | Performed by: INTERNAL MEDICINE

## 2024-11-15 PROCEDURE — 63710000001 INSULIN LISPRO (HUMAN) PER 5 UNITS: Performed by: INTERNAL MEDICINE

## 2024-11-15 PROCEDURE — 83883 ASSAY NEPHELOMETRY NOT SPEC: CPT | Performed by: INTERNAL MEDICINE

## 2024-11-15 PROCEDURE — 82977 ASSAY OF GGT: CPT | Performed by: INTERNAL MEDICINE

## 2024-11-15 PROCEDURE — 84100 ASSAY OF PHOSPHORUS: CPT | Performed by: HOSPITALIST

## 2024-11-15 PROCEDURE — 80053 COMPREHEN METABOLIC PANEL: CPT | Performed by: INTERNAL MEDICINE

## 2024-11-15 PROCEDURE — 97530 THERAPEUTIC ACTIVITIES: CPT

## 2024-11-15 PROCEDURE — 85025 COMPLETE CBC W/AUTO DIFF WBC: CPT | Performed by: INTERNAL MEDICINE

## 2024-11-15 PROCEDURE — 82948 REAGENT STRIP/BLOOD GLUCOSE: CPT

## 2024-11-15 PROCEDURE — 82172 ASSAY OF APOLIPOPROTEIN: CPT | Performed by: INTERNAL MEDICINE

## 2024-11-15 RX ORDER — LUBIPROSTONE 24 UG/1
24 CAPSULE ORAL 2 TIMES DAILY WITH MEALS
Status: DISCONTINUED | OUTPATIENT
Start: 2024-11-15 | End: 2024-11-18

## 2024-11-15 RX ORDER — ALBUMIN (HUMAN) 12.5 G/50ML
25 SOLUTION INTRAVENOUS ONCE
Status: COMPLETED | OUTPATIENT
Start: 2024-11-15 | End: 2024-11-15

## 2024-11-15 RX ADMIN — INSULIN LISPRO 2 UNITS: 100 INJECTION, SOLUTION INTRAVENOUS; SUBCUTANEOUS at 16:32

## 2024-11-15 RX ADMIN — HYDRALAZINE HYDROCHLORIDE 50 MG: 50 TABLET ORAL at 21:15

## 2024-11-15 RX ADMIN — HYDRALAZINE HYDROCHLORIDE 50 MG: 50 TABLET ORAL at 06:53

## 2024-11-15 RX ADMIN — PANTOPRAZOLE SODIUM 40 MG: 40 INJECTION, POWDER, FOR SOLUTION INTRAVENOUS at 08:21

## 2024-11-15 RX ADMIN — Medication 10 ML: at 21:15

## 2024-11-15 RX ADMIN — LUBIPROSTONE 24 MCG: 24 CAPSULE, GELATIN COATED ORAL at 16:31

## 2024-11-15 RX ADMIN — INSULIN GLARGINE 8 UNITS: 100 INJECTION, SOLUTION SUBCUTANEOUS at 08:21

## 2024-11-15 RX ADMIN — PANTOPRAZOLE SODIUM 40 MG: 40 INJECTION, POWDER, FOR SOLUTION INTRAVENOUS at 21:15

## 2024-11-15 RX ADMIN — NADOLOL 20 MG: 20 TABLET ORAL at 10:13

## 2024-11-15 RX ADMIN — ALBUMIN (HUMAN) 25 G: 0.25 INJECTION, SOLUTION INTRAVENOUS at 10:11

## 2024-11-15 RX ADMIN — Medication 10 ML: at 08:22

## 2024-11-15 RX ADMIN — INSULIN LISPRO 2 UNITS: 100 INJECTION, SOLUTION INTRAVENOUS; SUBCUTANEOUS at 21:14

## 2024-11-15 NOTE — CONSULTS
"Nutrition Services    Patient Name:  Philly Person  YOB: 1945  MRN: [id number]  Admit Date:  11/13/2024Assessment Date:  11/15/24    CLINICAL NUTRITION - EDUCATION     ASSESSMENT  Encounter Information         Consult from Physician    Education topic Sodium    Learner Patient    Learning readiness Motivated to learn    Pertinent nutrition history Does not add salt to foods, but unaware of high sodium foods      Anthropometrics         Height Height: 155 cm (61.02\")    Weight Weight: 111 kg (244 lb 0.8 oz) (weighed with 2 pillows 1 blanket 1 sheet) (11/15/24 0611)    BMI  Body mass index is 46.08 kg/m².   Obese, Class III (40 or higher)    Comments      Medication/Biochemical          Pertinent medications Antacid, Insulin      Pertinent lab values Results from last 7 days   Lab Units 11/15/24  0543 11/14/24  0424 11/13/24  1244   SODIUM mmol/L 141 138 145   POTASSIUM mmol/L 3.6 3.5 4.1   CHLORIDE mmol/L 106 102 104   CO2 mmol/L 23.8 24.0 26.4   BUN mg/dL 59* 58* 59*   CREATININE mg/dL 3.01* 2.44* 2.71*   CALCIUM mg/dL 8.3* 8.4* 9.0   BILIRUBIN mg/dL 0.6 0.9 1.4*   ALK PHOS U/L 121* 139* 155*   ALT (SGPT) U/L 17 20 20   AST (SGOT) U/L 32 37* 43*   GLUCOSE mg/dL 78 94 59*       Lab Results   Component Value Date    HGBA1C 5.60 10/28/2024        DIAGNOSIS  PES Statement         Problem  Food and nutrition knowledge deficit    Etiology Medical Diagnosis - CKD    Signs/Symptoms Information deficit, Report/observation     INTERVENTION  Intervention/Evaluation         Goal   Disease management/therapy, Improved nutrition related labs, Provide information , Reduce/improve symptoms    Educated regarding Appropriate portions, Beverage choices, Eating pattern, Food choices, Food preparation, Food shopping, Label reading, Seasoning foods, Snacks    Resources given Discussion only, Printed materials, Sample menus    Monitor/evaluation  RD contact information provided    Discharge planning No discharge needs " identified at this time     RD to follow per protocol.     Electronically signed by:  Liz Godoy RD  11/15/24 12:14 EST

## 2024-11-15 NOTE — THERAPY TREATMENT NOTE
Patient Name: Philly Person  : 1945    MRN: [id number]                              Today's Date: 11/15/2024       Admit Date: 2024    Visit Dx:     ICD-10-CM ICD-9-CM   1. Other ascites  R18.8 789.59   2. Nonalcoholic liver disease, chronic  K76.9 571.9   3. Heart murmur  R01.1 785.2   4. Chronic kidney disease, unspecified CKD stage  N18.9 585.9   5. Anemia, unspecified type  D64.9 285.9     Patient Active Problem List   Diagnosis    Elevated liver enzymes    Anemia    Arthritis    Benign essential hypertension    Hypercholesterolemia    Microalbuminuria    Nonalcoholic fatty liver disease    Calculus of kidney    Adiposity    Type 2 diabetes mellitus    Squamous cell carcinoma of skin    Melanoma    Basal cell carcinoma    DJD (degenerative joint disease) of knee    Angiodysplasia of colon without hemorrhage    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 abscess w/o bleeding    Dyslipidemia    H/O: gout    History of renal calculi    Other fecal abnormalities    Polyp of colon    Renal hypertension    Stage 4 chronic kidney disease    Vitamin D deficiency    Murmur    Cirrhosis of liver with ascites    Arteriovenous fistula of left upper extremity     Past Medical History:   Diagnosis Date    Angiodysplasia of colon without hemorrhage 2022    Arteriovenous fistula of left upper extremity 2024    Arthritis     Basal cell carcinoma 2018.      Calculus of kidney 2016    Cataract     LEFT AND RIGHT    Diabetes mellitus     TYPE 2    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 abscess w/o bleeding 2022    H/O: gout 2021    History of kidney stones     History of melanoma     LEFT ARM    Hyperlipidemia     Hypertension     Left knee pain     Melanoma 2018.      Nonalcoholic steatohepatitis     Seasonal allergies     Squamous cell carcinoma of skin 2016    New one in 2019 -left leg.      Stage 4 chronic kidney disease 2021    Progressive  decline in kidney function since 2019.   most recent creatinine 1.85.    Etiology of kidney disease is likely associated with diabetic nephrosclerosis. biopsy-proven   No recent history of NSAID use.    Renal duplex US negative for renal artery stenosis.    kidney biopsy consistent to his diabetic nephropathy with extensive global sclerosis and 60% interstitial fibrosis.  It was noted     Type 2 diabetes mellitus 2016     Past Surgical History:   Procedure Laterality Date     SECTION      COLONOSCOPY      CYSTOSCOPY W/ LASER LITHOTRIPSY      REPLACEMENT TOTAL KNEE Right 10/2015    SKIN CANCER EXCISION Left     ARM MELANOMA    TONSILLECTOMY AND ADENOIDECTOMY      TOTAL KNEE ARTHROPLASTY Left 2019    Procedure: LEFT TOTAL KNEE ARTHROPLASTY;  Surgeon: Magdaleno Saleh MD;  Location: [facility];  Service: Orthopedics    UPPER GASTROINTESTINAL ENDOSCOPY        General Information       Row Name 11/15/24 4559          Physical Therapy Time and Intention    Document Type therapy note (daily note)  -MG     Mode of Treatment individual therapy;physical therapy  -MG       Row Name 11/15/24 2354          General Information    Patient Profile Reviewed yes  -MG     Existing Precautions/Restrictions fall  -MG     Barriers to Rehab none identified  -MG       Row Name 11/15/24 2325          Cognition    Orientation Status (Cognition) oriented x 4  -MG       Row Name 11/15/24 8630          Safety Issues/Impairments Affecting Functional Mobility    Safety Issues Affecting Function (Mobility) insight into deficits/self-awareness;safety precaution awareness  -MG     Impairments Affecting Function (Mobility) balance;endurance/activity tolerance;strength  -MG     Comment, Safety Issues/Impairments (Mobility) Gait belt and non-skid socks donned.  -MG               User Key  (r) = Recorded By, (t) = Taken By, (c) = Cosigned By      Initials Name Provider Type    MG Lula Ramirez, PT Physical Therapist                    Mobility       Row Name 11/15/24 1550          Bed Mobility    Bed Mobility supine-sit  -MG     Supine-Sit [place] (Bed Mobility) standby assist  -MG     Assistive Device (Bed Mobility) head of bed elevated;bed rails  -MG       Row Name 11/15/24 1550          Sit-Stand Transfer    Sit-Stand [place] (Transfers) contact guard;standby assist  -MG     Assistive Device (Sit-Stand Transfers) other (see comments);walker, front-wheeled  L HHA  -MG     Comment, (Sit-Stand Transfer) x1 L HHA and CGA from EOB. x1 w/ RW and SBA. Cues for hand placement.  -MG       Row Name 11/15/24 1550          Gait/Stairs (Locomotion)    [place] Level (Gait) contact guard;minimum assist (75% patient effort);verbal cues  -MG     Assistive Device (Gait) other (see comments);walker, front-wheeled  L HHA  -MG     Distance in Feet (Gait) 80  + 10'  -MG     Deviations/Abnormal Patterns (Gait) gait speed decreased;stride length decreased  -MG     Bilateral Gait Deviations forward flexed posture;heel strike decreased  -MG     Comment, (Gait/Stairs) Pt amb 10' w/ L HHA and Andre w/ mild unsteadiness and attempting to furniture surf. Pt was cued to stop and sit in the room chair while a RW was brought up to her. Pt then ambulated additional 80' w/ RW. Required 3-4 short standing rest breaks d/t fatigue and RLE N/T. No knee buckling or overt LOB.  -MG               User Key  (r) = Recorded By, (t) = Taken By, (c) = Cosigned By      Initials Name Provider Type    Lula Schroeder PT Physical Therapist                   Obj/Interventions       Row Name 11/15/24 155          Motor Skills    Therapeutic Exercise other (see comments)  AP, LAQ, HF x10  -MG               User Key  (r) = Recorded By, (t) = Taken By, (c) = Cosigned By      Initials Name Provider Type    MG Lula Ramirez PT Physical Therapist                   Goals/Plan    No documentation.                  Clinical Impression       Row Name 11/15/24 0416          Pain     "Pretreatment Pain Rating 0/10 - no pain  -MG     Posttreatment Pain Rating 0/10 - no pain  -MG       Row Name 11/15/24 1977          Plan of Care Review    Plan of Care Reviewed With patient;spouse  -MG     Progress improving  -MG     Outcome Evaluation Pt seen for PT tx this PM and tolerated the session fairly. Today, pt was SBA for bed mobility, CGA L HHA for STS from EOB, SBA for STS from the chair w/ RW, Min L HHA for ambulation of 10', and CGA for ambulation of 80' w/ RW. Pt demo mild unsteadiness and wanting to furniture surf w/ L HHA, but balance improved w/ use of RW; pt owns a RW. Pt did require 3-4 short standing rest breaks that she attributes to fatigue and RLE numbness/tingling. Pt denies feeling any radicular signs/symptoms, and states it's non-painful, just \"feels funny\". Pt was supervision for sitting balance at EOB while performing LE ther-ex for 10 reps. Encouraged pt to ambulate multiple times a day w/ nsg and sit UIC at least TID; she v/u. PT will cont to follow.  -MG       Row Name 11/15/24 0927          Therapy Assessment/Plan (PT)    Rehab Potential (PT) good  -MG     Criteria for Skilled Interventions Met (PT) yes  -MG     Therapy Frequency (PT) 5 times/wk  -MG       Row Name 11/15/24 2769          Vital Signs    Pretreatment Heart Rate (beats/min) 76  -MG     Posttreatment Heart Rate (beats/min) 83  -MG     Pre SpO2 (%) 100  -MG     O2 Delivery Pre Treatment room air  -MG     O2 Delivery Intra Treatment room air  -MG     Post SpO2 (%) 97  -MG     O2 Delivery Post Treatment room air  -MG     Pre Patient Position Supine  -MG     Intra Patient Position Standing  -MG     Post Patient Position Sitting  -MG       Row Name 11/15/24 8555          Positioning and Restraints    Pre-Treatment Position in bed  -MG     Post Treatment Position chair  -MG     In Chair notified nsg;sitting;call light within reach;encouraged to call for assist;exit alarm on;with family/caregiver  -MG               User Key  " (r) = Recorded By, (t) = Taken By, (c) = Cosigned By      Initials Name Provider Type    MG Lula Ramirez, PT Physical Therapist                   Outcome Measures       Row Name 11/15/24 1600 11/15/24 0824       How much help from another person do you currently need...    Turning from your back to your side while in flat bed without using bedrails? 4  -MG 4  -MS    Moving from lying on back to sitting on the side of a flat bed without bedrails? 3  -MG 3  -MS    Moving to and from a bed to a chair (including a wheelchair)? 3  -MG 3  -MS    Standing up from a chair using your arms (e.g., wheelchair, bedside chair)? 3  -MG 3  -MS    Climbing 3-5 steps with a railing? 2  -MG 2  -MS    To walk in hospital room? 3  -MG 3  -MS    AM-PAC 6 Clicks Score (PT) 18  -MG 18  -MS              User Key  (r) = Recorded By, (t) = Taken By, (c) = Cosigned By      Initials Name Provider Type    Immanuel John, RN Registered Nurse    Lula Schroeder, PT Physical Therapist                                 Physical Therapy Education       Title: PT OT SLP Therapies (Done)       Topic: Physical Therapy (Done)       Point: Mobility training (Done)       Learning Progress Summary            Patient Acceptance, E,D,TB, VU by  at 11/15/2024 1600                      Point: Home exercise program (Done)       Learning Progress Summary            Patient Acceptance, E,D,TB, VU by MG at 11/15/2024 1600                      Point: Body mechanics (Done)       Learning Progress Summary            Patient Acceptance, E,D,TB, VU by  at 11/15/2024 1600    Acceptance, E, VU by  at 11/14/2024 1431                      Point: Precautions (Done)       Learning Progress Summary            Patient Acceptance, E,D,TB, VU by  at 11/15/2024 1600    Acceptance, E, VU by  at 11/14/2024 1431                                      User Key       Initials Effective Dates Name Provider Type [facility] 05/24/22 -  Lula Ramirez, PT Physical  "Therapist PT                  PT Recommendation and Plan  Planned Therapy Interventions (PT): balance training, bed mobility training, gait training, home exercise program, patient/family education, stretching, strengthening, stair training, ROM (range of motion), neuromuscular re-education, motor coordination training, postural re-education, transfer training  Progress: improving  Outcome Evaluation: Pt seen for PT tx this PM and tolerated the session fairly. Today, pt was SBA for bed mobility, CGA L HHA for STS from EOB, SBA for STS from the chair w/ RW, Min L HHA for ambulation of 10', and CGA for ambulation of 80' w/ RW. Pt demo mild unsteadiness and wanting to furniture surf w/ L HHA, but balance improved w/ use of RW; pt owns a RW. Pt did require 3-4 short standing rest breaks that she attributes to fatigue and RLE numbness/tingling. Pt denies feeling any radicular signs/symptoms, and states it's non-painful, just \"feels funny\". Pt was supervision for sitting balance at EOB while performing LE ther-ex for 10 reps. Encouraged pt to ambulate multiple times a day w/ nsg and sit UIC at least TID; she v/u. PT will cont to follow.     Time Calculation:         PT Charges       Row Name 11/15/24 1600             Time Calculation    Start Time 1426  -MG      Stop Time 1439  -MG      Time Calculation (min) 13 min  -MG      PT Received On 11/15/24  -MG      PT - Next Appointment 11/18/24  -MG                User Key  (r) = Recorded By, (t) = Taken By, (c) = Cosigned By      Initials Name Provider Type    MG Lula Ramirez, PT Physical Therapist                  Therapy Charges for Today       Code Description Service Date Service Provider Modifiers Qty    28223902569 HC PT EVAL MOD COMPLEXITY 3 11/14/2024 Lula Ramirez, PT GP 1    19404106721 HC PT THERAPEUTIC ACT EA 15 MIN 11/14/2024 Lula Ramirez, PT GP 1    72686114722 HC PT THERAPEUTIC ACT EA 15 MIN 11/15/2024 Lula Ramirez, PT GP 1            PT G-Codes  AM-PAC " 6 Clicks Score (PT): 18  PT Discharge Summary  Anticipated Discharge Disposition (PT): home, home with assist    Lula Ramirez, PT  11/15/2024

## 2024-11-15 NOTE — PLAN OF CARE
Goal Outcome Evaluation:  Plan of Care Reviewed With: patient, spouse        Progress: improving  Outcome Evaluation: Patient  resting  at this  time. Alert  and oriented.  Denies  any  pain.   Rreports  no  respiratory  distress.  Incontinent  care  per  staff. Blood  sugars  noted  and  treated. Remain  on  room  air.  SR  on the monitor.nursing  will  continue to monitor

## 2024-11-15 NOTE — PROGRESS NOTES
[facility] Progress Note      Patient Name: Philly Person  : 1945  MRN: [id number]  Primary Care Physician:  Shazia Cardoza MD  Date of admission: 2024    Subjective     Interval History:   The patient was seen and examined today for follow-up on CKD and volume overload  No issues noted today.  Underwent paracentesis yesterday with 7.1 L of fluid removed  Urine output not recorded  Edema is much better  Review of Systems:   As noted above    Objective     Vitals:   Temp:  [97.3 °F (36.3 °C)-98.2 °F (36.8 °C)] 97.9 °F (36.6 °C)  Heart Rate:  [65-74] 65  Resp:  [16-18] 16  BP: (116-164)/(51-71) 121/53    Intake/Output Summary (Last 24 hours) at 11/15/2024 0816  Last data filed at 2024  Gross per 24 hour   Intake 600 ml   Output 7150 ml   Net -6550 ml       Physical Exam:    General Appearance: Pleasant, alert, oriented x 3, no acute distress.  Obese  Skin: warm and dry  HEENT: oral mucosa normal, nonicteric sclera  Neck: supple, no JVD  Lungs: CTA  Heart: RRR, normal S1 and S2  Abdomen: soft, nontender, distended but better than before  : no palpable bladder  Extremities: Bilateral lower extremity edema noted improved from yesterday, no cyanosis or clubbing  Neuro: normal speech and mental status     Scheduled Meds:     cefTRIAXone, 2,000 mg, Intravenous, Q24H  hydrALAZINE, 50 mg, Oral, Q8H  insulin glargine, 8 Units, Subcutaneous, Daily  insulin lispro, 2-7 Units, Subcutaneous, 4x Daily AC & at Bedtime  lubiprostone, 24 mcg, Oral, BID With Meals  nadolol, 20 mg, Oral, Q24H  pantoprazole, 40 mg, Intravenous, BID  sodium chloride, 10 mL, Intravenous, Q12H      IV Meds:        Results Reviewed:   I have personally reviewed the results from the time of this admission to 11/15/2024 08:16 EST     Results from last 7 days   Lab Units 11/15/24  0543 24  0424 24  1244   SODIUM mmol/L 141 138 145   POTASSIUM mmol/L 3.6 3.5 4.1   CHLORIDE mmol/L 106 102  104   CO2 mmol/L 23.8 24.0 26.4   BUN mg/dL 59* 58* 59*   CREATININE mg/dL 3.01* 2.44* 2.71*   CALCIUM mg/dL 8.3* 8.4* 9.0   BILIRUBIN mg/dL 0.6 0.9 1.4*   ALK PHOS U/L 121* 139* 155*   ALT (SGPT) U/L 17 20 20   AST (SGOT) U/L 32 37* 43*   GLUCOSE mg/dL 78 94 59*       Estimated Creatinine Clearance: 17.2 mL/min (A) (by C-G formula based on SCr of 3.01 mg/dL (H)).    Results from last 7 days   Lab Units 11/15/24  0543 11/14/24  0424   MAGNESIUM mg/dL  --  2.1   PHOSPHORUS mg/dL 3.5  --              Results from last 7 days   Lab Units 11/15/24  0543 11/14/24  0424 11/13/24  1244   WBC 10*3/mm3 5.03 5.46 6.55   HEMOGLOBIN g/dL 8.6* 8.3* 9.7*   PLATELETS 10*3/mm3 111* 106* 165       Results from last 7 days   Lab Units 11/14/24  0424 11/13/24  1244   INR  1.29* 1.24*       Assessment / Plan     ASSESSMENT:  Acute kidney injury on top of CKD stage IV with recent baseline creatinine between 2 up to 2.3 mg/dL secondary to biopsy-proven diabetic nephrosclerosis with 60% interstitial fibrosis   Urine albumin to creatinine ratio 541 mg/g consistent with diabetic nephrosclerosis.  Does have matured left upper extremity AV fistula with good thrill.  Etiology of worsening kidney function likely secondary to diuresis in the setting of hepatorenal syndrome/large-volume paracentesis and contrast-induced nephropathy  History of type 2 diabetes mellitus with CKD  Hypertension with CKD.  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  Complex needs to be followed in outpatient setting  Possible UTI on antibiotics        PLAN:  Will hold on further diuresis given worsening kidney function  Will give 1 dose of albumin today  Check  urine sodium and urine protein to creatinine ratio.  No indication for dialysis at this time we will continue to monitor kidney function daily  Labs in a.m.      Thank you for involving us in the care of Philly Person.  Please  feel free to call with any questions.    Christy Flores MD  11/15/24  08:16 [facility]    [facility]  [phone]    Parts of this note may be an electronic transcription/translation of spoken language to printed text using the Dragon dictation system.

## 2024-11-15 NOTE — PLAN OF CARE
"Goal Outcome Evaluation:  Plan of Care Reviewed With: patient, spouse        Progress: improving  Outcome Evaluation: Pt seen for PT tx this PM and tolerated the session fairly. Today, pt was SBA for bed mobility, CGA L HHA for STS from EOB, SBA for STS from the chair w/ RW, Min L HHA for ambulation of 10', and CGA for ambulation of 80' w/ RW. Pt demo mild unsteadiness and wanting to furniture surf w/ L HHA, but balance improved w/ use of RW; pt owns a RW. Pt did require 3-4 short standing rest breaks that she attributes to fatigue and RLE numbness/tingling. Pt denies feeling any radicular signs/symptoms, and states it's non-painful, just \"feels funny\". Pt was supervision for sitting balance at EOB while performing LE ther-ex for 10 reps. Encouraged pt to ambulate multiple times a day w/ nsg and sit UIC at least TID; she v/u. PT will cont to follow.    Anticipated Discharge Disposition (PT): home, home with assist                        "

## 2024-11-15 NOTE — PROGRESS NOTES
Name: Philly Person ADMIT: 2024   : 1945  PCP: Shazia Cardoza MD    MRN: [id number] LOS: 2 days   AGE/SEX: 79 y.o. female  ROOM: [facility]     Subjective   Subjective   Patient is seen at bedside today.  She feels well, does not have any new acute issues.       Objective   Objective   Vital Signs  Temp:  [97.3 °F (36.3 °C)-98.2 °F (36.8 °C)] 97.7 °F (36.5 °C)  Heart Rate:  [65-74] 72  Resp:  [16-18] 16  BP: (116-164)/(51-71) 123/51  SpO2:  [97 %-100 %] 99 %  on   ;   Device (Oxygen Therapy): room air  Body mass index is 46.08 kg/m².  Physical Exam  General, awake and alert.  Head and ENT, normocephalic and atraumatic.  Lungs, symmetric expansion, equal air entry bilaterally.  Heart, regular rate and rhythm.  Abdomen, soft and nontender.  Extremities, no clubbing or cyanosis.  Neuro, no focal deficits.  Skin: Warm and no rash.  Psych, normal mood and affect.  Musculoskeletal, joint examination is grossly normal.      Results Review     I reviewed the patient's new clinical results.  Results from last 7 days   Lab Units 11/15/24  0543 11/14/24  0424 11/13/24  1244   WBC 10*3/mm3 5.03 5.46 6.55   HEMOGLOBIN g/dL 8.6* 8.3* 9.7*   PLATELETS 10*3/mm3 111* 106* 165     Results from last 7 days   Lab Units 11/15/24  0543 11/14/24  0424 11/13/24  1244   SODIUM mmol/L 141 138 145   POTASSIUM mmol/L 3.6 3.5 4.1   CHLORIDE mmol/L 106 102 104   CO2 mmol/L 23.8 24.0 26.4   BUN mg/dL 59* 58* 59*   CREATININE mg/dL 3.01* 2.44* 2.71*   GLUCOSE mg/dL 78 94 59*   EGFR mL/min/1.73 15.2* 19.6* 17.3*     Results from last 7 days   Lab Units 11/15/24  0543 11/14/24  0424 11/13/24  1244   ALBUMIN g/dL 2.3* 2.7* 3.0*   BILIRUBIN mg/dL 0.6 0.9 1.4*   ALK PHOS U/L 121* 139* 155*   AST (SGOT) U/L 32 37* 43*   ALT (SGPT) U/L 17 20 20     Results from last 7 days   Lab Units 11/15/24  0543 24  0424 24  1244   CALCIUM mg/dL 8.3* 8.4* 9.0   ALBUMIN g/dL 2.3* 2.7* 3.0*   MAGNESIUM mg/dL  --  2.1  --    PHOSPHORUS  mg/dL 3.5  --   --      Results from last 7 days   Lab Units 11/13/24  1244   LACTATE mmol/L 1.0     Glucose   Date/Time Value Ref Range Status   11/15/2024 1038 107 70 - 130 mg/dL Final   11/15/2024 0607 81 70 - 130 mg/dL Final   11/14/2024 2023 184 (H) 70 - 130 mg/dL Final   11/14/2024 1553 156 (H) 70 - 130 mg/dL Final   11/14/2024 1126 74 70 - 130 mg/dL Final   11/14/2024 0620 90 70 - 130 mg/dL Final   11/13/2024 2029 107 70 - 130 mg/dL Final       US Paracentesis    Result Date: 11/14/2024  Ultrasound-guided paracentesis as described  This report was finalized on 11/14/2024 10:27 AM by Dr. Hung Michelle M.D on Workstation: [id number]      CT Abdomen Pelvis With Contrast    Result Date: 11/13/2024  1. Hepatic cirrhotic morphology with portal venous hypertension, splenomegaly. Diffuse intra-abdominal ascites. 2. Cholelithiasis. 3. 14 x 11 mm cystic area within the pancreatic body due to a small cystic lesion or extension of ascitic fluid into the pancreatic parenchyma. 4. 3.3 cm left renal parapelvic cystic lesion is denser than typically seen for a simple cyst and is new when compared to previous CT 10/21/2014. This could represent a hemorrhagic or proteinaceous cyst, though recommend follow-up with multiphasic CT to evaluate for internal enhancement and the possibility of a solid lesion. 5. 4 m nonobstructing left lower pole renal stone. 6. Generalized body wall edema. Radiation dose reduction techniques were utilized, including automated exposure control and exposure modulation based on body size.   This report was finalized on 11/13/2024 5:02 PM by Baldemar Bowen M.D on Workstation: [id number]       I have personally reviewed all medications:  Scheduled Medications  cefTRIAXone, 2,000 mg, Intravenous, Q24H  hydrALAZINE, 50 mg, Oral, Q8H  insulin glargine, 8 Units, Subcutaneous, Daily  insulin lispro, 2-7 Units, Subcutaneous, 4x Daily AC & at Bedtime  lubiprostone, 24 mcg, Oral, BID With Meals  nadolol,  20 mg, Oral, Q24H  pantoprazole, 40 mg, Intravenous, BID  sodium chloride, 10 mL, Intravenous, Q12H    Infusions   Diet  Diet: Cardiac, Diabetic; Low Sodium (2g); Consistent Carbohydrate; Fluid Consistency: Thin (IDDSI 0)    I have personally reviewed:  [x]  Laboratory   [x]  Microbiology   [x]  Radiology   [x]  EKG/Telemetry  [x]  Cardiology/Vascular   []  Pathology    []  Records       Assessment/Plan     Active Hospital Problems    Diagnosis  POA    Murmur [R01.1]  Yes    Cirrhosis of liver with ascites [K74.60, R18.8]  Yes    Arteriovenous fistula of left upper extremity [I77.0]  Yes    Stage 4 chronic kidney disease [N18.4]  Yes    Nonalcoholic fatty liver disease [K76.0]  Yes    Type 2 diabetes mellitus [E11.9]  Yes    Benign essential hypertension [I10]  Yes      Resolved Hospital Problems   No resolved problems to display.       79 y.o. female admitted with <principal problem not specified>.    Assessment and plan  1.  Decompensated liver cirrhosis due to QUINN, with ascites.  Gastroenterology consultation was requested.  Follow management recommendations.  On diuretic therapy and based on nephrology recommendations.  Continue Rocephin and nadolol.    2.  Chronic kidney disease stage IV, status post previous biopsy, showed diabetic nephropathy with glomerulosclerosis.  Patient has left AV fistula present, has not had to use.  Nephrology is following.  We will follow management recommendations.    3.  Left renal cyst, follow-up CT scan on outpatient basis.    4.  Aortic stenosis, echo showed mild aortic stenosis, EF was 62%.    5.  Diabetes mellitus, continue Accu-Cheks and sliding scale insulin coverage.    6.  Anemia in the setting of chronic kidney disease, monitor hemoglobin trend.  It is noted to be 8.6 today.    7.  GERD, continue PPI therapy.    8.  Hypertension, BP stable, continue hydralazine.    9.  CODE STATUS is full code.  Further plans based on hospital course.      Alan Fine,  MD [facility]  11/15/24  13:28 EST

## 2024-11-15 NOTE — PLAN OF CARE
Problem: Adult Inpatient Plan of Care  Goal: Plan of Care Review  Outcome: Progressing  Flowsheets (Taken 11/15/2024 1410)  Progress: improving  Outcome Evaluation: Patient has been pleasant and cooperative during shift. No complaints of pain, nausea or SOA. AOx4, assist x1, room air, SR. One dose of albumin given. Need urine sample. Discontinued lactulose and amitiza.  at bedside. Will continue to monitor and assist patient as needed.  Plan of Care Reviewed With: patient  Goal: Patient-Specific Goal (Individualized)  Outcome: Progressing  Goal: Absence of Hospital-Acquired Illness or Injury  Outcome: Progressing  Intervention: Identify and Manage Fall Risk  Recent Flowsheet Documentation  Taken 11/15/2024 1345 by Immanuel Velasco RN  Safety Promotion/Fall Prevention:   assistive device/personal items within reach   fall prevention program maintained   nonskid shoes/slippers when out of bed   safety round/check completed  Taken 11/15/2024 1200 by Immanuel Velasco RN  Safety Promotion/Fall Prevention:   assistive device/personal items within reach   fall prevention program maintained   nonskid shoes/slippers when out of bed   safety round/check completed  Taken 11/15/2024 1000 by Immanuel Velasco RN  Safety Promotion/Fall Prevention:   assistive device/personal items within reach   fall prevention program maintained   nonskid shoes/slippers when out of bed   safety round/check completed  Taken 11/15/2024 0824 by Immanuel Velasco RN  Safety Promotion/Fall Prevention:   assistive device/personal items within reach   fall prevention program maintained   nonskid shoes/slippers when out of bed   safety round/check completed  Intervention: Prevent Skin Injury  Recent Flowsheet Documentation  Taken 11/15/2024 1345 by Immanuel Velasco RN  Body Position: supine  Skin Protection: incontinence pads utilized  Taken 11/15/2024 1200 by Immanuel Velasco RN  Body Position:   left   turned  Taken 11/15/2024 1000 by  Immanuel Velasco RN  Body Position: supine  Taken 11/15/2024 0824 by Immanuel Velasco RN  Body Position: supine  Goal: Optimal Comfort and Wellbeing  Outcome: Progressing  Goal: Readiness for Transition of Care  Outcome: Progressing     Problem: Liver Failure  Goal: Optimal Coping with Liver Failure  Outcome: Progressing  Goal: Absence of Bleeding  Outcome: Progressing  Goal: Fluid and Electrolyte Balance  Outcome: Progressing  Goal: Minimize and Manage Gastrointestinal Symptoms  Outcome: Progressing  Goal: Blood Glucose Level Within Target Range  Outcome: Progressing  Goal: Hemodynamic Stability  Outcome: Progressing  Goal: Absence of Infection Signs and Symptoms  Outcome: Progressing  Goal: Optimize Neurologic Function  Outcome: Progressing  Intervention: Monitor and Optimize Neurologic Status  Recent Flowsheet Documentation  Taken 11/15/2024 1345 by Immanuel Velasco RN  Head of Bed ([facility]) Positioning: HOB at 30 degrees  Taken 11/15/2024 1200 by Immanuel Velasco RN  Head of Bed ([facility]) Positioning: HOB at 20 degrees  Taken 11/15/2024 1000 by Immanuel Velasco RN  Head of Bed ([facility]) Positioning: HOB at 20 degrees  Taken 11/15/2024 0824 by Immanuel Velasco RN  Head of Bed ([facility]) Positioning: HOB at 20 degrees  Goal: Improved Oral Intake  Outcome: Progressing  Goal: Optimal Pain Control, Comfort and Function  Outcome: Progressing  Goal: Effective Oxygenation and Ventilation  Outcome: Progressing  Intervention: Promote Airway Secretion Clearance  Recent Flowsheet Documentation  Taken 11/15/2024 0824 by Immanuel Velasco RN  Cough And Deep Breathing: done independently per patient  Intervention: Optimize Oxygenation and Ventilation  Recent Flowsheet Documentation  Taken 11/15/2024 1345 by Immanuel Velasco RN  Head of Bed ([facility]) Positioning: HOB at 30 degrees  Taken 11/15/2024 1200 by Immanuel Velasco RN  Head of Bed ([facility]) Positioning: HOB at 20 degrees  Taken 11/15/2024 1000 by Immanuel Velasco RN  Head of Bed ([facility])  Positioning: HOB at 20 degrees  Taken 11/15/2024 0824 by Immanuel Velasco RN  Head of Bed ([facility]) Positioning: HOB at 20 degrees     Problem: Fluid Volume Excess  Goal: Fluid Balance  Outcome: Progressing  Intervention: Monitor and Manage Hypervolemia  Recent Flowsheet Documentation  Taken 11/15/2024 1345 by Immanuel Velasco RN  Skin Protection: incontinence pads utilized     Problem: Fall Injury Risk  Goal: Absence of Fall and Fall-Related Injury  Outcome: Progressing  Intervention: Promote Injury-Free Environment  Recent Flowsheet Documentation  Taken 11/15/2024 1345 by Immanuel Velasco RN  Safety Promotion/Fall Prevention:   assistive device/personal items within reach   fall prevention program maintained   nonskid shoes/slippers when out of bed   safety round/check completed  Taken 11/15/2024 1200 by Immanuel Velasco RN  Safety Promotion/Fall Prevention:   assistive device/personal items within reach   fall prevention program maintained   nonskid shoes/slippers when out of bed   safety round/check completed  Taken 11/15/2024 1000 by Immanuel Velasco RN  Safety Promotion/Fall Prevention:   assistive device/personal items within reach   fall prevention program maintained   nonskid shoes/slippers when out of bed   safety round/check completed  Taken 11/15/2024 0824 by Immanuel Velasco RN  Safety Promotion/Fall Prevention:   assistive device/personal items within reach   fall prevention program maintained   nonskid shoes/slippers when out of bed   safety round/check completed   Goal Outcome Evaluation:  Plan of Care Reviewed With: patient        Progress: improving  Outcome Evaluation: Patient has been pleasant and cooperative during shift. No complaints of pain, nausea or SOA. AOx4, assist x1, room air, SR. One dose of albumin given. Need urine sample. Discontinued lactulose and amitiza.  at bedside. Will continue to monitor and assist patient as needed.

## 2024-11-15 NOTE — PROGRESS NOTES
[facility]     Progress Note    Patient Name: Philly Person  : 1945  MRN: [id number]  Primary Care Physician:  Shazia Cardoza MD  Date of admission: 2024    Subjective   Subjective     Chief Complaint:     History of Present Illness  Patient Reports feeling better after paracentesis, about 7 liters,  albumin was given.  She feels much better.     Review of Systems   Gastrointestinal:  Positive for abdominal distention and abdominal pain.   Neurological:  Positive for weakness.       Objective   Objective     Vitals:   Temp:  [97.3 °F (36.3 °C)-98.2 °F (36.8 °C)] 97.9 °F (36.6 °C)  Heart Rate:  [65-74] 65  Resp:  [16-18] 16  BP: (116-164)/(51-71) 121/53    Physical Exam  HENT:      Right Ear: External ear normal.      Left Ear: External ear normal.      Mouth/Throat:      Pharynx: Oropharynx is clear.   Eyes:      Conjunctiva/sclera: Conjunctivae normal.   Pulmonary:      Effort: Pulmonary effort is normal.   Abdominal:      Palpations: Abdomen is soft. There is shifting dullness.   Skin:     General: Skin is warm and dry.   Neurological:      General: No focal deficit present.      Mental Status: She is alert.   Psychiatric:         Mood and Affect: Mood normal.          Result Review    Result Review:  I have personally reviewed the results from the time of this admission to 11/15/2024 08:08 EST and agree with these findings:  []  Laboratory list / accordion  []  Microbiology  []  Radiology  []  EKG/Telemetry   []  Cardiology/Vascular   []  Pathology  []  Old records  []  Other:  Most notable findings include:       Assessment & Plan   Assessment / Plan     Brief Patient Summary:  Philly Person is a 79 y.o. 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        CODE STATUS:    Code Status (Patient has no pulse and is not breathing): CPR (Attempt to Resuscitate)  Medical Interventions (Patient has pulse or is breathing): Full    Disposition:  I expect patient to be discharged uncertain .    Brendon Pina MD

## 2024-11-16 LAB
ALBUMIN SERPL-MCNC: 2.6 G/DL (ref 3.5–5.2)
ALBUMIN UR-MCNC: 48.8 MG/DL
ALBUMIN/GLOB SERPL: 1 G/DL
ALP SERPL-CCNC: 112 U/L (ref 39–117)
ALT SERPL W P-5'-P-CCNC: 16 U/L (ref 1–33)
ANION GAP SERPL CALCULATED.3IONS-SCNC: 13 MMOL/L (ref 5–15)
AST SERPL-CCNC: 28 U/L (ref 1–32)
BASOPHILS # BLD AUTO: 0.04 10*3/MM3 (ref 0–0.2)
BASOPHILS NFR BLD AUTO: 0.8 % (ref 0–1.5)
BILIRUB FLD-MCNC: 0.5 MG/DL
BILIRUB SERPL-MCNC: 0.7 MG/DL (ref 0–1.2)
BUN SERPL-MCNC: 66 MG/DL (ref 8–23)
BUN/CREAT SERPL: 20.2 (ref 7–25)
CALCIUM SPEC-SCNC: 8.5 MG/DL (ref 8.6–10.5)
CHLORIDE SERPL-SCNC: 106 MMOL/L (ref 98–107)
CO2 SERPL-SCNC: 20 MMOL/L (ref 22–29)
CREAT SERPL-MCNC: 3.26 MG/DL (ref 0.57–1)
CREAT UR-MCNC: 127.7 MG/DL
CREAT UR-MCNC: 127.7 MG/DL
DEPRECATED RDW RBC AUTO: 47.5 FL (ref 37–54)
EGFRCR SERPLBLD CKD-EPI 2021: 13.8 ML/MIN/1.73
EOSINOPHIL # BLD AUTO: 0.1 10*3/MM3 (ref 0–0.4)
EOSINOPHIL NFR BLD AUTO: 2 % (ref 0.3–6.2)
ERYTHROCYTE [DISTWIDTH] IN BLOOD BY AUTOMATED COUNT: 14.3 % (ref 12.3–15.4)
GLOBULIN UR ELPH-MCNC: 2.6 GM/DL
GLUCOSE BLDC GLUCOMTR-MCNC: 137 MG/DL (ref 70–130)
GLUCOSE BLDC GLUCOMTR-MCNC: 149 MG/DL (ref 70–130)
GLUCOSE BLDC GLUCOMTR-MCNC: 159 MG/DL (ref 70–130)
GLUCOSE BLDC GLUCOMTR-MCNC: 171 MG/DL (ref 70–130)
GLUCOSE SERPL-MCNC: 134 MG/DL (ref 65–99)
HCT VFR BLD AUTO: 26.5 % (ref 34–46.6)
HGB BLD-MCNC: 8.8 G/DL (ref 12–15.9)
IMM GRANULOCYTES # BLD AUTO: 0.02 10*3/MM3 (ref 0–0.05)
IMM GRANULOCYTES NFR BLD AUTO: 0.4 % (ref 0–0.5)
LYMPHOCYTES # BLD AUTO: 0.87 10*3/MM3 (ref 0.7–3.1)
LYMPHOCYTES NFR BLD AUTO: 17.8 % (ref 19.6–45.3)
MCH RBC QN AUTO: 30.3 PG (ref 26.6–33)
MCHC RBC AUTO-ENTMCNC: 33.2 G/DL (ref 31.5–35.7)
MCV RBC AUTO: 91.4 FL (ref 79–97)
MICROALBUMIN/CREAT UR: 382.1 MG/G (ref 0–29)
MONOCYTES # BLD AUTO: 0.56 10*3/MM3 (ref 0.1–0.9)
MONOCYTES NFR BLD AUTO: 11.5 % (ref 5–12)
NEUTROPHILS NFR BLD AUTO: 3.29 10*3/MM3 (ref 1.7–7)
NEUTROPHILS NFR BLD AUTO: 67.5 % (ref 42.7–76)
PHOSPHATE SERPL-MCNC: 3.5 MG/DL (ref 2.5–4.5)
PLATELET # BLD AUTO: 138 10*3/MM3 (ref 140–450)
POTASSIUM SERPL-SCNC: 3.7 MMOL/L (ref 3.5–5.2)
PROT ?TM UR-MCNC: 112.3 MG/DL
PROT SERPL-MCNC: 5.2 G/DL (ref 6–8.5)
PROT/CREAT UR: 879.4 MG/G CREA (ref 0–200)
RBC # BLD AUTO: 2.9 10*6/MM3 (ref 3.77–5.28)
SODIUM SERPL-SCNC: 139 MMOL/L (ref 136–145)
SODIUM UR-SCNC: 32 MMOL/L
WBC NRBC COR # BLD AUTO: 4.88 10*3/MM3 (ref 3.4–10.8)

## 2024-11-16 PROCEDURE — 84156 ASSAY OF PROTEIN URINE: CPT | Performed by: HOSPITALIST

## 2024-11-16 PROCEDURE — 80053 COMPREHEN METABOLIC PANEL: CPT | Performed by: INTERNAL MEDICINE

## 2024-11-16 PROCEDURE — 63710000001 INSULIN LISPRO (HUMAN) PER 5 UNITS: Performed by: INTERNAL MEDICINE

## 2024-11-16 PROCEDURE — 99232 SBSQ HOSP IP/OBS MODERATE 35: CPT | Performed by: INTERNAL MEDICINE

## 2024-11-16 PROCEDURE — 84100 ASSAY OF PHOSPHORUS: CPT | Performed by: HOSPITALIST

## 2024-11-16 PROCEDURE — 63710000001 INSULIN GLARGINE PER 5 UNITS: Performed by: INTERNAL MEDICINE

## 2024-11-16 PROCEDURE — 84300 ASSAY OF URINE SODIUM: CPT | Performed by: HOSPITALIST

## 2024-11-16 PROCEDURE — 82570 ASSAY OF URINE CREATININE: CPT | Performed by: HOSPITALIST

## 2024-11-16 PROCEDURE — 82948 REAGENT STRIP/BLOOD GLUCOSE: CPT

## 2024-11-16 PROCEDURE — 85025 COMPLETE CBC W/AUTO DIFF WBC: CPT | Performed by: INTERNAL MEDICINE

## 2024-11-16 PROCEDURE — 82043 UR ALBUMIN QUANTITATIVE: CPT | Performed by: HOSPITALIST

## 2024-11-16 RX ADMIN — INSULIN GLARGINE 8 UNITS: 100 INJECTION, SOLUTION SUBCUTANEOUS at 09:03

## 2024-11-16 RX ADMIN — INSULIN LISPRO 2 UNITS: 100 INJECTION, SOLUTION INTRAVENOUS; SUBCUTANEOUS at 21:06

## 2024-11-16 RX ADMIN — Medication 10 ML: at 09:04

## 2024-11-16 RX ADMIN — PANTOPRAZOLE SODIUM 40 MG: 40 INJECTION, POWDER, FOR SOLUTION INTRAVENOUS at 09:04

## 2024-11-16 RX ADMIN — HYDRALAZINE HYDROCHLORIDE 50 MG: 50 TABLET ORAL at 06:33

## 2024-11-16 RX ADMIN — HYDRALAZINE HYDROCHLORIDE 50 MG: 50 TABLET ORAL at 23:18

## 2024-11-16 RX ADMIN — PANTOPRAZOLE SODIUM 40 MG: 40 INJECTION, POWDER, FOR SOLUTION INTRAVENOUS at 20:25

## 2024-11-16 RX ADMIN — Medication 10 ML: at 20:26

## 2024-11-16 RX ADMIN — INSULIN LISPRO 2 UNITS: 100 INJECTION, SOLUTION INTRAVENOUS; SUBCUTANEOUS at 16:53

## 2024-11-16 NOTE — PROGRESS NOTES
[facility]  Inpatient Progress Note    Reason for Follow Up: Bond cirrhosis    Subjective     Interval History:   Patient continues with liquid stool.  No other complaint    Current Facility-Administered Medications:     acetaminophen (TYLENOL) tablet 650 mg, 650 mg, Oral, Q4H PRN **OR** acetaminophen (TYLENOL) 160 MG/5ML oral solution 650 mg, 650 mg, Oral, Q4H PRN, Cristel Henry MD    albumin human 25 % IV SOLN 50 g, 50 g, Intravenous, Once PRN, Cristel Henry MD    calcium carbonate (TUMS) chewable tablet 500 mg (200 mg elemental), 2 tablet, Oral, BID PRN, Cristel Henry MD    cefTRIAXone (ROCEPHIN) 2,000 mg in sodium chloride 0.9 % 100 mL MBP, 2,000 mg, Intravenous, Q24H, Cristel Henry MD, Last Rate: 200 mL/hr at 11/15/24 1633, Currently Infusing at 11/15/24 1633    dextrose (D50W) (25 g/50 mL) IV injection 25 g, 25 g, Intravenous, Q15 Min PRN, Cristel Henry MD    dextrose (GLUTOSE) oral gel 15 g, 15 g, Oral, Q15 Min PRN, Cristel Henry MD    glucagon (GLUCAGEN) injection 1 mg, 1 mg, Intramuscular, Q15 Min PRN, Cristel Henry MD    hydrALAZINE (APRESOLINE) tablet 50 mg, 50 mg, Oral, Q8H, Christy Flores MD, 50 mg at 11/16/24 0633    insulin glargine (LANTUS, SEMGLEE) injection 8 Units, 8 Units, Subcutaneous, Daily, Cristel Henry MD, 8 Units at 11/16/24 0903    insulin lispro (HUMALOG/ADMELOG) injection 2-7 Units, 2-7 Units, Subcutaneous, 4x Daily AC & at Bedtime, Cristel Henry MD, 2 Units at 11/15/24 2114    lubiprostone (AMITIZA) capsule 24 mcg, 24 mcg, Oral, BID With Meals, Brendon Pina MD, 24 mcg at 11/15/24 1631    nadolol (CORGARD) tablet 20 mg, 20 mg, Oral, Q24H, Cristel Henry MD, 20 mg at 11/15/24 1013    nitroglycerin (NITROSTAT) SL tablet 0.4 mg, 0.4 mg, Sublingual, Q5 Min PRN, Cristel Henry MD    ondansetron ODT (ZOFRAN-ODT) disintegrating tablet 4 mg, 4 mg, Oral,  Q6H PRN **OR** ondansetron (ZOFRAN) injection 4 mg, 4 mg, Intravenous, Q6H PRN, Cristel Henry MD    pantoprazole (PROTONIX) injection 40 mg, 40 mg, Intravenous, BID, Jae Chandra DO, 40 mg at 11/16/24 0904    sodium chloride 0.9 % flush 10 mL, 10 mL, Intravenous, PRN, Constantino Martin PA    sodium chloride 0.9 % flush 10 mL, 10 mL, Intravenous, Q12H, Cristel Henry MD, 10 mL at 11/16/24 0904    sodium chloride 0.9 % flush 10 mL, 10 mL, Intravenous, PRN, Cristel Henry MD    sodium chloride 0.9 % infusion 40 mL, 40 mL, Intravenous, PRN, Cristel Henry MD  Review of Systems:    There is weakness of fatigue all other systems reviewed and    Objective     Vital Signs  Temp:  [97.7 °F (36.5 °C)-98.3 °F (36.8 °C)] 98.1 °F (36.7 °C)  Heart Rate:  [70-74] 70  Resp:  [16-18] 16  BP: (123-141)/(51-71) 134/51  Body mass index is 46.01 kg/m².    Intake/Output Summary (Last 24 hours) at 11/16/2024 1159  Last data filed at 11/16/2024 0305  Gross per 24 hour   Intake 120 ml   Output 100 ml   Net 20 ml     No intake/output data recorded.     Physical Exam:   General: patient awake, alert and cooperative   Eyes: Normal lids and lashes, no scleral icterus   Neck: supple, normal ROM   Skin: warm and dry, not jaundiced   Cardiovascular: regular rhythm and rate, no murmurs auscultated   Pulm: clear to auscultation bilaterally, regular and unlabored   Abdomen: soft, nontender, nondistended; normal bowel sounds   Extremities: no rash or edema   Psychiatric: Normal mood and behavior; memory intact     Results Review:     I reviewed the patient's new clinical results.    Results from last 7 days   Lab Units 11/16/24  0424 11/15/24  0543 11/14/24  0424   WBC 10*3/mm3 4.88 5.03 5.46   HEMOGLOBIN g/dL 8.8* 8.6* 8.3*   HEMATOCRIT % 26.5* 25.7* 26.4*   PLATELETS 10*3/mm3 138* 111* 106*     Results from last 7 days   Lab Units 11/16/24  0424 11/15/24  0543 11/14/24  0424   SODIUM mmol/L 139 141 138    POTASSIUM mmol/L 3.7 3.6 3.5   CHLORIDE mmol/L 106 106 102   CO2 mmol/L 20.0* 23.8 24.0   BUN mg/dL 66* 59* 58*   CREATININE mg/dL 3.26* 3.01* 2.44*   CALCIUM mg/dL 8.5* 8.3* 8.4*   BILIRUBIN mg/dL 0.7 0.6 0.9   ALK PHOS U/L 112 121* 139*   ALT (SGPT) U/L 16 17 20   AST (SGOT) U/L 28 32 37*   GLUCOSE mg/dL 134* 78 94     Results from last 7 days   Lab Units 11/14/24  0424 11/13/24  1244   INR  1.29* 1.24*     Lab Results   Lab Value Date/Time    LIPASE 66 (H) 11/13/2024 1244       Radiology:  US Paracentesis   Final Result   Ultrasound-guided paracentesis as described       This report was finalized on 11/14/2024 10:27 AM by Dr. Hung Michelle M.D on Workstation: [id number]          CT Abdomen Pelvis With Contrast   Final Result   1. Hepatic cirrhotic morphology with portal venous hypertension,   splenomegaly. Diffuse intra-abdominal ascites.   2. Cholelithiasis.   3. 14 x 11 mm cystic area within the pancreatic body due to a small   cystic lesion or extension of ascitic fluid into the pancreatic   parenchyma.   4. 3.3 cm left renal parapelvic cystic lesion is denser than typically   seen for a simple cyst and is new when compared to previous CT   10/21/2014. This could represent a hemorrhagic or proteinaceous cyst,   though recommend follow-up with multiphasic CT to evaluate for internal   enhancement and the possibility of a solid lesion.   5. 4 m nonobstructing left lower pole renal stone.   6. Generalized body wall edema.   Radiation dose reduction techniques were utilized, including automated   exposure control and exposure modulation based on body size.           This report was finalized on 11/13/2024 5:02 PM by Baldemar Bowen M.D   on Workstation: [id number]              Assessment & Plan     Active Hospital Problems    Diagnosis     Murmur     Cirrhosis of liver with ascites     Arteriovenous fistula of left upper extremity     Stage 4 chronic kidney disease     Nonalcoholic fatty liver disease      Type 2 diabetes mellitus     Benign essential hypertension        plan: diuretics as per renal  May need eventual repeat paracentesis depending on clinical status-needed today  Lactulose stopped, started amitiza 24mcg bid -patient with liquid stool  BGA available as needed this weekend  Dr. Pina returns Monday     I discussed the patients findings and my recommendations with patient and nursing staff.    Rey Gonsalves MD

## 2024-11-16 NOTE — PROGRESS NOTES
[facility] Progress Note      Patient Name: Philly Person  : 1945  MRN: [id number]  Primary Care Physician:  Shazia Cardoza MD  Date of admission: 2024    Subjective     Interval History:   Follow-up acute kidney injury on chronic kidney disease  The patient is feeling reasonably well, she had paracentesis on 2024 with 7.1 L removed she received albumin infusion, she is feeling the same denies any chest pain or shortness of air, no orthopnea or PND, no nausea or vomiting.  Review of Systems:   As noted above    Objective     Vitals:   Temp:  [97.7 °F (36.5 °C)-98.3 °F (36.8 °C)] 98.1 °F (36.7 °C)  Heart Rate:  [70-74] 70  Resp:  [16-18] 16  BP: (123-141)/(51-71) 134/51    Intake/Output Summary (Last 24 hours) at 2024 1104  Last data filed at 2024 0305  Gross per 24 hour   Intake 120 ml   Output 100 ml   Net 20 ml       Physical Exam:    General Appearance: Awake, alert, chronically ill, no acute distress.  Obese  Skin: warm and dry  HEENT: oral mucosa normal, nonicteric sclera  Neck: No JVD  Lungs: Scattered rhonchi, breathing effort not labored  Heart: RRR, no rub  Abdomen: soft, nontender, nondistended, normal active bowel  Extremities: 1+ lower extremity edema, no cyanosis or clubbing.  Left upper extremity AV fistula with good thrill and bruit.  Neuro: normal speech and mental status     Scheduled Meds:     cefTRIAXone, 2,000 mg, Intravenous, Q24H  hydrALAZINE, 50 mg, Oral, Q8H  insulin glargine, 8 Units, Subcutaneous, Daily  insulin lispro, 2-7 Units, Subcutaneous, 4x Daily AC & at Bedtime  lubiprostone, 24 mcg, Oral, BID With Meals  nadolol, 20 mg, Oral, Q24H  pantoprazole, 40 mg, Intravenous, BID  sodium chloride, 10 mL, Intravenous, Q12H      IV Meds:        Results Reviewed:   I have personally reviewed the results from the time of this admission to 2024 11:04 EST     Results from last 7 days   Lab Units 24  0424 11/15/24  0543  11/14/24  0424   SODIUM mmol/L 139 141 138   POTASSIUM mmol/L 3.7 3.6 3.5   CHLORIDE mmol/L 106 106 102   CO2 mmol/L 20.0* 23.8 24.0   BUN mg/dL 66* 59* 58*   CREATININE mg/dL 3.26* 3.01* 2.44*   CALCIUM mg/dL 8.5* 8.3* 8.4*   BILIRUBIN mg/dL 0.7 0.6 0.9   ALK PHOS U/L 112 121* 139*   ALT (SGPT) U/L 16 17 20   AST (SGOT) U/L 28 32 37*   GLUCOSE mg/dL 134* 78 94       Estimated Creatinine Clearance: 15.9 mL/min (A) (by C-G formula based on SCr of 3.26 mg/dL (H)).    Results from last 7 days   Lab Units 11/16/24  0424 11/15/24  0543 11/14/24  0424   MAGNESIUM mg/dL  --   --  2.1   PHOSPHORUS mg/dL 3.5 3.5  --              Results from last 7 days   Lab Units 11/16/24  0424 11/15/24  0543 11/14/24 0424 11/13/24  1244   WBC 10*3/mm3 4.88 5.03 5.46 6.55   HEMOGLOBIN g/dL 8.8* 8.6* 8.3* 9.7*   PLATELETS 10*3/mm3 138* 111* 106* 165       Results from last 7 days   Lab Units 11/14/24 0424 11/13/24  1244   INR  1.29* 1.24*       Assessment / Plan     ASSESSMENT:  Acute kidney injury on top of CKD stage IV, the etiology related probably significant volume shift with the large-volume paracentesis and concern about hepatorenal syndrome, and contrast-induced nephropathy, urine sodium is 32  CKD stage IV, with recent baseline creatinine between 2 up to 2.3 mg/dL secondary to biopsy-proven diabetic nephrosclerosis with 60% interstitial fibrosis   Urine albumin to creatinine ratio 541 mg/g consistent with diabetic nephrosclerosis.  Does have matured left upper extremity AV fistula with good thrill.   History of type 2 diabetes mellitus with CKD  Hypertension with CKD.  Blood pressure uncontrolled  QUINN cirrhosis complicated by ascites  Volume overload improved improved  Anemia of CKD, hemoglobin 8.8  Left renal cyst.  Complex needs to be followed in outpatient setting  Possible UTI on antibiotics        PLAN:  No diuretics today  Continue the same treatment  Surveillance labs  No indication for dialysis at this time we will  continue to monitor kidney function daily  I discussed the case with the patient and her  at the bedside    I reviewed the chart and other providers notes, reviewed labs.  Copied text in this note has been reviewed and is accurate as of 11/16/24.         Thank you for involving us in the care of Philly Person.  Please feel free to call with any questions.    Terry Younger MD  11/16/24  11:04 [facility]    [facility]  [phone]    Parts of this note may be an electronic transcription/translation of spoken language to printed text using the Dragon dictation system.

## 2024-11-16 NOTE — PLAN OF CARE
Goal Outcome Evaluation:  Plan of Care Reviewed With: patient        Progress: improving  Outcome Evaluation: Patient  resting  at this  time..  No  complaints  of  pain voiced.  Urine  collected  and  sent  to  lab  as  per  Nephrology  order.  Alert  and  oriented.  Continue  to  have  loose watery  stool.  Fluid  encouraged. . Spuse  at  bedside.  Nursing  will  continue to monitor.

## 2024-11-16 NOTE — PLAN OF CARE
Goal Outcome Evaluation:  Plan of Care Reviewed With: patient           Outcome Evaluation: Denies pain or SOA> Room air. 02 sat 70's-80's while sleeping. Woke pt up and irais returned to 90's.Corgard and Hydralazine held d/t dbp below 60.Blood sugars noted and treated prn with SS. Telemetry SR.

## 2024-11-16 NOTE — PROGRESS NOTES
Name: Philly Person ADMIT: 2024   : 1945  PCP: Shazia Cardoza MD    MRN: [id number] LOS: 3 days   AGE/SEX: 79 y.o. female  ROOM: [facility]     Subjective   Subjective   Patient is seen at bedside today, she is eating her lunch.  Appetite is normal.       Objective   Objective   Vital Signs  Temp:  [98 °F (36.7 °C)-98.3 °F (36.8 °C)] 98.1 °F (36.7 °C)  Heart Rate:  [70-74] 70  Resp:  [16-18] 16  BP: (131-141)/(51-71) 131/55  SpO2:  [98 %-100 %] 100 %  on   ;   Device (Oxygen Therapy): room air  Body mass index is 46.01 kg/m².  Physical Exam  General, awake and alert.  Head and ENT, normocephalic and atraumatic.  Lungs, symmetric expansion, equal air entry bilaterally.  Heart, regular rate and rhythm.  Abdomen, soft and nontender.  Extremities, no clubbing or cyanosis.  Neuro, no focal deficits.  Skin: Warm and no rash.  Psych, normal mood and affect.  Musculoskeletal, joint examination is grossly normal.    Copied text material from yesterday's note has been reviewed for appropriate changes and remains accurate as of 24.        Results Review     I reviewed the patient's new clinical results.  Results from last 7 days   Lab Units 11/16/24  0424 11/15/24  0543 24  1244   WBC 10*3/mm3 4.88 5.03 5.46 6.55   HEMOGLOBIN g/dL 8.8* 8.6* 8.3* 9.7*   PLATELETS 10*3/mm3 138* 111* 106* 165     Results from last 7 days   Lab Units 11/16/24  0424 11/15/24  0543 24  1244   SODIUM mmol/L 139 141 138 145   POTASSIUM mmol/L 3.7 3.6 3.5 4.1   CHLORIDE mmol/L 106 106 102 104   CO2 mmol/L 20.0* 23.8 24.0 26.4   BUN mg/dL 66* 59* 58* 59*   CREATININE mg/dL 3.26* 3.01* 2.44* 2.71*   GLUCOSE mg/dL 134* 78 94 59*   EGFR mL/min/1.73 13.8* 15.2* 19.6* 17.3*     Results from last 7 days   Lab Units 24  0424 11/15/24  0543 24  0424 24  1244   ALBUMIN g/dL 2.6* 2.3* 2.7* 3.0*   BILIRUBIN mg/dL 0.7 0.6 0.9 1.4*   ALK PHOS U/L 112 121* 139* 155*   AST (SGOT) U/L  28 32 37* 43*   ALT (SGPT) U/L 16 17 20 20     Results from last 7 days   Lab Units 11/16/24  0424 11/15/24  0543 11/14/24 0424 11/13/24  1244   CALCIUM mg/dL 8.5* 8.3* 8.4* 9.0   ALBUMIN g/dL 2.6* 2.3* 2.7* 3.0*   MAGNESIUM mg/dL  --   --  2.1  --    PHOSPHORUS mg/dL 3.5 3.5  --   --      Results from last 7 days   Lab Units 11/13/24  1244   LACTATE mmol/L 1.0     Glucose   Date/Time Value Ref Range Status   11/16/2024 1045 149 (H) 70 - 130 mg/dL Final   11/16/2024 0626 137 (H) 70 - 130 mg/dL Final   11/15/2024 2048 172 (H) 70 - 130 mg/dL Final   11/15/2024 1533 156 (H) 70 - 130 mg/dL Final   11/15/2024 1038 107 70 - 130 mg/dL Final   11/15/2024 0607 81 70 - 130 mg/dL Final   11/14/2024 2023 184 (H) 70 - 130 mg/dL Final       No radiology results for the last day    I have personally reviewed all medications:  Scheduled Medications  cefTRIAXone, 2,000 mg, Intravenous, Q24H  hydrALAZINE, 50 mg, Oral, Q8H  insulin glargine, 8 Units, Subcutaneous, Daily  insulin lispro, 2-7 Units, Subcutaneous, 4x Daily AC & at Bedtime  lubiprostone, 24 mcg, Oral, BID With Meals  nadolol, 20 mg, Oral, Q24H  pantoprazole, 40 mg, Intravenous, BID  sodium chloride, 10 mL, Intravenous, Q12H    Infusions   Diet  Diet: Cardiac, Diabetic; Low Sodium (2g); Consistent Carbohydrate; Fluid Consistency: Thin (IDDSI 0)    I have personally reviewed:  [x]  Laboratory   [x]  Microbiology   [x]  Radiology   [x]  EKG/Telemetry  [x]  Cardiology/Vascular   []  Pathology    []  Records       Assessment/Plan     Active Hospital Problems    Diagnosis  POA    Murmur [R01.1]  Yes    Cirrhosis of liver with ascites [K74.60, R18.8]  Yes    Arteriovenous fistula of left upper extremity [I77.0]  Yes    Stage 4 chronic kidney disease [N18.4]  Yes    Nonalcoholic fatty liver disease [K76.0]  Yes    Type 2 diabetes mellitus [E11.9]  Yes    Benign essential hypertension [I10]  Yes      Resolved Hospital Problems   No resolved problems to display.       79 y.o.  female admitted with <principal problem not specified>.    Assessment and plan  1.  Decompensated liver cirrhosis due to QUINN, with ascites.  Gastroenterology consultation was requested.  Follow management recommendations.  On diuretic therapy and based on nephrology recommendations.  Continue Rocephin and nadolol.  Patient may need repeat paracentesis.     2.  Chronic kidney disease stage IV, status post previous biopsy, showed diabetic nephropathy with glomerulosclerosis.  Patient has left AV fistula present, has not had to use.  Nephrology is following.  We will follow management recommendations.     3.  Left renal cyst, follow-up CT scan on outpatient basis.     4.  Aortic stenosis, echo showed mild aortic stenosis, EF was 62%.     5.  Diabetes mellitus, continue Accu-Cheks and sliding scale insulin coverage.     6.  Anemia in the setting of chronic kidney disease, monitor hemoglobin trend.  It is noted to be 8.8 today.     7.  GERD, continue PPI therapy.     8.  Hypertension, BP stable, continue hydralazine.     9.  CODE STATUS is full code.  Further plans based on hospital course.         Alan Fine MD  [facility]  11/16/24  13:33 EST

## 2024-11-17 LAB
ALBUMIN SERPL-MCNC: 2.4 G/DL (ref 3.5–5.2)
ALBUMIN/GLOB SERPL: 0.9 G/DL
ALP SERPL-CCNC: 99 U/L (ref 39–117)
ALT SERPL W P-5'-P-CCNC: 16 U/L (ref 1–33)
ANION GAP SERPL CALCULATED.3IONS-SCNC: 11 MMOL/L (ref 5–15)
AST SERPL-CCNC: 27 U/L (ref 1–32)
BASOPHILS # BLD AUTO: 0.06 10*3/MM3 (ref 0–0.2)
BASOPHILS NFR BLD AUTO: 1.4 % (ref 0–1.5)
BILIRUB SERPL-MCNC: 0.5 MG/DL (ref 0–1.2)
BUN SERPL-MCNC: 68 MG/DL (ref 8–23)
BUN/CREAT SERPL: 18.6 (ref 7–25)
CALCIUM SPEC-SCNC: 8.3 MG/DL (ref 8.6–10.5)
CHLORIDE SERPL-SCNC: 106 MMOL/L (ref 98–107)
CO2 SERPL-SCNC: 21 MMOL/L (ref 22–29)
CREAT SERPL-MCNC: 3.66 MG/DL (ref 0.57–1)
DEPRECATED RDW RBC AUTO: 48.3 FL (ref 37–54)
EGFRCR SERPLBLD CKD-EPI 2021: 12 ML/MIN/1.73
EOSINOPHIL # BLD AUTO: 0.2 10*3/MM3 (ref 0–0.4)
EOSINOPHIL NFR BLD AUTO: 4.7 % (ref 0.3–6.2)
ERYTHROCYTE [DISTWIDTH] IN BLOOD BY AUTOMATED COUNT: 14.4 % (ref 12.3–15.4)
GLOBULIN UR ELPH-MCNC: 2.6 GM/DL
GLUCOSE BLDC GLUCOMTR-MCNC: 130 MG/DL (ref 70–130)
GLUCOSE BLDC GLUCOMTR-MCNC: 130 MG/DL (ref 70–130)
GLUCOSE BLDC GLUCOMTR-MCNC: 142 MG/DL (ref 70–130)
GLUCOSE BLDC GLUCOMTR-MCNC: 91 MG/DL (ref 70–130)
GLUCOSE SERPL-MCNC: 80 MG/DL (ref 65–99)
HCT VFR BLD AUTO: 25.1 % (ref 34–46.6)
HGB BLD-MCNC: 8.3 G/DL (ref 12–15.9)
IMM GRANULOCYTES # BLD AUTO: 0.01 10*3/MM3 (ref 0–0.05)
IMM GRANULOCYTES NFR BLD AUTO: 0.2 % (ref 0–0.5)
LYMPHOCYTES # BLD AUTO: 1.27 10*3/MM3 (ref 0.7–3.1)
LYMPHOCYTES NFR BLD AUTO: 30.1 % (ref 19.6–45.3)
MAGNESIUM SERPL-MCNC: 2.3 MG/DL (ref 1.6–2.4)
MCH RBC QN AUTO: 30.2 PG (ref 26.6–33)
MCHC RBC AUTO-ENTMCNC: 33.1 G/DL (ref 31.5–35.7)
MCV RBC AUTO: 91.3 FL (ref 79–97)
MONOCYTES # BLD AUTO: 0.53 10*3/MM3 (ref 0.1–0.9)
MONOCYTES NFR BLD AUTO: 12.6 % (ref 5–12)
NEUTROPHILS NFR BLD AUTO: 2.15 10*3/MM3 (ref 1.7–7)
NEUTROPHILS NFR BLD AUTO: 51 % (ref 42.7–76)
PHOSPHATE SERPL-MCNC: 3.5 MG/DL (ref 2.5–4.5)
PLATELET # BLD AUTO: 96 10*3/MM3 (ref 140–450)
PMV BLD AUTO: 13.6 FL (ref 6–12)
POTASSIUM SERPL-SCNC: 3.6 MMOL/L (ref 3.5–5.2)
PROT SERPL-MCNC: 5 G/DL (ref 6–8.5)
RBC # BLD AUTO: 2.75 10*6/MM3 (ref 3.77–5.28)
SODIUM SERPL-SCNC: 138 MMOL/L (ref 136–145)
URATE SERPL-MCNC: 5.9 MG/DL (ref 2.4–5.7)
WBC NRBC COR # BLD AUTO: 4.22 10*3/MM3 (ref 3.4–10.8)

## 2024-11-17 PROCEDURE — 25010000002 ONDANSETRON PER 1 MG: Performed by: INTERNAL MEDICINE

## 2024-11-17 PROCEDURE — 85025 COMPLETE CBC W/AUTO DIFF WBC: CPT | Performed by: INTERNAL MEDICINE

## 2024-11-17 PROCEDURE — 99232 SBSQ HOSP IP/OBS MODERATE 35: CPT | Performed by: INTERNAL MEDICINE

## 2024-11-17 PROCEDURE — 25010000002 ALBUMIN HUMAN 5% PER 50 ML: Performed by: INTERNAL MEDICINE

## 2024-11-17 PROCEDURE — 63710000001 INSULIN GLARGINE PER 5 UNITS: Performed by: INTERNAL MEDICINE

## 2024-11-17 PROCEDURE — 84100 ASSAY OF PHOSPHORUS: CPT | Performed by: INTERNAL MEDICINE

## 2024-11-17 PROCEDURE — P9041 ALBUMIN (HUMAN),5%, 50ML: HCPCS | Performed by: INTERNAL MEDICINE

## 2024-11-17 PROCEDURE — 82948 REAGENT STRIP/BLOOD GLUCOSE: CPT

## 2024-11-17 PROCEDURE — 84550 ASSAY OF BLOOD/URIC ACID: CPT | Performed by: INTERNAL MEDICINE

## 2024-11-17 PROCEDURE — 80053 COMPREHEN METABOLIC PANEL: CPT | Performed by: INTERNAL MEDICINE

## 2024-11-17 PROCEDURE — 83735 ASSAY OF MAGNESIUM: CPT | Performed by: INTERNAL MEDICINE

## 2024-11-17 RX ORDER — ALBUMIN, HUMAN INJ 5% 5 %
100 SOLUTION INTRAVENOUS ONCE
Status: COMPLETED | OUTPATIENT
Start: 2024-11-17 | End: 2024-11-17

## 2024-11-17 RX ADMIN — ONDANSETRON 4 MG: 2 INJECTION, SOLUTION INTRAMUSCULAR; INTRAVENOUS at 12:11

## 2024-11-17 RX ADMIN — ALBUMIN (HUMAN) 100 G: 12.5 INJECTION, SOLUTION INTRAVENOUS at 12:15

## 2024-11-17 RX ADMIN — HYDRALAZINE HYDROCHLORIDE 50 MG: 50 TABLET ORAL at 15:50

## 2024-11-17 RX ADMIN — LUBIPROSTONE 24 MCG: 24 CAPSULE, GELATIN COATED ORAL at 08:15

## 2024-11-17 RX ADMIN — Medication 10 ML: at 08:15

## 2024-11-17 RX ADMIN — PANTOPRAZOLE SODIUM 40 MG: 40 INJECTION, POWDER, FOR SOLUTION INTRAVENOUS at 20:34

## 2024-11-17 RX ADMIN — Medication 10 ML: at 20:35

## 2024-11-17 RX ADMIN — PANTOPRAZOLE SODIUM 40 MG: 40 INJECTION, POWDER, FOR SOLUTION INTRAVENOUS at 08:15

## 2024-11-17 RX ADMIN — INSULIN GLARGINE 8 UNITS: 100 INJECTION, SOLUTION SUBCUTANEOUS at 08:15

## 2024-11-17 NOTE — PROGRESS NOTES
[facility]  Inpatient Progress Note    Reason for Follow Up: Cirrhosis    Subjective     Interval History:   No complaints, still with liquid stool.    Current Facility-Administered Medications:     acetaminophen (TYLENOL) tablet 650 mg, 650 mg, Oral, Q4H PRN **OR** acetaminophen (TYLENOL) 160 MG/5ML oral solution 650 mg, 650 mg, Oral, Q4H PRN, Cristel Henry MD    albumin human 25 % IV SOLN 50 g, 50 g, Intravenous, Once PRN, Cristel Henry MD    calcium carbonate (TUMS) chewable tablet 500 mg (200 mg elemental), 2 tablet, Oral, BID PRN, Cristel Henry MD    dextrose (D50W) (25 g/50 mL) IV injection 25 g, 25 g, Intravenous, Q15 Min PRN, Cristel Henry MD    dextrose (GLUTOSE) oral gel 15 g, 15 g, Oral, Q15 Min PRN, Cristel Henry MD    glucagon (GLUCAGEN) injection 1 mg, 1 mg, Intramuscular, Q15 Min PRN, Cristel Henry MD    hydrALAZINE (APRESOLINE) tablet 50 mg, 50 mg, Oral, Q8H, Christy Flores MD, 50 mg at 11/16/24 2318    insulin glargine (LANTUS, SEMGLEE) injection 8 Units, 8 Units, Subcutaneous, Daily, Cristel Henry MD, 8 Units at 11/17/24 0815    insulin lispro (HUMALOG/ADMELOG) injection 2-7 Units, 2-7 Units, Subcutaneous, 4x Daily AC & at Bedtime, Cristel Henry MD, 2 Units at 11/16/24 2106    lubiprostone (AMITIZA) capsule 24 mcg, 24 mcg, Oral, BID With Meals, Brendon Pina MD, 24 mcg at 11/17/24 0815    nadolol (CORGARD) tablet 20 mg, 20 mg, Oral, Q24H, Cristel Henry MD, 20 mg at 11/15/24 1013    nitroglycerin (NITROSTAT) SL tablet 0.4 mg, 0.4 mg, Sublingual, Q5 Min PRN, Cristel Henry MD    ondansetron ODT (ZOFRAN-ODT) disintegrating tablet 4 mg, 4 mg, Oral, Q6H PRN **OR** ondansetron (ZOFRAN) injection 4 mg, 4 mg, Intravenous, Q6H PRN, Cristel Henry MD    pantoprazole (PROTONIX) injection 40 mg, 40 mg, Intravenous, BID, Jae Chandra, , 40 mg at 11/17/24 0840     sodium chloride 0.9 % flush 10 mL, 10 mL, Intravenous, PRN, Constantino Martin PA    sodium chloride 0.9 % flush 10 mL, 10 mL, Intravenous, Q12H, Cristel Henry MD, 10 mL at 11/17/24 0815    sodium chloride 0.9 % flush 10 mL, 10 mL, Intravenous, PRN, Cristel Henry MD    sodium chloride 0.9 % infusion 40 mL, 40 mL, Intravenous, PRN, Cristel Henry MD  Review of Systems:    There is weakness of fatigue all other systems reviewed and negative    Objective     Vital Signs  Temp:  [97.5 °F (36.4 °C)-98.1 °F (36.7 °C)] 97.7 °F (36.5 °C)  Heart Rate:  [63-73] 63  Resp:  [16-18] 18  BP: (128-146)/(54-67) 130/55  Body mass index is 45.73 kg/m².    Intake/Output Summary (Last 24 hours) at 11/17/2024 1030  Last data filed at 11/17/2024 0723  Gross per 24 hour   Intake 220 ml   Output --   Net 220 ml     I/O this shift:  In: 20 [P.O.:20]  Out: -      Physical Exam:   General: patient awake, alert and cooperative   Eyes: Normal lids and lashes, no scleral icterus   Neck: supple, normal ROM   Skin: warm and dry, not jaundiced   Cardiovascular: regular rhythm and rate, no murmurs auscultated   Pulm: clear to auscultation bilaterally, regular and unlabored   Abdomen: soft, nontender, nondistended; normal bowel sounds   Extremities: no rash or edema   Psychiatric: Normal mood and behavior; memory intact     Results Review:     I reviewed the patient's new clinical results.    Results from last 7 days   Lab Units 11/17/24  0600 11/16/24  0424 11/15/24  0543   WBC 10*3/mm3 4.22 4.88 5.03   HEMOGLOBIN g/dL 8.3* 8.8* 8.6*   HEMATOCRIT % 25.1* 26.5* 25.7*   PLATELETS 10*3/mm3 96* 138* 111*     Results from last 7 days   Lab Units 11/17/24  0600 11/16/24 0424 11/15/24  0543   SODIUM mmol/L 138 139 141   POTASSIUM mmol/L 3.6 3.7 3.6   CHLORIDE mmol/L 106 106 106   CO2 mmol/L 21.0* 20.0* 23.8   BUN mg/dL 68* 66* 59*   CREATININE mg/dL 3.66* 3.26* 3.01*   CALCIUM mg/dL 8.3* 8.5* 8.3*   BILIRUBIN mg/dL 0.5 0.7 0.6    ALK PHOS U/L 99 112 121*   ALT (SGPT) U/L 16 16 17   AST (SGOT) U/L 27 28 32   GLUCOSE mg/dL 80 134* 78     Results from last 7 days   Lab Units 11/14/24  0424 11/13/24  1244   INR  1.29* 1.24*     Lab Results   Lab Value Date/Time    LIPASE 66 (H) 11/13/2024 1244       Radiology:  US Paracentesis   Final Result   Ultrasound-guided paracentesis as described       This report was finalized on 11/14/2024 10:27 AM by Dr. Hung Michelle M.D on Workstation: [id number]          CT Abdomen Pelvis With Contrast   Final Result   1. Hepatic cirrhotic morphology with portal venous hypertension,   splenomegaly. Diffuse intra-abdominal ascites.   2. Cholelithiasis.   3. 14 x 11 mm cystic area within the pancreatic body due to a small   cystic lesion or extension of ascitic fluid into the pancreatic   parenchyma.   4. 3.3 cm left renal parapelvic cystic lesion is denser than typically   seen for a simple cyst and is new when compared to previous CT   10/21/2014. This could represent a hemorrhagic or proteinaceous cyst,   though recommend follow-up with multiphasic CT to evaluate for internal   enhancement and the possibility of a solid lesion.   5. 4 m nonobstructing left lower pole renal stone.   6. Generalized body wall edema.   Radiation dose reduction techniques were utilized, including automated   exposure control and exposure modulation based on body size.           This report was finalized on 11/13/2024 5:02 PM by Baldemar Bowen M.D   on Workstation: [id number]              Assessment & Plan     Active Hospital Problems    Diagnosis     Murmur     Cirrhosis of liver with ascites     Arteriovenous fistula of left upper extremity     Stage 4 chronic kidney disease     Nonalcoholic fatty liver disease     Type 2 diabetes mellitus     Benign essential hypertension      plan: diuretics as per renal  May need eventual repeat paracentesis depending on clinical status  Lactulose stopped, started amitiza 24mcg bid  -patient with liquid stool  Continue nadolol  BGA available as needed this weekend  Dr. Pina returns Monday  I discussed the patients findings and my recommendations with patient and nursing staff.    Rey Gonsalves MD

## 2024-11-17 NOTE — PLAN OF CARE
Goal Outcome Evaluation:   Patient alert follows commands. No c/o pain or nausea noted.  at bedside. Ambulate in room to bathroom. Skin care and incont care provided. No acute distress ntoed. Hydralazine given with midnight vitals. No BM this shift at this time. Will continue to monitor

## 2024-11-17 NOTE — PLAN OF CARE
Goal Outcome Evaluation:  Plan of Care Reviewed With: patient           Outcome Evaluation: Denies pain or SOA.Room air.  100 gm of IV Albumin given. Tolerated well. Had several liquid/loose stools incontinently with nausea after taking Amitiza. Telemetry SR w/1*AVB.

## 2024-11-17 NOTE — PROGRESS NOTES
Name: Philly Person ADMIT: 2024   : 1945  PCP: Shazia Cardoza MD    MRN: [id number] LOS: 4 days   AGE/SEX: 79 y.o. female  ROOM: [facility]     Subjective   Subjective   Patient is seen at bedside, she reportedly has diarrhea today.       Objective   Objective   Vital Signs  Temp:  [97.5 °F (36.4 °C)-98.1 °F (36.7 °C)] 97.7 °F (36.5 °C)  Heart Rate:  [63-73] 63  Resp:  [16-18] 18  BP: (128-146)/(54-67) 130/55  SpO2:  [90 %-100 %] 98 %  on   ;   Device (Oxygen Therapy): room air  Body mass index is 45.73 kg/m².  Physical Exam  General, awake and alert.  Head and ENT, normocephalic and atraumatic.  Lungs, symmetric expansion, equal air entry bilaterally.  Heart, regular rate and rhythm.  Abdomen, soft and nontender.  Extremities, no clubbing or cyanosis.  Neuro, no focal deficits.  Skin: Warm and no rash.  Psych, normal mood and affect.  Musculoskeletal, joint examination is grossly normal.     Copied text material from yesterday's note has been reviewed for appropriate changes and remains accurate as of 24.      Results Review     I reviewed the patient's new clinical results.  Results from last 7 days   Lab Units 24  0600 11/16/24  0424 11/15/24  0543 11/14/24  0424   WBC 10*3/mm3 4.22 4.88 5.03 5.46   HEMOGLOBIN g/dL 8.3* 8.8* 8.6* 8.3*   PLATELETS 10*3/mm3 96* 138* 111* 106*     Results from last 7 days   Lab Units 24  0600 244 11/15/24  0543 11/14/24  0424   SODIUM mmol/L 138 139 141 138   POTASSIUM mmol/L 3.6 3.7 3.6 3.5   CHLORIDE mmol/L 106 106 106 102   CO2 mmol/L 21.0* 20.0* 23.8 24.0   BUN mg/dL 68* 66* 59* 58*   CREATININE mg/dL 3.66* 3.26* 3.01* 2.44*   GLUCOSE mg/dL 80 134* 78 94   EGFR mL/min/1.73 12.0* 13.8* 15.2* 19.6*     Results from last 7 days   Lab Units 24  0600 24  0424 11/15/24  0543 24  0424   ALBUMIN g/dL 2.4* 2.6* 2.3* 2.7*   BILIRUBIN mg/dL 0.5 0.7 0.6 0.9   ALK PHOS U/L 99 112 121* 139*   AST (SGOT) U/L 27 28 32 37*   ALT  (SGPT) U/L 16 16 17 20     Results from last 7 days   Lab Units 11/17/24  0600 11/16/24  0424 11/15/24  0543 11/14/24  0424   CALCIUM mg/dL 8.3* 8.5* 8.3* 8.4*   ALBUMIN g/dL 2.4* 2.6* 2.3* 2.7*   MAGNESIUM mg/dL 2.3  --   --  2.1   PHOSPHORUS mg/dL 3.5 3.5 3.5  --      Results from last 7 days   Lab Units 11/13/24  1244   LACTATE mmol/L 1.0     Glucose   Date/Time Value Ref Range Status   11/17/2024 1101 142 (H) 70 - 130 mg/dL Final   11/17/2024 0626 91 70 - 130 mg/dL Final   11/16/2024 2025 159 (H) 70 - 130 mg/dL Final   11/16/2024 1535 171 (H) 70 - 130 mg/dL Final   11/16/2024 1045 149 (H) 70 - 130 mg/dL Final   11/16/2024 0626 137 (H) 70 - 130 mg/dL Final   11/15/2024 2048 172 (H) 70 - 130 mg/dL Final       No radiology results for the last day    I have personally reviewed all medications:  Scheduled Medications  albumin human, 100 g, Intravenous, Once  hydrALAZINE, 50 mg, Oral, Q8H  insulin glargine, 8 Units, Subcutaneous, Daily  insulin lispro, 2-7 Units, Subcutaneous, 4x Daily AC & at Bedtime  lubiprostone, 24 mcg, Oral, BID With Meals  nadolol, 20 mg, Oral, Q24H  pantoprazole, 40 mg, Intravenous, BID  sodium chloride, 10 mL, Intravenous, Q12H    Infusions   Diet  Diet: Cardiac, Diabetic; Low Sodium (2g); Consistent Carbohydrate; Fluid Consistency: Thin (IDDSI 0)    I have personally reviewed:  [x]  Laboratory   [x]  Microbiology   [x]  Radiology   [x]  EKG/Telemetry  [x]  Cardiology/Vascular   []  Pathology    []  Records       Assessment/Plan     Active Hospital Problems    Diagnosis  POA    Murmur [R01.1]  Yes    Cirrhosis of liver with ascites [K74.60, R18.8]  Yes    Arteriovenous fistula of left upper extremity [I77.0]  Yes    Stage 4 chronic kidney disease [N18.4]  Yes    Nonalcoholic fatty liver disease [K76.0]  Yes    Type 2 diabetes mellitus [E11.9]  Yes    Benign essential hypertension [I10]  Yes      Resolved Hospital Problems   No resolved problems to display.       79 y.o. female admitted with  <principal problem not specified>.    Assessment and plan  1.  Decompensated liver cirrhosis due to QUINN, with ascites.  Gastroenterology consultation was requested.  Follow management recommendations.  On diuretic therapy and based on nephrology recommendations.  Continue Rocephin and nadolol.  Patient may need repeat paracentesis.  Patient reports diarrhea, now on Amitiza, follow GI recommendations.     2.  Chronic kidney disease stage IV, status post previous biopsy, showed diabetic nephropathy with glomerulosclerosis.  Patient has left AV fistula present, has not had to use.  Nephrology is following.  We will follow management recommendations.  No indication of dialysis at this point.     3.  Left renal cyst, follow-up CT scan on outpatient basis.     4.  Aortic stenosis, echo showed mild aortic stenosis, EF was 62%.     5.  Diabetes mellitus, continue Accu-Cheks and sliding scale insulin coverage.     6.  Anemia in the setting of chronic kidney disease, monitor hemoglobin trend.  It is noted to be 8.3 today.     7.  GERD, continue PPI therapy.     8.  Hypertension, BP stable, continue hydralazine.     9.  CODE STATUS is full code.  Further plans based on hospital course.         Alan Fine MD  [facility]  11/17/24  11:55 EST

## 2024-11-17 NOTE — PROGRESS NOTES
[facility] Progress Note      Patient Name: Philly Person  : 1945  MRN: [id number]  Primary Care Physician:  Shazia Cardoza MD  Date of admission: 2024    Subjective     Interval History:   Follow-up acute kidney injury on chronic kidney disease  The patient is feeling reasonably well, she had paracentesis on 2024 with 7.1 L removed she received albumin infusion, she is feeling the same denies any chest pain or shortness of air, no orthopnea or PND, no nausea or vomiting.  She has worsening creatinine, no reported urine output  Review of Systems:   As noted above    Objective     Vitals:   Temp:  [97.5 °F (36.4 °C)-98.1 °F (36.7 °C)] 97.7 °F (36.5 °C)  Heart Rate:  [63-73] 63  Resp:  [16-18] 18  BP: (128-146)/(54-67) 130/55    Intake/Output Summary (Last 24 hours) at 2024 1031  Last data filed at 2024 0723  Gross per 24 hour   Intake 220 ml   Output --   Net 220 ml       Physical Exam:    General Appearance: Awake, alert, chronically ill, no acute distress.  Obese  Skin: warm and dry  HEENT: oral mucosa normal, nonicteric sclera  Neck: No JVD  Lungs: Scattered rhonchi, breathing effort not labored  Heart: RRR, no rub  Abdomen: soft, nontender, nondistended, normal active bowel  Extremities: 1+ lower extremity edema, no cyanosis or clubbing.  Left upper extremity AV fistula with good thrill and bruit.  Neuro: normal speech and mental status     Scheduled Meds:     hydrALAZINE, 50 mg, Oral, Q8H  insulin glargine, 8 Units, Subcutaneous, Daily  insulin lispro, 2-7 Units, Subcutaneous, 4x Daily AC & at Bedtime  lubiprostone, 24 mcg, Oral, BID With Meals  nadolol, 20 mg, Oral, Q24H  pantoprazole, 40 mg, Intravenous, BID  sodium chloride, 10 mL, Intravenous, Q12H      IV Meds:        Results Reviewed:   I have personally reviewed the results from the time of this admission to 2024 10:31 EST     Results from last 7 days   Lab Units 24  0600  11/16/24 0424 11/15/24  0543   SODIUM mmol/L 138 139 141   POTASSIUM mmol/L 3.6 3.7 3.6   CHLORIDE mmol/L 106 106 106   CO2 mmol/L 21.0* 20.0* 23.8   BUN mg/dL 68* 66* 59*   CREATININE mg/dL 3.66* 3.26* 3.01*   CALCIUM mg/dL 8.3* 8.5* 8.3*   BILIRUBIN mg/dL 0.5 0.7 0.6   ALK PHOS U/L 99 112 121*   ALT (SGPT) U/L 16 16 17   AST (SGOT) U/L 27 28 32   GLUCOSE mg/dL 80 134* 78       Estimated Creatinine Clearance: 14.1 mL/min (A) (by C-G formula based on SCr of 3.66 mg/dL (H)).    Results from last 7 days   Lab Units 11/17/24  0600 11/16/24  0424 11/15/24  0543 11/14/24  0424   MAGNESIUM mg/dL 2.3  --   --  2.1   PHOSPHORUS mg/dL 3.5 3.5 3.5  --        Results from last 7 days   Lab Units 11/17/24  0600   URIC ACID mg/dL 5.9*       Results from last 7 days   Lab Units 11/17/24  0600 11/16/24 0424 11/15/24  0543 11/14/24 0424 11/13/24  1244   WBC 10*3/mm3 4.22 4.88 5.03 5.46 6.55   HEMOGLOBIN g/dL 8.3* 8.8* 8.6* 8.3* 9.7*   PLATELETS 10*3/mm3 96* 138* 111* 106* 165       Results from last 7 days   Lab Units 11/14/24 0424 11/13/24  1244   INR  1.29* 1.24*       Assessment / Plan     ASSESSMENT:  Acute kidney injury on top of CKD stage IV, the etiology related probably significant volume shift with the large-volume paracentesis and concern about hepatorenal syndrome, and contrast-induced nephropathy, creatinine up to 3.66, electrolyte within acceptable range, uric acid is 5.9  CKD stage IV, with recent baseline creatinine between 2 up to 2.3 mg/dL secondary to biopsy-proven diabetic nephrosclerosis with 60% interstitial fibrosis   Urine albumin to creatinine ratio 541 mg/g consistent with diabetic nephrosclerosis.  Does have matured left upper extremity AV fistula with good thrill.   History of type 2 diabetes mellitus with CKD  Hypertension with CKD.  Blood pressure uncontrolled  QUINN cirrhosis complicated by ascites  Volume overload improved improved  Anemia of CKD, hemoglobin 8.3  Left renal cyst.  Complex needs to  be followed in outpatient setting  Possible UTI on antibiotics        PLAN:  Will give albumin today, no diuretic  Continue the same treatment  Surveillance labs  No indication for dialysis at this time we will continue to monitor kidney function daily  I discussed the case with the patient and her  at the bedside    I reviewed the chart and other providers notes, reviewed labs.  Copied text in this note has been reviewed and is accurate as of 11/17/24.         Thank you for involving us in the care of Philly Person.  Please feel free to call with any questions.    Terry Younger MD  11/17/24  10:31 [facility], [place]    [facility]  [phone]    Parts of this note may be an electronic transcription/translation of spoken language to printed text using the Dragon dictation system.

## 2024-11-18 ENCOUNTER — INPATIENT HOSPITAL (AMBULATORY)
Age: 79
End: 2024-11-18
Payer: COMMERCIAL

## 2024-11-18 ENCOUNTER — INPATIENT HOSPITAL (AMBULATORY)
Dept: URBAN - METROPOLITAN AREA HOSPITAL 113 | Facility: HOSPITAL | Age: 79
End: 2024-11-18
Payer: COMMERCIAL

## 2024-11-18 DIAGNOSIS — K76.6 PORTAL HYPERTENSION: ICD-10-CM

## 2024-11-18 DIAGNOSIS — K74.69 OTHER CIRRHOSIS OF LIVER: ICD-10-CM

## 2024-11-18 DIAGNOSIS — R18.8 OTHER ASCITES: ICD-10-CM

## 2024-11-18 DIAGNOSIS — K59.00 CONSTIPATION, UNSPECIFIED: ICD-10-CM

## 2024-11-18 LAB
ALBUMIN SERPL-MCNC: 3.1 G/DL (ref 3.5–5.2)
ALBUMIN/GLOB SERPL: 1.6 G/DL
ALP SERPL-CCNC: 77 U/L (ref 39–117)
ALT SERPL W P-5'-P-CCNC: 10 U/L (ref 1–33)
ANION GAP SERPL CALCULATED.3IONS-SCNC: 12.8 MMOL/L (ref 5–15)
AST SERPL-CCNC: 20 U/L (ref 1–32)
BACTERIA SPEC AEROBE CULT: NORMAL
BACTERIA SPEC AEROBE CULT: NORMAL
BASOPHILS # BLD AUTO: 0.02 10*3/MM3 (ref 0–0.2)
BASOPHILS NFR BLD AUTO: 0.6 % (ref 0–1.5)
BILIRUB SERPL-MCNC: 0.6 MG/DL (ref 0–1.2)
BUN SERPL-MCNC: 68 MG/DL (ref 8–23)
BUN/CREAT SERPL: 17.4 (ref 7–25)
CALCIUM SPEC-SCNC: 8.2 MG/DL (ref 8.6–10.5)
CHLORIDE SERPL-SCNC: 103 MMOL/L (ref 98–107)
CO2 SERPL-SCNC: 19.2 MMOL/L (ref 22–29)
CREAT SERPL-MCNC: 3.9 MG/DL (ref 0.57–1)
DEPRECATED RDW RBC AUTO: 49 FL (ref 37–54)
EGFRCR SERPLBLD CKD-EPI 2021: 11.1 ML/MIN/1.73
EOSINOPHIL # BLD AUTO: 0.15 10*3/MM3 (ref 0–0.4)
EOSINOPHIL NFR BLD AUTO: 4.8 % (ref 0.3–6.2)
ERYTHROCYTE [DISTWIDTH] IN BLOOD BY AUTOMATED COUNT: 14.4 % (ref 12.3–15.4)
GLOBULIN UR ELPH-MCNC: 2 GM/DL
GLUCOSE BLDC GLUCOMTR-MCNC: 114 MG/DL (ref 70–130)
GLUCOSE BLDC GLUCOMTR-MCNC: 116 MG/DL (ref 70–130)
GLUCOSE BLDC GLUCOMTR-MCNC: 135 MG/DL (ref 70–130)
GLUCOSE BLDC GLUCOMTR-MCNC: 136 MG/DL (ref 70–130)
GLUCOSE SERPL-MCNC: 124 MG/DL (ref 65–99)
HCT VFR BLD AUTO: 21.9 % (ref 34–46.6)
HCT VFR BLD AUTO: 23 % (ref 34–46.6)
HGB BLD-MCNC: 7.1 G/DL (ref 12–15.9)
HGB BLD-MCNC: 7.6 G/DL (ref 12–15.9)
HOLD SPECIMEN: NORMAL
IMM GRANULOCYTES # BLD AUTO: 0.02 10*3/MM3 (ref 0–0.05)
IMM GRANULOCYTES NFR BLD AUTO: 0.6 % (ref 0–0.5)
LYMPHOCYTES # BLD AUTO: 0.72 10*3/MM3 (ref 0.7–3.1)
LYMPHOCYTES NFR BLD AUTO: 22.9 % (ref 19.6–45.3)
MAGNESIUM SERPL-MCNC: 2.1 MG/DL (ref 1.6–2.4)
MCH RBC QN AUTO: 30.6 PG (ref 26.6–33)
MCHC RBC AUTO-ENTMCNC: 32.4 G/DL (ref 31.5–35.7)
MCV RBC AUTO: 94.4 FL (ref 79–97)
MONOCYTES # BLD AUTO: 0.44 10*3/MM3 (ref 0.1–0.9)
MONOCYTES NFR BLD AUTO: 14 % (ref 5–12)
NEUTROPHILS NFR BLD AUTO: 1.8 10*3/MM3 (ref 1.7–7)
NEUTROPHILS NFR BLD AUTO: 57.1 % (ref 42.7–76)
PHOSPHATE SERPL-MCNC: 3.7 MG/DL (ref 2.5–4.5)
PLATELET # BLD AUTO: 71 10*3/MM3 (ref 140–450)
POTASSIUM SERPL-SCNC: 3.6 MMOL/L (ref 3.5–5.2)
PROT SERPL-MCNC: 5.1 G/DL (ref 6–8.5)
RBC # BLD AUTO: 2.32 10*6/MM3 (ref 3.77–5.28)
SODIUM SERPL-SCNC: 135 MMOL/L (ref 136–145)
URATE SERPL-MCNC: 5.7 MG/DL (ref 2.4–5.7)
WBC NRBC COR # BLD AUTO: 3.15 10*3/MM3 (ref 3.4–10.8)

## 2024-11-18 PROCEDURE — 99233 SBSQ HOSP IP/OBS HIGH 50: CPT | Performed by: INTERNAL MEDICINE

## 2024-11-18 PROCEDURE — 84100 ASSAY OF PHOSPHORUS: CPT | Performed by: INTERNAL MEDICINE

## 2024-11-18 PROCEDURE — 84550 ASSAY OF BLOOD/URIC ACID: CPT | Performed by: INTERNAL MEDICINE

## 2024-11-18 PROCEDURE — 25010000002 ALBUMIN HUMAN 25% PER 50 ML: Performed by: HOSPITALIST

## 2024-11-18 PROCEDURE — 83735 ASSAY OF MAGNESIUM: CPT | Performed by: INTERNAL MEDICINE

## 2024-11-18 PROCEDURE — 85025 COMPLETE CBC W/AUTO DIFF WBC: CPT | Performed by: INTERNAL MEDICINE

## 2024-11-18 PROCEDURE — 85014 HEMATOCRIT: CPT | Performed by: HOSPITALIST

## 2024-11-18 PROCEDURE — P9047 ALBUMIN (HUMAN), 25%, 50ML: HCPCS | Performed by: HOSPITALIST

## 2024-11-18 PROCEDURE — 85018 HEMOGLOBIN: CPT | Performed by: HOSPITALIST

## 2024-11-18 PROCEDURE — 63710000001 INSULIN GLARGINE PER 5 UNITS: Performed by: INTERNAL MEDICINE

## 2024-11-18 PROCEDURE — 97530 THERAPEUTIC ACTIVITIES: CPT

## 2024-11-18 PROCEDURE — 82948 REAGENT STRIP/BLOOD GLUCOSE: CPT

## 2024-11-18 PROCEDURE — 80053 COMPREHEN METABOLIC PANEL: CPT | Performed by: INTERNAL MEDICINE

## 2024-11-18 RX ORDER — LACTULOSE 10 G/15ML
20 SOLUTION ORAL DAILY
Status: DISCONTINUED | OUTPATIENT
Start: 2024-11-18 | End: 2024-11-22 | Stop reason: HOSPADM

## 2024-11-18 RX ORDER — ALBUMIN (HUMAN) 12.5 G/50ML
50 SOLUTION INTRAVENOUS ONCE
Status: COMPLETED | OUTPATIENT
Start: 2024-11-18 | End: 2024-11-18

## 2024-11-18 RX ADMIN — INSULIN GLARGINE 8 UNITS: 100 INJECTION, SOLUTION SUBCUTANEOUS at 08:01

## 2024-11-18 RX ADMIN — Medication 10 ML: at 08:02

## 2024-11-18 RX ADMIN — LACTULOSE 20 G: 20 SOLUTION ORAL at 11:05

## 2024-11-18 RX ADMIN — ALBUMIN (HUMAN) 50 G: 0.25 INJECTION, SOLUTION INTRAVENOUS at 11:02

## 2024-11-18 RX ADMIN — PANTOPRAZOLE SODIUM 40 MG: 40 INJECTION, POWDER, FOR SOLUTION INTRAVENOUS at 21:22

## 2024-11-18 RX ADMIN — Medication 10 ML: at 21:22

## 2024-11-18 RX ADMIN — PANTOPRAZOLE SODIUM 40 MG: 40 INJECTION, POWDER, FOR SOLUTION INTRAVENOUS at 08:02

## 2024-11-18 RX ADMIN — NADOLOL 20 MG: 20 TABLET ORAL at 08:02

## 2024-11-18 NOTE — PLAN OF CARE
Goal Outcome Evaluation:  Plan of Care Reviewed With: patient        Progress: improving  Outcome Evaluation: Pt seen for PT tx this AM and tolerated the session well. Pt reports feeling better this date compared to last week, but cont to have bowel issues. Today, pt was SV for bed mobility, SBA for STS to RW and CGA progressing to SBA for ambulation of 80' w/ RW. No knee buckling, LOB or SOB. Pt ambulated on room air satting mid-upper 90s throughout. Pt performed UE and LE ther-ex for 10-15 reps while seated EOB. Educated and discussed use of RW during admission and at home, for improved independence/balance, as pt states she has been HHA w/ her  or furniture surfing to walk in her room and when she was at home. TONI w/ RN. PT will cont to follow.    Anticipated Discharge Disposition (PT): home, home with assist

## 2024-11-18 NOTE — PLAN OF CARE
Goal Outcome Evaluation:   Patient alert follows commands. No c/o pain or nausea noted. Assist x1 to ambulate in bathroom. Oral hydralazine not given due to order parameters regarding the b/p. Accucheck performed, blood glucose monitored. No acute distress noted will continue to monitor

## 2024-11-18 NOTE — PROGRESS NOTES
[facility] Progress Note      Patient Name: Philly Person  : 1945  MRN: [id number]  Primary Care Physician:  Shazia Cardoza MD  Date of admission: 2024    Subjective     Interval History:   Follow-up acute kidney injury on chronic kidney disease  The patient is feeling reasonably well, she had paracentesis on 2024 with 7.1 L removed  Denies any nausea or vomiting.  No fever no chills.  No chest pain or shortness of breath  Noted diarrhea overnight  Review of Systems:   As noted above    Objective     Vitals:   Temp:  [97.7 °F (36.5 °C)-98.7 °F (37.1 °C)] 98.7 °F (37.1 °C)  Heart Rate:  [68-79] 70  Resp:  [16-18] 16  BP: (121-137)/(55-63) 128/55    Intake/Output Summary (Last 24 hours) at 2024 0857  Last data filed at 2024 0757  Gross per 24 hour   Intake 440 ml   Output --   Net 440 ml       Physical Exam:    General Appearance: Awake, alert, chronically ill, no acute distress.  Obese  Skin: warm and dry  HEENT: oral mucosa normal, nonicteric sclera  Neck: No JVD  Lungs: Scattered rhonchi, breathing effort not labored  Heart: RRR, no rub  Abdomen: soft, nontender, nondistended, normal active bowel  Extremities: 1+ lower extremity edema, no cyanosis or clubbing.  Left upper extremity AV fistula with good thrill and bruit.  Neuro: normal speech and mental status     Scheduled Meds:     hydrALAZINE, 50 mg, Oral, Q8H  insulin glargine, 8 Units, Subcutaneous, Daily  insulin lispro, 2-7 Units, Subcutaneous, 4x Daily AC & at Bedtime  lactulose, 20 g, Oral, Daily  nadolol, 20 mg, Oral, Q24H  pantoprazole, 40 mg, Intravenous, BID  sodium chloride, 10 mL, Intravenous, Q12H      IV Meds:        Results Reviewed:   I have personally reviewed the results from the time of this admission to 2024 08:57 EST     Results from last 7 days   Lab Units 24  0512 24  0600 24  0424   SODIUM mmol/L 135* 138 139   POTASSIUM mmol/L 3.6 3.6 3.7    CHLORIDE mmol/L 103 106 106   CO2 mmol/L 19.2* 21.0* 20.0*   BUN mg/dL 68* 68* 66*   CREATININE mg/dL 3.90* 3.66* 3.26*   CALCIUM mg/dL 8.2* 8.3* 8.5*   BILIRUBIN mg/dL 0.6 0.5 0.7   ALK PHOS U/L 77 99 112   ALT (SGPT) U/L 10 16 16   AST (SGOT) U/L 20 27 28   GLUCOSE mg/dL 124* 80 134*       Estimated Creatinine Clearance: 13.3 mL/min (A) (by C-G formula based on SCr of 3.9 mg/dL (H)).    Results from last 7 days   Lab Units 11/18/24  0512 11/17/24  0600 11/16/24  0424 11/15/24  0543 11/14/24  0424   MAGNESIUM mg/dL 2.1 2.3  --   --  2.1   PHOSPHORUS mg/dL 3.7 3.5 3.5   < >  --     < > = values in this interval not displayed.       Results from last 7 days   Lab Units 11/18/24  0512 11/17/24  0600   URIC ACID mg/dL 5.7 5.9*       Results from last 7 days   Lab Units 11/18/24  0512 11/17/24  0600 11/16/24 0424 11/15/24  0543 11/14/24  0424   WBC 10*3/mm3 3.15* 4.22 4.88 5.03 5.46   HEMOGLOBIN g/dL 7.1* 8.3* 8.8* 8.6* 8.3*   PLATELETS 10*3/mm3 71* 96* 138* 111* 106*       Results from last 7 days   Lab Units 11/14/24 0424 11/13/24  1244   INR  1.29* 1.24*       Assessment / Plan     ASSESSMENT:  Acute kidney injury on top of CKD stage IV, the etiology related probably significant volume shift with the large-volume paracentesis and concern about hepatorenal syndrome, diarrhea and contrast-induced nephropathy.  Urine sodium of above 20 is more in favor of ATN  CKD stage IV, with recent baseline creatinine between 2 up to 2.3 mg/dL secondary to biopsy-proven diabetic nephrosclerosis with 60% interstitial fibrosis   Urine albumin to creatinine ratio 382 mg/g consistent with diabetic nephrosclerosis.  Does have matured left upper extremity AV fistula with good thrill.   History of type 2 diabetes mellitus with CKD  Hypertension with CKD.  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  Complex needs to be followed in outpatient  setting  Possible UTI on antibiotics        PLAN:  Will continue to hold diuretic therapy but patient showing signs of peripheral hypervolemia   We will give 1 dose of albumin today again  We will replace electrolytes  Fluid restriction at 1500 cc per 24 hours given hyponatremia  No indication for dialysis at this time, discussed with patient possible offending dialysis if kidney function continues to deteriorate  Labs in a.m.  Will check hemoglobin at noon and transfuse if hemoglobin less than 7    I reviewed the chart and other providers notes, reviewed labs.  Copied text in this note has been reviewed and is accurate as of 11/18/24.         Thank you for involving us in the care of Philly Person.  Please feel free to call with any questions.    Christy Flores MD  11/18/24  08:57 [facility]    [facility]  [phone]    Parts of this note may be an electronic transcription/translation of spoken language to printed text using the Dragon dictation system.

## 2024-11-18 NOTE — PLAN OF CARE
Goal Outcome Evaluation:           Progress: no change  Outcome Evaluation: Pt AOx4. On RA. NSR on monitor. HGB down today but stable. DBP low- held hydralizine x2. Amitiza d/c and lactulose restarted per pt request/ GI MD. Pt discussed CT pancreatic lesion with GI MD- no intervention needed. Creatinine climbing- Nephrology ordered abumin x2 bags- given per MAR. Fluid restriction of 1500 for 24 hours per nephro- pt following. Pt had a BM. I/O's.  helped pt to BR. Pt denies pain. BLE 3+. Abd distended but more soft then before paracentesis. Pt not in acute distress. Plan of care ongoing

## 2024-11-18 NOTE — CASE MANAGEMENT/SOCIAL WORK
Continued Stay Note  [facility]     Patient Name: Philly Person  MRN: [id number]  Today's Date: 11/18/2024    Admit Date: 11/13/2024    Plan: Plan home with spouse.  RITCHIE Sims RN   Discharge Plan       Row Name 11/18/24 5462       Plan    Plan Plan home with spouse.  RITCHIE Sism RN    Plan Comments Spoke with pt and pt's spouse (Leeroy [phone]) at bedside. Pt denies any discharge needs at present. Pt states her spouse and son will assist her at home and will transport her home. Plan home with spouse.  RITCHIE Sims RN                   Discharge Codes    No documentation.                 Expected Discharge Date and Time       Expected Discharge Date Expected Discharge Time    Nov 21, 2024               Erma Sims RN

## 2024-11-18 NOTE — THERAPY TREATMENT NOTE
Patient Name: Philly Person  : 1945    MRN: [id number]                              Today's Date: 2024       Admit Date: 2024    Visit Dx:     ICD-10-CM ICD-9-CM   1. Other ascites  R18.8 789.59   2. Nonalcoholic liver disease, chronic  K76.9 571.9   3. Heart murmur  R01.1 785.2   4. Chronic kidney disease, unspecified CKD stage  N18.9 585.9   5. Anemia, unspecified type  D64.9 285.9     Patient Active Problem List   Diagnosis    Elevated liver enzymes    Anemia    Arthritis    Benign essential hypertension    Hypercholesterolemia    Microalbuminuria    Nonalcoholic fatty liver disease    Calculus of kidney    Adiposity    Type 2 diabetes mellitus    Squamous cell carcinoma of skin    Melanoma    Basal cell carcinoma    DJD (degenerative joint disease) of knee    Angiodysplasia of colon without hemorrhage    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 abscess w/o bleeding    Dyslipidemia    H/O: gout    History of renal calculi    Other fecal abnormalities    Polyp of colon    Renal hypertension    Stage 4 chronic kidney disease    Vitamin D deficiency    Murmur    Cirrhosis of liver with ascites    Arteriovenous fistula of left upper extremity     Past Medical History:   Diagnosis Date    Angiodysplasia of colon without hemorrhage 2022    Arteriovenous fistula of left upper extremity 2024    Arthritis     Basal cell carcinoma 2018.      Calculus of kidney 2016    Cataract     LEFT AND RIGHT    Diabetes mellitus     TYPE 2    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 abscess w/o bleeding 2022    H/O: gout 2021    History of kidney stones     History of melanoma     LEFT ARM    Hyperlipidemia     Hypertension     Left knee pain     Melanoma 2018.      Nonalcoholic steatohepatitis     Seasonal allergies     Squamous cell carcinoma of skin 2016    New one in 2019 -left leg.      Stage 4 chronic kidney disease 2021    Progressive  near syncopal episode today, with sudden headache that has since subsided ,denies LOC decline in kidney function since 2019.   most recent creatinine 1.85.    Etiology of kidney disease is likely associated with diabetic nephrosclerosis. biopsy-proven   No recent history of NSAID use.    Renal duplex US negative for renal artery stenosis.    kidney biopsy consistent to his diabetic nephropathy with extensive global sclerosis and 60% interstitial fibrosis.  It was noted     Type 2 diabetes mellitus 2016     Past Surgical History:   Procedure Laterality Date     SECTION      COLONOSCOPY      CYSTOSCOPY W/ LASER LITHOTRIPSY      REPLACEMENT TOTAL KNEE Right 10/2015    SKIN CANCER EXCISION Left     ARM MELANOMA    TONSILLECTOMY AND ADENOIDECTOMY      TOTAL KNEE ARTHROPLASTY Left 2019    Procedure: LEFT TOTAL KNEE ARTHROPLASTY;  Surgeon: Magdaleno Saleh MD;  Location: [facility];  Service: Orthopedics    UPPER GASTROINTESTINAL ENDOSCOPY        General Information       Row Name 24 1452          Physical Therapy Time and Intention    Document Type therapy note (daily note)  -MG     Mode of Treatment individual therapy;physical therapy  -MG       Row Name 24 1452          General Information    Patient Profile Reviewed yes  -MG     Existing Precautions/Restrictions fall  -MG     Barriers to Rehab none identified  -MG       Row Name 24 1452          Cognition    Orientation Status (Cognition) oriented x 4  -MG       Row Name 24 1452          Safety Issues/Impairments Affecting Functional Mobility    Safety Issues Affecting Function (Mobility) insight into deficits/self-awareness;safety precaution awareness  -MG     Impairments Affecting Function (Mobility) balance;endurance/activity tolerance;strength  -MG     Comment, Safety Issues/Impairments (Mobility) Gait belt and non-skid socks donned.  -MG               User Key  (r) = Recorded By, (t) = Taken By, (c) = Cosigned By      Initials Name Provider Type    MG Lula Ramirez, PT Physical Therapist                    Mobility       Row Name 11/18/24 1453          Bed Mobility    Bed Mobility supine-sit;sit-supine  -MG     Supine-Sit Stan[place] (Bed Mobility) supervision  -MG     Sit-Supine [place] (Bed Mobility) supervision  -MG     Assistive Device (Bed Mobility) head of bed elevated;bed rails  -MG       Row Name 11/18/24 1453          Sit-Stand Transfer    Sit-Stand Stanly (Transfers) standby assist;verbal cues  -MG     Assistive Device (Sit-Stand Transfers) walker, front-wheeled  -MG     Comment, (Sit-Stand Transfer) Cues for hand placement.  -MG       Row Name 11/18/24 1453          Gait/Stairs (Locomotion)    Stanly Level (Gait) contact guard;standby assist;verbal cues  -MG     Assistive Device (Gait) walker, front-wheeled  -MG     Distance in Feet (Gait) 80  -MG     Deviations/Abnormal Patterns (Gait) gait speed decreased;stride length decreased  -MG     Bilateral Gait Deviations forward flexed posture;heel strike decreased  -MG     Comment, (Gait/Stairs) Amb 3 laps in her room, declined going out into the hallway. Amb on room air satting mid-upper 90s throughout.  -MG               User Key  (r) = Recorded By, (t) = Taken By, (c) = Cosigned By      Initials Name Provider Type    MG Lula Ramirez PT Physical Therapist                   Obj/Interventions       Row Name 11/18/24 1457          Motor Skills    Therapeutic Exercise other (see comments)  BLE: AP, LAQ, HF x15. BUE: wrist flex/ext, bicep curls, sup/pro, shoulder flexion, shurgs, scap squeeze x10-15.  -MG       Row Name 11/18/24 1457          Balance    Balance Assessment sitting static balance;sitting dynamic balance;standing static balance;standing dynamic balance  -MG     Static Sitting Balance independent  -MG     Dynamic Sitting Balance modified independence  -MG     Position, Sitting Balance sitting edge of bed  -MG     Static Standing Balance standby assist  -MG     Dynamic Standing Balance contact guard;standby assist  -MG      Position/Device Used, Standing Balance supported;walker, front-wheeled  -MG               User Key  (r) = Recorded By, (t) = Taken By, (c) = Cosigned By      Initials Name Provider Type    MG Lula Ramirez, PT Physical Therapist                   Goals/Plan       Row Name 11/18/24 1451          Transfer Goal 1 (PT)    Rescue Level/Cues Needed (Transfer Goal 1, PT) modified independence  -MG     Progress/Outcome (Transfer Goal 1, PT) goal revised this date  -MG       Row Name 11/18/24 7197          Gait Training Goal 1 (PT)    Distance (Gait Training Goal 1, PT) 120  -MG     Progress/Outcome (Gait Training Goal 1, PT) goal revised this date  -MG               User Key  (r) = Recorded By, (t) = Taken By, (c) = Cosigned By      Initials Name Provider Type    MG Lula Ramirez, PT Physical Therapist                   Clinical Impression       Row Name 11/18/24 3444          Pain    Pretreatment Pain Rating 0/10 - no pain  -MG     Posttreatment Pain Rating 0/10 - no pain  -MG       Row Name 11/18/24 1451          Plan of Care Review    Plan of Care Reviewed With patient  -MG     Progress improving  -MG     Outcome Evaluation Pt seen for PT tx this AM and tolerated the session well. Pt reports feeling better this date compared to last week, but cont to have bowel issues. Today, pt was SV for bed mobility, SBA for STS to RW and CGA progressing to SBA for ambulation of 80' w/ RW. No knee buckling, LOB or SOB. Pt ambulated on room air satting mid-upper 90s throughout. Pt performed UE and LE ther-ex for 10-15 reps while seated EOB. Educated and discussed use of RW during admission and at home, for improved independence/balance, as pt states she has been HHA w/ her  or furniture surfing to walk in her room and when she was at home. SBAR w/ RN. PT will cont to follow.  -MG       Row Name 11/18/24 9036          Therapy Assessment/Plan (PT)    Rehab Potential (PT) good  -MG     Criteria for Skilled Interventions Met  (PT) yes  -MG     Therapy Frequency (PT) 5 times/wk  -MG       Row Name 11/18/24 1455          Vital Signs    Pretreatment Heart Rate (beats/min) 72  -MG     Intratreatment Heart Rate (beats/min) 86  -MG     Posttreatment Heart Rate (beats/min) 82  -MG     Pre SpO2 (%) 100  -MG     O2 Delivery Pre Treatment room air  -MG     Intra SpO2 (%) 96  -MG     O2 Delivery Intra Treatment room air  -MG     Post SpO2 (%) 98  -MG     O2 Delivery Post Treatment room air  -MG     Pre Patient Position Supine  -MG     Intra Patient Position Standing  -MG     Post Patient Position Supine  -MG       Row Name 11/18/24 1455          Positioning and Restraints    Pre-Treatment Position in bed  -MG     Post Treatment Position bed  -MG     In Bed notified nsg;supine;fowlers;call light within reach;encouraged to call for assist;exit alarm on;side rails up x2  No bed alarm activated on arrival.  -MG               User Key  (r) = Recorded By, (t) = Taken By, (c) = Cosigned By      Initials Name Provider Type    Lula Schroeder, PT Physical Therapist                   Outcome Measures       Row Name 11/18/24 1459 11/18/24 0757       How much help from another person do you currently need...    Turning from your back to your side while in flat bed without using bedrails? 4  -MG 4  -PB    Moving from lying on back to sitting on the side of a flat bed without bedrails? 4  -MG 4  -PB    Moving to and from a bed to a chair (including a wheelchair)? 3  -MG 3  -PB    Standing up from a chair using your arms (e.g., wheelchair, bedside chair)? 3  -MG 3  -PB    Climbing 3-5 steps with a railing? 3  -MG 3  -PB    To walk in hospital room? 3  -MG 3  -PB    AM-PAC 6 Clicks Score (PT) 20  -MG 20  -PB              User Key  (r) = Recorded By, (t) = Taken By, (c) = Cosigned By      Initials Name Provider Type    Lula Schroeder, PT Physical Therapist    Carol Wilkinson RN Registered Nurse                                 Physical Therapy Education        Title: PT OT SLP Therapies (Done)       Topic: Physical Therapy (Done)       Point: Mobility training (Done)       Learning Progress Summary            Patient Acceptance, E,TB,D, VU by MG at 11/18/2024 1459    Acceptance, E,D,TB, VU by MG at 11/15/2024 1600                      Point: Home exercise program (Done)       Learning Progress Summary            Patient Acceptance, E,TB,D, VU by MG at 11/18/2024 1459    Acceptance, E,D,TB, VU by MG at 11/15/2024 1600                      Point: Body mechanics (Done)       Learning Progress Summary            Patient Acceptance, E,TB,D, VU by MG at 11/18/2024 1459    Acceptance, E,D,TB, VU by MG at 11/15/2024 1600    Acceptance, E, VU by MG at 11/14/2024 1431                      Point: Precautions (Done)       Learning Progress Summary            Patient Acceptance, E,TB,D, VU by MG at 11/18/2024 1459    Acceptance, E,D,TB, VU by MG at 11/15/2024 1600    Acceptance, E, VU by MG at 11/14/2024 1431                                      User Key       Initials Effective Dates Name Provider Type Discipline     05/24/22 -  Lula Ramirez, PT Physical Therapist PT                  PT Recommendation and Plan  Planned Therapy Interventions (PT): balance training, bed mobility training, gait training, home exercise program, patient/family education, stretching, strengthening, stair training, ROM (range of motion), neuromuscular re-education, motor coordination training, postural re-education, transfer training  Progress: improving  Outcome Evaluation: Pt seen for PT tx this AM and tolerated the session well. Pt reports feeling better this date compared to last week, but cont to have bowel issues. Today, pt was SV for bed mobility, SBA for STS to RW and CGA progressing to SBA for ambulation of 80' w/ RW. No knee buckling, LOB or SOB. Pt ambulated on room air satting mid-upper 90s throughout. Pt performed UE and LE ther-ex for 10-15 reps while seated EOB. Educated and  discussed use of RW during admission and at home, for improved independence/balance, as pt states she has been HHA w/ her  or furniture surfing to walk in her room and when she was at home. TONI w/ RN. PT will cont to follow.     Time Calculation:         PT Charges       Row Name 11/18/24 1459             Time Calculation    Start Time 1430  -MG      Stop Time 1446  -MG      Time Calculation (min) 16 min  -MG      PT Received On 11/18/24  -MG      PT - Next Appointment 11/19/24  -MG                User Key  (r) = Recorded By, (t) = Taken By, (c) = Cosigned By      Initials Name Provider Type    MG Lula Ramirez, PT Physical Therapist                  Therapy Charges for Today       Code Description Service Date Service Provider Modifiers Qty    87506930439  PT THERAPEUTIC ACT EA 15 MIN 11/18/2024 Lula Ramirez, PT GP 1            PT G-Codes  AM-PAC 6 Clicks Score (PT): 20  PT Discharge Summary  Anticipated Discharge Disposition (PT): home, home with assist    Lula Ramirez PT  11/18/2024

## 2024-11-18 NOTE — PROGRESS NOTES
[facility]     Progress Note    Patient Name: Philly Person  : 1945  MRN: [id number]  Primary Care Physician:  Shazia Cardoza MD  Date of admission: 2024    Subjective   Subjective     Chief Complaint: ascites, constipation    History of Present Illness  Patient Reports worse diarrhea with amitiza, even with daily dose.      Review of Systems   Eyes:  Negative for itching.   Gastrointestinal:  Positive for diarrhea and nausea.   All other systems reviewed and are negative.      Objective   Objective     Vitals:   Temp:  [97.7 °F (36.5 °C)-98.7 °F (37.1 °C)] 98.7 °F (37.1 °C)  Heart Rate:  [68-79] 70  Resp:  [16-18] 16  BP: (121-137)/(55-63) 128/55    Physical Exam  HENT:      Right Ear: External ear normal.      Left Ear: External ear normal.      Mouth/Throat:      Pharynx: Oropharynx is clear.   Eyes:      Conjunctiva/sclera: Conjunctivae normal.   Cardiovascular:      Rate and Rhythm: Normal rate.      Pulses: Normal pulses.   Pulmonary:      Effort: Pulmonary effort is normal.   Abdominal:      General: Abdomen is flat.   Skin:     General: Skin is warm.   Neurological:      General: No focal deficit present.      Mental Status: She is alert.   Psychiatric:         Mood and Affect: Mood normal.          Result Review    Result Review:  I have personally reviewed the results from the time of this admission to 2024 08:13 EST and agree with these findings:  []  Laboratory list / accordion  []  Microbiology  []  Radiology  []  EKG/Telemetry   []  Cardiology/Vascular   []  Pathology  []  Old records  []  Other:  Most notable findings include:       Assessment & Plan   Assessment / Plan     Brief Patient Summary:  Philly Person is a 79 y.o. female who   Ascites secondary to portal htn  Cirrhosis suspect basis of [id number] mm cyst of pancreas      Active Hospital Problems:  Active Hospital Problems    Diagnosis     Murmur     Cirrhosis of liver with ascites     Arteriovenous fistula of  left upper extremity     Stage 4 chronic kidney disease     Nonalcoholic fatty liver disease     Type 2 diabetes mellitus     Benign essential hypertension      Plan: diuretics per renal   Prn paracentesis with albumin support  Pancreatic cystic lesion is small enough that no further invasive evaluation needed. Could consider imaging in 6 months  to 12 months discussed with patient     VTE Prophylaxis:  Mechanical VTE prophylaxis orders are present.        CODE STATUS:    Code Status (Patient has no pulse and is not breathing): CPR (Attempt to Resuscitate)  Medical Interventions (Patient has pulse or is breathing): Full    Disposition:  I expect patient to be discharged uncertain.    Brendon Pina MD

## 2024-11-18 NOTE — PROGRESS NOTES
Name: Philly Person ADMIT: 2024   : 1945  PCP: Shazia Cardoza MD    MRN: [id number] LOS: 5 days   AGE/SEX: 79 y.o. female  ROOM: [facility]     Subjective   Subjective   Patient is seen at bedside today, she is not feeling very well, she is lying in bed.  She does not appear to be in any kind of distress at this point of time.       Objective   Objective   Vital Signs  Temp:  [97.7 °F (36.5 °C)-98.7 °F (37.1 °C)] 97.9 °F (36.6 °C)  Heart Rate:  [67-79] 67  Resp:  [16-18] 16  BP: (112-137)/(49-63) 112/49  SpO2:  [96 %-100 %] 97 %  on   ;   Device (Oxygen Therapy): room air  Body mass index is 46.01 kg/m².  Physical Exam  General, awake and alert.  Head and ENT, normocephalic and atraumatic.  Lungs, symmetric expansion, equal air entry bilaterally.  Heart, regular rate and rhythm.  Abdomen, distention  Extremities, no clubbing or cyanosis.  Neuro, no focal deficits.  Skin: Warm and no rash.  Psych, normal mood and affect.  Musculoskeletal, joint examination is grossly normal.     Copied text material from yesterday's note has been reviewed for appropriate changes and remains accurate as of 24.      Results Review     I reviewed the patient's new clinical results.  Results from last 7 days   Lab Units 24  1205 24  0512 24  0600 24  0424 11/15/24  0543   WBC 10*3/mm3  --  3.15* 4.22 4.88 5.03   HEMOGLOBIN g/dL 7.6* 7.1* 8.3* 8.8* 8.6*   PLATELETS 10*3/mm3  --  71* 96* 138* 111*     Results from last 7 days   Lab Units 24  0512 24  0600 24  0424 11/15/24  0543   SODIUM mmol/L 135* 138 139 141   POTASSIUM mmol/L 3.6 3.6 3.7 3.6   CHLORIDE mmol/L 103 106 106 106   CO2 mmol/L 19.2* 21.0* 20.0* 23.8   BUN mg/dL 68* 68* 66* 59*   CREATININE mg/dL 3.90* 3.66* 3.26* 3.01*   GLUCOSE mg/dL 124* 80 134* 78   EGFR mL/min/1.73 11.1* 12.0* 13.8* 15.2*     Results from last 7 days   Lab Units 24  0512 24  0600 24  0424 11/15/24  0543   ALBUMIN g/dL  3.1* 2.4* 2.6* 2.3*   BILIRUBIN mg/dL 0.6 0.5 0.7 0.6   ALK PHOS U/L 77 99 112 121*   AST (SGOT) U/L 20 27 28 32   ALT (SGPT) U/L 10 16 16 17     Results from last 7 days   Lab Units 11/18/24  0512 11/17/24  0600 11/16/24  0424 11/15/24  0543 11/14/24  0424   CALCIUM mg/dL 8.2* 8.3* 8.5* 8.3* 8.4*   ALBUMIN g/dL 3.1* 2.4* 2.6* 2.3* 2.7*   MAGNESIUM mg/dL 2.1 2.3  --   --  2.1   PHOSPHORUS mg/dL 3.7 3.5 3.5 3.5  --      Results from last 7 days   Lab Units 11/13/24  1244   LACTATE mmol/L 1.0     Glucose   Date/Time Value Ref Range Status   11/18/2024 1052 135 (H) 70 - 130 mg/dL Final   11/18/2024 0625 136 (H) 70 - 130 mg/dL Final   11/17/2024 2042 130 70 - 130 mg/dL Final   11/17/2024 1556 130 70 - 130 mg/dL Final   11/17/2024 1101 142 (H) 70 - 130 mg/dL Final   11/17/2024 0626 91 70 - 130 mg/dL Final   11/16/2024 2025 159 (H) 70 - 130 mg/dL Final       No radiology results for the last day    I have personally reviewed all medications:  Scheduled Medications  hydrALAZINE, 50 mg, Oral, Q8H  insulin glargine, 8 Units, Subcutaneous, Daily  insulin lispro, 2-7 Units, Subcutaneous, 4x Daily AC & at Bedtime  lactulose, 20 g, Oral, Daily  nadolol, 20 mg, Oral, Q24H  pantoprazole, 40 mg, Intravenous, BID  sodium chloride, 10 mL, Intravenous, Q12H    Infusions   Diet  Diet: Cardiac, Diabetic; Low Sodium (2g); Consistent Carbohydrate; Fluid Consistency: Thin (IDDSI 0)    I have personally reviewed:  [x]  Laboratory   [x]  Microbiology   [x]  Radiology   [x]  EKG/Telemetry  [x]  Cardiology/Vascular   []  Pathology    []  Records       Assessment/Plan     Active Hospital Problems    Diagnosis  POA    Murmur [R01.1]  Yes    Cirrhosis of liver with ascites [K74.60, R18.8]  Yes    Arteriovenous fistula of left upper extremity [I77.0]  Yes    Stage 4 chronic kidney disease [N18.4]  Yes    Nonalcoholic fatty liver disease [K76.0]  Yes    Type 2 diabetes mellitus [E11.9]  Yes    Benign essential hypertension [I10]  Yes      Resolved  Hospital Problems   No resolved problems to display.     Assessment and plan  1.  Decompensated liver cirrhosis due to QUINN, with ascites.  Gastroenterology consultation was requested.  Follow management recommendations.  On diuretic therapy and based on nephrology recommendations.  Continue nadolol.  Patient may need repeat paracentesis.  Patient reports diarrhea, with Amitiza on board, which has been discontinued based on GI recommendations.  Lactulose will also be continued.     2.  Chronic kidney disease stage IV, status post previous biopsy, showed diabetic nephropathy with glomerulosclerosis.  Patient has left AV fistula present, has not had to use.  Nephrology is following.  We will follow management recommendations.  No indication of dialysis at this point.  Today's nephrology note review stated that patient would be offered hemodialysis if her renal function continues to decline.  Patient clinically does not feel well today.  Follow-up with repeat labs.     3.  Left renal cyst, follow-up CT scan on outpatient basis.     4.  Aortic stenosis, echo showed mild aortic stenosis, EF was 62%.     5.  Diabetes mellitus, continue Accu-Cheks and sliding scale insulin coverage.     6.  Anemia in the setting of chronic kidney disease, monitor hemoglobin trend.  It is noted to be 7.6 today.     7.  GERD, continue PPI therapy.     8.  Hypertension, BP stable, continue hydralazine.     9.  CODE STATUS is full code.  Further plans based on hospital course.       Alan Fine MD  [facility]  11/18/24  12:50 EST

## 2024-11-19 PROBLEM — I35.0 NONRHEUMATIC AORTIC VALVE STENOSIS: Status: ACTIVE | Noted: 2024-11-19

## 2024-11-19 PROBLEM — D61.818 OTHER PANCYTOPENIA: Status: ACTIVE | Noted: 2024-11-19

## 2024-11-19 PROBLEM — N17.9 AKI (ACUTE KIDNEY INJURY): Status: ACTIVE | Noted: 2024-11-19

## 2024-11-19 LAB
A2 MACROGLOB SERPL-MCNC: 167 MG/DL (ref 110–276)
ALBUMIN SERPL-MCNC: 3.2 G/DL (ref 3.5–5.2)
ALT SERPL W P-5'-P-CCNC: 17 IU/L (ref 0–40)
ANION GAP SERPL CALCULATED.3IONS-SCNC: 12 MMOL/L (ref 5–15)
APO A-I SERPL-MCNC: 70 MG/DL (ref 116–209)
AST SERPL W P-5'-P-CCNC: 37 IU/L (ref 0–40)
BACTERIA FLD CULT: NORMAL
BACTERIA SPEC ANAEROBE CULT: NORMAL
BASOPHILS # BLD AUTO: 0.02 10*3/MM3 (ref 0–0.2)
BASOPHILS NFR BLD AUTO: 0.6 % (ref 0–1.5)
BILIRUB SERPL-MCNC: 0.3 MG/DL (ref 0–1.2)
BUN SERPL-MCNC: 66 MG/DL (ref 8–23)
BUN/CREAT SERPL: 16.6 (ref 7–25)
CALCIUM SPEC-SCNC: 8.2 MG/DL (ref 8.6–10.5)
CHLORIDE SERPL-SCNC: 106 MMOL/L (ref 98–107)
CHOLEST SERPL-MCNC: 86 MG/DL (ref 100–199)
CO2 SERPL-SCNC: 18 MMOL/L (ref 22–29)
CREAT SERPL-MCNC: 3.98 MG/DL (ref 0.57–1)
DEPRECATED RDW RBC AUTO: 46.3 FL (ref 37–54)
EGFRCR SERPLBLD CKD-EPI 2021: 10.9 ML/MIN/1.73
EOSINOPHIL # BLD AUTO: 0.18 10*3/MM3 (ref 0–0.4)
EOSINOPHIL NFR BLD AUTO: 5.2 % (ref 0.3–6.2)
ERYTHROCYTE [DISTWIDTH] IN BLOOD BY AUTOMATED COUNT: 14 % (ref 12.3–15.4)
FIBROSIS SCORING:: ABNORMAL
FIBROSIS STAGE SERPL QL: ABNORMAL
GGT SERPL-CCNC: 71 IU/L (ref 0–60)
GLUCOSE BLDC GLUCOMTR-MCNC: 123 MG/DL (ref 70–130)
GLUCOSE BLDC GLUCOMTR-MCNC: 161 MG/DL (ref 70–130)
GLUCOSE BLDC GLUCOMTR-MCNC: 199 MG/DL (ref 70–130)
GLUCOSE BLDC GLUCOMTR-MCNC: 72 MG/DL (ref 70–130)
GLUCOSE SERPL-MCNC: 66 MG/DL (ref 65–99)
GLUCOSE SERPL-MCNC: 78 MG/DL (ref 70–99)
GRAM STN SPEC: NORMAL
HAPTOGLOB SERPL-MCNC: 138 MG/DL (ref 42–346)
HCT VFR BLD AUTO: 23 % (ref 34–46.6)
HGB BLD-MCNC: 7.5 G/DL (ref 12–15.9)
IMM GRANULOCYTES # BLD AUTO: 0.01 10*3/MM3 (ref 0–0.05)
IMM GRANULOCYTES NFR BLD AUTO: 0.3 % (ref 0–0.5)
LABORATORY COMMENT REPORT: ABNORMAL
LIVER FIBR SCORE SERPL CALC.FIBROSURE: 0.45 (ref 0–0.21)
LIVER STEATOSIS GRADE SERPL QL: ABNORMAL
LIVER STEATOSIS SCORE SERPL: 0.67 (ref 0–0.4)
LYMPHOCYTES # BLD AUTO: 0.73 10*3/MM3 (ref 0.7–3.1)
LYMPHOCYTES NFR BLD AUTO: 21.1 % (ref 19.6–45.3)
MCH RBC QN AUTO: 29.9 PG (ref 26.6–33)
MCHC RBC AUTO-ENTMCNC: 32.6 G/DL (ref 31.5–35.7)
MCV RBC AUTO: 91.6 FL (ref 79–97)
MONOCYTES # BLD AUTO: 0.45 10*3/MM3 (ref 0.1–0.9)
MONOCYTES NFR BLD AUTO: 13 % (ref 5–12)
NASH GRADE SERPL QL: ABNORMAL
NASH INTERPRETATION SERPL-IMP: ABNORMAL
NASH SCORE SERPL: 0.72 (ref 0–0.25)
NASH SCORING: ABNORMAL
NEUTROPHILS NFR BLD AUTO: 2.07 10*3/MM3 (ref 1.7–7)
NEUTROPHILS NFR BLD AUTO: 59.8 % (ref 42.7–76)
PHOSPHATE SERPL-MCNC: 3.5 MG/DL (ref 2.5–4.5)
PLATELET # BLD AUTO: 67 10*3/MM3 (ref 140–450)
POTASSIUM SERPL-SCNC: 3.5 MMOL/L (ref 3.5–5.2)
RBC # BLD AUTO: 2.51 10*6/MM3 (ref 3.77–5.28)
SODIUM SERPL-SCNC: 136 MMOL/L (ref 136–145)
STEATOSIS SCORING: ABNORMAL
TEST PERFORMANCE INFO SPEC: ABNORMAL
TEST PERFORMANCE INFO SPEC: ABNORMAL
TRIGL SERPL-MCNC: 80 MG/DL (ref 0–149)
WBC NRBC COR # BLD AUTO: 3.46 10*3/MM3 (ref 3.4–10.8)

## 2024-11-19 PROCEDURE — 99233 SBSQ HOSP IP/OBS HIGH 50: CPT | Performed by: INTERNAL MEDICINE

## 2024-11-19 PROCEDURE — 85025 COMPLETE CBC W/AUTO DIFF WBC: CPT | Performed by: INTERNAL MEDICINE

## 2024-11-19 PROCEDURE — 97110 THERAPEUTIC EXERCISES: CPT

## 2024-11-19 PROCEDURE — 63710000001 INSULIN LISPRO (HUMAN) PER 5 UNITS: Performed by: INTERNAL MEDICINE

## 2024-11-19 PROCEDURE — 63710000001 INSULIN GLARGINE PER 5 UNITS: Performed by: INTERNAL MEDICINE

## 2024-11-19 PROCEDURE — 82948 REAGENT STRIP/BLOOD GLUCOSE: CPT

## 2024-11-19 PROCEDURE — 80069 RENAL FUNCTION PANEL: CPT | Performed by: HOSPITALIST

## 2024-11-19 RX ORDER — TORSEMIDE 20 MG/1
20 TABLET ORAL DAILY
Status: DISCONTINUED | OUTPATIENT
Start: 2024-11-19 | End: 2024-11-20

## 2024-11-19 RX ORDER — PANTOPRAZOLE SODIUM 40 MG/1
40 TABLET, DELAYED RELEASE ORAL
Status: DISCONTINUED | OUTPATIENT
Start: 2024-11-19 | End: 2024-11-19

## 2024-11-19 RX ORDER — PANTOPRAZOLE SODIUM 40 MG/1
40 TABLET, DELAYED RELEASE ORAL
Status: DISCONTINUED | OUTPATIENT
Start: 2024-11-20 | End: 2024-11-22 | Stop reason: HOSPADM

## 2024-11-19 RX ADMIN — LACTULOSE 20 G: 20 SOLUTION ORAL at 08:44

## 2024-11-19 RX ADMIN — INSULIN LISPRO 2 UNITS: 100 INJECTION, SOLUTION INTRAVENOUS; SUBCUTANEOUS at 16:07

## 2024-11-19 RX ADMIN — HYDRALAZINE HYDROCHLORIDE 50 MG: 50 TABLET ORAL at 21:27

## 2024-11-19 RX ADMIN — NADOLOL 20 MG: 20 TABLET ORAL at 08:44

## 2024-11-19 RX ADMIN — INSULIN LISPRO 2 UNITS: 100 INJECTION, SOLUTION INTRAVENOUS; SUBCUTANEOUS at 21:27

## 2024-11-19 RX ADMIN — HYDRALAZINE HYDROCHLORIDE 50 MG: 50 TABLET ORAL at 13:06

## 2024-11-19 RX ADMIN — Medication 10 ML: at 08:44

## 2024-11-19 RX ADMIN — HYDRALAZINE HYDROCHLORIDE 50 MG: 50 TABLET ORAL at 06:54

## 2024-11-19 RX ADMIN — Medication 10 ML: at 21:34

## 2024-11-19 RX ADMIN — TORSEMIDE 20 MG: 20 TABLET ORAL at 11:30

## 2024-11-19 RX ADMIN — PANTOPRAZOLE SODIUM 40 MG: 40 INJECTION, POWDER, FOR SOLUTION INTRAVENOUS at 08:44

## 2024-11-19 RX ADMIN — INSULIN GLARGINE 8 UNITS: 100 INJECTION, SOLUTION SUBCUTANEOUS at 08:44

## 2024-11-19 NOTE — PLAN OF CARE
Goal Outcome Evaluation:              Outcome Evaluation: Patient continues to improve with functional mobilty; less assist to transfer to EOB and coming up to stand.  The patient wanted to use STC from home, pt did reach for table at one point for more stability.  With cane provided CGA for balance.  Pt and spouse report increased daily activity.

## 2024-11-19 NOTE — PROGRESS NOTES
[facility]     Progress Note    Patient Name: Philly Person  : 1945  MRN: [id number]  Primary Care Physician:  Shazia Cardoza MD  Date of admission: 2024    Subjective   Subjective     Chief Complaint: ascites    History of Present Illness  Patient Reports feeling a little better today    Review of Systems   Gastrointestinal:  Positive for constipation.   Neurological:  Positive for weakness.   All other systems reviewed and are negative.      Objective   Objective     Vitals:   Temp:  [97.8 °F (36.6 °C)-97.9 °F (36.6 °C)] 97.8 °F (36.6 °C)  Heart Rate:  [67-73] 67  Resp:  [16] 16  BP: (112-144)/(49-77) 144/61    Physical Exam  HENT:      Right Ear: External ear normal.      Left Ear: External ear normal.      Mouth/Throat:      Pharynx: Oropharynx is clear.   Eyes:      Conjunctiva/sclera: Conjunctivae normal.   Cardiovascular:      Rate and Rhythm: Normal rate.      Pulses: Normal pulses.   Pulmonary:      Effort: Pulmonary effort is normal.   Abdominal:      General: There is distension.      Palpations: Abdomen is soft. There is shifting dullness.   Neurological:      Mental Status: She is alert.          Result Review    Result Review:  I have personally reviewed the results from the time of this admission to 2024 08:08 EST and agree with these findings:  []  Laboratory list / accordion  []  Microbiology  []  Radiology  []  EKG/Telemetry   []  Cardiology/Vascular   []  Pathology  []  Old records  []  Other:  Most notable findings include:       Assessment & Plan   Assessment / Plan     Brief Patient Summary:  Philly Person is a 79 y.o. female who   Ascites secondary to portal htn  Cirrhosis suspect basis of [id number] mm cyst of pancreas  constipation     Active Hospital Problems:  Active Hospital Problems    Diagnosis     Murmur     Cirrhosis of liver with ascites     Arteriovenous fistula of left upper extremity     Stage 4 chronic kidney disease     Nonalcoholic fatty liver  disease     Type 2 diabetes mellitus     Benign essential hypertension      Plan:   Plan: diuretics per renal   Prn paracentesis with albumin support  Pancreatic cystic lesion is small enough that no further invasive evaluation needed. Could consider imaging in 6 months  to 12 months discussed with patient   Continue lactulose for constipation        VTE Prophylaxis:  Mechanical VTE prophylaxis orders are present.        CODE STATUS:    Code Status (Patient has no pulse and is not breathing): CPR (Attempt to Resuscitate)  Medical Interventions (Patient has pulse or is breathing): Full    Disposition:  I expect patient to be discharged few days .    Brendon Pina MD

## 2024-11-19 NOTE — PLAN OF CARE
Goal Outcome Evaluation:   Pt resting on the bed, sr on monitor, RA, alert and oriented, administered meds per mar, no ss of acute distress noted, will continue to monitor.      Problem: Adult Inpatient Plan of Care  Goal: Absence of Hospital-Acquired Illness or Injury  Intervention: Identify and Manage Fall Risk  Recent Flowsheet Documentation  Taken 11/19/2024 0612 by Shayne Golden RN  Safety Promotion/Fall Prevention:   activity supervised   room organization consistent   safety round/check completed  Taken 11/19/2024 0412 by Shayne Golden RN  Safety Promotion/Fall Prevention:   activity supervised   room organization consistent   safety round/check completed  Intervention: Prevent Skin Injury  Recent Flowsheet Documentation  Taken 11/19/2024 0612 by Shayne Golden RN  Body Position: position changed independently  Taken 11/19/2024 0412 by Shayne Golden RN  Body Position: position changed independently  Intervention: Prevent Infection  Recent Flowsheet Documentation  Taken 11/19/2024 0612 by Shayne Golden RN  Infection Prevention:   hand hygiene promoted   rest/sleep promoted  Taken 11/19/2024 0412 by Shayne Golden RN  Infection Prevention:   hand hygiene promoted   rest/sleep promoted     Problem: Liver Failure  Goal: Optimize Neurologic Function  Intervention: Monitor and Optimize Neurologic Status  Recent Flowsheet Documentation  Taken 11/19/2024 0612 by Shayne Golden RN  Head of Bed (HOB) Positioning: HOB elevated  Taken 11/19/2024 0412 by Shayne Golden RN  Head of Bed (HOB) Positioning: HOB elevated  Goal: Effective Oxygenation and Ventilation  Intervention: Optimize Oxygenation and Ventilation  Recent Flowsheet Documentation  Taken 11/19/2024 0612 by Shayne Golden RN  Head of Bed (HOB) Positioning: HOB elevated  Taken 11/19/2024 0412 by Shayne Golden RN  Head of Bed (HOB) Positioning: HOB elevated     Problem: Fall Injury Risk  Goal: Absence of Fall and  Fall-Related Injury  Intervention: Identify and Manage Contributors  Recent Flowsheet Documentation  Taken 11/19/2024 0612 by Shayne Golden RN  Medication Review/Management: medications reviewed  Taken 11/19/2024 0412 by Shayne Golden RN  Medication Review/Management: medications reviewed  Intervention: Promote Injury-Free Environment  Recent Flowsheet Documentation  Taken 11/19/2024 0612 by Shayne Golden RN  Safety Promotion/Fall Prevention:   activity supervised   room organization consistent   safety round/check completed  Taken 11/19/2024 0412 by Shayne Golden RN  Safety Promotion/Fall Prevention:   activity supervised   room organization consistent   safety round/check completed     Problem: Chronic Kidney Disease  Goal: Minimize Renal Failure Effects  Intervention: Monitor and Support Renal Function  Recent Flowsheet Documentation  Taken 11/19/2024 0612 by Shayne Golden RN  Medication Review/Management: medications reviewed  Taken 11/19/2024 0412 by Shayne Golden RN  Medication Review/Management: medications reviewed

## 2024-11-19 NOTE — PLAN OF CARE
Problem: Adult Inpatient Plan of Care  Goal: Plan of Care Review  Outcome: Progressing  Flowsheets (Taken 11/19/2024 1401)  Progress: improving  Outcome Evaluation: Patient has been pleasant and cooperative during shift. No complaints of pain, nausea or SOA. AOx4, stand-by assist, room air, SR. Demadex started. Protonix changed to PO. VSS.  at bedside. Strict I/O. Will continue to monitor and assist patient as needed.  Plan of Care Reviewed With: patient  Goal: Patient-Specific Goal (Individualized)  Outcome: Progressing  Goal: Absence of Hospital-Acquired Illness or Injury  Outcome: Progressing  Intervention: Identify and Manage Fall Risk  Recent Flowsheet Documentation  Taken 11/19/2024 1315 by Immanuel Velasco RN  Safety Promotion/Fall Prevention:   assistive device/personal items within reach   fall prevention program maintained   nonskid shoes/slippers when out of bed   safety round/check completed  Taken 11/19/2024 1200 by Immanuel Velasco RN  Safety Promotion/Fall Prevention:   assistive device/personal items within reach   fall prevention program maintained   nonskid shoes/slippers when out of bed   safety round/check completed  Taken 11/19/2024 1000 by Immanuel Velasco RN  Safety Promotion/Fall Prevention:   assistive device/personal items within reach   fall prevention program maintained   nonskid shoes/slippers when out of bed   safety round/check completed  Taken 11/19/2024 0847 by Immanuel Velasco RN  Safety Promotion/Fall Prevention:   assistive device/personal items within reach   fall prevention program maintained   nonskid shoes/slippers when out of bed   safety round/check completed  Intervention: Prevent Skin Injury  Recent Flowsheet Documentation  Taken 11/19/2024 1315 by Immanuel Velasco RN  Body Position: supine  Skin Protection: incontinence pads utilized  Taken 11/19/2024 1200 by Immanuel Velasco RN  Body Position: dangle, side of bed  Taken 11/19/2024 1000 by Immanuel Velasco  RN  Body Position:   supine   position changed independently  Taken 11/19/2024 0847 by Immanuel Velasco RN  Body Position: supine  Goal: Optimal Comfort and Wellbeing  Outcome: Progressing  Goal: Readiness for Transition of Care  Outcome: Progressing     Problem: Liver Failure  Goal: Optimal Coping with Liver Failure  Outcome: Progressing  Goal: Absence of Bleeding  Outcome: Progressing  Goal: Fluid and Electrolyte Balance  Outcome: Progressing  Goal: Minimize and Manage Gastrointestinal Symptoms  Outcome: Progressing  Goal: Blood Glucose Level Within Target Range  Outcome: Progressing  Goal: Hemodynamic Stability  Outcome: Progressing  Goal: Absence of Infection Signs and Symptoms  Outcome: Progressing  Goal: Optimize Neurologic Function  Outcome: Progressing  Intervention: Monitor and Optimize Neurologic Status  Recent Flowsheet Documentation  Taken 11/19/2024 1315 by Immanuel Velasco RN  Head of Bed ([facility]) Positioning: HOB at 20 degrees  Taken 11/19/2024 1000 by Immanuel Velasco RN  Head of Bed ([facility]) Positioning: HOB at 30 degrees  Taken 11/19/2024 0847 by Immanuel Velasco RN  Head of Bed ([facility]) Positioning: HOB at 30 degrees  Goal: Improved Oral Intake  Outcome: Progressing  Goal: Optimal Pain Control, Comfort and Function  Outcome: Progressing  Goal: Effective Oxygenation and Ventilation  Outcome: Progressing  Intervention: Promote Airway Secretion Clearance  Recent Flowsheet Documentation  Taken 11/19/2024 1200 by Immanuel Velasco RN  Activity Management: sitting, edge of bed  Taken 11/19/2024 0847 by Immanuel Velasco RN  Cough And Deep Breathing: done independently per patient  Intervention: Optimize Oxygenation and Ventilation  Recent Flowsheet Documentation  Taken 11/19/2024 1315 by Immanuel Velasco RN  Head of Bed ([facility]) Positioning: HOB at 20 degrees  Taken 11/19/2024 1000 by Immanuel Velasco RN  Head of Bed ([facility]) Positioning: HOB at 30 degrees  Taken 11/19/2024 0847 by Immanuel Velasco RN  Head of  Bed (HOB) Positioning: HOB at 30 degrees     Problem: Fluid Volume Excess  Goal: Fluid Balance  Outcome: Progressing  Intervention: Monitor and Manage Hypervolemia  Recent Flowsheet Documentation  Taken 11/19/2024 1315 by Immanuel Velasco RN  Skin Protection: incontinence pads utilized     Problem: Fall Injury Risk  Goal: Absence of Fall and Fall-Related Injury  Outcome: Progressing  Intervention: Promote Injury-Free Environment  Recent Flowsheet Documentation  Taken 11/19/2024 1315 by Immanuel Velasco RN  Safety Promotion/Fall Prevention:   assistive device/personal items within reach   fall prevention program maintained   nonskid shoes/slippers when out of bed   safety round/check completed  Taken 11/19/2024 1200 by Immanuel Velasco RN  Safety Promotion/Fall Prevention:   assistive device/personal items within reach   fall prevention program maintained   nonskid shoes/slippers when out of bed   safety round/check completed  Taken 11/19/2024 1000 by Immanuel Velasco RN  Safety Promotion/Fall Prevention:   assistive device/personal items within reach   fall prevention program maintained   nonskid shoes/slippers when out of bed   safety round/check completed  Taken 11/19/2024 0847 by Immanuel Velasco RN  Safety Promotion/Fall Prevention:   assistive device/personal items within reach   fall prevention program maintained   nonskid shoes/slippers when out of bed   safety round/check completed     Problem: Chronic Kidney Disease  Goal: Optimal Coping with Chronic Illness  Outcome: Progressing  Goal: Electrolyte Balance  Outcome: Progressing  Goal: Fluid Balance  Outcome: Progressing  Intervention: Monitor and Manage Hypervolemia  Recent Flowsheet Documentation  Taken 11/19/2024 1315 by Immanuel Velasco RN  Skin Protection: incontinence pads utilized  Goal: Optimal Functional Ability  Outcome: Progressing  Intervention: Optimize Functional Ability  Recent Flowsheet Documentation  Taken 11/19/2024 1200 by Rod  Immanuel RN  Activity Management: sitting, edge of bed  Goal: Absence of Anemia Signs and Symptoms  Outcome: Progressing  Goal: Optimal Oral Intake  Outcome: Progressing  Goal: Acceptable Pain Control  Outcome: Progressing  Goal: Minimize Renal Failure Effects  Outcome: Progressing   Goal Outcome Evaluation:  Plan of Care Reviewed With: patient        Progress: improving  Outcome Evaluation: Patient has been pleasant and cooperative during shift. No complaints of pain, nausea or SOA. AOx4, stand-by assist, room air, SR. Demadex started. Protonix changed to PO. VSS.  at bedside. Strict I/O. Will continue to monitor and assist patient as needed.

## 2024-11-19 NOTE — PROGRESS NOTES
[facility] Progress Note      Patient Name: Philly Person  : 1945  MRN: [id number]  Primary Care Physician:  Shazia Cardoza MD  Date of admission: 2024    Subjective     Interval History:   Follow-up acute kidney injury on chronic kidney disease  The patient had paracentesis on 2024 with 7.1 L removed  Patient doing better today.  Feels more energetic.  Denies nausea or vomiting.  No fever no chills creatinine seems to have plateaued.  Urine output not properly recorded  Review of Systems:   As noted above    Objective     Vitals:   Temp:  [97.8 °F (36.6 °C)-97.9 °F (36.6 °C)] 97.8 °F (36.6 °C)  Heart Rate:  [67-73] 67  Resp:  [16] 16  BP: (112-144)/(49-77) 144/61    Intake/Output Summary (Last 24 hours) at 2024 0930  Last data filed at 2024 0025  Gross per 24 hour   Intake 240 ml   Output 150 ml   Net 90 ml       Physical Exam:    General Appearance: Awake, alert, chronically ill, no acute distress.  Obese  Skin: warm and dry  HEENT: oral mucosa normal, nonicteric sclera  Neck: No JVD  Lungs: Scattered rhonchi, breathing effort not labored  Heart: RRR, no rub  Abdomen: soft, nontender, nondistended, normal active bowel  Extremities: 1+ lower extremity edema, no cyanosis or clubbing.  Left upper extremity AV fistula with good thrill and bruit.  Neuro: normal speech and mental status     Scheduled Meds:     hydrALAZINE, 50 mg, Oral, Q8H  insulin glargine, 8 Units, Subcutaneous, Daily  insulin lispro, 2-7 Units, Subcutaneous, 4x Daily AC & at Bedtime  lactulose, 20 g, Oral, Daily  nadolol, 20 mg, Oral, Q24H  pantoprazole, 40 mg, Intravenous, BID  sodium chloride, 10 mL, Intravenous, Q12H      IV Meds:        Results Reviewed:   I have personally reviewed the results from the time of this admission to 2024 09:30 EST     Results from last 7 days   Lab Units 24  0505 24  0512 24  0600 24  0424   SODIUM mmol/L 136 135* 138  139   POTASSIUM mmol/L 3.5 3.6 3.6 3.7   CHLORIDE mmol/L 106 103 106 106   CO2 mmol/L 18.0* 19.2* 21.0* 20.0*   BUN mg/dL 66* 68* 68* 66*   CREATININE mg/dL 3.98* 3.90* 3.66* 3.26*   CALCIUM mg/dL 8.2* 8.2* 8.3* 8.5*   BILIRUBIN mg/dL  --  0.6 0.5 0.7   ALK PHOS U/L  --  77 99 112   ALT (SGPT) U/L  --  10 16 16   AST (SGOT) U/L  --  20 27 28   GLUCOSE mg/dL 66 124* 80 134*       Estimated Creatinine Clearance: 13.1 mL/min (A) (by C-G formula based on SCr of 3.98 mg/dL (H)).    Results from last 7 days   Lab Units 11/19/24  0505 11/18/24  0512 11/17/24  0600 11/15/24  0543 11/14/24  0424   MAGNESIUM mg/dL  --  2.1 2.3  --  2.1   PHOSPHORUS mg/dL 3.5 3.7 3.5   < >  --     < > = values in this interval not displayed.       Results from last 7 days   Lab Units 11/18/24  0512 11/17/24  0600   URIC ACID mg/dL 5.7 5.9*       Results from last 7 days   Lab Units 11/19/24  0505 11/18/24  1205 11/18/24  0512 11/17/24  0600 11/16/24  0424 11/15/24  0543   WBC 10*3/mm3 3.46  --  3.15* 4.22 4.88 5.03   HEMOGLOBIN g/dL 7.5* 7.6* 7.1* 8.3* 8.8* 8.6*   PLATELETS 10*3/mm3 67*  --  71* 96* 138* 111*       Results from last 7 days   Lab Units 11/14/24  0424 11/13/24  1244   INR  1.29* 1.24*       Assessment / Plan     ASSESSMENT:  Acute kidney injury on top of CKD stage IV, the etiology related probably significant volume shift with the large-volume paracentesis and concern about hepatorenal syndrome, diarrhea and contrast-induced nephropathy.  Urine sodium of above 20 is more in favor of ATN  CKD stage IV, with recent baseline creatinine between 2 up to 2.3 mg/dL secondary to biopsy-proven diabetic nephrosclerosis with 60% interstitial fibrosis   Urine albumin to creatinine ratio 382 mg/g consistent with diabetic nephrosclerosis.  Does have matured left upper extremity AV fistula with good thrill.   History of type 2 diabetes mellitus with CKD  Hypertension with CKD.  Blood pressure uncontrolled  QUINN cirrhosis complicated by  ascites  Volume overload   Anemia of CKD, iron panel not in favor iron deficiency anemia  Left renal cyst.  Complex needs to be followed in outpatient setting  Possible UTI on antibiotics        PLAN:  Kidney function seems to have plateaued.  Will start gentle diuresis with torsemide 20 mg/day  Fluid restriction at 1500 cc per 24 hours given hyponatremia  No indication for dialysis at this time, discussed with patient possible needing dialysis if kidney function continues to deteriorate  Labs in a.m.  Packed RBC transfusion if hemoglobin less than 7.  Will start Epogen    I reviewed the chart and other providers notes, reviewed labs.  Copied text in this note has been reviewed and is accurate as of 11/19/24.         Thank you for involving us in the care of Philly Person.  Please feel free to call with any questions.    Christy Flores MD  11/19/24  09:30 [facility]    [facility]  [phone]    Parts of this note may be an electronic transcription/translation of spoken language to printed text using the Dragon dictation system.

## 2024-11-19 NOTE — THERAPY TREATMENT NOTE
Patient Name: Philly Person  : 1945    MRN: [id number]                              Today's Date: 2024       Admit Date: 2024    Visit Dx:     ICD-10-CM ICD-9-CM   1. Other ascites  R18.8 789.59   2. Nonalcoholic liver disease, chronic  K76.9 571.9   3. Heart murmur  R01.1 785.2   4. Chronic kidney disease, unspecified CKD stage  N18.9 585.9   5. Anemia, unspecified type  D64.9 285.9     Patient Active Problem List   Diagnosis    Elevated liver enzymes    Anemia    Arthritis    Benign essential hypertension    Hypercholesterolemia    Microalbuminuria    Nonalcoholic fatty liver disease    Calculus of kidney    Adiposity    Type 2 diabetes mellitus    Squamous cell carcinoma of skin    Melanoma    Basal cell carcinoma    DJD (degenerative joint disease) of knee    Angiodysplasia of colon without hemorrhage    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 abscess w/o bleeding    Dyslipidemia    H/O: gout    History of renal calculi    Other fecal abnormalities    Polyp of colon    Renal hypertension    Stage 4 chronic kidney disease    Vitamin D deficiency    Murmur    Cirrhosis of liver with ascites    Arteriovenous fistula of left upper extremity    JENNIFER (acute kidney injury)    Nonrheumatic aortic valve stenosis    Other pancytopenia     Past Medical History:   Diagnosis Date    Angiodysplasia of colon without hemorrhage 2022    Arteriovenous fistula of left upper extremity 2024    Arthritis     Basal cell carcinoma 2018.      Calculus of kidney 2016    Cataract     LEFT AND RIGHT    Diabetes mellitus     TYPE 2    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 abscess w/o bleeding 2022    H/O: gout 2021    History of kidney stones     History of melanoma     LEFT ARM    Hyperlipidemia     Hypertension     Left knee pain     Melanoma 2018.      Nonalcoholic steatohepatitis     Seasonal allergies     Squamous cell carcinoma of skin 2016    New  one in 2019 -left leg.      Stage 4 chronic kidney disease 2021    Progressive decline in kidney function since 2019.   most recent creatinine 1.85.    Etiology of kidney disease is likely associated with diabetic nephrosclerosis. biopsy-proven   No recent history of NSAID use.    Renal duplex US negative for renal artery stenosis.    kidney biopsy consistent to his diabetic nephropathy with extensive global sclerosis and 60% interstitial fibrosis.  It was noted     Type 2 diabetes mellitus 2016     Past Surgical History:   Procedure Laterality Date     SECTION      COLONOSCOPY      CYSTOSCOPY W/ LASER LITHOTRIPSY      REPLACEMENT TOTAL KNEE Right 10/2015    SKIN CANCER EXCISION Left     ARM MELANOMA    TONSILLECTOMY AND ADENOIDECTOMY      TOTAL KNEE ARTHROPLASTY Left 2019    Procedure: LEFT TOTAL KNEE ARTHROPLASTY;  Surgeon: Magdaleno Saleh MD;  Location: [facility];  Service: Orthopedics    UPPER GASTROINTESTINAL ENDOSCOPY        General Information       Row Name 24 1442          Physical Therapy Time and Intention    Document Type therapy note (daily note)  -LB     Mode of Treatment individual therapy;physical therapy  -LB       Row Name 24 1442          General Information    Patient Profile Reviewed yes  -LB     Existing Precautions/Restrictions fall  -LB       Row Name 24 1442          Cognition    Orientation Status (Cognition) oriented x 4  -LB       Row Name 24 1442          Safety Issues/Impairments Affecting Functional Mobility    Impairments Affecting Function (Mobility) balance;endurance/activity tolerance;strength  -LB               User Key  (r) = Recorded By, (t) = Taken By, (c) = Cosigned By      Initials Name Provider Type    LB Erika Harmon PT Physical Therapist                   Mobility       Row Name 24 1443          Bed Mobility    Supine-Sit [place] (Bed Mobility) supervision  -LB     Assistive Device (Bed Mobility) head  of bed elevated;bed rails  -LB       Row Name 11/19/24 1443          Sit-Stand Transfer    Sit-Stand [place] (Transfers) standby assist;verbal cues  -LB     Assistive Device (Sit-Stand Transfers) cane, straight  -LB       Row Name 11/19/24 1443          Gait/Stairs (Locomotion)    [place] Level (Gait) contact guard;standby assist;verbal cues  -LB     Assistive Device (Gait) cane, straight  -LB     Distance in Feet (Gait) 40  -LB     Deviations/Abnormal Patterns (Gait) gait speed decreased;stride length decreased  -LB     Bilateral Gait Deviations forward flexed posture;heel strike decreased;lateral trunk flexion  -LB               User Key  (r) = Recorded By, (t) = Taken By, (c) = Cosigned By      Initials Name Provider Type    Erika Jerry PT Physical Therapist                   Obj/Interventions       Row Name 11/19/24 1444          Balance    Comment, Balance STS times 5 with UE assist; LAQs; MIP with cues to hold trunk in place  -LB               User Key  (r) = Recorded By, (t) = Taken By, (c) = Cosigned By      Initials Name Provider Type    Erika Jerry, PT Physical Therapist                   Goals/Plan    No documentation.                  Clinical Impression       Row Name 11/19/24 1445          Pain    Pretreatment Pain Rating 0/10 - no pain  -LB     Posttreatment Pain Rating 0/10 - no pain  -LB       Row Name 11/19/24 1448          Plan of Care Review    Outcome Evaluation Patient continues to improve with functional mobilty; less assist to transfer to EOB and coming up to stand.  The patient wanted to use STC from home, pt did reach for table at one point for more stability.  With cane provided CGA for balance.  Pt and spouse report increased daily activity.  -LB               User Key  (r) = Recorded By, (t) = Taken By, (c) = Cosigned By      Initials Name Provider Type    Erika Jerry PT Physical Therapist                   Outcome Measures       Row Name 11/19/24 6369           How much help from another person do you currently need...    Turning from your back to your side while in flat bed without using bedrails? 4  -MS     Moving from lying on back to sitting on the side of a flat bed without bedrails? 4  -MS     Moving to and from a bed to a chair (including a wheelchair)? 3  -MS     Standing up from a chair using your arms (e.g., wheelchair, bedside chair)? 3  -MS     Climbing 3-5 steps with a railing? 3  -MS     To walk in hospital room? 3  -MS     AM-PAC 6 Clicks Score (PT) 20  -MS               User Key  (r) = Recorded By, (t) = Taken By, (c) = Cosigned By      Initials Name Provider Type    Immanuel John, RN Registered Nurse                                 Physical Therapy Education       Title: PT OT SLP Therapies (Done)       Topic: Physical Therapy (Done)       Point: Mobility training (Done)       Learning Progress Summary            Patient Acceptance, E,TB,D, VU by  at 11/18/2024 1459    Acceptance, E,D,TB, VU by  at 11/15/2024 1600                      Point: Home exercise program (Done)       Learning Progress Summary            Patient Acceptance, E,TB,D, VU by  at 11/18/2024 1459    Acceptance, E,D,TB, VU by MG at 11/15/2024 1600                      Point: Body mechanics (Done)       Learning Progress Summary            Patient Acceptance, E,TB,D, VU by MG at 11/18/2024 1459    Acceptance, E,D,TB, VU by MG at 11/15/2024 1600    Acceptance, E, VU by  at 11/14/2024 1431                      Point: Precautions (Done)       Learning Progress Summary            Patient Acceptance, E,TB,D, VU by MG at 11/18/2024 1459    Acceptance, E,D,TB, VU by MG at 11/15/2024 1600    Acceptance, E, VU by MG at 11/14/2024 1431                                      User Key       Initials Effective Dates Name Provider Type Discipline     05/24/22 -  Lula Ramirez, PT Physical Therapist PT                  PT Recommendation and Plan     Outcome Evaluation: Patient continues to  improve with functional mobilty; less assist to transfer to EOB and coming up to stand.  The patient wanted to use STC from home, pt did reach for table at one point for more stability.  With cane provided CGA for balance.  Pt and spouse report increased daily activity.     Time Calculation:         PT Charges       Row Name 11/19/24 1447             Time Calculation    Start Time 1108  -LB      Stop Time 1121  -LB      Time Calculation (min) 13 min  -LB      PT Received On 11/19/24  -LB      PT - Next Appointment 11/20/24  -LB                User Key  (r) = Recorded By, (t) = Taken By, (c) = Cosigned By      Initials Name Provider Type    LB Erika Harmon, PT Physical Therapist                  Therapy Charges for Today       Code Description Service Date Service Provider Modifiers Qty    29903777489 HC PT THER PROC EA 15 MIN 11/19/2024 Erika Harmon PT GP 1            PT G-Codes  AM-PAC 6 Clicks Score (PT): 20       Erika Harmon PT  11/19/2024

## 2024-11-19 NOTE — PROGRESS NOTES
Name: Philly Person ADMIT: 2024   : 1945  PCP: Shazia Cardoza MD    MRN: [id number] LOS: 6 days   AGE/SEX: 79 y.o. female  ROOM: [facility]/     Subjective   Subjective   Feeling well. Ambulated normally around the unit    Weight is up 4 pounds today.  Not all UOP recorded       Objective   Objective   Vital Signs  Temp:  [97.8 °F (36.6 °C)-97.9 °F (36.6 °C)] 97.8 °F (36.6 °C)  Heart Rate:  [67-73] 67  Resp:  [16] 16  BP: (112-144)/(49-77) 144/61  SpO2:  [97 %-99 %] 97 %  on   ;   Device (Oxygen Therapy): room air  Body mass index is 46.67 kg/m².  Physical Exam  Vitals reviewed.   Constitutional:       General: She is not in acute distress.     Appearance: She is obese.   Eyes:      General: No scleral icterus.  Cardiovascular:      Rate and Rhythm: Normal rate and regular rhythm.      Heart sounds: Murmur heard.   Pulmonary:      Effort: Pulmonary effort is normal.      Breath sounds: Normal breath sounds.   Abdominal:      General: There is no distension.      Palpations: Abdomen is soft.      Tenderness: There is no abdominal tenderness.   Musculoskeletal:      Right lower leg: Edema present.      Left lower leg: Edema present.   Skin:     General: Skin is warm and dry.   Neurological:      Mental Status: She is alert and oriented to person, place, and time.   Psychiatric:         Mood and Affect: Mood normal.       Results Review     I reviewed the patient's new clinical results.  Results from last 7 days   Lab Units 24  0505 24  1205 24  0512 24  0600 24  0424   WBC 10*3/mm3 3.46  --  3.15* 4.22 4.88   HEMOGLOBIN g/dL 7.5* 7.6* 7.1* 8.3* 8.8*   PLATELETS 10*3/mm3 67*  --  71* 96* 138*     Results from last 7 days   Lab Units 24  0505 24  0512 24  0600 24  0424   SODIUM mmol/L 136 135* 138 139   POTASSIUM mmol/L 3.5 3.6 3.6 3.7   CHLORIDE mmol/L 106 103 106 106   CO2 mmol/L 18.0* 19.2* 21.0* 20.0*   BUN mg/dL 66* 68* 68* 66*   CREATININE  mg/dL 3.98* 3.90* 3.66* 3.26*   GLUCOSE mg/dL 66 124* 80 134*   EGFR mL/min/1.73 10.9* 11.1* 12.0* 13.8*     Results from last 7 days   Lab Units 11/19/24  0505 11/18/24  0512 11/17/24  0600 11/16/24  0424 11/15/24  0543   ALBUMIN g/dL 3.2* 3.1* 2.4* 2.6* 2.3*   BILIRUBIN mg/dL  --  0.6 0.5 0.7 0.6   ALK PHOS U/L  --  77 99 112 121*   AST (SGOT) U/L  --  20 27 28 32   ALT (SGPT) U/L  --  10 16 16 17     Results from last 7 days   Lab Units 11/19/24  0505 11/18/24  0512 11/17/24  0600 11/16/24  0424 11/15/24  0543 11/14/24  0424   CALCIUM mg/dL 8.2* 8.2* 8.3* 8.5*   < > 8.4*   ALBUMIN g/dL 3.2* 3.1* 2.4* 2.6*   < > 2.7*   MAGNESIUM mg/dL  --  2.1 2.3  --   --  2.1   PHOSPHORUS mg/dL 3.5 3.7 3.5 3.5   < >  --     < > = values in this interval not displayed.     Results from last 7 days   Lab Units 11/13/24  1244   LACTATE mmol/L 1.0     Glucose   Date/Time Value Ref Range Status   11/19/2024 1101 123 70 - 130 mg/dL Final   11/19/2024 0630 72 70 - 130 mg/dL Final   11/18/2024 2010 114 70 - 130 mg/dL Final   11/18/2024 1603 116 70 - 130 mg/dL Final   11/18/2024 1052 135 (H) 70 - 130 mg/dL Final   11/18/2024 0625 136 (H) 70 - 130 mg/dL Final   11/17/2024 2042 130 70 - 130 mg/dL Final       No radiology results for the last day    I have personally reviewed all medications:  Scheduled Medications  [START ON 11/20/2024] epoetin zuleika/zuleika-epbx, 3,000 Units, Subcutaneous, Once per day on Monday Wednesday Friday  hydrALAZINE, 50 mg, Oral, Q8H  insulin glargine, 8 Units, Subcutaneous, Daily  insulin lispro, 2-7 Units, Subcutaneous, 4x Daily AC & at Bedtime  lactulose, 20 g, Oral, Daily  nadolol, 20 mg, Oral, Q24H  pantoprazole, 40 mg, Intravenous, BID  sodium chloride, 10 mL, Intravenous, Q12H  torsemide, 20 mg, Oral, Daily    Infusions   Diet  Diet: Cardiac, Diabetic; Low Sodium (2g); Consistent Carbohydrate; Fluid Consistency: Thin (IDDSI 0)    I have personally reviewed:  [x]  Laboratory   []  Microbiology   [x]  Radiology    []  EKG/Telemetry  []  Cardiology/Vascular   []  Pathology    []  Records       Assessment/Plan     Active Hospital Problems    Diagnosis  POA    **Cirrhosis of liver with ascites [K74.60, R18.8]  Yes    JENNIFER (acute kidney injury) [N17.9]  No    Nonrheumatic aortic valve stenosis [I35.0]  Yes    Other pancytopenia [D61.818]  Yes    Murmur [R01.1]  Yes    Arteriovenous fistula of left upper extremity [I77.0]  Yes    Stage 4 chronic kidney disease [N18.4]  Yes    Nonalcoholic fatty liver disease [K76.0]  Yes    Type 2 diabetes mellitus [E11.9]  Yes    Benign essential hypertension [I10]  Yes      Resolved Hospital Problems   No resolved problems to display.       79 y.o. female with history of [place], diabetes and CKD 4 presenting with shortness of breath and weight gain, fluid overload due to decompensated cirrhosis    Cirrhosis appears to be stable/improving.  Status post paracentesis, hoping to avoid repeat but will need to monitor closely.  Continue dietary restrictions    Renal function plateaued overnight.  Still worse than baseline.  Started back on torsemide per nephrology and monitoring electrolytes closely.  Continue to weigh daily.  Monitor electrolytes, bicarbonate drifting down    Pancytopenia, worsening, especially platelet count.  No need for transfusion today but monitoring closely.  Epogen added by nephrology    DM2 well-controlled      SCDs  Disposition: Home with  at discharge.  Likely in the next couple of days      Eladio Shelley MD  [facility]  11/19/24  11:33 EST

## 2024-11-20 ENCOUNTER — INPATIENT HOSPITAL (AMBULATORY)
Dept: URBAN - METROPOLITAN AREA HOSPITAL 113 | Facility: HOSPITAL | Age: 79
End: 2024-11-20
Payer: MEDICARE

## 2024-11-20 ENCOUNTER — INPATIENT HOSPITAL (AMBULATORY)
Age: 79
End: 2024-11-20
Payer: MEDICARE

## 2024-11-20 DIAGNOSIS — K76.6 PORTAL HYPERTENSION: ICD-10-CM

## 2024-11-20 DIAGNOSIS — K86.2 CYST OF PANCREAS: ICD-10-CM

## 2024-11-20 DIAGNOSIS — N18.4 CHRONIC KIDNEY DISEASE, STAGE 4 (SEVERE): ICD-10-CM

## 2024-11-20 DIAGNOSIS — K59.00 CONSTIPATION, UNSPECIFIED: ICD-10-CM

## 2024-11-20 DIAGNOSIS — R18.8 OTHER ASCITES: ICD-10-CM

## 2024-11-20 DIAGNOSIS — K74.69 OTHER CIRRHOSIS OF LIVER: ICD-10-CM

## 2024-11-20 LAB
ALBUMIN SERPL-MCNC: 3.1 G/DL (ref 3.5–5.2)
ANION GAP SERPL CALCULATED.3IONS-SCNC: 13 MMOL/L (ref 5–15)
BASOPHILS # BLD AUTO: 0.04 10*3/MM3 (ref 0–0.2)
BASOPHILS NFR BLD AUTO: 0.9 % (ref 0–1.5)
BUN SERPL-MCNC: 68 MG/DL (ref 8–23)
BUN/CREAT SERPL: 17.2 (ref 7–25)
CALCIUM SPEC-SCNC: 8.4 MG/DL (ref 8.6–10.5)
CHLORIDE SERPL-SCNC: 105 MMOL/L (ref 98–107)
CO2 SERPL-SCNC: 19 MMOL/L (ref 22–29)
CREAT SERPL-MCNC: 3.95 MG/DL (ref 0.57–1)
DEPRECATED RDW RBC AUTO: 49.6 FL (ref 37–54)
EGFRCR SERPLBLD CKD-EPI 2021: 11 ML/MIN/1.73
EOSINOPHIL # BLD AUTO: 0.16 10*3/MM3 (ref 0–0.4)
EOSINOPHIL NFR BLD AUTO: 3.5 % (ref 0.3–6.2)
ERYTHROCYTE [DISTWIDTH] IN BLOOD BY AUTOMATED COUNT: 14.5 % (ref 12.3–15.4)
GLUCOSE BLDC GLUCOMTR-MCNC: 103 MG/DL (ref 70–130)
GLUCOSE BLDC GLUCOMTR-MCNC: 116 MG/DL (ref 70–130)
GLUCOSE BLDC GLUCOMTR-MCNC: 199 MG/DL (ref 70–130)
GLUCOSE BLDC GLUCOMTR-MCNC: 217 MG/DL (ref 70–130)
GLUCOSE SERPL-MCNC: 119 MG/DL (ref 65–99)
HCT VFR BLD AUTO: 24.7 % (ref 34–46.6)
HGB BLD-MCNC: 8.2 G/DL (ref 12–15.9)
IMM GRANULOCYTES # BLD AUTO: 0.02 10*3/MM3 (ref 0–0.05)
IMM GRANULOCYTES NFR BLD AUTO: 0.4 % (ref 0–0.5)
LYMPHOCYTES # BLD AUTO: 0.79 10*3/MM3 (ref 0.7–3.1)
LYMPHOCYTES NFR BLD AUTO: 17.4 % (ref 19.6–45.3)
MCH RBC QN AUTO: 30.8 PG (ref 26.6–33)
MCHC RBC AUTO-ENTMCNC: 33.2 G/DL (ref 31.5–35.7)
MCV RBC AUTO: 92.9 FL (ref 79–97)
MONOCYTES # BLD AUTO: 0.67 10*3/MM3 (ref 0.1–0.9)
MONOCYTES NFR BLD AUTO: 14.7 % (ref 5–12)
NEUTROPHILS NFR BLD AUTO: 2.87 10*3/MM3 (ref 1.7–7)
NEUTROPHILS NFR BLD AUTO: 63.1 % (ref 42.7–76)
PHOSPHATE SERPL-MCNC: 3.4 MG/DL (ref 2.5–4.5)
PLATELET # BLD AUTO: 84 10*3/MM3 (ref 140–450)
POTASSIUM SERPL-SCNC: 3.7 MMOL/L (ref 3.5–5.2)
RBC # BLD AUTO: 2.66 10*6/MM3 (ref 3.77–5.28)
SODIUM SERPL-SCNC: 137 MMOL/L (ref 136–145)
WBC NRBC COR # BLD AUTO: 4.55 10*3/MM3 (ref 3.4–10.8)

## 2024-11-20 PROCEDURE — 99233 SBSQ HOSP IP/OBS HIGH 50: CPT | Performed by: INTERNAL MEDICINE

## 2024-11-20 PROCEDURE — 63710000001 INSULIN GLARGINE PER 5 UNITS: Performed by: INTERNAL MEDICINE

## 2024-11-20 PROCEDURE — 82948 REAGENT STRIP/BLOOD GLUCOSE: CPT

## 2024-11-20 PROCEDURE — 63710000001 INSULIN LISPRO (HUMAN) PER 5 UNITS: Performed by: INTERNAL MEDICINE

## 2024-11-20 PROCEDURE — 97110 THERAPEUTIC EXERCISES: CPT

## 2024-11-20 PROCEDURE — 25010000002 EPOETIN ALFA-EPBX 3000 UNIT/ML SOLUTION: Performed by: HOSPITALIST

## 2024-11-20 PROCEDURE — 85025 COMPLETE CBC W/AUTO DIFF WBC: CPT | Performed by: HOSPITALIST

## 2024-11-20 PROCEDURE — 80069 RENAL FUNCTION PANEL: CPT | Performed by: HOSPITALIST

## 2024-11-20 RX ORDER — TORSEMIDE 20 MG/1
20 TABLET ORAL
Status: DISCONTINUED | OUTPATIENT
Start: 2024-11-20 | End: 2024-11-22

## 2024-11-20 RX ADMIN — TORSEMIDE 20 MG: 20 TABLET ORAL at 17:53

## 2024-11-20 RX ADMIN — INSULIN LISPRO 3 UNITS: 100 INJECTION, SOLUTION INTRAVENOUS; SUBCUTANEOUS at 20:38

## 2024-11-20 RX ADMIN — PANTOPRAZOLE SODIUM 40 MG: 40 TABLET, DELAYED RELEASE ORAL at 06:37

## 2024-11-20 RX ADMIN — INSULIN GLARGINE 8 UNITS: 100 INJECTION, SOLUTION SUBCUTANEOUS at 08:22

## 2024-11-20 RX ADMIN — HYDRALAZINE HYDROCHLORIDE 50 MG: 50 TABLET ORAL at 06:37

## 2024-11-20 RX ADMIN — INSULIN LISPRO 2 UNITS: 100 INJECTION, SOLUTION INTRAVENOUS; SUBCUTANEOUS at 17:54

## 2024-11-20 RX ADMIN — LACTULOSE 20 G: 20 SOLUTION ORAL at 08:22

## 2024-11-20 RX ADMIN — Medication 10 ML: at 20:38

## 2024-11-20 RX ADMIN — HYDRALAZINE HYDROCHLORIDE 50 MG: 50 TABLET ORAL at 15:06

## 2024-11-20 RX ADMIN — TORSEMIDE 20 MG: 20 TABLET ORAL at 08:21

## 2024-11-20 RX ADMIN — EPOETIN ALFA-EPBX 3000 UNITS: 3000 INJECTION, SOLUTION INTRAVENOUS; SUBCUTANEOUS at 08:22

## 2024-11-20 RX ADMIN — NADOLOL 20 MG: 20 TABLET ORAL at 08:21

## 2024-11-20 RX ADMIN — HYDRALAZINE HYDROCHLORIDE 50 MG: 50 TABLET ORAL at 21:02

## 2024-11-20 NOTE — PROGRESS NOTES
[facility] Progress Note      Patient Name: Philly Person  : 1945  MRN: [id number]  Primary Care Physician:  Shazia Cardoza MD  Date of admission: 2024    Subjective     Interval History:   Follow-up acute kidney injury on chronic kidney disease  The patient had paracentesis on 2024 with 7.1 L removed  Feeling better today.  Denies any nausea or vomiting.  No fever no chills.  Edema coronary arteries improving.  Weight slightly up but this was a bed scale.  Noted frequency of urination but output not large.  Review of Systems:   As noted above    Objective     Vitals:   Temp:  [97.5 °F (36.4 °C)-98.3 °F (36.8 °C)] 98.3 °F (36.8 °C)  Heart Rate:  [65-77] 65  Resp:  [1-16] 16  BP: (121-146)/(43-60) 121/43    Intake/Output Summary (Last 24 hours) at 2024 0957  Last data filed at 2024 1605  Gross per 24 hour   Intake --   Output 300 ml   Net -300 ml       Physical Exam:    General Appearance: Awake, alert, chronically ill, no acute distress.  Obese  Skin: warm and dry  HEENT: oral mucosa normal, nonicteric sclera  Neck: No JVD  Lungs: Scattered rhonchi, breathing effort not labored  Heart: RRR, no rub  Abdomen: soft, nontender, nondistended, normal active bowel  Extremities: 1+ lower extremity edema, no cyanosis or clubbing.  Left upper extremity AV fistula with good thrill and bruit.  Neuro: normal speech and mental status     Scheduled Meds:     epoetin zuleika/zuleika-epbx, 3,000 Units, Subcutaneous, Once per day on   hydrALAZINE, 50 mg, Oral, Q8H  insulin glargine, 8 Units, Subcutaneous, Daily  insulin lispro, 2-7 Units, Subcutaneous, 4x Daily AC & at Bedtime  lactulose, 20 g, Oral, Daily  nadolol, 20 mg, Oral, Q24H  pantoprazole, 40 mg, Oral, QAM AC  sodium chloride, 10 mL, Intravenous, Q12H  torsemide, 20 mg, Oral, Daily      IV Meds:        Results Reviewed:   I have personally reviewed the results from the time of this admission  to 11/20/2024 09:57 EST     Results from last 7 days   Lab Units 11/20/24 0337 11/19/24  0505 11/18/24  0512 11/17/24  0600 11/16/24  0424   SODIUM mmol/L 137 136 135* 138 139   POTASSIUM mmol/L 3.7 3.5 3.6 3.6 3.7   CHLORIDE mmol/L 105 106 103 106 106   CO2 mmol/L 19.0* 18.0* 19.2* 21.0* 20.0*   BUN mg/dL 68* 66* 68* 68* 66*   CREATININE mg/dL 3.95* 3.98* 3.90* 3.66* 3.26*   CALCIUM mg/dL 8.4* 8.2* 8.2* 8.3* 8.5*   BILIRUBIN mg/dL  --   --  0.6 0.5 0.7   ALK PHOS U/L  --   --  77 99 112   ALT (SGPT) U/L  --   --  10 16 16   AST (SGOT) U/L  --   --  20 27 28   GLUCOSE mg/dL 119* 66 124* 80 134*       Estimated Creatinine Clearance: 13.5 mL/min (A) (by C-G formula based on SCr of 3.95 mg/dL (H)).    Results from last 7 days   Lab Units 11/20/24 0337 11/19/24 0505 11/18/24  0512 11/17/24  0600 11/15/24  0543 11/14/24  0424   MAGNESIUM mg/dL  --   --  2.1 2.3  --  2.1   PHOSPHORUS mg/dL 3.4 3.5 3.7 3.5   < >  --     < > = values in this interval not displayed.       Results from last 7 days   Lab Units 11/18/24  0512 11/17/24  0600   URIC ACID mg/dL 5.7 5.9*       Results from last 7 days   Lab Units 11/20/24 0338 11/19/24  0505 11/18/24  1205 11/18/24  0512 11/17/24  0600 11/16/24  0424   WBC 10*3/mm3 4.55 3.46  --  3.15* 4.22 4.88   HEMOGLOBIN g/dL 8.2* 7.5* 7.6* 7.1* 8.3* 8.8*   PLATELETS 10*3/mm3 84* 67*  --  71* 96* 138*       Results from last 7 days   Lab Units 11/14/24  0424 11/13/24  1244   INR  1.29* 1.24*       Assessment / Plan     ASSESSMENT:  Acute kidney injury on top of CKD stage IV, the etiology related probably significant volume shift with the large-volume paracentesis and concern about hepatorenal syndrome, diarrhea and contrast-induced nephropathy.  Urine sodium of above 20 is more in favor of ATN  CKD stage IV, with recent baseline creatinine between 2 up to 2.3 mg/dL secondary to biopsy-proven diabetic nephrosclerosis with 60% interstitial fibrosis   Urine albumin to creatinine ratio 382 mg/g  consistent with diabetic nephrosclerosis.  Does have matured left upper extremity AV fistula with good thrill.   History of type 2 diabetes mellitus with CKD  Hypertension with CKD.  Blood pressure is better controlled  QUINN cirrhosis complicated by ascites  Volume overload   Anemia of CKD, iron panel not in favor iron deficiency anemia, started on April  Left renal cyst.  Complex needs to be followed in outpatient setting  Possible UTI on antibiotics          PLAN:  Kidney function seems to have plateaued.  Weight slightly up but this was a bed scale.  Instructed nursing staff to check standing scale.  Will continue with current diuretic dose at this time  Continue fluid restriction at 1500 cc per 24 hours   No indication for dialysis at this time, discussed with patient possible needing dialysis if kidney function continues to deteriorate  Labs in a.m.  Packed RBC transfusion if hemoglobin less than 7.  Check bladder scan  Hopefully can be discharged home tomorrow if volume status improves and kidney function is stable   Labs in a.m.    I reviewed the chart and other providers notes, reviewed labs.  Copied text in this note has been reviewed and is accurate as of 11/20/24.         Thank you for involving us in the care of Philly Person.  Please feel free to call with any questions.    Christy Flores MD  11/20/24  09:57 EST    [facility]  [phone]    Parts of this note may be an electronic transcription/translation of spoken language to printed text using the Dragon dictation system.

## 2024-11-20 NOTE — PROGRESS NOTES
Name: Philly Person ADMIT: 2024   : 1945  PCP: Shazia Cardoza MD    MRN: [id number] LOS: 7 days   AGE/SEX: 79 y.o. female  ROOM: [facility]/     Subjective   Subjective   Feeling better. No concerns today    Standing weight has increased some from yesterday       Objective   Objective   Vital Signs  Temp:  [97.5 °F (36.4 °C)-98.3 °F (36.8 °C)] 98.2 °F (36.8 °C)  Heart Rate:  [65-77] 74  Resp:  [1-18] 18  BP: (121-146)/(43-64) 143/64  SpO2:  [96 %-100 %] 100 %  on   ;   Device (Oxygen Therapy): room air  Body mass index is 47.35 kg/m².  Physical Exam  Vitals reviewed.   Constitutional:       General: She is not in acute distress.     Appearance: She is obese.   Eyes:      General: No scleral icterus.  Cardiovascular:      Rate and Rhythm: Normal rate and regular rhythm.      Heart sounds: Murmur heard.   Pulmonary:      Effort: Pulmonary effort is normal.      Breath sounds: Normal breath sounds.   Abdominal:      General: There is no distension.      Palpations: Abdomen is soft.      Tenderness: There is no abdominal tenderness.   Musculoskeletal:      Right lower leg: Edema present.      Left lower leg: Edema present.   Skin:     General: Skin is warm and dry.   Neurological:      Mental Status: She is alert and oriented to person, place, and time.   Psychiatric:         Mood and Affect: Mood normal.       Results Review     I reviewed the patient's new clinical results.  Results from last 7 days   Lab Units 24  0338 24  0505 24  1205 24  0512 24  0600   WBC 10*3/mm3 4.55 3.46  --  3.15* 4.22   HEMOGLOBIN g/dL 8.2* 7.5* 7.6* 7.1* 8.3*   PLATELETS 10*3/mm3 84* 67*  --  71* 96*     Results from last 7 days   Lab Units 24  0337 24  0505 24  0512 24  0600   SODIUM mmol/L 137 136 135* 138   POTASSIUM mmol/L 3.7 3.5 3.6 3.6   CHLORIDE mmol/L 105 106 103 106   CO2 mmol/L 19.0* 18.0* 19.2* 21.0*   BUN mg/dL 68* 66* 68* 68*   CREATININE mg/dL 3.95*  3.98* 3.90* 3.66*   GLUCOSE mg/dL 119* 66 124* 80   EGFR mL/min/1.73 11.0* 10.9* 11.1* 12.0*     Results from last 7 days   Lab Units 11/20/24 0337 11/19/24  0505 11/18/24  0512 11/17/24  0600 11/16/24  0424 11/15/24  0543   ALBUMIN g/dL 3.1* 3.2* 3.1* 2.4* 2.6* 2.3*   BILIRUBIN mg/dL  --   --  0.6 0.5 0.7 0.6   ALK PHOS U/L  --   --  77 99 112 121*   AST (SGOT) U/L  --   --  20 27 28 32   ALT (SGPT) U/L  --   --  10 16 16 17     Results from last 7 days   Lab Units 11/20/24 0337 11/19/24  0505 11/18/24  0512 11/17/24  0600 11/15/24  0543 11/14/24  0424   CALCIUM mg/dL 8.4* 8.2* 8.2* 8.3*   < > 8.4*   ALBUMIN g/dL 3.1* 3.2* 3.1* 2.4*   < > 2.7*   MAGNESIUM mg/dL  --   --  2.1 2.3  --  2.1   PHOSPHORUS mg/dL 3.4 3.5 3.7 3.5   < >  --     < > = values in this interval not displayed.           Glucose   Date/Time Value Ref Range Status   11/20/2024 1553 199 (H) 70 - 130 mg/dL Final   11/20/2024 1041 103 70 - 130 mg/dL Final   11/20/2024 0601 116 70 - 130 mg/dL Final   11/19/2024 2013 199 (H) 70 - 130 mg/dL Final   11/19/2024 1548 161 (H) 70 - 130 mg/dL Final   11/19/2024 1101 123 70 - 130 mg/dL Final   11/19/2024 0630 72 70 - 130 mg/dL Final       No radiology results for the last day    I have personally reviewed all medications:  Scheduled Medications  epoetin zuleika/zuleika-epbx, 3,000 Units, Subcutaneous, Once per day on Monday Wednesday Friday  hydrALAZINE, 50 mg, Oral, Q8H  insulin glargine, 8 Units, Subcutaneous, Daily  insulin lispro, 2-7 Units, Subcutaneous, 4x Daily AC & at Bedtime  lactulose, 20 g, Oral, Daily  nadolol, 20 mg, Oral, Q24H  pantoprazole, 40 mg, Oral, QAM AC  sodium chloride, 10 mL, Intravenous, Q12H  torsemide, 20 mg, Oral, BID    Infusions   Diet  Diet: Cardiac, Diabetic; Low Sodium (2g); Consistent Carbohydrate; Fluid Consistency: Thin (IDDSI 0)    I have personally reviewed:  [x]  Laboratory   []  Microbiology   [x]  Radiology   []  EKG/Telemetry  []  Cardiology/Vascular   []  Pathology    []   Records       Assessment/Plan     Active Hospital Problems    Diagnosis  POA    **Cirrhosis of liver with ascites [K74.60, R18.8]  Yes    JENNIFER (acute kidney injury) [N17.9]  No    Nonrheumatic aortic valve stenosis [I35.0]  Yes    Other pancytopenia [D61.818]  Yes    Murmur [R01.1]  Yes    Arteriovenous fistula of left upper extremity [I77.0]  Yes    Stage 4 chronic kidney disease [N18.4]  Yes    Nonalcoholic fatty liver disease [K76.0]  Yes    Type 2 diabetes mellitus [E11.9]  Yes    Benign essential hypertension [I10]  Yes      Resolved Hospital Problems   No resolved problems to display.       79 y.o. female with history of Bond, diabetes and CKD 4 presenting with shortness of breath and weight gain, fluid overload due to decompensated cirrhosis    Cirrhosis appears to be stable/improving.  Status post paracentesis, hoping to avoid repeat but will need to monitor closely.  Continue dietary restrictions    JENNIFER/CKD: Creatinine has plateaued but not coming down yet.  Agree with increasing torsemide with weight increasing.  Need to continue to weigh daily on standing scale.    Pancytopenia, stable today.  No need for transfusion today but monitoring closely.  Epogen added by nephrology    DM2 well-controlled      SCDs  Disposition: Home with  at discharge.  Potentially tomorrow if weight is stable      Eladio Shelley MD  [facility]  11/20/24  17:13 EST

## 2024-11-20 NOTE — DISCHARGE PLACEMENT REQUEST
"Philly Jorge (79 y.o. Female)       Date of Birth   1945    Social Security Number       [place]   [address] [place] [address]    Home Phone   [phone]    MRN   [id number]       Hinduism   Episcopalian    Marital Status                               Admission Date   11/13/24    Admission Type   Emergency    Admitting Provider   Cristel Henry MD    Attending Provider   Eladio Shelley MD    Department, Room/Bed   [facility], 3109/1       Discharge Date       Discharge Disposition       Discharge Destination                                 Attending Provider: Eladio Shelley MD    Allergies: No Known Allergies    Isolation: None   Infection: None   Code Status: CPR    Ht: 155 cm (61.02\")   Wt: 114 kg (250 lb 12.8 oz)    Admission Cmt: None   Principal Problem: Cirrhosis of liver with ascites [K74.60,R18.8]                   Active Insurance as of 11/13/2024       Primary Coverage       Payor Plan Insurance Group Employer/Plan Group    MEDICARE MEDICARE A & B        Payor Plan Address Payor Plan Phone Number Payor Plan Fax Number Effective Dates    [address] [phone]  8/1/2009 - None Entered    [facility] [id number]         Subscriber Name Subscriber Birth Date Member ID       PHILLY JORGE 1945 [id number]               Secondary Coverage       Payor Plan Insurance Group Employer/Plan Group    [employer] MC SUP AAR HEALTH CARE OPTIONS        Payor Plan Address Payor Plan Phone Number Payor Plan Fax Number Effective Dates    [facility] [phone]  1/1/2016 - None Entered    [address]       [facility] [id number]         Subscriber Name Subscriber Birth Date Member ID       PHILLY JORGE 1945 [id number]                     Emergency Contacts        (Rel.) Home Phone Work Phone Mobile Phone    Leeroy Jorge (Spouse) -- -- [phone]          "

## 2024-11-20 NOTE — PLAN OF CARE
Goal Outcome Evaluation:         Pt. Alert, oriented x4, family staying at bedside during this shift, used bathroom self with her  assisted, no voiced c/o pain,v/s stable, 02 sats kept over 90% on room air, no s/s acute distress noted, remained edematous to jose. Lower ext.

## 2024-11-20 NOTE — THERAPY TREATMENT NOTE
Acute Care - Physical Therapy Treatment Note  [facility]     Patient Name: Philly Person  : 1945  MRN: [id number]  Today's Date: 2024      Visit Dx:     ICD-10-CM ICD-9-CM   1. Other ascites  R18.8 789.59   2. Nonalcoholic liver disease, chronic  K76.9 571.9   3. Heart murmur  R01.1 785.2   4. Chronic kidney disease, unspecified CKD stage  N18.9 585.9   5. Anemia, unspecified type  D64.9 285.9     Patient Active Problem List   Diagnosis    Elevated liver enzymes    Anemia    Arthritis    Benign essential hypertension    Hypercholesterolemia    Microalbuminuria    Nonalcoholic fatty liver disease    Calculus of kidney    Adiposity    Type 2 diabetes mellitus    Squamous cell carcinoma of skin    Melanoma    Basal cell carcinoma    DJD (degenerative joint disease) of knee    Angiodysplasia of colon without hemorrhage    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 abscess w/o bleeding    Dyslipidemia    H/O: gout    History of renal calculi    Other fecal abnormalities    Polyp of colon    Renal hypertension    Stage 4 chronic kidney disease    Vitamin D deficiency    Murmur    Cirrhosis of liver with ascites    Arteriovenous fistula of left upper extremity    JENNIFER (acute kidney injury)    Nonrheumatic aortic valve stenosis    Other pancytopenia     Past Medical History:   Diagnosis Date    Angiodysplasia of colon without hemorrhage 2022    Arteriovenous fistula of left upper extremity 2024    Arthritis     Basal cell carcinoma 2018.      Calculus of kidney 2016    Cataract     LEFT AND RIGHT    Diabetes mellitus     TYPE 2    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 abscess w/o bleeding 2022    H/O: gout 2021    History of kidney stones     History of melanoma     LEFT ARM    Hyperlipidemia     Hypertension     Left knee pain     Melanoma 2018.      Nonalcoholic steatohepatitis     Seasonal allergies     Squamous cell carcinoma of skin 2016     New one in 2019 -left leg.      Stage 4 chronic kidney disease 2021    Progressive decline in kidney function since 2019.   most recent creatinine 1.85.    Etiology of kidney disease is likely associated with diabetic nephrosclerosis. biopsy-proven   No recent history of NSAID use.    Renal duplex US negative for renal artery stenosis.    kidney biopsy consistent to his diabetic nephropathy with extensive global sclerosis and 60% interstitial fibrosis.  It was noted     Type 2 diabetes mellitus 2016     Past Surgical History:   Procedure Laterality Date     SECTION      COLONOSCOPY      CYSTOSCOPY W/ LASER LITHOTRIPSY      REPLACEMENT TOTAL KNEE Right 10/2015    SKIN CANCER EXCISION Left     ARM MELANOMA    TONSILLECTOMY AND ADENOIDECTOMY      TOTAL KNEE ARTHROPLASTY Left 2019    Procedure: LEFT TOTAL KNEE ARTHROPLASTY;  Surgeon: Magdaleno Saleh MD;  Location: [facility];  Service: Orthopedics    UPPER GASTROINTESTINAL ENDOSCOPY       PT Assessment (Last 12 Hours)       PT Evaluation and Treatment       Row Name 24 1015          Physical Therapy Time and Intention    Subjective Information --  reports she plans on using cane, does not feel she needs rolling walker  -     Document Type therapy note (daily note)  -     Mode of Treatment individual therapy;physical therapy  -       Row Name 24 1015          Pain    Pretreatment Pain Rating 0/10 - no pain  -     Posttreatment Pain Rating 0/10 - no pain  -       Row Name 24 1015          Cognition    Orientation Status (Cognition) oriented x 4  -       Row Name 24 1015          Bed Mobility    Supine-Sit [place] (Bed Mobility) supervision  -     Assistive Device (Bed Mobility) head of bed elevated;bed rails  -       Row Name 24 1015          Transfers    Transfers stand-sit transfer  -       Row Name 24 1015          Sit-Stand Transfer    Sit-Stand [place] (Transfers)  standby assist;verbal cues  -SVETLANA     Assistive Device (Sit-Stand Transfers) cane, straight  pt's own  -SVETLANA     Comment, (Sit-Stand Transfer) tends to pull up on cane  -       Row Name 11/20/24 1015          Stand-Sit Transfer    Stand-Sit [place] (Transfers) standby assist;verbal cues  -SVETLANA     Assistive Device (Stand-Sit Transfers) cane, straight  -SVETLANA       Row Name 11/20/24 1015          Gait/Stairs (Locomotion)    [place] Level (Gait) contact guard;standby assist;verbal cues  -SVETLANA     Assistive Device (Gait) cane, straight  -SVETLANA     Distance in Feet (Gait) --  40', 30' -- pt wants to stay inside room, no barboza walking  -     Deviations/Abnormal Patterns (Gait) gait speed decreased;stride length decreased  -     Bilateral Gait Deviations forward flexed posture;heel strike decreased;lateral trunk flexion  -     Gait Assessment/Intervention occasional reach for wall especially with turn  -       Row Name 11/20/24 1015          Hip (Therapeutic Exercise)    Hip (Therapeutic Exercise) strengthening exercise  -     Hip Strengthening (Therapeutic Exercise) bilateral;marching while standing;5 repetitions  holding back of chair with R hand  -       Row Name 11/20/24 1015          Knee (Therapeutic Exercise)    Knee (Therapeutic Exercise) strengthening exercise  -     Knee Strengthening (Therapeutic Exercise) bilateral;LAQ (long arc quad);sitting;standing;10 repetitions  partial knee bends -- while holding onto back of chair  -       Row Name 11/20/24 1015          Ankle (Therapeutic Exercise)    Ankle (Therapeutic Exercise) strengthening exercise  -     Ankle Strengthening (Therapeutic Exercise) bilateral;dorsiflexion;plantarflexion;sitting;10 repetitions  -       Row Name 11/20/24 1015          Positioning and Restraints    Pre-Treatment Position in bed  -SVETLANA     Post Treatment Position other  couch  -SVETLANA     In Chair notified nsg;call light within reach;encouraged to call for assist  -SVETLANA        Row Name 11/20/24 1015          Therapy Assessment/Plan (PT)    Rehab Potential (PT) good  -     Therapy Frequency (PT) 5 times/wk  -               User Key  (r) = Recorded By, (t) = Taken By, (c) = Cosigned By      Initials Name Provider Type    SVETLANA Tracee Jacobson, PT Physical Therapist                    Physical Therapy Education       Title: PT OT SLP Therapies (In Progress)       Topic: Physical Therapy (In Progress)       Point: Mobility training (Done)       Learning Progress Summary            Patient Acceptance, E,TB, VU,NR by SVETLANA at 11/20/2024 1052    Acceptance, E, NR by JL at 11/20/2024 0304    Acceptance, E,TB,D, VU by MG at 11/18/2024 1459    Acceptance, E,D,TB, VU by MG at 11/15/2024 1600                      Point: Home exercise program (In Progress)       Learning Progress Summary            Patient Acceptance, E, NR by JL at 11/20/2024 0304    Acceptance, E,TB,D, VU by MG at 11/18/2024 1459    Acceptance, E,D,TB, VU by MG at 11/15/2024 1600                      Point: Body mechanics (In Progress)       Learning Progress Summary            Patient Acceptance, E, NR by JL at 11/20/2024 0304    Acceptance, E,TB,D, VU by MG at 11/18/2024 1459    Acceptance, E,D,TB, VU by MG at 11/15/2024 1600    Acceptance, E, VU by MG at 11/14/2024 1431                      Point: Precautions (In Progress)       Learning Progress Summary            Patient Acceptance, E, NR by  at 11/20/2024 0304    Acceptance, E,TB,D, VU by MG at 11/18/2024 1459    Acceptance, E,D,TB, VU by MG at 11/15/2024 1600    Acceptance, E, VU by MG at 11/14/2024 1431                                      User Key       Initials Effective Dates Name Provider Type Discipline     06/16/21 -  Tracee Jacobson, PT Physical Therapist PT    MG 05/24/22 -  Lula Ramirez, PT Physical Therapist PT    JL 10/20/20 -  Constantin Benavides, RN Registered Nurse Nurse                  PT Recommendation and Plan  Therapy Frequency (PT): 5 times/wk      Outcome  Measures       Row Name 11/20/24 1015             How much help from another person do you currently need...    Turning from your back to your side while in flat bed without using bedrails? 4  -SVETLANA      Moving from lying on back to sitting on the side of a flat bed without bedrails? 3  -SVETLANA      Moving to and from a bed to a chair (including a wheelchair)? 3  -SVETLANA      Standing up from a chair using your arms (e.g., wheelchair, bedside chair)? 3  -SVETLANA      Climbing 3-5 steps with a railing? 3  -SVETLANA      To walk in hospital room? 3  -SVETLANA      AM-PAC 6 Clicks Score (PT) 19  -SVETLANA                User Key  (r) = Recorded By, (t) = Taken By, (c) = Cosigned By      Initials Name Provider Type    Tracee Quinn, PT Physical Therapist                     Time Calculation:    PT Charges       Row Name 11/20/24 1056             Time Calculation    Start Time 1015  -SVETLANA      Stop Time 1040  -SVETLANA      Time Calculation (min) 25 min  -SVETLANA      PT Received On 11/20/24  -SVETLANA      PT - Next Appointment 11/21/24  -SVETLANA         Time Calculation- PT    Total Timed Code Minutes- PT 25 minute(s)  -SVETLANA                User Key  (r) = Recorded By, (t) = Taken By, (c) = Cosigned By      Initials Name Provider Type    Tracee Quinn, PT Physical Therapist                  Therapy Charges for Today       Code Description Service Date Service Provider Modifiers Qty    [id number]  PT THER PROC EA 15 MIN 11/20/2024 Tracee Jacobson, PT GP 2            PT G-Codes  AM-PAC 6 Clicks Score (PT): 19    Tracee Jacobson, PT  11/20/2024

## 2024-11-20 NOTE — CASE MANAGEMENT/SOCIAL WORK
Continued Stay Note  [facility]     Patient Name: Philly Person  MRN: [id number]  Today's Date: 11/20/2024    Admit Date: 11/13/2024    Plan: Plan home with spouse.  RITCHIE Sims RN   Discharge Plan       Row Name 11/20/24 4813       Plan    Plan Plan home with spouse.  RITCHIE Sims RN    Patient/Family in Agreement with Plan yes    Plan Comments Spoke with pt at bedside. Pt is requesting a wheelchair for assistance in getting to MD appointments.  Requested needed verbiage from MD.  Pt does not have a preferred DME provider but is agreeable to Breezy Gray ( [phone]) called to follow.  Pt's spouse will assist her at home and transport her home.  Plan home with spouse.  RITCHIE Sims RN                   Discharge Codes    No documentation.                 Expected Discharge Date and Time       Expected Discharge Date Expected Discharge Time    Nov 21, 2024               Erma Sims RN

## 2024-11-20 NOTE — PROGRESS NOTES
[facility]     Progress Note    Patient Name: Philly Person  : 1945  MRN: [id number]  Primary Care Physician:  Shazia Cardoza MD  Date of admission: 2024    Subjective   Subjective     Chief Complaint: abd distention    History of Present Illness  Patient Reports no bm but flatus, feels comfortable.  Passing a lot of urine    Review of Systems   Gastrointestinal:  Positive for abdominal pain and constipation.   Neurological:  Positive for weakness.       Objective   Objective     Vitals:   Temp:  [97.5 °F (36.4 °C)-98.3 °F (36.8 °C)] 98.3 °F (36.8 °C)  Heart Rate:  [65-77] 65  Resp:  [1-16] 16  BP: (121-146)/(43-60) 121/43    Physical Exam  HENT:      Right Ear: External ear normal.      Left Ear: External ear normal.      Mouth/Throat:      Pharynx: Oropharynx is clear.   Eyes:      Conjunctiva/sclera: Conjunctivae normal.   Cardiovascular:      Rate and Rhythm: Normal rate.      Pulses: Normal pulses.   Pulmonary:      Effort: Pulmonary effort is normal.   Abdominal:      General: Abdomen is flat.   Skin:     General: Skin is warm and dry.   Neurological:      General: No focal deficit present.      Mental Status: She is alert.   Psychiatric:         Mood and Affect: Mood normal.          Result Review    Result Review:  I have personally reviewed the results from the time of this admission to 2024 11:21 EST and agree with these findings:  []  Laboratory list / accordion  []  Microbiology  []  Radiology  []  EKG/Telemetry   []  Cardiology/Vascular   []  Pathology  []  Old records  []  Other:  Most notable findings include:       Assessment & Plan   Assessment / Plan     Brief Patient Summary:  Philly Person is a 79 y.o. female who Ascites secondary to portal htn  Cirrhosis suspect basis of QUINN  11x14 mm cyst of pancreas   slow transit constipation      Active Hospital Problems:  Active Hospital Problems    Diagnosis     **Cirrhosis of liver with ascites     JENNIFER (acute kidney  injury)     Nonrheumatic aortic valve stenosis     Other pancytopenia     Murmur     Arteriovenous fistula of left upper extremity     Stage 4 chronic kidney disease     Nonalcoholic fatty liver disease     Type 2 diabetes mellitus     Benign essential hypertension      Plan: Plan: diuretics per renal   Prn paracentesis with albumin support  Pancreatic cystic lesion is small enough that no further invasive evaluation needed.  Could consider imaging in 6 months  to 12 months     patient comfortable advised does not need daily bm, continue lactulose daily       VTE Prophylaxis:  Mechanical VTE prophylaxis orders are present.        CODE STATUS:    Code Status (Patient has no pulse and is not breathing): CPR (Attempt to Resuscitate)  Medical Interventions (Patient has pulse or is breathing): Full    Disposition:  I expect patient to be discharged uncertain .    Brendon Pina MD

## 2024-11-20 NOTE — PROGRESS NOTES
"Nutrition Services    Patient Name:  Philly Person  YOB: 1945  MRN: [id number]  Admit Date:  11/13/2024  Assessment Date:  11/20/24    NUTRITION SCREENING      Reason for Encounter Follow-up protocol   Diagnosis/Problem Cirrhosis of liver with ascites       PO Diet Diet: Cardiac, Diabetic; Low Sodium (2g); Consistent Carbohydrate; Fluid Consistency: Thin (IDDSI 0)   Supplements N/a   PO Intake % %       Medications MAR reviewed by RD   Labs  Listed below, reviewed   Physical Findings Obese; mild/moderate edema   GI Function Normoactive, diarrhea, fecal incontinence,+BM 11/18   Skin Status B=19, bruised       Height  Weight  BMI  Weight Trend     Height: 155 cm (61.02\")  Weight: 114 kg (250 lb 12.8 oz) (11/20/24 1237)  Body mass index is 47.35 kg/m².  Gain       Nutrition Problem (PES) No nutrition diagnosis at this time.       Intervention/Plan RD to follow up per protocol.     Results from last 7 days   Lab Units 11/20/24  0337 11/19/24  0505 11/18/24  0512 11/17/24  0600 11/16/24  0424   SODIUM mmol/L 137 136 135* 138 139   POTASSIUM mmol/L 3.7 3.5 3.6 3.6 3.7   CHLORIDE mmol/L 105 106 103 106 106   CO2 mmol/L 19.0* 18.0* 19.2* 21.0* 20.0*   BUN mg/dL 68* 66* 68* 68* 66*   CREATININE mg/dL 3.95* 3.98* 3.90* 3.66* 3.26*   CALCIUM mg/dL 8.4* 8.2* 8.2* 8.3* 8.5*   BILIRUBIN mg/dL  --   --  0.6 0.5 0.7   ALK PHOS U/L  --   --  77 99 112   ALT (SGPT) U/L  --   --  10 16 16   AST (SGOT) U/L  --   --  20 27 28   GLUCOSE mg/dL 119* 66 124* 80 134*     Results from last 7 days   Lab Units 11/20/24  0338 11/20/24  0337 11/18/24  1205 11/18/24  0512 11/17/24  0600 11/16/24  0424 11/15/24  0543 11/14/24  0424   MAGNESIUM mg/dL  --   --   --  2.1 2.3  --   --  2.1   PHOSPHORUS mg/dL  --  3.4   < > 3.7 3.5   < > 3.5  --    HEMOGLOBIN g/dL 8.2*  --    < > 7.1* 8.3*   < > 8.6* 8.3*   HEMATOCRIT % 24.7*  --    < > 21.9* 25.1*   < > 25.7* 26.4*   TRIGLYCERIDES mg/dL  --   --   --   --   --   --  80  --     " < > = values in this interval not displayed.     Lab Results   Component Value Date    HGBA1C 5.60 10/28/2024         Electronically signed by:  Lucia Sánchez RD  11/20/24 15:17 EST

## 2024-11-20 NOTE — PLAN OF CARE
Pt is improving with mobility with SBA bed mobility and transfers to stand. Pt ambulated farther today with cane 30', 40' and stating she prefers cane over rolling walker. Pt was SOA following ambulation. Pt would benefit from continued PT to work on improving mobility to return back home.

## 2024-11-21 ENCOUNTER — INPATIENT HOSPITAL (AMBULATORY)
Age: 79
End: 2024-11-21
Payer: MEDICARE

## 2024-11-21 ENCOUNTER — INPATIENT HOSPITAL (AMBULATORY)
Dept: URBAN - METROPOLITAN AREA HOSPITAL 113 | Facility: HOSPITAL | Age: 79
End: 2024-11-21
Payer: MEDICARE

## 2024-11-21 ENCOUNTER — APPOINTMENT (OUTPATIENT)
Dept: ULTRASOUND IMAGING | Facility: HOSPITAL | Age: 79
End: 2024-11-21
Payer: MEDICARE

## 2024-11-21 DIAGNOSIS — K74.69 OTHER CIRRHOSIS OF LIVER: ICD-10-CM

## 2024-11-21 DIAGNOSIS — K86.2 CYST OF PANCREAS: ICD-10-CM

## 2024-11-21 DIAGNOSIS — R14.0 ABDOMINAL DISTENSION (GASEOUS): ICD-10-CM

## 2024-11-21 DIAGNOSIS — R18.8 OTHER ASCITES: ICD-10-CM

## 2024-11-21 DIAGNOSIS — K59.01 SLOW TRANSIT CONSTIPATION: ICD-10-CM

## 2024-11-21 DIAGNOSIS — K76.6 PORTAL HYPERTENSION: ICD-10-CM

## 2024-11-21 LAB
ALBUMIN SERPL-MCNC: 3.1 G/DL (ref 3.5–5.2)
ANION GAP SERPL CALCULATED.3IONS-SCNC: 13 MMOL/L (ref 5–15)
BUN SERPL-MCNC: 69 MG/DL (ref 8–23)
BUN/CREAT SERPL: 16.9 (ref 7–25)
CALCIUM SPEC-SCNC: 8.6 MG/DL (ref 8.6–10.5)
CHLORIDE SERPL-SCNC: 100 MMOL/L (ref 98–107)
CO2 SERPL-SCNC: 18 MMOL/L (ref 22–29)
CREAT SERPL-MCNC: 4.09 MG/DL (ref 0.57–1)
EGFRCR SERPLBLD CKD-EPI 2021: 10.5 ML/MIN/1.73
GLUCOSE BLDC GLUCOMTR-MCNC: 106 MG/DL (ref 70–130)
GLUCOSE BLDC GLUCOMTR-MCNC: 127 MG/DL (ref 70–130)
GLUCOSE BLDC GLUCOMTR-MCNC: 147 MG/DL (ref 70–130)
GLUCOSE BLDC GLUCOMTR-MCNC: 201 MG/DL (ref 70–130)
GLUCOSE SERPL-MCNC: 96 MG/DL (ref 65–99)
PHOSPHATE SERPL-MCNC: 3.4 MG/DL (ref 2.5–4.5)
POTASSIUM SERPL-SCNC: 3.8 MMOL/L (ref 3.5–5.2)
SODIUM SERPL-SCNC: 131 MMOL/L (ref 136–145)

## 2024-11-21 PROCEDURE — 99233 SBSQ HOSP IP/OBS HIGH 50: CPT | Performed by: INTERNAL MEDICINE

## 2024-11-21 PROCEDURE — 82948 REAGENT STRIP/BLOOD GLUCOSE: CPT

## 2024-11-21 PROCEDURE — 76705 ECHO EXAM OF ABDOMEN: CPT

## 2024-11-21 PROCEDURE — 63710000001 INSULIN GLARGINE PER 5 UNITS: Performed by: INTERNAL MEDICINE

## 2024-11-21 PROCEDURE — 63710000001 INSULIN LISPRO (HUMAN) PER 5 UNITS: Performed by: INTERNAL MEDICINE

## 2024-11-21 PROCEDURE — 80069 RENAL FUNCTION PANEL: CPT | Performed by: HOSPITALIST

## 2024-11-21 RX ADMIN — TORSEMIDE 20 MG: 20 TABLET ORAL at 08:15

## 2024-11-21 RX ADMIN — Medication 10 ML: at 22:01

## 2024-11-21 RX ADMIN — PANTOPRAZOLE SODIUM 40 MG: 40 TABLET, DELAYED RELEASE ORAL at 07:05

## 2024-11-21 RX ADMIN — HYDRALAZINE HYDROCHLORIDE 50 MG: 50 TABLET ORAL at 15:44

## 2024-11-21 RX ADMIN — LACTULOSE 20 G: 20 SOLUTION ORAL at 08:14

## 2024-11-21 RX ADMIN — NADOLOL 20 MG: 20 TABLET ORAL at 08:14

## 2024-11-21 RX ADMIN — HYDRALAZINE HYDROCHLORIDE 50 MG: 50 TABLET ORAL at 21:23

## 2024-11-21 RX ADMIN — HYDRALAZINE HYDROCHLORIDE 50 MG: 50 TABLET ORAL at 07:02

## 2024-11-21 RX ADMIN — TORSEMIDE 20 MG: 20 TABLET ORAL at 18:35

## 2024-11-21 RX ADMIN — INSULIN LISPRO 3 UNITS: 100 INJECTION, SOLUTION INTRAVENOUS; SUBCUTANEOUS at 21:22

## 2024-11-21 RX ADMIN — INSULIN GLARGINE 8 UNITS: 100 INJECTION, SOLUTION SUBCUTANEOUS at 08:15

## 2024-11-21 NOTE — PROGRESS NOTES
Name: Philly Person ADMIT: 2024   : 1945  PCP: Shazia Cardoza MD    MRN: [id number] LOS: 8 days   AGE/SEX: 79 y.o. female  ROOM: [facility]     Subjective   Subjective   Feeling about the same.    Weight unchanged from yesterday     Objective   Objective   Vital Signs  Temp:  [97.6 °F (36.4 °C)-98.8 °F (37.1 °C)] 98.8 °F (37.1 °C)  Heart Rate:  [66-74] 70  Resp:  [16-18] 16  BP: (126-163)/(54-72) 163/72  SpO2:  [94 %-98 %] 98 %  on   ;   Device (Oxygen Therapy): room air  Body mass index is 47.29 kg/m².  Physical Exam  Vitals reviewed.   Constitutional:       General: She is not in acute distress.     Appearance: She is obese.   Eyes:      General: No scleral icterus.  Cardiovascular:      Rate and Rhythm: Normal rate and regular rhythm.      Heart sounds: Murmur heard.   Pulmonary:      Effort: Pulmonary effort is normal.      Breath sounds: Normal breath sounds.   Abdominal:      General: There is distension.      Palpations: Abdomen is soft.      Tenderness: There is no abdominal tenderness.   Musculoskeletal:      Right lower leg: Edema present.      Left lower leg: Edema present.   Skin:     General: Skin is warm and dry.   Neurological:      Mental Status: She is alert and oriented to person, place, and time.   Psychiatric:         Mood and Affect: Mood normal.       Results Review     I reviewed the patient's new clinical results.  Results from last 7 days   Lab Units 24  0338 24  0505 24  1205 24  0512 24  0600   WBC 10*3/mm3 4.55 3.46  --  3.15* 4.22   HEMOGLOBIN g/dL 8.2* 7.5* 7.6* 7.1* 8.3*   PLATELETS 10*3/mm3 84* 67*  --  71* 96*     Results from last 7 days   Lab Units 24  0557 24  0337 24  0505 24  0512   SODIUM mmol/L 131* 137 136 135*   POTASSIUM mmol/L 3.8 3.7 3.5 3.6   CHLORIDE mmol/L 100 105 106 103   CO2 mmol/L 18.0* 19.0* 18.0* 19.2*   BUN mg/dL 69* 68* 66* 68*   CREATININE mg/dL 4.09* 3.95* 3.98* 3.90*   GLUCOSE mg/dL 96  119* 66 124*   EGFR mL/min/1.73 10.5* 11.0* 10.9* 11.1*     Results from last 7 days   Lab Units 11/21/24  0557 11/20/24  0337 11/19/24  0505 11/18/24  0512 11/17/24  0600 11/16/24  0424 11/15/24  0543   ALBUMIN g/dL 3.1* 3.1* 3.2* 3.1* 2.4* 2.6* 2.3*   BILIRUBIN mg/dL  --   --   --  0.6 0.5 0.7 0.6   ALK PHOS U/L  --   --   --  77 99 112 121*   AST (SGOT) U/L  --   --   --  20 27 28 32   ALT (SGPT) U/L  --   --   --  10 16 16 17     Results from last 7 days   Lab Units 11/21/24  0557 11/20/24  0337 11/19/24  0505 11/18/24  0512 11/17/24  0600   CALCIUM mg/dL 8.6 8.4* 8.2* 8.2* 8.3*   ALBUMIN g/dL 3.1* 3.1* 3.2* 3.1* 2.4*   MAGNESIUM mg/dL  --   --   --  2.1 2.3   PHOSPHORUS mg/dL 3.4 3.4 3.5 3.7 3.5           Glucose   Date/Time Value Ref Range Status   11/21/2024 1538 127 70 - 130 mg/dL Final   11/21/2024 1046 147 (H) 70 - 130 mg/dL Final   11/21/2024 0642 106 70 - 130 mg/dL Final   11/20/2024 2019 217 (H) 70 - 130 mg/dL Final   11/20/2024 1553 199 (H) 70 - 130 mg/dL Final   11/20/2024 1041 103 70 - 130 mg/dL Final   11/20/2024 0601 116 70 - 130 mg/dL Final       US Abdomen Limited    Result Date: 11/21/2024   1. Mild ascites. 2. Cirrhosis  This report was finalized on 11/21/2024 3:42 PM by Dr. Immanuel Strong M.D on Workstation: [id number]       I have personally reviewed all medications:  Scheduled Medications  epoetin zuleika/zuleika-epbx, 3,000 Units, Subcutaneous, Once per day on Monday Wednesday Friday  hydrALAZINE, 50 mg, Oral, Q8H  insulin glargine, 8 Units, Subcutaneous, Daily  insulin lispro, 2-7 Units, Subcutaneous, 4x Daily AC & at Bedtime  lactulose, 20 g, Oral, Daily  nadolol, 20 mg, Oral, Q24H  pantoprazole, 40 mg, Oral, QAM AC  sodium chloride, 10 mL, Intravenous, Q12H  torsemide, 20 mg, Oral, BID    Infusions   Diet  Diet: Cardiac, Diabetic; Low Sodium (2g); Consistent Carbohydrate; Fluid Consistency: Thin (IDDSI 0)    I have personally reviewed:  [x]  Laboratory   []  Microbiology   [x]  Radiology    []  EKG/Telemetry  []  Cardiology/Vascular   []  Pathology    []  Records       Assessment/Plan     Active Hospital Problems    Diagnosis  POA    **Cirrhosis of liver with ascites [K74.60, R18.8]  Yes    JENNIFER (acute kidney injury) [N17.9]  No    Nonrheumatic aortic valve stenosis [I35.0]  Yes    Other pancytopenia [D61.818]  Yes    Murmur [R01.1]  Yes    Arteriovenous fistula of left upper extremity [I77.0]  Yes    Stage 4 chronic kidney disease [N18.4]  Yes    Nonalcoholic fatty liver disease [K76.0]  Yes    Type 2 diabetes mellitus [E11.9]  Yes    Benign essential hypertension [I10]  Yes      Resolved Hospital Problems   No resolved problems to display.       79 y.o. female with history of Bond, diabetes and CKD 4 presenting with shortness of breath and weight gain, fluid overload due to decompensated cirrhosis    Cirrhosis appears to be stable/improving.  - Status post paracentesis 7.1 L on 11/14.  Repeat ultrasound ordered today, shows only minimal ascites, and probably not enough to tap again.  Hopefully diuretics will help keep the fluid off for longer    JENNIFER/CKD: Creatinine has plateaued, up slightly today.  Continue twice daily torsemide  - Discussed with Dr. Chaim Howard    Pancytopenia, stable today.  Platelets actually improving.  Epogen added by nephrology    DM2 well-controlled      SCDs  Disposition: Home with  at discharge.  Patient did not feel ready today, hoping for tomorrow      Eladio Shelley MD  [facility]  11/21/24  15:46 EST

## 2024-11-21 NOTE — PLAN OF CARE
Goal Outcome Evaluation:   Pt did not work with patient. No complications, had abdominal us, no other complications will continue to monitor

## 2024-11-21 NOTE — PROGRESS NOTES
[facility]     Progress Note    Patient Name: Philly Person  : 1945  MRN: [id number]  Primary Care Physician:  Shazia Cardoza MD  Date of admission: 2024    Subjective   Subjective     Chief Complaint: ascites    History of Present Illness  Patient Reports flatus,  but comfortable     Review of Systems   Gastrointestinal:  Positive for abdominal distention.   Neurological:  Positive for weakness.   All other systems reviewed and are negative.      Objective   Objective     Vitals:   Temp:  [97.6 °F (36.4 °C)-98.3 °F (36.8 °C)] 98.3 °F (36.8 °C)  Heart Rate:  [66-74] 66  Resp:  [16-18] 16  BP: (136-150)/(61-64) 136/62    Physical Exam  HENT:      Right Ear: External ear normal.      Left Ear: External ear normal.      Mouth/Throat:      Pharynx: Oropharynx is clear.   Eyes:      Conjunctiva/sclera: Conjunctivae normal.   Cardiovascular:      Rate and Rhythm: Normal rate.      Pulses: Normal pulses.   Pulmonary:      Effort: Pulmonary effort is normal.   Abdominal:      General: There is distension.      Palpations: Abdomen is soft.   Skin:     General: Skin is warm and dry.   Neurological:      General: No focal deficit present.      Mental Status: She is alert.   Psychiatric:         Mood and Affect: Mood normal.          Result Review    Result Review:  I have personally reviewed the results from the time of this admission to 2024 09:00 EST and agree with these findings:  []  Laboratory list / accordion  []  Microbiology  []  Radiology  []  EKG/Telemetry   []  Cardiology/Vascular   []  Pathology  []  Old records  []  Other:  Most notable findings include:soft abd      Assessment & Plan   Assessment / Plan     Brief Patient Summary:  Philly Person is a 79 y.o. female who Ascites secondary to portal htn  Cirrhosis suspect basis of QUINN  11x14 mm cyst of pancreas  Slow transit constipation          Active Hospital Problems:  Active Hospital Problems    Diagnosis     **Cirrhosis of  liver with ascites     JENNIFER (acute kidney injury)     Nonrheumatic aortic valve stenosis     Other pancytopenia     Murmur     Arteriovenous fistula of left upper extremity     Stage 4 chronic kidney disease     Nonalcoholic fatty liver disease     Type 2 diabetes mellitus     Benign essential hypertension      Plan: continue daily lactulose for the constipation. Patient does not have to have bowel movement everyday   Diuretic rx per renal  Paracentesis as needed,  maybe consider outpatient paracentesis a week after discharge with albumin support       VTE Prophylaxis:  Mechanical VTE prophylaxis orders are present.        CODE STATUS:    Code Status (Patient has no pulse and is not breathing): CPR (Attempt to Resuscitate)  Medical Interventions (Patient has pulse or is breathing): Full    Disposition:  I expect patient to be discharged uncertain.    Brendon Pina MD

## 2024-11-21 NOTE — PLAN OF CARE
Goal Outcome Evaluation:      Pt resting on the bed, alert and oriented, sr on monitor, RA, administered meds per mar, no c/o pain or soa, vss, will continue to monitor.      Problem: Adult Inpatient Plan of Care  Goal: Absence of Hospital-Acquired Illness or Injury  Intervention: Identify and Manage Fall Risk  Recent Flowsheet Documentation  Taken 11/21/2024 0401 by Shayne Golden RN  Safety Promotion/Fall Prevention:   activity supervised   room organization consistent   safety round/check completed  Taken 11/21/2024 0201 by Shayne Golden RN  Safety Promotion/Fall Prevention:   activity supervised   room organization consistent   safety round/check completed  Taken 11/21/2024 0015 by Shayne Golden RN  Safety Promotion/Fall Prevention:   activity supervised   room organization consistent   safety round/check completed  Taken 11/20/2024 2212 by Shayne Golden RN  Safety Promotion/Fall Prevention:   activity supervised   room organization consistent   safety round/check completed  Taken 11/20/2024 2028 by Shayne Golden RN  Safety Promotion/Fall Prevention:   activity supervised   room organization consistent   safety round/check completed  Intervention: Prevent Skin Injury  Recent Flowsheet Documentation  Taken 11/21/2024 0401 by Shayne Golden RN  Body Position: position changed independently  Taken 11/21/2024 0201 by Shayne Golden RN  Body Position: position changed independently  Taken 11/21/2024 0015 by Shayne Golden RN  Body Position: position changed independently  Taken 11/20/2024 2212 by Shayne Golden RN  Body Position: position changed independently  Taken 11/20/2024 2028 by Shayne Golden RN  Body Position: position changed independently  Intervention: Prevent and Manage VTE (Venous Thromboembolism) Risk  Recent Flowsheet Documentation  Taken 11/20/2024 2028 by Shayne Golden RN  VTE Prevention/Management: SCDs (sequential compression devices)  off  Intervention: Prevent Infection  Recent Flowsheet Documentation  Taken 11/21/2024 0401 by Shayne Golden RN  Infection Prevention:   hand hygiene promoted   rest/sleep promoted  Taken 11/21/2024 0201 by Shayne Golden RN  Infection Prevention:   hand hygiene promoted   rest/sleep promoted  Taken 11/21/2024 0015 by Shayne Golden RN  Infection Prevention:   hand hygiene promoted   single patient room provided   rest/sleep promoted  Taken 11/20/2024 2212 by Shayne Golden RN  Infection Prevention:   hand hygiene promoted   rest/sleep promoted  Taken 11/20/2024 2028 by Shayne Golden RN  Infection Prevention:   hand hygiene promoted   rest/sleep promoted  Goal: Optimal Comfort and Wellbeing  Intervention: Provide Person-Centered Care  Recent Flowsheet Documentation  Taken 11/21/2024 0015 by Shayne Golden RN  Trust Relationship/Rapport:   care explained   choices provided  Taken 11/20/2024 2028 by Shayne Golden RN  Trust Relationship/Rapport:   choices provided   care explained     Problem: Liver Failure  Goal: Optimal Coping with Liver Failure  Intervention: Support and Optimize Psychosocial Response  Recent Flowsheet Documentation  Taken 11/21/2024 0015 by Shayne Golden RN  Supportive Measures: active listening utilized  Diversional Activities: television  Taken 11/20/2024 2028 by Shayne Golden RN  Supportive Measures: active listening utilized  Diversional Activities: television  Family/Support System Care:   self-care encouraged   support provided  Goal: Blood Glucose Level Within Target Range  Intervention: Optimize Glycemic Control  Recent Flowsheet Documentation  Taken 11/20/2024 2028 by Shayne Golden RN  Hyperglycemia Management: blood glucose monitored  Hypoglycemia Management: blood glucose monitored  Goal: Optimize Neurologic Function  Intervention: Monitor and Optimize Neurologic Status  Recent Flowsheet Documentation  Taken 11/21/2024 0401 by Cahntel  DUSTIN Bella  Head of Bed (HOB) Positioning: HOB elevated  Taken 11/21/2024 0201 by Shayne Golden RN  Head of Bed (HOB) Positioning: HOB elevated  Seizure Precautions: activity supervised  Taken 11/21/2024 0015 by Shayne Golden RN  Sensory Stimulation Regulation: care clustered  Head of Bed (HOB) Positioning: HOB elevated  Seizure Precautions: activity supervised  Taken 11/20/2024 2212 by Shayne Golden RN  Head of Bed (HOB) Positioning: HOB elevated  Taken 11/20/2024 2028 by Shayne oGlden RN  Sensory Stimulation Regulation: care clustered  Head of Bed (HOB) Positioning: HOB elevated  Seizure Precautions: activity supervised  Goal: Effective Oxygenation and Ventilation  Intervention: Promote Airway Secretion Clearance  Recent Flowsheet Documentation  Taken 11/21/2024 0201 by Shayne Golden RN  Activity Management: bedrest  Taken 11/21/2024 0015 by Shayne Golden RN  Activity Management: bedrest  Cough And Deep Breathing: done independently per patient  Taken 11/20/2024 2028 by Shayne Golden RN  Activity Management: bedrest  Cough And Deep Breathing: done independently per patient  Intervention: Optimize Oxygenation and Ventilation  Recent Flowsheet Documentation  Taken 11/21/2024 0401 by Shayne Golden RN  Head of Bed (HOB) Positioning: HOB elevated  Taken 11/21/2024 0201 by Shayne Golden RN  Head of Bed (HOB) Positioning: HOB elevated  Taken 11/21/2024 0015 by Shayne Golden RN  Head of Bed (HOB) Positioning: HOB elevated  Taken 11/20/2024 2212 by Shayne Golden RN  Head of Bed (HOB) Positioning: HOB elevated  Taken 11/20/2024 2028 by Shayne Golden RN  Head of Bed (HOB) Positioning: HOB elevated     Problem: Fall Injury Risk  Goal: Absence of Fall and Fall-Related Injury  Intervention: Identify and Manage Contributors  Recent Flowsheet Documentation  Taken 11/21/2024 0401 by Shayne Golden RN  Medication Review/Management: medications reviewed  Taken  11/21/2024 0201 by Shayne Golden RN  Medication Review/Management: medications reviewed  Taken 11/20/2024 2212 by Shayne Golden RN  Medication Review/Management: medications reviewed  Taken 11/20/2024 2028 by Shayne Golden RN  Medication Review/Management: medications reviewed  Self-Care Promotion: independence encouraged  Intervention: Promote Injury-Free Environment  Recent Flowsheet Documentation  Taken 11/21/2024 0401 by Shayne Golden RN  Safety Promotion/Fall Prevention:   activity supervised   room organization consistent   safety round/check completed  Taken 11/21/2024 0201 by Shayne Golden RN  Safety Promotion/Fall Prevention:   activity supervised   room organization consistent   safety round/check completed  Taken 11/21/2024 0015 by Shayne Golden RN  Safety Promotion/Fall Prevention:   activity supervised   room organization consistent   safety round/check completed  Taken 11/20/2024 2212 by Shayne Golden RN  Safety Promotion/Fall Prevention:   activity supervised   room organization consistent   safety round/check completed  Taken 11/20/2024 2028 by Shayne Golden RN  Safety Promotion/Fall Prevention:   activity supervised   room organization consistent   safety round/check completed     Problem: Chronic Kidney Disease  Goal: Optimal Coping with Chronic Illness  Intervention: Support Psychosocial Response  Recent Flowsheet Documentation  Taken 11/21/2024 0015 by Shayne Golden RN  Supportive Measures: active listening utilized  Taken 11/20/2024 2028 by Shayne Golden RN  Supportive Measures: active listening utilized  Family/Support System Care:   self-care encouraged   support provided  Goal: Optimal Functional Ability  Intervention: Optimize Functional Ability  Recent Flowsheet Documentation  Taken 11/21/2024 0201 by Shayne Golden RN  Activity Management: bedrest  Taken 11/21/2024 0015 by Shayne Golden RN  Activity Management: bedrest  Taken  11/20/2024 2028 by Shayne Golden RN  Activity Management: bedrest  Self-Care Promotion: independence encouraged  Goal: Minimize Renal Failure Effects  Intervention: Monitor and Support Renal Function  Recent Flowsheet Documentation  Taken 11/21/2024 0401 by Shayne Golden RN  Medication Review/Management: medications reviewed  Taken 11/21/2024 0201 by Shayne Golden RN  Medication Review/Management: medications reviewed  Taken 11/20/2024 2212 by Shayne Golden RN  Medication Review/Management: medications reviewed  Taken 11/20/2024 2028 by Shayne Golden RN  Medication Review/Management: medications reviewed  Intervention: Protect and Monitor Dialysis Access Site  Recent Flowsheet Documentation  Taken 11/21/2024 0401 by Shayne Golden RN  Safety Precautions: limb precautions maintained

## 2024-11-21 NOTE — SIGNIFICANT NOTE
11/21/24 1645   OTHER   Discipline physical therapist   Rehab Time/Intention   Session Not Performed patient unavailable for treatment  (Attempt x2 in PM. First attempt pt declining PT tx stating she is waiting on an abdominal US. Second attempt pt was seated EOB eating dinner. PT may follow up later this PM, time permitting, or next service date for tx as appropriate.)   Therapy Assessment/Plan (PT)   Criteria for Skilled Interventions Met (PT) yes   Recommendation   PT - Next Appointment 11/22/24

## 2024-11-22 ENCOUNTER — READMISSION MANAGEMENT (OUTPATIENT)
Dept: CALL CENTER | Facility: HOSPITAL | Age: 79
End: 2024-11-22
Payer: MEDICARE

## 2024-11-22 VITALS
BODY MASS INDEX: 47.2 KG/M2 | SYSTOLIC BLOOD PRESSURE: 157 MMHG | HEART RATE: 67 BPM | HEIGHT: 61 IN | RESPIRATION RATE: 20 BRPM | TEMPERATURE: 97.9 F | OXYGEN SATURATION: 98 % | WEIGHT: 250 LBS | DIASTOLIC BLOOD PRESSURE: 65 MMHG

## 2024-11-22 PROBLEM — E87.1 HYPONATREMIA: Status: ACTIVE | Noted: 2024-11-22

## 2024-11-22 LAB
ALBUMIN SERPL-MCNC: 2.6 G/DL (ref 3.5–5.2)
ANION GAP SERPL CALCULATED.3IONS-SCNC: 8.6 MMOL/L (ref 5–15)
BUN SERPL-MCNC: 75 MG/DL (ref 8–23)
BUN/CREAT SERPL: 18.1 (ref 7–25)
CALCIUM SPEC-SCNC: 8.5 MG/DL (ref 8.6–10.5)
CHLORIDE SERPL-SCNC: 102 MMOL/L (ref 98–107)
CO2 SERPL-SCNC: 18.4 MMOL/L (ref 22–29)
CREAT SERPL-MCNC: 4.15 MG/DL (ref 0.57–1)
DEPRECATED RDW RBC AUTO: 49 FL (ref 37–54)
EGFRCR SERPLBLD CKD-EPI 2021: 10.3 ML/MIN/1.73
ERYTHROCYTE [DISTWIDTH] IN BLOOD BY AUTOMATED COUNT: 14.7 % (ref 12.3–15.4)
GLUCOSE BLDC GLUCOMTR-MCNC: 118 MG/DL (ref 70–130)
GLUCOSE BLDC GLUCOMTR-MCNC: 176 MG/DL (ref 70–130)
GLUCOSE SERPL-MCNC: 111 MG/DL (ref 65–99)
HCT VFR BLD AUTO: 23.8 % (ref 34–46.6)
HGB BLD-MCNC: 7.9 G/DL (ref 12–15.9)
MCH RBC QN AUTO: 30.2 PG (ref 26.6–33)
MCHC RBC AUTO-ENTMCNC: 33.2 G/DL (ref 31.5–35.7)
MCV RBC AUTO: 90.8 FL (ref 79–97)
PHOSPHATE SERPL-MCNC: 3.6 MG/DL (ref 2.5–4.5)
PLATELET # BLD AUTO: 93 10*3/MM3 (ref 140–450)
PMV BLD AUTO: ABNORMAL FL
POTASSIUM SERPL-SCNC: 3.6 MMOL/L (ref 3.5–5.2)
RBC # BLD AUTO: 2.62 10*6/MM3 (ref 3.77–5.28)
SODIUM SERPL-SCNC: 129 MMOL/L (ref 136–145)
WBC NRBC COR # BLD AUTO: 3.72 10*3/MM3 (ref 3.4–10.8)

## 2024-11-22 PROCEDURE — 82948 REAGENT STRIP/BLOOD GLUCOSE: CPT

## 2024-11-22 PROCEDURE — 99233 SBSQ HOSP IP/OBS HIGH 50: CPT | Performed by: INTERNAL MEDICINE

## 2024-11-22 PROCEDURE — 85027 COMPLETE CBC AUTOMATED: CPT | Performed by: HOSPITALIST

## 2024-11-22 PROCEDURE — 80069 RENAL FUNCTION PANEL: CPT | Performed by: HOSPITALIST

## 2024-11-22 PROCEDURE — 63710000001 INSULIN LISPRO (HUMAN) PER 5 UNITS: Performed by: INTERNAL MEDICINE

## 2024-11-22 PROCEDURE — 25010000002 EPOETIN ALFA-EPBX 3000 UNIT/ML SOLUTION: Performed by: HOSPITALIST

## 2024-11-22 PROCEDURE — 63710000001 INSULIN GLARGINE PER 5 UNITS: Performed by: INTERNAL MEDICINE

## 2024-11-22 RX ORDER — TORSEMIDE 20 MG/1
20 TABLET ORAL DAILY
Status: DISCONTINUED | OUTPATIENT
Start: 2024-11-22 | End: 2024-11-22 | Stop reason: HOSPADM

## 2024-11-22 RX ORDER — HYDRALAZINE HYDROCHLORIDE 50 MG/1
50 TABLET, FILM COATED ORAL EVERY 8 HOURS SCHEDULED
Qty: 90 TABLET | Refills: 0 | Status: SHIPPED | OUTPATIENT
Start: 2024-11-22

## 2024-11-22 RX ORDER — PANTOPRAZOLE SODIUM 40 MG/1
40 TABLET, DELAYED RELEASE ORAL
Qty: 30 TABLET | Refills: 0 | Status: SHIPPED | OUTPATIENT
Start: 2024-11-23

## 2024-11-22 RX ORDER — TORSEMIDE 20 MG/1
20 TABLET ORAL DAILY
Qty: 30 TABLET | Refills: 0 | Status: SHIPPED | OUTPATIENT
Start: 2024-11-22

## 2024-11-22 RX ORDER — NADOLOL 40 MG/1
20 TABLET ORAL
Qty: 15 TABLET | Refills: 0 | Status: SHIPPED | OUTPATIENT
Start: 2024-11-22

## 2024-11-22 RX ORDER — LACTULOSE 10 G/15ML
20 SOLUTION ORAL DAILY
Qty: 946 ML | Refills: 0 | Status: SHIPPED | OUTPATIENT
Start: 2024-11-22

## 2024-11-22 RX ADMIN — LACTULOSE 20 G: 20 SOLUTION ORAL at 09:18

## 2024-11-22 RX ADMIN — PANTOPRAZOLE SODIUM 40 MG: 40 TABLET, DELAYED RELEASE ORAL at 06:45

## 2024-11-22 RX ADMIN — EPOETIN ALFA-EPBX 3000 UNITS: 3000 INJECTION, SOLUTION INTRAVENOUS; SUBCUTANEOUS at 09:18

## 2024-11-22 RX ADMIN — TORSEMIDE 20 MG: 20 TABLET ORAL at 09:19

## 2024-11-22 RX ADMIN — Medication 10 ML: at 09:19

## 2024-11-22 RX ADMIN — NADOLOL 20 MG: 20 TABLET ORAL at 09:19

## 2024-11-22 RX ADMIN — HYDRALAZINE HYDROCHLORIDE 50 MG: 50 TABLET ORAL at 06:45

## 2024-11-22 RX ADMIN — INSULIN GLARGINE 8 UNITS: 100 INJECTION, SOLUTION SUBCUTANEOUS at 09:19

## 2024-11-22 RX ADMIN — INSULIN LISPRO 2 UNITS: 100 INJECTION, SOLUTION INTRAVENOUS; SUBCUTANEOUS at 12:13

## 2024-11-22 NOTE — PLAN OF CARE
Goal Outcome Evaluation:  Plan of Care Reviewed With: patient        Progress: no change  Outcome Evaluation: Pt being d/c home with  today, discharge educ given, questions answered, pt up to br, and is voiding adequately, voided x3 this shift, skin intact, sr on tele, room air satting 94-97%

## 2024-11-22 NOTE — PROGRESS NOTES
[facility] Progress Note      Patient Name: Philly Person  : 1945  MRN: [id number]  Primary Care Physician:  Shazia Cardoza MD  Date of admission: 2024    Subjective     Interval History:   Follow-up on JENNIFER and CKD  S/p paracentesis on  with 7.1 fluid removal    No new issues noted today.  Afebrile.  Wants to go home.    Review of Systems:   As noted above    Objective     Vitals:   Temp:  [97.4 °F (36.3 °C)-98.4 °F (36.9 °C)] 97.9 °F (36.6 °C)  Heart Rate:  [67-74] 67  Resp:  [16-20] 20  BP: (126-157)/(57-84) 157/65    Intake/Output Summary (Last 24 hours) at 2024 1358  Last data filed at 2024 0900  Gross per 24 hour   Intake 120 ml   Output 400 ml   Net -280 ml       Physical Exam:    General Appearance: Awake, chronically ill, no acute distress.  Obese  Skin: warm and dry  HEENT: oral mucosa normal, nonicteric sclera  Neck: No JVD  Lungs: Clear, breathing effort not labored on room air  Heart: RRR, no rub  Abdomen: soft, nontender, nondistended, normal active bowel  Extremities: 1+ lower extremity edema, no cyanosis or clubbing.  Left upper extremity AV fistula with good thrill and bruit.  Neuro: normal speech and mental status     Scheduled Meds:     epoetin zuleika/zuleika-epbx, 3,000 Units, Subcutaneous, Once per day on   hydrALAZINE, 50 mg, Oral, Q8H  insulin glargine, 8 Units, Subcutaneous, Daily  insulin lispro, 2-7 Units, Subcutaneous, 4x Daily AC & at Bedtime  lactulose, 20 g, Oral, Daily  nadolol, 20 mg, Oral, Q24H  pantoprazole, 40 mg, Oral, QAM AC  sodium chloride, 10 mL, Intravenous, Q12H  torsemide, 20 mg, Oral, Daily      IV Meds:        Results Reviewed:   I have personally reviewed the results from the time of this admission to 2024 13:58 EST     Results from last 7 days   Lab Units 24  0443 24  0557 24  0337 24  0505 24  0512 24  0600 24  0424   SODIUM mmol/L 129*  131* 137   < > 135* 138 139   POTASSIUM mmol/L 3.6 3.8 3.7   < > 3.6 3.6 3.7   CHLORIDE mmol/L 102 100 105   < > 103 106 106   CO2 mmol/L 18.4* 18.0* 19.0*   < > 19.2* 21.0* 20.0*   BUN mg/dL 75* 69* 68*   < > 68* 68* 66*   CREATININE mg/dL 4.15* 4.09* 3.95*   < > 3.90* 3.66* 3.26*   CALCIUM mg/dL 8.5* 8.6 8.4*   < > 8.2* 8.3* 8.5*   BILIRUBIN mg/dL  --   --   --   --  0.6 0.5 0.7   ALK PHOS U/L  --   --   --   --  77 99 112   ALT (SGPT) U/L  --   --   --   --  10 16 16   AST (SGOT) U/L  --   --   --   --  20 27 28   GLUCOSE mg/dL 111* 96 119*   < > 124* 80 134*    < > = values in this interval not displayed.       Estimated Creatinine Clearance: 12.6 mL/min (A) (by C-G formula based on SCr of 4.15 mg/dL (H)).    Results from last 7 days   Lab Units 11/22/24 0443 11/21/24  0557 11/20/24 0337 11/19/24  0505 11/18/24  0512 11/17/24  0600   MAGNESIUM mg/dL  --   --   --   --  2.1 2.3   PHOSPHORUS mg/dL 3.6 3.4 3.4   < > 3.7 3.5    < > = values in this interval not displayed.       Results from last 7 days   Lab Units 11/18/24  0512 11/17/24  0600   URIC ACID mg/dL 5.7 5.9*       Results from last 7 days   Lab Units 11/22/24 0443 11/20/24 0338 11/19/24  0505 11/18/24  1205 11/18/24  0512 11/17/24  0600   WBC 10*3/mm3 3.72 4.55 3.46  --  3.15* 4.22   HEMOGLOBIN g/dL 7.9* 8.2* 7.5* 7.6* 7.1* 8.3*   PLATELETS 10*3/mm3 93* 84* 67*  --  71* 96*               Assessment / Plan     ASSESSMENT:  Acute kidney injury on CKD stage IV, acute etiology is secondary to significant volume shift with the large-volume paracentesis and concern for hepatorenal syndrome, diarrhea and contrast-induced nephropathy.  Urine sodium of above 20 is more in favor of ATN.  Serum creatinine seems to be relatively stable  CKD stage IV, with recent baseline creatinine between 2- 2.3 mg/dL, secondary to biopsy-proven diabetic nephrosclerosis with 60% interstitial fibrosis   Urine albumin to creatinine ratio 382 mg/g consistent with diabetic  nephrosclerosis.  Does have matured left upper extremity AV fistula with good thrill.   History of type 2 diabetes mellitus with CKD  Hypertension with CKD.  BP acceptable, negative orthostatics  QUINN cirrhosis complicated by ascites, s/p paracentesis on 11/14  Volume overload, improving  Anemia of CKD, iron panel not in favor iron deficiency anemia, started on April  Left renal cyst.  Complex needs to be followed in outpatient setting  Possible UTI, on antibiotics    PLAN:  Volume status seems to be acceptable but sodium is low likely secondary to diuresis.  Will decrease torsemide to 20 mg p.o. daily.  Continue fluid restriction at 1500 cc per 24 hours.  Okay to discharge home from nephrology standpoint she does have labs scheduled next Monday in my clinic  Plan to follow-up with patient in 1 week   I reviewed the chart and other providers notes, reviewed labs.  Copied text in this note has been reviewed and is accurate as of 11/22/24.       Thank you for involving us in the care of Philly Person.  Please feel free to call with any questions.    Christy Lynch MD  11/22/24  13:58 EST    [facility]  [phone]    Parts of this note may be an electronic transcription/translation of spoken language to printed text using the Dragon dictation system.

## 2024-11-22 NOTE — SIGNIFICANT NOTE
11/22/24 1343   OTHER   Discipline physical therapist   Rehab Time/Intention   Session Not Performed other (see comments)  (On arrival prior to lunch pt w/ spouse packing for DC. Pt reports no concerns or questions for return home and feels ready to DC.)

## 2024-11-22 NOTE — CASE MANAGEMENT/SOCIAL WORK
Continued Stay Note  [facility]     Patient Name: Philly Person  MRN: [id number]  Today's Date: 11/22/2024    Admit Date: 11/13/2024    Plan: Plan home with family.  RITCHIE Sims RN   Discharge Plan       Row Name 11/22/24 1326       Plan    Plan Plan home with family.  RITCHIE Sims RN    Patient/Family in Agreement with Plan yes    Plan Comments Spoke with pt at bedside.  Isaac ( [phone]) is delivering a walker to pt's room for home.  Pt's spouse will assist pt at home and transport pt home. Plan home with spouse.  RITCHIE Sims RN                   Discharge Codes    No documentation.                 Expected Discharge Date and Time       Expected Discharge Date Expected Discharge Time    Nov 22, 2024               Erma Sims RN

## 2024-11-22 NOTE — PLAN OF CARE
Goal Outcome Evaluation:      Pt. Alert, oriented x4, used bathroom with 1 person assisted, v/s stable, 02 sats 96% on room air, no voiced c/o pain, remained edematous to bilateral lower ext. No s/s acute  distress noted

## 2024-11-22 NOTE — DISCHARGE SUMMARY
Patient Name: Philly Person  : 1945  MRN: [id number]    Date of Admission: 2024  Date of Discharge:  2024  Primary Care Physician: Shazia Cardoza MD      Chief Complaint:   Edema and Abdominal Pain      Discharge Diagnoses     Active Hospital Problems    Diagnosis  POA    **Cirrhosis of liver with ascites [K74.60, R18.8]  Yes    Hyponatremia [E87.1]  No    JENNIFER (acute kidney injury) [N17.9]  No    Nonrheumatic aortic valve stenosis [I35.0]  Yes    Other pancytopenia [D61.818]  Yes    Murmur [R01.1]  Yes    Arteriovenous fistula of left upper extremity [I77.0]  Yes    Stage 4 chronic kidney disease [N18.4]  Yes    Nonalcoholic fatty liver disease [K76.0]  Yes    Type 2 diabetes mellitus [E11.9]  Yes    Benign essential hypertension [I10]  Yes      Resolved Hospital Problems   No resolved problems to display.        Hospital Course     Ms. Person is a 79 y.o. female with a history of Bond with cirrhosis, CKD stage IV and diabetes who presented to [facility] initially complaining of lower extremity edema, constipation and abdominal pain.  Please see the admitting history and physical for further details.  She was found to have decompensated cirrhosis and was admitted.  Ultrasound-guided paracentesis was done with drainage of 7 L of fluid.  Fluid was not consistent with SBP and cytology was negative.  She did receive albumin following the procedure.  She was changed from atenolol to nadolol.  Nephrology and GI consulted while she was here.  She did have JENNIFER and his creatinine was running significantly higher than her previous baseline.  Nephrology managed her diuretics, first IV and then switched over to oral.  She has plateaued around 3.9-4.1 on the creatinine.  Since it has been stable for several days they feel comfortable discharging her home with outpatient labs next week.  Weight has been stable right around 250 pounds on the standing scale.  She has been instructed to  continue to monitor this daily after discharge.  She was started on lactulose although she never displayed any signs of hepatic encephalopathy, mainly for constipation.  This seems to be controlling things well.  She was also given some Neupogen here which can be managed in the nephrology office if she continues to need it.  Ultimately she is likely headed toward dialysis in the next few weeks to months but for now has stabilized to the point where she does not urgently need it.  She already has a fistula in place when the time comes.    Philly Person will need to be discharged with a wheelchair from a [employer] due to chirrosis of the liver and JENNIFER . Because of the previous mentioned issues, the patient has a mobility limitation that significantly impairs them to accomplish their MRADL entirely in the home. Mobility limitation cannot be resolved by using a cane or walker because she her pain and partial weightbearing on lower extremities. The patient's functional mobility deficit will be resolved with the use of a wheelchair. The patient is planning to use the wheelchair on a regular basis in the home and has not expressed an unwillingness to do so. The patient can safely use a manual wheelchair and has the strength to maneuver the wheelchair independently.       Day of Discharge     Subjective:  No events.  Feels ready to go    Physical Exam:  Temp:  [97.4 °F (36.3 °C)-98.8 °F (37.1 °C)] 97.9 °F (36.6 °C)  Heart Rate:  [67-74] 67  Resp:  [16-20] 20  BP: (126-163)/(54-84) 157/65  Body mass index is 47.2 kg/m².  Physical Exam  Vitals reviewed.   Constitutional:       General: She is not in acute distress.     Appearance: She is obese.   Eyes:      General: No scleral icterus.  Cardiovascular:      Rate and Rhythm: Normal rate and regular rhythm.      Heart sounds: Murmur heard.   Pulmonary:      Effort: Pulmonary effort is normal.      Breath sounds: Normal breath sounds.   Abdominal:      General: There is  distension.      Palpations: Abdomen is soft.      Tenderness: There is no abdominal tenderness.   Musculoskeletal:      Right lower leg: Edema present.      Left lower leg: Edema present.   Skin:     General: Skin is warm and dry.   Neurological:      Mental Status: She is alert and oriented to person, place, and time.   Psychiatric:         Mood and Affect: Mood normal.         Consultants     Consult Orders (all) (From admission, onward)       Start     Ordered    11/14/24 0702  Inpatient Gastroenterology Consult  IN         Specialty:  Gastroenterology  Provider:  Brendon Pina MD    11/13/24 1843 11/13/24 1913  Inpatient Case Management  Consult  Once        Provider:  (Not yet assigned)    11/13/24 1912 11/13/24 1844  Inpatient Spiritual Care Consult  Once        Provider:  (Not yet assigned)    11/13/24 1843 11/13/24 1844  Inpatient Nutrition Consult  Once        Provider:  (Not yet assigned)    11/13/24 1843    11/13/24 1512  Inpatient Nephrology Consult  Once        Specialty:  Nephrology  Provider:  Christy Flores MD    11/13/24 1512    11/13/24 1447  [facility] (on-call MD unless specified) Details  Once        Specialty:  Hospitalist  Provider:  Cristel Henry MD    11/13/24 1446    Pending  Inpatient Consult to Advance Care Planning  Once        Provider:  (Not yet assigned)    Pending                  Procedures       Imaging Results (All)       Procedure Component Value Units Date/Time    US Abdomen Limited [[id number]] Collected: 11/21/24 1540     Updated: 11/21/24 1545    Narrative:      US ABDOMEN LIMITED-     INDICATION: Ascites search     COMPARISON: CT abdomen pelvis November 13, 2024     TECHNIQUE: Grayscale evaluation of the 4 quadrants of the abdomen and  pelvis     FINDINGS:      Coarse liver echotexture with nodular contour. Mild free fluid seen in  the right upper quadrant. Mild free fluid seen in the right lower  quadrant. Mild free fluid seen in the  left upper quadrant. Small amount  of free fluid in the left lower quadrant. No fluid in the midline  pelvis.       Impression:         1. Mild ascites.  2. Cirrhosis     This report was finalized on 11/21/2024 3:42 PM by Dr. Immanuel Strong M.D on Workstation: [id number]       US Paracentesis [[id number]] Collected: 11/14/24 1027    Specimen: Body Fluid Updated: 11/14/24 1033    Narrative:      ULTRASOUND GUIDED PARACENTESIS     HISTORY: Ascites     Informed consent was obtained. The abdomen was prepped and draped in the  usual sterile fashion with 2% chlorhexidine. Lidocaine was used for  local anesthesia.     A 5 Latvian catheter was inserted into the left upper quadrant using  ultrasound guidance. 7150 mL of straw-colored ascites was removed.  Sample was sent to the lab.     Confirmatory images were obtained.     Patient tolerated the procedure well without immediate complications.       Impression:      Ultrasound-guided paracentesis as described     This report was finalized on 11/14/2024 10:27 AM by Dr. Hung Michelle M.D on Workstation: [id number]       CT Abdomen Pelvis With Contrast [[id number]] Collected: 11/13/24 1448     Updated: 11/13/24 1705    Narrative:      CT ABDOMEN PELVIS W CONTRAST-     HISTORY: 79 years of age, Female.  Generalized abdominal distention,  probable ascites, hepatomegaly     TECHNIQUE:  CT includes axial imaging from the lung bases to the  trochanters with intravenous contrast and without use of oral contrast.  Data reconstructed in coronal and sagittal planes. Radiation dose  reduction techniques were utilized, including automated exposure control  and exposure modulation based on body size.     COMPARISON: CT abdomen 10/21/2014     FINDINGS: Calcified right lower lobe nodule. Calcified right infrahilar  lymph nodes.     Moderate ascites. Hepatic cirrhotic morphology with evidence for portal  venous hypertension and splenomegaly. Hepatic and splenic calcifications  are  present. Multiple gallstones. Gallbladder mostly decompressed.     Adrenal glands within normal limits. Anterior-inferior pancreatic body  there is focal fluid density within the pancreatic parenchyma measuring  14 x 11 mm. this could represent a small pancreatic cystic lesion or  fluid extending into the substance of the parenchyma.     There is a left mid to lower pole renal parapelvic cystic lesion that  contains internal density that is denser than typically seen for a  simple cyst and measures 3.1 x 3.3 cm. There is a 4 mm left lower pole  nonobstructing stone. No hydronephrosis. Urinary bladder partially  decompressed. Generalized body wall edema. Advanced multilevel  degenerative disc disease within the thoracolumbar spine. At L1-L2,  there is a posterior disc osteophyte complex with moderate canal  stenosis.     Colonic diverticulosis. No bowel dilatation or evidence of bowel  obstruction. No evidence for appendicitis. Uterus and adnexal structures  appear within normal limits. Atherosclerotic calcifications are present  involving the abdominal aorta and iliac vasculature.       Impression:      1. Hepatic cirrhotic morphology with portal venous hypertension,  splenomegaly. Diffuse intra-abdominal ascites.  2. Cholelithiasis.  3. 14 x 11 mm cystic area within the pancreatic body due to a small  cystic lesion or extension of ascitic fluid into the pancreatic  parenchyma.  4. 3.3 cm left renal parapelvic cystic lesion is denser than typically  seen for a simple cyst and is new when compared to previous CT  10/21/2014. This could represent a hemorrhagic or proteinaceous cyst,  though recommend follow-up with multiphasic CT to evaluate for internal  enhancement and the possibility of a solid lesion.  5. 4 m nonobstructing left lower pole renal stone.  6. Generalized body wall edema.  Radiation dose reduction techniques were utilized, including automated  exposure control and exposure modulation based on body  size.        This report was finalized on 11/13/2024 5:02 PM by Baldemar Bowen M.D  on Workstation: [id number]               Results for orders placed during the hospital encounter of 11/13/24    ADULT TRANSTHORACIC ECHO COMPLETE W/ CONT IF NECESSARY PER PROTOCOL    Interpretation Summary    Left ventricular systolic function is normal. Calculated left ventricular EF = 62.2%    Left ventricular diastolic function was normal.    Mild aortic valve stenosis is present. Aortic valve area is 1.2 cm2.    Peak velocity of the flow distal to the aortic valve is 243.7 cm/s. Aortic valve mean pressure gradient is 13 mmHg. Aortic valve dimensionless index is 0.41 .    Estimated right ventricular systolic pressure from tricuspid regurgitation is normal (<35 mmHg).    There is a small (<1cm) pericardial effusion.    Pertinent Labs     Results from last 7 days   Lab Units 11/22/24 0443 11/20/24  0338 11/19/24  0505 11/18/24  1205 11/18/24  0512   WBC 10*3/mm3 3.72 4.55 3.46  --  3.15*   HEMOGLOBIN g/dL 7.9* 8.2* 7.5* 7.6* 7.1*   PLATELETS 10*3/mm3 93* 84* 67*  --  71*     Results from last 7 days   Lab Units 11/22/24 0443 11/21/24  0557 11/20/24  0337 11/19/24  0505   SODIUM mmol/L 129* 131* 137 136   POTASSIUM mmol/L 3.6 3.8 3.7 3.5   CHLORIDE mmol/L 102 100 105 106   CO2 mmol/L 18.4* 18.0* 19.0* 18.0*   BUN mg/dL 75* 69* 68* 66*   CREATININE mg/dL 4.15* 4.09* 3.95* 3.98*   GLUCOSE mg/dL 111* 96 119* 66   EGFR mL/min/1.73 10.3* 10.5* 11.0* 10.9*     Results from last 7 days   Lab Units 11/22/24 0443 11/21/24  0557 11/20/24  0337 11/19/24  0505 11/18/24  0512 11/17/24  0600 11/16/24  0424   ALBUMIN g/dL 2.6* 3.1* 3.1* 3.2* 3.1* 2.4* 2.6*   BILIRUBIN mg/dL  --   --   --   --  0.6 0.5 0.7   ALK PHOS U/L  --   --   --   --  77 99 112   AST (SGOT) U/L  --   --   --   --  20 27 28   ALT (SGPT) U/L  --   --   --   --  10 16 16     Results from last 7 days   Lab Units 11/22/24  0443 11/21/24  0557 11/20/24  0337 11/19/24  0505  "11/18/24  0512 11/17/24  0600   CALCIUM mg/dL 8.5* 8.6 8.4* 8.2* 8.2* 8.3*   ALBUMIN g/dL 2.6* 3.1* 3.1* 3.2* 3.1* 2.4*   MAGNESIUM mg/dL  --   --   --   --  2.1 2.3   PHOSPHORUS mg/dL 3.6 3.4 3.4 3.5 3.7 3.5         Results from last 7 days   Lab Units 11/18/24  0512 11/17/24  0600 11/16/24  0309   SODIUM UR mmol/L  --   --  32   CREATININE UR mg/dL  --   --  127.7  127.7   PROTEIN TOTAL URINE mg/dL  --   --  112.3   URIC ACID mg/dL 5.7   < >  --    PROT/CREAT RATIO UR mg/G Crea  --   --  879.4*    < > = values in this interval not displayed.         Invalid input(s): \"LDLCALC\"          Test Results Pending at Discharge     Pending Results       None              Discharge Details        Discharge Medications        New Medications        Instructions Start Date   lactulose 10 GM/15ML solution  Commonly known as: CHRONULAC   20 g, Oral, Daily      nadolol 40 MG tablet  Commonly known as: CORGARD   20 mg, Oral, Every 24 Hours Scheduled      pantoprazole 40 MG EC tablet  Commonly known as: PROTONIX   40 mg, Oral, Every Morning Before Breakfast   Start Date: November 23, 2024     torsemide 20 MG tablet  Commonly known as: DEMADEX   20 mg, Oral, Daily             Changes to Medications        Instructions Start Date   hydrALAZINE 50 MG tablet  Commonly known as: APRESOLINE  What changed:   medication strength  how much to take  when to take this   50 mg, Oral, Every 8 Hours Scheduled             Continue These Medications        Instructions Start Date   allopurinol 100 MG tablet  Commonly known as: ZYLOPRIM   100 mg, Oral, 2 Times Daily      atorvastatin 10 MG tablet  Commonly known as: LIPITOR   10 mg, Oral, Nightly      coenzyme Q10 100 MG capsule   200 mg, Oral, Every Evening      Cranberry 500 MG capsule   500 mg, Oral, Daily      glucose blood test strip   Use as instructed to test blood sugar once daily . Uncontrolled diabetes e11.65      glucose blood test strip   Use as instructed to test blood sugar once " daily for diabetes E11.9      [employer] Premium Blood Glucose w/Device kit   Use to test blood sugar once daily E11.9 -diabetes      multivitamin with minerals tablet tablet   1 tablet, Oral, Daily      Pen Needles 32G X 4 MM misc   1 each, Not Applicable, Daily      sodium bicarbonate 650 MG tablet   1,300 mg, 2 Times Daily      Tresiba FlexTouch 100 UNIT/ML solution pen-injector injection  Generic drug: insulin degludec   10 Units, Subcutaneous, Daily      vitamin B-6 100 MG tablet  Commonly known as: PYRIDOXINE   100 mg, Oral, Daily             Stop These Medications      atenolol 100 MG tablet  Commonly known as: TENORMIN     furosemide 20 MG tablet  Commonly known as: LASIX     glimepiride 4 MG tablet  Commonly known as: AMARYL     lisinopril 20 MG tablet  Commonly known as: PRINIVIL,ZESTRIL              No Known Allergies    Discharge Disposition:  Home or Self Care      Discharge Diet:  Diet Order   Procedures    Diet: Cardiac, Diabetic; Low Sodium (2g); Consistent Carbohydrate; Fluid Consistency: Thin (IDDSI 0)       Discharge Activity:       CODE STATUS:    Code Status and Medical Interventions: CPR (Attempt to Resuscitate); Full   Ordered at: 11/13/24 1753     Code Status (Patient has no pulse and is not breathing):    CPR (Attempt to Resuscitate)     Medical Interventions (Patient has pulse or is breathing):    Full       Future Appointments   Date Time Provider Department Center   2/5/2025  2:50 PM [facility]   2/12/2025  1:00 PM Shazia Cardoza MD [facility]   11/3/2025  2:20 PM [facility]   11/10/2025  2:15 PM Shazia Cardoza MD [facility]     Additional Instructions for the Follow-ups that You Need to Schedule       Discharge Follow-up with Specialty: nephrology next week for labs (patient says already scheduled)   As directed      Specialty: nephrology next week for labs (patient says already scheduled)               Follow-up  Information       Shazia Cardoza MD Follow up in 1 week(s).    Specialty: Family Medicine  Contact information:  [address]  [address]  [place] [id number] [phone]                             Additional Instructions for the Follow-ups that You Need to Schedule       Discharge Follow-up with Specialty: nephrology next week for labs (patient says already scheduled)   As directed      Specialty: nephrology next week for labs (patient says already scheduled)            Time Spent on Discharge:  Greater than 30 minutes      Eladio Shelley MD  [facility]  11/22/24  09:40 EST

## 2024-11-22 NOTE — OUTREACH NOTE
Prep Survey      Flowsheet Row Responses   [facility] patient discharged from? [place]   Is LACE score < 7 ? No   Eligibility [facility]   Date of Admission 11/13/24   Date of Discharge 11/22/24   Discharge diagnosis Cirrhosis of liver with ascites   Does the patient have one of the following disease processes/diagnoses(primary or secondary)? Other   Does the patient have Home health ordered? No   Is there a DME ordered? Yes   What DME was ordered? wheelchair suppled by [employer]   Prep survey completed? Yes            Kirti KUNZ - Registered Nurse

## 2024-11-22 NOTE — PROGRESS NOTES
[facility]     Progress Note    Patient Name: Philly Person  : 1945  MRN: [id number]  Primary Care Physician:  Shazia Cardoza MD  Date of admission: 2024    Subjective   Subjective     Chief Complaint: ascites    History of Present Illness  Patient Reports  feeling ok today, some flatus and bowel activity with daily lactulose     Review of Systems   All other systems reviewed and are negative.      Objective   Objective     Vitals:   Temp:  [97.4 °F (36.3 °C)-98.8 °F (37.1 °C)] 97.9 °F (36.6 °C)  Heart Rate:  [67-74] 67  Resp:  [16-20] 20  BP: (126-163)/(54-84) 157/65    Physical Exam  HENT:      Right Ear: External ear normal.      Left Ear: External ear normal.      Mouth/Throat:      Pharynx: Oropharynx is clear.   Eyes:      Conjunctiva/sclera: Conjunctivae normal.   Cardiovascular:      Pulses: Normal pulses.   Pulmonary:      Effort: Pulmonary effort is normal.   Abdominal:      General: Bowel sounds are normal.   Skin:     General: Skin is warm.   Neurological:      General: No focal deficit present.      Mental Status: She is alert.   Psychiatric:         Mood and Affect: Mood normal.          Result Review    Result Review:  I have personally reviewed the results from the time of this admission to 2024 08:02 EST and agree with these findings:  []  Laboratory list / accordion  []  Microbiology  []  Radiology  []  EKG/Telemetry   []  Cardiology/Vascular   []  Pathology  []  Old records  []  Other:  Most notable findings include: DISCHARGE  LB      Assessment & Plan   Assessment / Plan     Brief Patient Summary:  Philly Person is a 79 y.o. female who  Ascites secondary to cirrhosis    Cirrhosis suspect basis of QUINN  11x14 mm cyst of pancreas   slow transit constipation      Active Hospital Problems:  Active Hospital Problems    Diagnosis     **Cirrhosis of liver with ascites     JENNIFER (acute kidney injury)     Nonrheumatic aortic valve stenosis     Other pancytopenia      Murmur     Arteriovenous fistula of left upper extremity     Stage 4 chronic kidney disease     Nonalcoholic fatty liver disease     Type 2 diabetes mellitus     Benign essential hypertension      Plan: diuretics per renal  Ok to discharge   To see my NP as scheduled  May need outpatient paracetesis at [facility], when needed will arrange with albumin infusion, 50gm iv  if 5 or more liters removed   Continue just lactulose daily for constipation ( she did not tolerated amitiza)       VTE Prophylaxis:  Mechanical VTE prophylaxis orders are present.        CODE STATUS:    Code Status (Patient has no pulse and is not breathing): CPR (Attempt to Resuscitate)  Medical Interventions (Patient has pulse or is breathing): Full    Disposition:  I expect patient to be discharged today .    Brendon Pina MD

## 2024-11-22 NOTE — CASE MANAGEMENT/SOCIAL WORK
Case Management Discharge Note      Final Note: Pt discharged home with spouse and wheelchair suppled by [employer], RN    Provided Post Acute Provider List?: Refused  Refused Provider List Comment: declined    Selected Continued Care - Admitted Since 11/13/2024       Destination    No services have been selected for the patient.                Durable Medical Equipment       Service Provider Services Address Phone Fax Patient Preferred    [facility] [address], [place] [id number] [phone] --              Dialysis/Infusion    No services have been selected for the patient.                Home Medical Care    No services have been selected for the patient.                Therapy    No services have been selected for the patient.                Community Resources    No services have been selected for the patient.                Community & DME    No services have been selected for the patient.                    Transportation Services  Private: Car    Final Discharge Disposition Code: 01 - home or self-care

## 2024-11-25 ENCOUNTER — TRANSITIONAL CARE MANAGEMENT TELEPHONE ENCOUNTER (OUTPATIENT)
Dept: CALL CENTER | Facility: HOSPITAL | Age: 79
End: 2024-11-25
Payer: MEDICARE

## 2024-11-25 NOTE — OUTREACH NOTE
Call Center TCM Note      Flowsheet Row Responses   [facility] patient discharged from? [place]   Does the patient have one of the following disease processes/diagnoses(primary or secondary)? Other   TCM attempt successful? No   Unsuccessful attempts Attempt 1   Does the patient have an appointment with their PCP within 7-14 days of discharge? No appointments available            Louisa Segura RN    11/25/2024, 15:50 EST

## 2024-11-25 NOTE — OUTREACH NOTE
Call Center TCM Note      Flowsheet Row Responses   [facility] patient discharged from? [place]   Does the patient have one of the following disease processes/diagnoses(primary or secondary)? Other   TCM attempt successful? No   Unsuccessful attempts Attempt 2   Call Status Left message   Does the patient have an appointment with their PCP within 7-14 days of discharge? No appointments available            Louisa Segura RN    11/25/2024, 16:23 EST

## 2024-11-26 ENCOUNTER — TRANSITIONAL CARE MANAGEMENT TELEPHONE ENCOUNTER (OUTPATIENT)
Dept: CALL CENTER | Facility: HOSPITAL | Age: 79
End: 2024-11-26
Payer: MEDICARE

## 2024-11-26 NOTE — OUTREACH NOTE
Call Center TCM Note      Flowsheet Row Responses   [facility] patient discharged from? [place]   Does the patient have one of the following disease processes/diagnoses(primary or secondary)? Other   TCM attempt successful? Yes  [VR for Juan Carlos Umaña and Thomas]   Call start time 1346   Call end time 1355   Discharge diagnosis Cirrhosis of liver with ascites   Person spoke with today (if not patient) and relationship Leeroy,    Meds reviewed with patient/caregiver? Yes   Is the patient having any side effects they believe may be caused by any medication additions or changes? No   Does the patient have all medications ordered at discharge? Yes   Does the patient have an appointment with their PCP within 7-14 days of discharge? No appointments available   Nursing Interventions Routed TCM call to PCP office   What DME was ordered? wheelchair suppled by [employer]   Has all DME been delivered? Yes   Psychosocial issues? No   Did the patient receive a copy of their discharge instructions? Yes   Nursing interventions Reviewed instructions with patient   What is the patient's perception of their health status since discharge? Same  [ reports pt has been better now home, resting at time of call but reports that he believes she is starting to retain fluid again despite med changes.  Had lab work completed per nephro and will f/u . Questions scheduling another paracentesis.]   Is the patient/caregiver able to teach back signs and symptoms related to disease process for when to call PCP? Yes   Is the patient/caregiver able to teach back signs and symptoms related to disease process for when to call 911? Yes   Additional teach back comments Advised to discuss with MD.  Encouraged to take meds, weigh qd and monitor for edema and increased shortness of air, v/u and compliance.   TCM call completed? Yes   Call end time 1355            Mulu Fam RN    11/26/2024, 14:09 EST

## 2024-12-02 RX ORDER — SILVER SULFADIAZINE 10 MG/G
1 CREAM TOPICAL DAILY
Qty: 1 KIT | Refills: 0 | Status: SHIPPED | OUTPATIENT
Start: 2024-12-02

## 2024-12-03 ENCOUNTER — READMISSION MANAGEMENT (OUTPATIENT)
Dept: CALL CENTER | Facility: HOSPITAL | Age: 79
End: 2024-12-03
Payer: MEDICARE

## 2024-12-03 NOTE — OUTREACH NOTE
Medical Week 2 Survey      Flowsheet Row Responses   [facility] patient discharged from? [place]   Does the patient have one of the following disease processes/diagnoses(primary or secondary)? Other   Week 2 attempt successful? No   Unsuccessful attempts Attempt 1            Stephany FROST - Registered Nurse

## 2024-12-05 ENCOUNTER — OFFICE (AMBULATORY)
Age: 79
End: 2024-12-05
Payer: MEDICARE

## 2024-12-05 ENCOUNTER — OFFICE (AMBULATORY)
Dept: URBAN - METROPOLITAN AREA CLINIC 76 | Facility: CLINIC | Age: 79
End: 2024-12-05
Payer: MEDICARE

## 2024-12-05 VITALS
SYSTOLIC BLOOD PRESSURE: 134 MMHG | HEIGHT: 62 IN | HEIGHT: 62 IN | DIASTOLIC BLOOD PRESSURE: 74 MMHG | WEIGHT: 241 LBS | DIASTOLIC BLOOD PRESSURE: 70 MMHG | SYSTOLIC BLOOD PRESSURE: 166 MMHG | DIASTOLIC BLOOD PRESSURE: 70 MMHG | OXYGEN SATURATION: 97 % | OXYGEN SATURATION: 97 % | SYSTOLIC BLOOD PRESSURE: 134 MMHG | DIASTOLIC BLOOD PRESSURE: 70 MMHG | SYSTOLIC BLOOD PRESSURE: 166 MMHG | DIASTOLIC BLOOD PRESSURE: 74 MMHG | DIASTOLIC BLOOD PRESSURE: 70 MMHG | SYSTOLIC BLOOD PRESSURE: 134 MMHG | DIASTOLIC BLOOD PRESSURE: 70 MMHG | WEIGHT: 241 LBS | DIASTOLIC BLOOD PRESSURE: 70 MMHG | HEIGHT: 62 IN | WEIGHT: 241 LBS | WEIGHT: 241 LBS | SYSTOLIC BLOOD PRESSURE: 134 MMHG | OXYGEN SATURATION: 97 % | HEIGHT: 62 IN | OXYGEN SATURATION: 97 % | DIASTOLIC BLOOD PRESSURE: 74 MMHG | HEIGHT: 62 IN | HEIGHT: 62 IN | SYSTOLIC BLOOD PRESSURE: 166 MMHG | OXYGEN SATURATION: 97 % | WEIGHT: 241 LBS | SYSTOLIC BLOOD PRESSURE: 166 MMHG | DIASTOLIC BLOOD PRESSURE: 74 MMHG | SYSTOLIC BLOOD PRESSURE: 166 MMHG | SYSTOLIC BLOOD PRESSURE: 166 MMHG | WEIGHT: 241 LBS | DIASTOLIC BLOOD PRESSURE: 74 MMHG | OXYGEN SATURATION: 97 % | SYSTOLIC BLOOD PRESSURE: 134 MMHG | OXYGEN SATURATION: 97 % | DIASTOLIC BLOOD PRESSURE: 70 MMHG | SYSTOLIC BLOOD PRESSURE: 166 MMHG | SYSTOLIC BLOOD PRESSURE: 134 MMHG | DIASTOLIC BLOOD PRESSURE: 74 MMHG | DIASTOLIC BLOOD PRESSURE: 74 MMHG | HEIGHT: 62 IN | WEIGHT: 241 LBS | SYSTOLIC BLOOD PRESSURE: 134 MMHG

## 2024-12-05 DIAGNOSIS — K59.00 CONSTIPATION, UNSPECIFIED: ICD-10-CM

## 2024-12-05 DIAGNOSIS — N18.9 CHRONIC KIDNEY DISEASE, UNSPECIFIED: ICD-10-CM

## 2024-12-05 DIAGNOSIS — R14.0 ABDOMINAL DISTENSION (GASEOUS): ICD-10-CM

## 2024-12-05 DIAGNOSIS — K55.20 ANGIODYSPLASIA OF COLON WITHOUT HEMORRHAGE: ICD-10-CM

## 2024-12-05 DIAGNOSIS — K57.30 DIVERTICULOSIS OF LARGE INTESTINE WITHOUT PERFORATION OR ABS: ICD-10-CM

## 2024-12-05 DIAGNOSIS — K74.60 UNSPECIFIED CIRRHOSIS OF LIVER: ICD-10-CM

## 2024-12-05 PROBLEM — K63.5 POLYP OF COLON: Status: ACTIVE | Noted: 2022-07-14

## 2024-12-05 PROCEDURE — 99213 OFFICE O/P EST LOW 20 MIN: CPT | Performed by: NURSE PRACTITIONER

## 2024-12-09 RX ORDER — TORSEMIDE 20 MG/1
20 TABLET ORAL DAILY
Qty: 30 TABLET | Refills: 0 | Status: SHIPPED | OUTPATIENT
Start: 2024-12-09

## 2024-12-09 RX ORDER — NADOLOL 40 MG/1
20 TABLET ORAL
Qty: 15 TABLET | Refills: 0 | Status: SHIPPED | OUTPATIENT
Start: 2024-12-09

## 2024-12-09 RX ORDER — PANTOPRAZOLE SODIUM 40 MG/1
40 TABLET, DELAYED RELEASE ORAL
Qty: 30 TABLET | Refills: 0 | Status: SHIPPED | OUTPATIENT
Start: 2024-12-09

## 2024-12-09 NOTE — TELEPHONE ENCOUNTER
Caller: Novinger Philly S    Relationship: Self    Best call back number: [phone]      Requested Prescriptions:   Requested Prescriptions     Pending Prescriptions Disp Refills    torsemide (DEMADEX) 20 MG tablet 30 tablet 0     Sig: Take 1 tablet by mouth Daily.    nadolol (CORGARD) 40 MG tablet 15 tablet 0     Sig: Take 0.5 tablets by mouth Daily.    pantoprazole (PROTONIX) 40 MG EC tablet 30 tablet 0     Sig: Take 1 tablet by mouth Every Morning Before Breakfast.        Pharmacy where request should be sent: [facility] [id number] [facility] [address] [facility] [phone] [facility] [phone] FX     Last office visit with prescribing clinician: 11/4/2024   Last telemedicine visit with prescribing clinician: Visit date not found   Next office visit with prescribing clinician: 2/12/2025     Additional details provided by patient:   PATIENT WAS PRESCRIBED THESE IN THE HOSPITAL BUT IS RUNNING OUT OF THEM.  SHE WOULD LIKE TO KNOW IF SHE NEEDS  REFILL OR IF SHE NEEDS TO STOP TAKING THEM.   PLEASE CALL TO CONFIRM OR DENY  Does the patient have less than a 3 day supply:  [x] Yes  [] No    Would you like a call back once the refill request has been completed: [x] Yes [] No    If the office needs to give you a call back, can they leave a voicemail: [x] Yes [] No    Micah Le Rep   12/09/24 11:10 EST

## 2024-12-10 ENCOUNTER — TELEPHONE (OUTPATIENT)
Dept: ENDOCRINOLOGY | Age: 79
End: 2024-12-10

## 2024-12-10 ENCOUNTER — READMISSION MANAGEMENT (OUTPATIENT)
Dept: CALL CENTER | Facility: HOSPITAL | Age: 79
End: 2024-12-10
Payer: MEDICARE

## 2024-12-10 RX ORDER — INSULIN DEGLUDEC 100 U/ML
14 INJECTION, SOLUTION SUBCUTANEOUS DAILY
Qty: 15 ML | Refills: 0 | Status: SHIPPED | OUTPATIENT
Start: 2024-12-10

## 2024-12-10 NOTE — OUTREACH NOTE
Medical Week 3 Survey      Flowsheet Row Responses   [facility] [place] patient discharged from? [place]   Does the patient have one of the following disease processes/diagnoses(primary or secondary)? Other   Week 3 attempt successful? No   Unsuccessful attempts Attempt 1            Lauro AL - Registered Nurse

## 2024-12-10 NOTE — TELEPHONE ENCOUNTER
Rx Refill Note  Requested Prescriptions     Pending Prescriptions Disp Refills    insulin degludec (Tresiba FlexTouch) 100 UNIT/ML solution pen-injector injection 15 mL 4     Sig: Inject 14 Units under the skin into the appropriate area as directed Daily.      Last office visit with prescribing clinician: 7/17/2024   Last telemedicine visit with prescribing clinician: Visit date not found   Next office visit with prescribing clinician: 1/9/2025                         Would you like a call back once the refill request has been completed: [] Yes [] No    If the office needs to give you a call back, can they leave a voicemail: [] Yes [] No    Malinda Upton  12/10/24, 12:42 EST

## 2024-12-10 NOTE — TELEPHONE ENCOUNTER
Caller: Philly Person    Relationship to patient: Self    Best call back number: [phone]     Patient is needing: PT HAS MADE THE APT FOR GLORIA. PT IS ASKING FOR HER PRESCRIPTION TO BE SENT IN. PLEASE ADVISE AND CALL BACK

## 2024-12-11 ENCOUNTER — TELEPHONE (OUTPATIENT)
Dept: INTERNAL MEDICINE | Facility: CLINIC | Age: 79
End: 2024-12-11

## 2024-12-11 NOTE — TELEPHONE ENCOUNTER
Caller: Philly Person    Relationship: Self    Best call back number: [phone]     What was the call regarding: REQUESTING CALL FROM [facility] REGARDING MEDICATIONS GIVEN IN THE HOSPITAL. PLEASE CALL AND ADVISE.

## 2024-12-13 ENCOUNTER — READMISSION MANAGEMENT (OUTPATIENT)
Dept: CALL CENTER | Facility: HOSPITAL | Age: 79
End: 2024-12-13
Payer: MEDICARE

## 2024-12-13 NOTE — OUTREACH NOTE
Medical Week 3 Survey      Flowsheet Row Responses   [facility] patient discharged from? [place]   Does the patient have one of the following disease processes/diagnoses(primary or secondary)? Other   Week 3 attempt successful? No   Unsuccessful attempts Attempt 2   Revoke Decline to participate            Kirti KUNZ - Registered Nurse

## 2024-12-19 ENCOUNTER — TRANSCRIBE ORDERS (OUTPATIENT)
Dept: ADMINISTRATIVE | Facility: HOSPITAL | Age: 79
End: 2024-12-19
Payer: MEDICARE

## 2024-12-19 DIAGNOSIS — K74.60 CIRRHOSIS OF LIVER WITHOUT ASCITES, UNSPECIFIED HEPATIC CIRRHOSIS TYPE: Primary | ICD-10-CM

## 2024-12-20 ENCOUNTER — OFFICE VISIT (OUTPATIENT)
Dept: INTERNAL MEDICINE | Facility: CLINIC | Age: 79
End: 2024-12-20
Payer: MEDICARE

## 2024-12-20 VITALS
TEMPERATURE: 98 F | HEART RATE: 86 BPM | OXYGEN SATURATION: 99 % | BODY MASS INDEX: 47.2 KG/M2 | SYSTOLIC BLOOD PRESSURE: 120 MMHG | WEIGHT: 250 LBS | DIASTOLIC BLOOD PRESSURE: 72 MMHG | HEIGHT: 61 IN

## 2024-12-20 DIAGNOSIS — K74.60 CIRRHOSIS OF LIVER WITH ASCITES, UNSPECIFIED HEPATIC CIRRHOSIS TYPE: Primary | ICD-10-CM

## 2024-12-20 DIAGNOSIS — N18.4 STAGE 4 CHRONIC KIDNEY DISEASE: ICD-10-CM

## 2024-12-20 DIAGNOSIS — I10 BENIGN ESSENTIAL HYPERTENSION: ICD-10-CM

## 2024-12-20 DIAGNOSIS — R18.8 CIRRHOSIS OF LIVER WITH ASCITES, UNSPECIFIED HEPATIC CIRRHOSIS TYPE: Primary | ICD-10-CM

## 2024-12-20 NOTE — PROGRESS NOTES
Subjective   Philly Person is a 79 y.o. female.   Chief Complaint   Patient presents with    Hypertension    Cirrhosis       Liver cirrhosis/hypertension-patient was admitted to hospital from 11/13 to 11/22 for edema.  She was diagnosed with decompressed cirrhosis.  Ultrasound-guided paracentesis- 7 L of fluid was drained.  No SBP, negative cytology.  She was evaluated by GI and nephrology.  She was diuresed.  Kidney tests were worse, but then stabilized with creatinine at around 3.9.  Medications were adjusted.  Furosemide was stopped and she was started on torsemide 20 mg a day.  She takes it every day as prescribed.  Atenolol was stopped and she was started on nadolol at 20 mg a day.  She completed it about 2 weeks ago.  She does not take it anymore.  Dose of hydroxyzine was increased to 50 mg 3 times a day.  She takes it as prescribed.  She was started on lactulose for constipation.  She had no encephalopathy.  She takes it 3 times a week and it works.  She was also started on pantoprazole 40 mg a day and she takes it every day.  Lisinopril and glimepiride were discontinued. Patient does not take them.  After discharge from hospital she had blood work done by nephrology.  She followed up with GI.  She noticed increased weight.  She is up 3 pounds in about a week on her home scale.  She discussed it with GI and they were planning paracentesis.  Patient says that she feels similar to how she felt when she went to ER.  She feels bloated.  No abdominal pain.  No fever.      Review of Systems   Cardiovascular: Negative.    Gastrointestinal:  Negative for abdominal pain.         Objective   Wt Readings from Last 3 Encounters:   12/20/24 113 kg (250 lb)   11/22/24 113 kg (250 lb)   11/04/24 113 kg (250 lb)      Vitals:    12/20/24 1014   BP: 120/72   Pulse: 86   Temp: 98 °F (36.7 °C)   SpO2: 99%     Temp Readings from Last 3 Encounters:   12/20/24 98 °F (36.7 °C)   11/22/24 97.9 °F (36.6 °C) (Oral)   11/04/24 97.1  °F (36.2 °C)     BP Readings from Last 3 Encounters:   12/20/24 120/72   11/22/24 157/65   11/04/24 130/60     Pulse Readings from Last 3 Encounters:   12/20/24 86   11/22/24 67   11/04/24 67     Body mass index is 47.2 kg/m².    Physical Exam  Constitutional:       Appearance: Normal appearance.   Cardiovascular:      Rate and Rhythm: Normal rate and regular rhythm.   Pulmonary:      Effort: Pulmonary effort is normal.      Breath sounds: Normal breath sounds. No wheezing, rhonchi or rales.   Abdominal:      General: There is distension.      Palpations: Abdomen is soft.      Tenderness: There is no abdominal tenderness.         Assessment & Plan   Diagnoses and all orders for this visit:    1. Cirrhosis of liver with ascites, unspecified hepatic cirrhosis type (Primary)    2. Benign essential hypertension    3. Stage 4 chronic kidney disease        Liver cirrhosis/ CKD-Hospital records were reviewed.  Imaging tests and blood work results were reviewed.  Medications are updated.  Patient will follow-up with GI and nephrology as recommended by them.  We are reaching out to GI regarding patient symptoms.    Hypertension-continue current treatment.  At this point we are not restarting atenolol.  Blood pressure is 120/72.

## 2024-12-27 ENCOUNTER — HOSPITAL ENCOUNTER (OUTPATIENT)
Dept: ULTRASOUND IMAGING | Facility: HOSPITAL | Age: 79
Discharge: HOME | End: 2024-12-27
Payer: MEDICARE

## 2024-12-27 VITALS
HEIGHT: 62 IN | SYSTOLIC BLOOD PRESSURE: 147 MMHG | RESPIRATION RATE: 16 BRPM | OXYGEN SATURATION: 100 % | HEART RATE: 94 BPM | WEIGHT: 237 LBS | DIASTOLIC BLOOD PRESSURE: 65 MMHG | TEMPERATURE: 97.5 F | BODY MASS INDEX: 43.61 KG/M2

## 2024-12-27 DIAGNOSIS — K74.60 CIRRHOSIS OF LIVER WITHOUT ASCITES, UNSPECIFIED HEPATIC CIRRHOSIS TYPE: ICD-10-CM

## 2024-12-27 LAB
INR PPP: 1.2 (ref 0.8–1.2)
PROTHROMBIN TIME: 12.2 SECONDS (ref 12.8–15.2)

## 2024-12-27 PROCEDURE — P9047 ALBUMIN (HUMAN), 25%, 50ML: HCPCS | Performed by: INTERNAL MEDICINE

## 2024-12-27 PROCEDURE — 25010000002 ALBUMIN HUMAN 25% PER 50 ML: Performed by: INTERNAL MEDICINE

## 2024-12-27 PROCEDURE — 96365 THER/PROPH/DIAG IV INF INIT: CPT

## 2024-12-27 PROCEDURE — 85610 PROTHROMBIN TIME: CPT

## 2024-12-27 PROCEDURE — 76942 ECHO GUIDE FOR BIOPSY: CPT

## 2024-12-27 PROCEDURE — 25010000002 LIDOCAINE 1 % SOLUTION: Performed by: RADIOLOGY

## 2024-12-27 RX ORDER — ALBUMIN (HUMAN) 12.5 G/50ML
50 SOLUTION INTRAVENOUS ONCE
Status: COMPLETED | OUTPATIENT
Start: 2024-12-27 | End: 2024-12-27

## 2024-12-27 RX ORDER — LIDOCAINE HYDROCHLORIDE 10 MG/ML
10 INJECTION, SOLUTION INFILTRATION; PERINEURAL ONCE
Status: COMPLETED | OUTPATIENT
Start: 2024-12-27 | End: 2024-12-27

## 2024-12-27 RX ORDER — SODIUM CHLORIDE 0.9 % (FLUSH) 0.9 %
10 SYRINGE (ML) INJECTION AS NEEDED
Status: DISCONTINUED | OUTPATIENT
Start: 2024-12-27 | End: 2024-12-28 | Stop reason: HOSPADM

## 2024-12-27 RX ADMIN — ALBUMIN (HUMAN) 50 G: 0.25 INJECTION, SOLUTION INTRAVENOUS at 14:46

## 2024-12-27 RX ADMIN — LIDOCAINE HYDROCHLORIDE 8 ML: 10 INJECTION, SOLUTION INFILTRATION; PERINEURAL at 13:49

## 2024-12-27 NOTE — DISCHARGE INSTRUCTIONS
EDUCATION /DISCHARGE INSTRUCTIONS  Paracentesis:  A needle is inserted into the space between your abdominal organs and the membrane that surrounds them (peritoneal space).  It is done for the diagnosis and treatment of fluid that is resistant to other therapies.  It helps determine the cause of the fluid and at the relieves pressure created by the fluid.  A sample is obtained and sent to the laboratory for study.  During the procedure:  You will lie on a bed on your back with your legs drawn up.  Your abdomen will be exposed from the chest to the pelvis.  You will otherwise be covered to maintain comfort.  A physician will clean your abdomen with antiseptic soap, place a sterile towel around the site and administer a local anesthetic to numb the area.  The physician will insert a needle into your abdominal wall.  There may be a popping sound which signifies the needle has pierced the abdominal wall. Next, the physician will attach tubing to transfer a sample into a collection bottle.  After the fluid is obtained the needle will be removed.  A pressure dressing is applied to the site.  Risks of the procedure include but are not limited to:   *  Bleeding    *  Wound infection   *  Low blood pressure   *  Decreased urination   *  Low sodium if a large amount of fluid is removed   *  Puncture of abdominal organs by the needle    Benefits of the procedure:  Benefits include the removal of fluid from the abdomen, relief of abdominal pressure and facilitation of a diagnosis.  Alternatives to the procedure:  Possible alternatives are diuretic drug therapy or surgery to place a shunt to drain fluid.  Risks of diuretic drug therapy include possible dehydration and renal failure.  The benefit of drug therapy is that it can be done at home under physician supervision.  Risks of shunt placement include exposure to anesthesia, infection, excessive bleeding and injury to abdominal organs.  The benefit of a shunt is that it can be  used to drain fluid over a longer period of time.  THIS EDUCATION INFORMATION WAS REVIEWED PRIOR TO THE PROCEDURE AND CONSENT. Patient initials__________________Time_________________    Post procedure: (Follow all the instructions below carefully)   *  Weigh yourself daily.   *  Follow your doctors dietary instructions.   *  Rest today (no pushing pulling, straining or heavy lifting).   *  Slowly increase activity tomorow.   *  If you received sedation do not drive for 24 hours.              * Skin affix  applied to puncture site. Do not try to remove, scratch or apply lotion   * Skin affix will fall off on it's own   *  You may shower tomorrow  Call your doctor if experiencing:   *  Signs of infection such as redness, swelling, excessive pain and / or foul       smelling drainage from the puncture site.   *  Chills or fever over 101 degrees (by mouth).   *  Fainting or any noted Rapid weight gain/loss   *  Unrelieved pain or any new or severe symptoms  Following the procedure:      Follow-up with the ordering physician as directed.   Continue to take other medications as directed by your physician unless    otherwise instructed.   If applicable, resume taking your blood thinners or Aspirin in 24 hours.              If you have any concerns please call the Radiology Nurses Desk at [phone]

## 2024-12-30 ENCOUNTER — TELEPHONE (OUTPATIENT)
Dept: INTERVENTIONAL RADIOLOGY/VASCULAR | Facility: HOSPITAL | Age: 79
End: 2024-12-30
Payer: MEDICARE

## 2024-12-30 ENCOUNTER — TRANSCRIBE ORDERS (OUTPATIENT)
Dept: GENERAL RADIOLOGY | Facility: HOSPITAL | Age: 79
End: 2024-12-30
Payer: MEDICARE

## 2024-12-30 DIAGNOSIS — R18.8 CIRRHOSIS OF LIVER WITH ASCITES, UNSPECIFIED HEPATIC CIRRHOSIS TYPE: Primary | ICD-10-CM

## 2024-12-30 DIAGNOSIS — K74.60 CIRRHOSIS OF LIVER WITH ASCITES, UNSPECIFIED HEPATIC CIRRHOSIS TYPE: Primary | ICD-10-CM

## 2025-01-13 RX ORDER — PANTOPRAZOLE SODIUM 40 MG/1
40 TABLET, DELAYED RELEASE ORAL
Qty: 30 TABLET | Refills: 0 | Status: SHIPPED | OUTPATIENT
Start: 2025-01-13

## 2025-01-13 RX ORDER — TORSEMIDE 20 MG/1
20 TABLET ORAL DAILY
Qty: 30 TABLET | Refills: 0 | Status: SHIPPED | OUTPATIENT
Start: 2025-01-13

## 2025-01-27 ENCOUNTER — HOSPITAL ENCOUNTER (OUTPATIENT)
Dept: ULTRASOUND IMAGING | Facility: HOSPITAL | Age: 80
Discharge: HOME | End: 2025-01-27
Admitting: NURSE PRACTITIONER
Payer: MEDICARE

## 2025-01-27 VITALS
OXYGEN SATURATION: 100 % | HEART RATE: 112 BPM | RESPIRATION RATE: 14 BRPM | BODY MASS INDEX: 41.96 KG/M2 | SYSTOLIC BLOOD PRESSURE: 152 MMHG | DIASTOLIC BLOOD PRESSURE: 65 MMHG | WEIGHT: 228 LBS | TEMPERATURE: 97.7 F | HEIGHT: 62 IN

## 2025-01-27 DIAGNOSIS — K74.60 CIRRHOSIS OF LIVER WITH ASCITES, UNSPECIFIED HEPATIC CIRRHOSIS TYPE: ICD-10-CM

## 2025-01-27 DIAGNOSIS — R18.8 CIRRHOSIS OF LIVER WITH ASCITES, UNSPECIFIED HEPATIC CIRRHOSIS TYPE: ICD-10-CM

## 2025-01-27 PROCEDURE — 76942 ECHO GUIDE FOR BIOPSY: CPT

## 2025-01-27 PROCEDURE — 25010000002 LIDOCAINE 1 % SOLUTION: Performed by: RADIOLOGY

## 2025-01-27 PROCEDURE — P9047 ALBUMIN (HUMAN), 25%, 50ML: HCPCS | Performed by: NURSE PRACTITIONER

## 2025-01-27 PROCEDURE — 25010000002 ALBUMIN HUMAN 25% PER 50 ML: Performed by: NURSE PRACTITIONER

## 2025-01-27 RX ORDER — SODIUM CHLORIDE 0.9 % (FLUSH) 0.9 %
10 SYRINGE (ML) INJECTION AS NEEDED
Status: DISCONTINUED | OUTPATIENT
Start: 2025-01-27 | End: 2025-01-28 | Stop reason: HOSPADM

## 2025-01-27 RX ORDER — LIDOCAINE HYDROCHLORIDE 10 MG/ML
10 INJECTION, SOLUTION INFILTRATION; PERINEURAL ONCE
Status: COMPLETED | OUTPATIENT
Start: 2025-01-27 | End: 2025-01-27

## 2025-01-27 RX ORDER — ALBUMIN (HUMAN) 12.5 G/50ML
50 SOLUTION INTRAVENOUS ONCE
Status: COMPLETED | OUTPATIENT
Start: 2025-01-27 | End: 2025-01-27

## 2025-01-27 RX ADMIN — LIDOCAINE HYDROCHLORIDE 9 ML: 10 INJECTION, SOLUTION INFILTRATION; PERINEURAL at 13:10

## 2025-01-27 RX ADMIN — ALBUMIN (HUMAN) 50 G: 12.5 SOLUTION INTRAVENOUS at 14:35

## 2025-01-27 NOTE — DISCHARGE INSTRUCTIONS
EDUCATION /DISCHARGE INSTRUCTIONS  Paracentesis:  A needle is inserted into the space between your abdominal organs and the membrane that surrounds them (peritoneal space).  It is done for the diagnosis and treatment of fluid that is resistant to other therapies.  It helps determine the cause of the fluid and at the relieves pressure created by the fluid.  A sample is obtained and sent to the laboratory for study.  During the procedure:  You will lie on a bed on your back with your legs drawn up.  Your abdomen will be exposed from the chest to the pelvis.  You will otherwise be covered to maintain comfort.  A physician will clean your abdomen with antiseptic soap, place a sterile towel around the site and administer a local anesthetic to numb the area.  The physician will insert a needle into your abdominal wall.  There may be a popping sound which signifies the needle has pierced the abdominal wall. Next, the physician will attach tubing to transfer a sample into a collection bottle.  After the fluid is obtained the needle will be removed.  A pressure dressing is applied to the site.  Risks of the procedure include but are not limited to:   *  Bleeding    *  Wound infection   *  Low blood pressure   *  Decreased urination   *  Low sodium if a large amount of fluid is removed   *  Puncture of abdominal organs by the needle    Benefits of the procedure:  Benefits include the removal of fluid from the abdomen, relief of abdominal pressure and facilitation of a diagnosis.  Alternatives to the procedure:  Possible alternatives are diuretic drug therapy or surgery to place a shunt to drain fluid.  Risks of diuretic drug therapy include possible dehydration and renal failure.  The benefit of drug therapy is that it can be done at home under physician supervision.  Risks of shunt placement include exposure to anesthesia, infection, excessive bleeding and injury to abdominal organs.  The benefit of a shunt is that it can be  used to drain fluid over a longer period of time.    Post procedure: (Follow all the instructions below carefully)   *  Weigh yourself daily.   *  Follow your doctors dietary instructions.   *  Rest today (no pushing pulling, straining or heavy lifting).   *  Slowly increase activity tomorow.   *  If you received sedation do not drive for 24 hours.              * Skin affix  applied to puncture site. Do not try to remove, scratch or apply lotion   * Skin affix will fall off on it's own   *  You may shower tomorrow  Call your doctor if experiencing:   *  Signs of infection such as redness, swelling, excessive pain and / or foul       smelling drainage from the puncture site.   *  Chills or fever over 101 degrees (by mouth).   *  Fainting or any noted Rapid weight gain/loss   *  Unrelieved pain or any new or severe symptoms  Following the procedure:      Follow-up with the ordering physician as directed.   Continue to take other medications as directed by your physician unless    otherwise instructed.   If applicable, resume taking your blood thinners or Aspirin in 24 hours.              If you have any concerns please call the Radiology Nurses Desk at [phone].

## 2025-01-28 ENCOUNTER — TRANSCRIBE ORDERS (OUTPATIENT)
Dept: GENERAL RADIOLOGY | Facility: HOSPITAL | Age: 80
End: 2025-01-28
Payer: MEDICARE

## 2025-01-28 ENCOUNTER — OFFICE VISIT (OUTPATIENT)
Dept: ENDOCRINOLOGY | Age: 80
End: 2025-01-28
Payer: MEDICARE

## 2025-01-28 VITALS
BODY MASS INDEX: 41.69 KG/M2 | DIASTOLIC BLOOD PRESSURE: 68 MMHG | HEIGHT: 62 IN | TEMPERATURE: 98 F | OXYGEN SATURATION: 98 % | HEART RATE: 99 BPM | SYSTOLIC BLOOD PRESSURE: 120 MMHG

## 2025-01-28 DIAGNOSIS — R18.8 ASCITES OF LIVER: Primary | ICD-10-CM

## 2025-01-28 DIAGNOSIS — E11.29 TYPE 2 DIABETES MELLITUS WITH DIABETIC MICROALBUMINURIA, WITHOUT LONG-TERM CURRENT USE OF INSULIN: Primary | ICD-10-CM

## 2025-01-28 DIAGNOSIS — R80.9 TYPE 2 DIABETES MELLITUS WITH DIABETIC MICROALBUMINURIA, WITHOUT LONG-TERM CURRENT USE OF INSULIN: Primary | ICD-10-CM

## 2025-01-28 DIAGNOSIS — E66.9 OBESITY WITH SERIOUS COMORBIDITY, UNSPECIFIED CLASS, UNSPECIFIED OBESITY TYPE: ICD-10-CM

## 2025-01-28 RX ORDER — SODIUM BICARBONATE 650 MG/1
650 TABLET ORAL 2 TIMES DAILY
COMMUNITY
Start: 2025-01-23 | End: 2026-01-23

## 2025-01-28 NOTE — PROGRESS NOTES
Chief complaint/Reason for consult: T2DM    HPI:   - 79 year old female here for management of diabetes mellitus type 2  - She had a paracentesis recently  - Has had diabetes for 15 years years  - Complications include diabetic retinopathy, CKD 4  - Is currently taking Tresiba 14 units daily  - Stopped Trulicity due to cost in June 2024  - Stopped metformin  - Denies hypoglycemia  - BG typically in the low 100's but as high as 170       The following portions of the patient's history were reviewed and updated as appropriate: allergies, current medications, past family history, past medical history, past social history, past surgical history, and problem list.      Objective     Vitals:    01/28/25 1353   BP: 120/68   Pulse: 99   Temp: 98 °F (36.7 °C)   SpO2: 98%        Physical Exam  Vitals reviewed.   Constitutional:       Appearance: Normal appearance.   HENT:      Head: Normocephalic and atraumatic.   Eyes:      General: No scleral icterus.  Pulmonary:      Effort: Pulmonary effort is normal. No respiratory distress.   Neurological:      Mental Status: She is alert.      Gait: Gait normal.   Psychiatric:         Mood and Affect: Mood normal.         Behavior: Behavior normal.         Thought Content: Thought content normal.         Judgment: Judgment normal.         Assessment & Plan   T2DM complicated by retinopathy and CKD 4  - Cont. Tresiba 14 units daily    2. Obesity (BMI of 41.7)  - Patient has been losing weight recently      - Return to clinic in 6 months

## 2025-02-04 DIAGNOSIS — E78.00 HYPERCHOLESTEROLEMIA: Primary | ICD-10-CM

## 2025-02-05 ENCOUNTER — TRANSCRIBE ORDERS (OUTPATIENT)
Dept: ULTRASOUND IMAGING | Facility: HOSPITAL | Age: 80
End: 2025-02-05
Payer: MEDICARE

## 2025-02-05 DIAGNOSIS — K74.60 CIRRHOSIS OF LIVER WITH ASCITES, UNSPECIFIED HEPATIC CIRRHOSIS TYPE: Primary | ICD-10-CM

## 2025-02-05 DIAGNOSIS — R18.8 CIRRHOSIS OF LIVER WITH ASCITES, UNSPECIFIED HEPATIC CIRRHOSIS TYPE: Primary | ICD-10-CM

## 2025-02-05 LAB
ALBUMIN SERPL-MCNC: 3 G/DL (ref 3.5–5.2)
ALBUMIN/GLOB SERPL: 0.9 G/DL
ALP SERPL-CCNC: 228 U/L (ref 39–117)
ALT SERPL-CCNC: 22 U/L (ref 1–33)
AST SERPL-CCNC: 44 U/L (ref 1–32)
BILIRUB SERPL-MCNC: 1.1 MG/DL (ref 0–1.2)
BUN SERPL-MCNC: 81 MG/DL (ref 8–23)
BUN/CREAT SERPL: 15.7 (ref 7–25)
CALCIUM SERPL-MCNC: 8.8 MG/DL (ref 8.6–10.5)
CHLORIDE SERPL-SCNC: 102 MMOL/L (ref 98–107)
CHOLEST SERPL-MCNC: 98 MG/DL (ref 0–200)
CO2 SERPL-SCNC: 21.8 MMOL/L (ref 22–29)
CREAT SERPL-MCNC: 5.15 MG/DL (ref 0.57–1)
EGFRCR SERPLBLD CKD-EPI 2021: 8 ML/MIN/1.73
GLOBULIN SER CALC-MCNC: 3.4 GM/DL
GLUCOSE SERPL-MCNC: 130 MG/DL (ref 65–99)
HDLC SERPL-MCNC: 35 MG/DL (ref 40–60)
LDLC SERPL CALC-MCNC: 47 MG/DL (ref 0–100)
LDLC/HDLC SERPL: 1.37 {RATIO}
POTASSIUM SERPL-SCNC: 3.7 MMOL/L (ref 3.5–5.2)
PROT SERPL-MCNC: 6.4 G/DL (ref 6–8.5)
SODIUM SERPL-SCNC: 139 MMOL/L (ref 136–145)
TRIGL SERPL-MCNC: 75 MG/DL (ref 0–150)
VLDLC SERPL CALC-MCNC: 16 MG/DL (ref 5–40)

## 2025-02-07 ENCOUNTER — HOSPITAL ENCOUNTER (OUTPATIENT)
Dept: ULTRASOUND IMAGING | Facility: HOSPITAL | Age: 80
Discharge: HOME | End: 2025-02-07
Payer: MEDICARE

## 2025-02-07 VITALS
HEART RATE: 101 BPM | WEIGHT: 227 LBS | SYSTOLIC BLOOD PRESSURE: 128 MMHG | HEIGHT: 62 IN | OXYGEN SATURATION: 98 % | TEMPERATURE: 97.1 F | DIASTOLIC BLOOD PRESSURE: 57 MMHG | BODY MASS INDEX: 41.77 KG/M2 | RESPIRATION RATE: 18 BRPM

## 2025-02-07 DIAGNOSIS — K74.60 CIRRHOSIS OF LIVER WITH ASCITES, UNSPECIFIED HEPATIC CIRRHOSIS TYPE: Primary | ICD-10-CM

## 2025-02-07 DIAGNOSIS — R18.8 CIRRHOSIS OF LIVER WITH ASCITES, UNSPECIFIED HEPATIC CIRRHOSIS TYPE: Primary | ICD-10-CM

## 2025-02-07 LAB
INR PPP: 1.3 (ref 0.8–1.2)
PROTHROMBIN TIME: 12.7 SECONDS (ref 12.8–15.2)

## 2025-02-07 PROCEDURE — 25010000002 ALBUMIN HUMAN 25% PER 50 ML: Performed by: NURSE PRACTITIONER

## 2025-02-07 PROCEDURE — 25010000002 LIDOCAINE 1 % SOLUTION: Performed by: RADIOLOGY

## 2025-02-07 PROCEDURE — 76942 ECHO GUIDE FOR BIOPSY: CPT

## 2025-02-07 PROCEDURE — 96366 THER/PROPH/DIAG IV INF ADDON: CPT

## 2025-02-07 PROCEDURE — 96365 THER/PROPH/DIAG IV INF INIT: CPT

## 2025-02-07 PROCEDURE — 85610 PROTHROMBIN TIME: CPT

## 2025-02-07 PROCEDURE — P9047 ALBUMIN (HUMAN), 25%, 50ML: HCPCS | Performed by: NURSE PRACTITIONER

## 2025-02-07 RX ORDER — SODIUM CHLORIDE 0.9 % (FLUSH) 0.9 %
10 SYRINGE (ML) INJECTION AS NEEDED
Status: DISCONTINUED | OUTPATIENT
Start: 2025-02-07 | End: 2025-02-08 | Stop reason: HOSPADM

## 2025-02-07 RX ORDER — LIDOCAINE HYDROCHLORIDE 10 MG/ML
10 INJECTION, SOLUTION INFILTRATION; PERINEURAL ONCE
Status: COMPLETED | OUTPATIENT
Start: 2025-02-07 | End: 2025-02-07

## 2025-02-07 RX ORDER — BETAMETHASONE DIPROPIONATE 0.05 %
1 OINTMENT (GRAM) TOPICAL 2 TIMES DAILY
COMMUNITY

## 2025-02-07 RX ORDER — ALBUMIN (HUMAN) 12.5 G/50ML
50 SOLUTION INTRAVENOUS ONCE
Status: COMPLETED | OUTPATIENT
Start: 2025-02-07 | End: 2025-02-07

## 2025-02-07 RX ADMIN — ALBUMIN (HUMAN) 50 G: 0.25 INJECTION, SOLUTION INTRAVENOUS at 09:07

## 2025-02-07 RX ADMIN — LIDOCAINE HYDROCHLORIDE 7 ML: 10 INJECTION, SOLUTION INFILTRATION; PERINEURAL at 07:47

## 2025-02-07 NOTE — DISCHARGE INSTRUCTIONS
EDUCATION /DISCHARGE INSTRUCTIONS  Paracentesis:  A needle is inserted into the space between your abdominal organs and the membrane that surrounds them (peritoneal space).  It is done for the diagnosis and treatment of fluid that is resistant to other therapies.  It helps determine the cause of the fluid and at the relieves pressure created by the fluid.  A sample is obtained and sent to the laboratory for study.  During the procedure:  You will lie on a bed on your back with your legs drawn up.  Your abdomen will be exposed from the chest to the pelvis.  You will otherwise be covered to maintain comfort.  A physician will clean your abdomen with antiseptic soap, place a sterile towel around the site and administer a local anesthetic to numb the area.  The physician will insert a needle into your abdominal wall.  There may be a popping sound which signifies the needle has pierced the abdominal wall. Next, the physician will attach tubing to transfer a sample into a collection bottle.  After the fluid is obtained the needle will be removed.  A pressure dressing is applied to the site.  Risks of the procedure include but are not limited to:   *  Bleeding    *  Wound infection   *  Low blood pressure   *  Decreased urination   *  Low sodium if a large amount of fluid is removed   *  Puncture of abdominal organs by the needle    Benefits of the procedure:  Benefits include the removal of fluid from the abdomen, relief of abdominal pressure and facilitation of a diagnosis.  Alternatives to the procedure:  Possible alternatives are diuretic drug therapy or surgery to place a shunt to drain fluid.  Risks of diuretic drug therapy include possible dehydration and renal failure.  The benefit of drug therapy is that it can be done at home under physician supervision.  Risks of shunt placement include exposure to anesthesia, infection, excessive bleeding and injury to abdominal organs.  The benefit of a shunt is that it can be  used to drain fluid over a longer period of time.  THIS EDUCATION INFORMATION WAS REVIEWED PRIOR TO THE PROCEDURE AND CONSENT.     Post procedure: (Follow all the instructions below carefully)   *  Weigh yourself daily.   *  Follow your doctors dietary instructions.   *  Rest today (no pushing pulling, straining or heavy lifting).   *  Slowly increase activity tomorow.   *  If you received sedation do not drive for 24 hours.              * Skin affix  applied to puncture site. Do not try to remove, scratch or apply lotion   * Skin affix will fall off on it's own   *  You may shower tomorrow  Call your doctor if experiencing:   *  Signs of infection such as redness, swelling, excessive pain and / or foul       smelling drainage from the puncture site.   *  Chills or fever over 101 degrees (by mouth).   *  Fainting or any noted Rapid weight gain/loss   *  Unrelieved pain or any new or severe symptoms  Following the procedure:      Follow-up with the ordering physician as directed.   Continue to take other medications as directed by your physician unless    otherwise instructed.   If applicable, resume taking your blood thinners or Aspirin in 24 hours.              If you have any concerns please call the Radiology Nurses Desk at [phone]

## 2025-02-10 RX ORDER — PANTOPRAZOLE SODIUM 40 MG/1
40 TABLET, DELAYED RELEASE ORAL
Qty: 30 TABLET | Refills: 0 | Status: SHIPPED | OUTPATIENT
Start: 2025-02-10

## 2025-02-12 ENCOUNTER — OFFICE VISIT (OUTPATIENT)
Dept: INTERNAL MEDICINE | Facility: CLINIC | Age: 80
End: 2025-02-12
Payer: MEDICARE

## 2025-02-12 VITALS
DIASTOLIC BLOOD PRESSURE: 52 MMHG | SYSTOLIC BLOOD PRESSURE: 110 MMHG | TEMPERATURE: 97.8 F | HEIGHT: 62 IN | HEART RATE: 98 BPM | BODY MASS INDEX: 40.48 KG/M2 | OXYGEN SATURATION: 98 % | WEIGHT: 220 LBS

## 2025-02-12 DIAGNOSIS — I10 BENIGN ESSENTIAL HYPERTENSION: Primary | ICD-10-CM

## 2025-02-12 DIAGNOSIS — E78.00 HYPERCHOLESTEROLEMIA: ICD-10-CM

## 2025-02-12 DIAGNOSIS — M1A.0710 CHRONIC GOUT OF RIGHT FOOT, UNSPECIFIED CAUSE: ICD-10-CM

## 2025-02-12 PROCEDURE — 3074F SYST BP LT 130 MM HG: CPT | Performed by: FAMILY MEDICINE

## 2025-02-12 PROCEDURE — 99214 OFFICE O/P EST MOD 30 MIN: CPT | Performed by: FAMILY MEDICINE

## 2025-02-12 PROCEDURE — 1126F AMNT PAIN NOTED NONE PRSNT: CPT | Performed by: FAMILY MEDICINE

## 2025-02-12 PROCEDURE — G2211 COMPLEX E/M VISIT ADD ON: HCPCS | Performed by: FAMILY MEDICINE

## 2025-02-12 PROCEDURE — 3078F DIAST BP <80 MM HG: CPT | Performed by: FAMILY MEDICINE

## 2025-02-12 RX ORDER — ATORVASTATIN CALCIUM 10 MG/1
10 TABLET, FILM COATED ORAL NIGHTLY
Qty: 90 TABLET | Refills: 1 | Status: SHIPPED | OUTPATIENT
Start: 2025-02-12

## 2025-02-12 RX ORDER — ALLOPURINOL 100 MG/1
200 TABLET ORAL NIGHTLY
Qty: 180 TABLET | Refills: 1 | Status: SHIPPED | OUTPATIENT
Start: 2025-02-12

## 2025-02-12 NOTE — H&P (VIEW-ONLY)
Subjective   Philly Person is a 79 y.o. female.   Chief Complaint   Patient presents with    Hypertension    Hyperlipidemia    Gout       History of Present Illness     HTN- on torsemide 40 mg a day (dose was increased by nephrologist in January) and hydralazine at 25 mg 3 times a day (rx by nephro) . She takes it as prescribed.  She reports no side effects.  Creatinine at 5.15 from 2.88, GFR 8 from 16.0.  She is scheduled with her nephrologist tomorrow.  Last visit was 3 weeks ago.  They talked about dialysis.  Patient thinks about it.  She continues to produce urine.  More shortly after paracentesis.     HLP-on atorvastatin at 10 mg a day.  She takes it every day.  She reports no side effects.  LDL 47 from 50 from 42, HDL 35 from 36 from 43.  She has liver cirrhosis.  She is getting paracentesis every 2 to 3 weeks.  Last was in February -7300 mL.    Gout -she is on allopurinol at 200 mg a day.  No flareups from last appointment.  Uric acid at 5.4.    Review of Systems   Respiratory:  Negative for shortness of breath.    Cardiovascular:  Negative for chest pain and palpitations.   Musculoskeletal:  Negative for myalgias.   Neurological:  Negative for light-headedness.         Objective   Wt Readings from Last 3 Encounters:   02/12/25 99.8 kg (220 lb)   02/07/25 103 kg (227 lb)   01/27/25 103 kg (228 lb)      Vitals:    02/12/25 1305   BP: 110/52   Pulse: 98   Temp: 97.8 °F (36.6 °C)   SpO2: 98%     Temp Readings from Last 3 Encounters:   02/12/25 97.8 °F (36.6 °C)   02/07/25 97.1 °F (36.2 °C) (Infrared)   01/28/25 98 °F (36.7 °C) (Oral)     BP Readings from Last 3 Encounters:   02/12/25 110/52   02/07/25 128/57   01/28/25 120/68     Pulse Readings from Last 3 Encounters:   02/12/25 98   02/07/25 101   01/28/25 99     Body mass index is 40.23 kg/m².    Physical Exam  Constitutional:       Appearance: Normal appearance.   Cardiovascular:      Rate and Rhythm: Normal rate and regular rhythm.      Heart sounds:  Murmur heard.   Pulmonary:      Effort: Pulmonary effort is normal.      Breath sounds: Normal breath sounds. No wheezing, rhonchi or rales.   Psychiatric:         Behavior: Behavior normal.         Assessment & Plan   Diagnoses and all orders for this visit:    1. Benign essential hypertension (Primary)    2. Hypercholesterolemia    3. Chronic gout of right foot, unspecified cause        Hypertension/chronic kidney disease-followed closely by nephrologist.  Appointment scheduled tomorrow.  Blood pressure is controlled.  Continue current treatment.  Follow-up in 6 months.    Hyperlipidemia-continue current treatment.  Follow-up in 6 months.    Gout- Continue current treatment.  Follow-up in 6 months.

## 2025-02-17 ENCOUNTER — TRANSCRIBE ORDERS (OUTPATIENT)
Dept: GENERAL RADIOLOGY | Facility: HOSPITAL | Age: 80
End: 2025-02-17
Payer: MEDICARE

## 2025-02-17 ENCOUNTER — HOSPITAL ENCOUNTER (OUTPATIENT)
Dept: ULTRASOUND IMAGING | Facility: HOSPITAL | Age: 80
Discharge: HOME | End: 2025-02-17
Admitting: NURSE PRACTITIONER
Payer: MEDICARE

## 2025-02-17 VITALS
RESPIRATION RATE: 18 BRPM | BODY MASS INDEX: 41.59 KG/M2 | OXYGEN SATURATION: 99 % | WEIGHT: 226 LBS | TEMPERATURE: 97.1 F | HEIGHT: 62 IN | DIASTOLIC BLOOD PRESSURE: 60 MMHG | SYSTOLIC BLOOD PRESSURE: 122 MMHG | HEART RATE: 95 BPM

## 2025-02-17 DIAGNOSIS — R18.8 CIRRHOSIS OF LIVER WITH ASCITES, UNSPECIFIED HEPATIC CIRRHOSIS TYPE: Primary | ICD-10-CM

## 2025-02-17 DIAGNOSIS — K74.60 CIRRHOSIS OF LIVER WITH ASCITES, UNSPECIFIED HEPATIC CIRRHOSIS TYPE: Primary | ICD-10-CM

## 2025-02-17 DIAGNOSIS — R18.8 ASCITES OF LIVER: ICD-10-CM

## 2025-02-17 PROCEDURE — 25010000002 LIDOCAINE 1 % SOLUTION: Performed by: RADIOLOGY

## 2025-02-17 PROCEDURE — 25010000002 ALBUMIN HUMAN 25% PER 50 ML: Performed by: FAMILY MEDICINE

## 2025-02-17 PROCEDURE — 76942 ECHO GUIDE FOR BIOPSY: CPT

## 2025-02-17 PROCEDURE — 96368 THER/DIAG CONCURRENT INF: CPT

## 2025-02-17 PROCEDURE — P9047 ALBUMIN (HUMAN), 25%, 50ML: HCPCS | Performed by: FAMILY MEDICINE

## 2025-02-17 RX ORDER — LIDOCAINE HYDROCHLORIDE 10 MG/ML
10 INJECTION, SOLUTION INFILTRATION; PERINEURAL ONCE
Status: COMPLETED | OUTPATIENT
Start: 2025-02-17 | End: 2025-02-17

## 2025-02-17 RX ORDER — ALBUMIN (HUMAN) 12.5 G/50ML
40 SOLUTION INTRAVENOUS ONCE
Status: COMPLETED | OUTPATIENT
Start: 2025-02-17 | End: 2025-02-17

## 2025-02-17 RX ORDER — SODIUM CHLORIDE 0.9 % (FLUSH) 0.9 %
10 SYRINGE (ML) INJECTION AS NEEDED
Status: DISCONTINUED | OUTPATIENT
Start: 2025-02-17 | End: 2025-02-18 | Stop reason: HOSPADM

## 2025-02-17 RX ADMIN — LIDOCAINE HYDROCHLORIDE 10 ML: 10 INJECTION, SOLUTION INFILTRATION; PERINEURAL at 07:37

## 2025-02-17 RX ADMIN — ALBUMIN (HUMAN) 37.5 G: 12.5 SOLUTION INTRAVENOUS at 08:48

## 2025-02-17 NOTE — DISCHARGE INSTRUCTIONS
EDUCATION /DISCHARGE INSTRUCTIONS  Paracentesis:  A needle is inserted into the space between your abdominal organs and the membrane that surrounds them (peritoneal space).  It is done for the diagnosis and treatment of fluid that is resistant to other therapies.  It helps determine the cause of the fluid and at the relieves pressure created by the fluid.  A sample is obtained and sent to the laboratory for study.  During the procedure:  You will lie on a bed on your back with your legs drawn up.  Your abdomen will be exposed from the chest to the pelvis.  You will otherwise be covered to maintain comfort.  A physician will clean your abdomen with antiseptic soap, place a sterile towel around the site and administer a local anesthetic to numb the area.  The physician will insert a needle into your abdominal wall.  There may be a popping sound which signifies the needle has pierced the abdominal wall. Next, the physician will attach tubing to transfer a sample into a collection bottle.  After the fluid is obtained the needle will be removed.  A pressure dressing is applied to the site.  Risks of the procedure include but are not limited to:   *  Bleeding    *  Wound infection   *  Low blood pressure   *  Decreased urination   *  Low sodium if a large amount of fluid is removed   *  Puncture of abdominal organs by the needle    Benefits of the procedure:  Benefits include the removal of fluid from the abdomen, relief of abdominal pressure and facilitation of a diagnosis.  Alternatives to the procedure:  Possible alternatives are diuretic drug therapy or surgery to place a shunt to drain fluid.  Risks of diuretic drug therapy include possible dehydration and renal failure.  The benefit of drug therapy is that it can be done at home under physician supervision.  Risks of shunt placement include exposure to anesthesia, infection, excessive bleeding and injury to abdominal organs.  The benefit of a shunt is that it can be  used to drain fluid over a longer period of time.  THIS EDUCATION INFORMATION WAS REVIEWED PRIOR TO THE PROCEDURE AND CONSENT.    Post procedure: (Follow all the instructions below carefully)   *  Weigh yourself daily.   *  Follow your doctors dietary instructions.   *  Rest today (no pushing pulling, straining or heavy lifting).   *  Slowly increase activity tomorow.   *  If you received sedation do not drive for 24 hours.              * Skin affix  applied to puncture site. Do not try to remove, scratch or apply lotion   * Skin affix will fall off on it's own   *  You may shower tomorrow  Call your doctor if experiencing:   *  Signs of infection such as redness, swelling, excessive pain and / or foul       smelling drainage from the puncture site.   *  Chills or fever over 101 degrees (by mouth).   *  Fainting or any noted Rapid weight gain/loss   *  Unrelieved pain or any new or severe symptoms  Following the procedure:      Follow-up with the ordering physician as directed.   Continue to take other medications as directed by your physician unless    otherwise instructed.   If applicable, resume taking your blood thinners or Aspirin in 24 hours.              If you have any concerns please call the Radiology Nurses Desk at [phone]

## 2025-02-19 ENCOUNTER — HOSPITAL ENCOUNTER (OUTPATIENT)
Dept: ULTRASOUND IMAGING | Facility: HOSPITAL | Age: 80
End: 2025-02-19
Payer: MEDICARE

## 2025-02-24 ENCOUNTER — TRANSCRIBE ORDERS (OUTPATIENT)
Dept: GENERAL RADIOLOGY | Facility: HOSPITAL | Age: 80
End: 2025-02-24
Payer: MEDICARE

## 2025-02-24 ENCOUNTER — HOSPITAL ENCOUNTER (OUTPATIENT)
Dept: ULTRASOUND IMAGING | Facility: HOSPITAL | Age: 80
Discharge: HOME | End: 2025-02-24
Admitting: NURSE PRACTITIONER
Payer: MEDICARE

## 2025-02-24 VITALS
RESPIRATION RATE: 18 BRPM | BODY MASS INDEX: 40.67 KG/M2 | HEART RATE: 92 BPM | OXYGEN SATURATION: 96 % | DIASTOLIC BLOOD PRESSURE: 57 MMHG | SYSTOLIC BLOOD PRESSURE: 115 MMHG | HEIGHT: 62 IN | WEIGHT: 221 LBS | TEMPERATURE: 97.3 F

## 2025-02-24 DIAGNOSIS — K74.60 CIRRHOSIS OF LIVER WITH ASCITES, UNSPECIFIED HEPATIC CIRRHOSIS TYPE: ICD-10-CM

## 2025-02-24 DIAGNOSIS — K74.60 CIRRHOSIS OF LIVER WITH ASCITES, UNSPECIFIED HEPATIC CIRRHOSIS TYPE: Primary | ICD-10-CM

## 2025-02-24 DIAGNOSIS — R18.8 CIRRHOSIS OF LIVER WITH ASCITES, UNSPECIFIED HEPATIC CIRRHOSIS TYPE: Primary | ICD-10-CM

## 2025-02-24 DIAGNOSIS — R18.8 CIRRHOSIS OF LIVER WITH ASCITES, UNSPECIFIED HEPATIC CIRRHOSIS TYPE: ICD-10-CM

## 2025-02-24 PROCEDURE — P9047 ALBUMIN (HUMAN), 25%, 50ML: HCPCS | Performed by: NURSE PRACTITIONER

## 2025-02-24 PROCEDURE — 25010000002 ALBUMIN HUMAN 25% PER 50 ML: Performed by: NURSE PRACTITIONER

## 2025-02-24 PROCEDURE — 25010000002 LIDOCAINE 1 % SOLUTION: Performed by: RADIOLOGY

## 2025-02-24 PROCEDURE — 76942 ECHO GUIDE FOR BIOPSY: CPT

## 2025-02-24 RX ORDER — SODIUM CHLORIDE 0.9 % (FLUSH) 0.9 %
10 SYRINGE (ML) INJECTION AS NEEDED
Status: CANCELLED | OUTPATIENT
Start: 2025-02-24

## 2025-02-24 RX ORDER — ALBUMIN (HUMAN) 12.5 G/50ML
50 SOLUTION INTRAVENOUS ONCE
Status: COMPLETED | OUTPATIENT
Start: 2025-02-24 | End: 2025-02-24

## 2025-02-24 RX ORDER — POTASSIUM CHLORIDE 750 MG/1
10 TABLET, EXTENDED RELEASE ORAL DAILY
COMMUNITY
Start: 2025-02-20 | End: 2025-03-22

## 2025-02-24 RX ORDER — LIDOCAINE HYDROCHLORIDE 10 MG/ML
10 INJECTION, SOLUTION INFILTRATION; PERINEURAL ONCE
Status: COMPLETED | OUTPATIENT
Start: 2025-02-24 | End: 2025-02-24

## 2025-02-24 RX ADMIN — LIDOCAINE HYDROCHLORIDE 7 ML: 10 INJECTION, SOLUTION INFILTRATION; PERINEURAL at 07:55

## 2025-02-24 RX ADMIN — ALBUMIN (HUMAN) 50 G: 0.25 INJECTION, SOLUTION INTRAVENOUS at 09:45

## 2025-02-24 RX ADMIN — ALBUMIN (HUMAN) 50 G: 0.25 INJECTION, SOLUTION INTRAVENOUS at 08:54

## 2025-02-24 NOTE — DISCHARGE INSTRUCTIONS
EDUCATION /DISCHARGE INSTRUCTIONS  Paracentesis:  A needle is inserted into the space between your abdominal organs and the membrane that surrounds them (peritoneal space).  It is done for the diagnosis and treatment of fluid that is resistant to other therapies.  It helps determine the cause of the fluid and at the relieves pressure created by the fluid.  A sample is obtained and sent to the laboratory for study.  During the procedure:  You will lie on a bed on your back with your legs drawn up.  Your abdomen will be exposed from the chest to the pelvis.  You will otherwise be covered to maintain comfort.  A physician will clean your abdomen with antiseptic soap, place a sterile towel around the site and administer a local anesthetic to numb the area.  The physician will insert a needle into your abdominal wall.  There may be a popping sound which signifies the needle has pierced the abdominal wall. Next, the physician will attach tubing to transfer a sample into a collection bottle.  After the fluid is obtained the needle will be removed.  A pressure dressing is applied to the site.  Risks of the procedure include but are not limited to:   *  Bleeding    *  Wound infection   *  Low blood pressure   *  Decreased urination   *  Low sodium if a large amount of fluid is removed   *  Puncture of abdominal organs by the needle    Benefits of the procedure:  Benefits include the removal of fluid from the abdomen, relief of abdominal pressure and facilitation of a diagnosis.  Alternatives to the procedure:  Possible alternatives are diuretic drug therapy or surgery to place a shunt to drain fluid.  Risks of diuretic drug therapy include possible dehydration and renal failure.  The benefit of drug therapy is that it can be done at home under physician supervision.  Risks of shunt placement include exposure to anesthesia, infection, excessive bleeding and injury to abdominal organs.  The benefit of a shunt is that it can be  used to drain fluid over a longer period of time.  THIS EDUCATION INFORMATION WAS REVIEWED PRIOR TO THE PROCEDURE AND CONSENT.     Post procedure: (Follow all the instructions below carefully)   *  Weigh yourself daily.   *  Follow your doctors dietary instructions.   *  Rest today (no pushing pulling, straining or heavy lifting).   *  Slowly increase activity tomorow.   *  If you received sedation do not drive for 24 hours.              * Skin affix  applied to puncture site. Do not try to remove, scratch or apply lotion   * Skin affix will fall off on it's own   *  You may shower tomorrow  Call your doctor if experiencing:   *  Signs of infection such as redness, swelling, excessive pain and / or foul       smelling drainage from the puncture site.   *  Chills or fever over 101 degrees (by mouth).   *  Fainting or any noted Rapid weight gain/loss   *  Unrelieved pain or any new or severe symptoms  Following the procedure:      Follow-up with the ordering physician as directed.   Continue to take other medications as directed by your physician unless    otherwise instructed.   If applicable, resume taking your blood thinners or Aspirin in 24 hours.              If you have any concerns please call the Radiology Nurses Desk at [phone]

## 2025-02-24 NOTE — NURSING NOTE
No Signs/Symptoms of distress noted. Patient taken by wheelchair to Patient Discharge by her  for D/C.

## 2025-03-03 ENCOUNTER — HOSPITAL ENCOUNTER (OUTPATIENT)
Dept: ULTRASOUND IMAGING | Facility: HOSPITAL | Age: 80
Discharge: HOME | End: 2025-03-03
Admitting: NURSE PRACTITIONER
Payer: MEDICARE

## 2025-03-03 VITALS
TEMPERATURE: 97.1 F | WEIGHT: 218 LBS | HEART RATE: 102 BPM | OXYGEN SATURATION: 99 % | SYSTOLIC BLOOD PRESSURE: 108 MMHG | HEIGHT: 62 IN | RESPIRATION RATE: 16 BRPM | BODY MASS INDEX: 40.12 KG/M2 | DIASTOLIC BLOOD PRESSURE: 49 MMHG

## 2025-03-03 DIAGNOSIS — R18.8 CIRRHOSIS OF LIVER WITH ASCITES, UNSPECIFIED HEPATIC CIRRHOSIS TYPE: Primary | ICD-10-CM

## 2025-03-03 DIAGNOSIS — K74.60 CIRRHOSIS OF LIVER WITH ASCITES, UNSPECIFIED HEPATIC CIRRHOSIS TYPE: Primary | ICD-10-CM

## 2025-03-03 PROCEDURE — 25010000002 ALBUMIN HUMAN 25% PER 50 ML: Performed by: INTERNAL MEDICINE

## 2025-03-03 PROCEDURE — 76942 ECHO GUIDE FOR BIOPSY: CPT

## 2025-03-03 PROCEDURE — 96365 THER/PROPH/DIAG IV INF INIT: CPT

## 2025-03-03 PROCEDURE — 96366 THER/PROPH/DIAG IV INF ADDON: CPT

## 2025-03-03 PROCEDURE — P9047 ALBUMIN (HUMAN), 25%, 50ML: HCPCS | Performed by: INTERNAL MEDICINE

## 2025-03-03 PROCEDURE — 25010000002 LIDOCAINE 1 % SOLUTION: Performed by: RADIOLOGY

## 2025-03-03 RX ORDER — SODIUM CHLORIDE 0.9 % (FLUSH) 0.9 %
10 SYRINGE (ML) INJECTION AS NEEDED
Status: DISCONTINUED | OUTPATIENT
Start: 2025-03-03 | End: 2025-03-04 | Stop reason: HOSPADM

## 2025-03-03 RX ORDER — BETAMETHASONE DIPROPIONATE 0.5 MG/G
1 OINTMENT, AUGMENTED TOPICAL WEEKLY
COMMUNITY
Start: 2025-02-28

## 2025-03-03 RX ORDER — LIDOCAINE HYDROCHLORIDE 10 MG/ML
10 INJECTION, SOLUTION INFILTRATION; PERINEURAL ONCE
Status: COMPLETED | OUTPATIENT
Start: 2025-03-03 | End: 2025-03-03

## 2025-03-03 RX ORDER — ALBUMIN (HUMAN) 12.5 G/50ML
25 SOLUTION INTRAVENOUS ONCE
Status: COMPLETED | OUTPATIENT
Start: 2025-03-03 | End: 2025-03-03

## 2025-03-03 RX ORDER — INSULIN GLARGINE 100 [IU]/ML
14 INJECTION, SOLUTION SUBCUTANEOUS DAILY
COMMUNITY
Start: 2025-02-28 | End: 2025-03-06

## 2025-03-03 RX ORDER — ALBUMIN (HUMAN) 12.5 G/50ML
12.5 SOLUTION INTRAVENOUS ONCE
Status: COMPLETED | OUTPATIENT
Start: 2025-03-03 | End: 2025-03-03

## 2025-03-03 RX ADMIN — ALBUMIN (HUMAN) 25 G: 12.5 SOLUTION INTRAVENOUS at 09:11

## 2025-03-03 RX ADMIN — LIDOCAINE HYDROCHLORIDE 8 ML: 10 INJECTION, SOLUTION INFILTRATION; PERINEURAL at 08:00

## 2025-03-03 RX ADMIN — ALBUMIN (HUMAN) 12.5 G: 12.5 SOLUTION INTRAVENOUS at 10:05

## 2025-03-03 RX ADMIN — Medication 10 ML: at 10:35

## 2025-03-03 RX ADMIN — Medication 10 ML: at 10:34

## 2025-03-03 NOTE — PROGRESS NOTES
[facility] Clinical Pharmacy Services: Albumin Consult     Philly Person has a pharmacy consult to dose albumin after paracentesis per Cristopher Harris's request.     Paracentesis completed 3/3 with 5.7L removed (indication ascites d/t cirrhosis)     Give albumin 25% 6-8g per Liter removed Range is 34-45g rounded to nearest 12.5g    Albumin 25% 37.5g ordered     Leticia Santo, PharmD, BCPS  Clinical Pharmacist

## 2025-03-03 NOTE — DISCHARGE INSTRUCTIONS
EDUCATION /DISCHARGE INSTRUCTIONS  Paracentesis:  A needle is inserted into the space between your abdominal organs and the membrane that surrounds them (peritoneal space).  It is done for the diagnosis and treatment of fluid that is resistant to other therapies.  It helps determine the cause of the fluid and at the relieves pressure created by the fluid.  A sample is obtained and sent to the laboratory for study.  During the procedure:  You will lie on a bed on your back with your legs drawn up.  Your abdomen will be exposed from the chest to the pelvis.  You will otherwise be covered to maintain comfort.  A physician will clean your abdomen with antiseptic soap, place a sterile towel around the site and administer a local anesthetic to numb the area.  The physician will insert a needle into your abdominal wall.  There may be a popping sound which signifies the needle has pierced the abdominal wall. Next, the physician will attach tubing to transfer a sample into a collection bottle.  After the fluid is obtained the needle will be removed.  A pressure dressing is applied to the site.  Risks of the procedure include but are not limited to:   *  Bleeding    *  Wound infection   *  Low blood pressure   *  Decreased urination   *  Low sodium if a large amount of fluid is removed   *  Puncture of abdominal organs by the needle    Benefits of the procedure:  Benefits include the removal of fluid from the abdomen, relief of abdominal pressure and facilitation of a diagnosis.  Alternatives to the procedure:  Possible alternatives are diuretic drug therapy or surgery to place a shunt to drain fluid.  Risks of diuretic drug therapy include possible dehydration and renal failure.  The benefit of drug therapy is that it can be done at home under physician supervision.  Risks of shunt placement include exposure to anesthesia, infection, excessive bleeding and injury to abdominal organs.  The benefit of a shunt is that it can be  used to drain fluid over a longer period of time.  THIS EDUCATION INFORMATION WAS REVIEWED PRIOR TO THE PROCEDURE AND CONSENT.    Post procedure: (Follow all the instructions below carefully)   *  Weigh yourself daily.   *  Follow your doctors dietary instructions.   *  Rest today (no pushing pulling, straining or heavy lifting).   *  Slowly increase activity tomorow.   *  If you received sedation do not drive for 24 hours.              * Skin affix  applied to puncture site. Do not try to remove, scratch or apply lotion   * Skin affix will fall off on it's own   *  You may shower tomorrow  Call your doctor if experiencing:   *  Signs of infection such as redness, swelling, excessive pain and / or foul       smelling drainage from the puncture site.   *  Chills or fever over 101 degrees (by mouth).   *  Fainting or any noted Rapid weight gain/loss   *  Unrelieved pain or any new or severe symptoms  Following the procedure:      Follow-up with the ordering physician as directed.   Continue to take other medications as directed by your physician unless    otherwise instructed.   If applicable, resume taking your blood thinners or Aspirin in 24 hours.              If you have any concerns please call the Radiology Nurses Desk at [phone]

## 2025-03-06 RX ORDER — INSULIN DEGLUDEC 100 U/ML
14 INJECTION, SOLUTION SUBCUTANEOUS DAILY
Qty: 15 ML | Refills: 1 | Status: SHIPPED | OUTPATIENT
Start: 2025-03-06

## 2025-03-06 NOTE — TELEPHONE ENCOUNTER
Rx Refill Note  Requested Prescriptions     Pending Prescriptions Disp Refills    insulin degludec (Tresiba FlexTouch) 100 UNIT/ML solution pen-injector injection [Pharmacy Med Name: TRESIBA FLEXTOUCH 100 UNIT/ML] 12 mL 0     Sig: INJECT 14 UNITS INTO THE SKIN TO THE APPROPRIATE AREA DAILY AS DIRECTED      Last office visit with prescribing clinician: 1/28/2025   Last telemedicine visit with prescribing clinician: Visit date not found   Next office visit with prescribing clinician: 7/30/2025                         Would you like a call back once the refill request has been completed: [] Yes [] No    If the office needs to give you a call back, can they leave a voicemail: [] Yes [] No    Malinda Upton  03/06/25, 09:21 EST

## 2025-03-10 RX ORDER — PANTOPRAZOLE SODIUM 40 MG/1
40 TABLET, DELAYED RELEASE ORAL
Qty: 30 TABLET | Refills: 0 | Status: SHIPPED | OUTPATIENT
Start: 2025-03-10

## 2025-03-19 ENCOUNTER — TRANSCRIBE ORDERS (OUTPATIENT)
Dept: GENERAL RADIOLOGY | Facility: HOSPITAL | Age: 80
End: 2025-03-19
Payer: MEDICARE

## 2025-03-19 ENCOUNTER — OFFICE (AMBULATORY)
Dept: URBAN - METROPOLITAN AREA CLINIC 76 | Facility: CLINIC | Age: 80
End: 2025-03-19
Payer: MEDICARE

## 2025-03-19 VITALS
HEIGHT: 62 IN | WEIGHT: 239 LBS | OXYGEN SATURATION: 98 % | HEART RATE: 76 BPM | SYSTOLIC BLOOD PRESSURE: 86 MMHG | DIASTOLIC BLOOD PRESSURE: 66 MMHG

## 2025-03-19 DIAGNOSIS — K55.20 ANGIODYSPLASIA OF COLON WITHOUT HEMORRHAGE: ICD-10-CM

## 2025-03-19 DIAGNOSIS — K74.60 UNSPECIFIED CIRRHOSIS OF LIVER: ICD-10-CM

## 2025-03-19 DIAGNOSIS — K74.60 CIRRHOSIS OF LIVER WITH ASCITES, UNSPECIFIED HEPATIC CIRRHOSIS TYPE: Primary | ICD-10-CM

## 2025-03-19 DIAGNOSIS — R18.8 CIRRHOSIS OF LIVER WITH ASCITES, UNSPECIFIED HEPATIC CIRRHOSIS TYPE: Primary | ICD-10-CM

## 2025-03-19 DIAGNOSIS — N18.9 CHRONIC KIDNEY DISEASE, UNSPECIFIED: ICD-10-CM

## 2025-03-19 PROCEDURE — 99213 OFFICE O/P EST LOW 20 MIN: CPT | Performed by: NURSE PRACTITIONER

## 2025-03-28 ENCOUNTER — TRANSCRIBE ORDERS (OUTPATIENT)
Dept: GENERAL RADIOLOGY | Facility: HOSPITAL | Age: 80
End: 2025-03-28
Payer: MEDICARE

## 2025-03-28 ENCOUNTER — HOSPITAL ENCOUNTER (OUTPATIENT)
Dept: ULTRASOUND IMAGING | Facility: HOSPITAL | Age: 80
Discharge: HOME | End: 2025-03-28
Payer: MEDICARE

## 2025-03-28 VITALS
TEMPERATURE: 98 F | RESPIRATION RATE: 14 BRPM | HEIGHT: 62 IN | WEIGHT: 213 LBS | SYSTOLIC BLOOD PRESSURE: 128 MMHG | OXYGEN SATURATION: 97 % | DIASTOLIC BLOOD PRESSURE: 55 MMHG | BODY MASS INDEX: 39.2 KG/M2 | HEART RATE: 91 BPM

## 2025-03-28 DIAGNOSIS — K74.60 CIRRHOSIS OF LIVER WITH ASCITES, UNSPECIFIED HEPATIC CIRRHOSIS TYPE: Primary | ICD-10-CM

## 2025-03-28 DIAGNOSIS — R18.8 CIRRHOSIS OF LIVER WITH ASCITES, UNSPECIFIED HEPATIC CIRRHOSIS TYPE: Primary | ICD-10-CM

## 2025-03-28 LAB
INR PPP: 1.1 (ref 0.8–1.2)
PROTHROMBIN TIME: 10.7 SECONDS (ref 12.8–15.2)

## 2025-03-28 PROCEDURE — 25010000002 LIDOCAINE 1 % SOLUTION: Performed by: RADIOLOGY

## 2025-03-28 PROCEDURE — 25010000002 ALBUMIN HUMAN 25% PER 50 ML: Performed by: INTERNAL MEDICINE

## 2025-03-28 PROCEDURE — 76942 ECHO GUIDE FOR BIOPSY: CPT

## 2025-03-28 PROCEDURE — P9047 ALBUMIN (HUMAN), 25%, 50ML: HCPCS | Performed by: INTERNAL MEDICINE

## 2025-03-28 PROCEDURE — 85610 PROTHROMBIN TIME: CPT

## 2025-03-28 RX ORDER — ALBUMIN (HUMAN) 12.5 G/50ML
50 SOLUTION INTRAVENOUS ONCE
Status: COMPLETED | OUTPATIENT
Start: 2025-03-28 | End: 2025-03-28

## 2025-03-28 RX ORDER — SODIUM CHLORIDE 0.9 % (FLUSH) 0.9 %
10 SYRINGE (ML) INJECTION AS NEEDED
Status: DISCONTINUED | OUTPATIENT
Start: 2025-03-28 | End: 2025-03-29 | Stop reason: HOSPADM

## 2025-03-28 RX ORDER — MIDODRINE HYDROCHLORIDE 5 MG/1
1 TABLET ORAL 3 TIMES DAILY
COMMUNITY
Start: 2025-03-07

## 2025-03-28 RX ORDER — POTASSIUM CHLORIDE 750 MG/1
1 CAPSULE, EXTENDED RELEASE ORAL DAILY
COMMUNITY
Start: 2025-02-28

## 2025-03-28 RX ORDER — SEVELAMER CARBONATE 800 MG/1
800 TABLET, FILM COATED ORAL
COMMUNITY
Start: 2025-03-07

## 2025-03-28 RX ORDER — ALBUMIN (HUMAN) 12.5 G/50ML
55 SOLUTION INTRAVENOUS ONCE
Start: 2025-03-28

## 2025-03-28 RX ORDER — LIDOCAINE HYDROCHLORIDE 10 MG/ML
10 INJECTION, SOLUTION INFILTRATION; PERINEURAL ONCE
Status: COMPLETED | OUTPATIENT
Start: 2025-03-28 | End: 2025-03-28

## 2025-03-28 RX ADMIN — ALBUMIN (HUMAN) 50 G: 0.25 INJECTION, SOLUTION INTRAVENOUS at 09:20

## 2025-03-28 RX ADMIN — LIDOCAINE HYDROCHLORIDE 5 ML: 10 INJECTION, SOLUTION INFILTRATION; PERINEURAL at 07:55

## 2025-03-28 NOTE — DISCHARGE INSTRUCTIONS
EDUCATION /DISCHARGE INSTRUCTIONS  Paracentesis:  A needle is inserted into the space between your abdominal organs and the membrane that surrounds them (peritoneal space).  It is done for the diagnosis and treatment of fluid that is resistant to other therapies.  It helps determine the cause of the fluid and at the relieves pressure created by the fluid.  A sample is obtained and sent to the laboratory for study.  During the procedure:  You will lie on a bed on your back with your legs drawn up.  Your abdomen will be exposed from the chest to the pelvis.  You will otherwise be covered to maintain comfort.  A physician will clean your abdomen with antiseptic soap, place a sterile towel around the site and administer a local anesthetic to numb the area.  The physician will insert a needle into your abdominal wall.  There may be a popping sound which signifies the needle has pierced the abdominal wall. Next, the physician will attach tubing to transfer a sample into a collection bottle.  After the fluid is obtained the needle will be removed.  A pressure dressing is applied to the site.  Risks of the procedure include but are not limited to:   *  Bleeding    *  Wound infection   *  Low blood pressure   *  Decreased urination   *  Low sodium if a large amount of fluid is removed   *  Puncture of abdominal organs by the needle    Benefits of the procedure:  Benefits include the removal of fluid from the abdomen, relief of abdominal pressure and facilitation of a diagnosis.  Alternatives to the procedure:  Possible alternatives are diuretic drug therapy or surgery to place a shunt to drain fluid.  Risks of diuretic drug therapy include possible dehydration and renal failure.  The benefit of drug therapy is that it can be done at home under physician supervision.  Risks of shunt placement include exposure to anesthesia, infection, excessive bleeding and injury to abdominal organs.  The benefit of a shunt is that it can be  used to drain fluid over a longer period of time.  THIS EDUCATION INFORMATION WAS REVIEWED PRIOR TO THE PROCEDURE AND CONSENT.     Post procedure: (Follow all the instructions below carefully)   *  Weigh yourself daily.   *  Follow your doctors dietary instructions.   *  Rest today (no pushing pulling, straining or heavy lifting).   *  Slowly increase activity tomorow.   *  If you received sedation do not drive for 24 hours.              * Skin affix  applied to puncture site. Do not try to remove, scratch or apply lotion   * Skin affix will fall off on it's own   *  You may shower tomorrow  Call your doctor if experiencing:   *  Signs of infection such as redness, swelling, excessive pain and / or foul       smelling drainage from the puncture site.   *  Chills or fever over 101 degrees (by mouth).   *  Fainting or any noted Rapid weight gain/loss   *  Unrelieved pain or any new or severe symptoms  Following the procedure:      Follow-up with the ordering physician as directed.   Continue to take other medications as directed by your physician unless    otherwise instructed.   If applicable, resume taking your blood thinners or Aspirin in 24 hours.              If you have any concerns please call the Radiology Nurses Desk at [phone]

## 2025-04-08 RX ORDER — PANTOPRAZOLE SODIUM 40 MG/1
40 TABLET, DELAYED RELEASE ORAL
Qty: 30 TABLET | Refills: 0 | Status: SHIPPED | OUTPATIENT
Start: 2025-04-08 | End: 2025-04-11

## 2025-04-11 ENCOUNTER — HOSPITAL ENCOUNTER (OUTPATIENT)
Dept: ULTRASOUND IMAGING | Facility: HOSPITAL | Age: 80
Discharge: HOME | End: 2025-04-11
Payer: MEDICARE

## 2025-04-11 ENCOUNTER — TRANSCRIBE ORDERS (OUTPATIENT)
Dept: GENERAL RADIOLOGY | Facility: HOSPITAL | Age: 80
End: 2025-04-11
Payer: MEDICARE

## 2025-04-11 VITALS
OXYGEN SATURATION: 97 % | WEIGHT: 207.6 LBS | DIASTOLIC BLOOD PRESSURE: 63 MMHG | BODY MASS INDEX: 38.2 KG/M2 | RESPIRATION RATE: 16 BRPM | TEMPERATURE: 97.3 F | HEIGHT: 62 IN | HEART RATE: 84 BPM | SYSTOLIC BLOOD PRESSURE: 105 MMHG

## 2025-04-11 DIAGNOSIS — K74.60 CIRRHOSIS OF LIVER WITH ASCITES, UNSPECIFIED HEPATIC CIRRHOSIS TYPE: Primary | ICD-10-CM

## 2025-04-11 DIAGNOSIS — R18.8 CIRRHOSIS OF LIVER WITH ASCITES, UNSPECIFIED HEPATIC CIRRHOSIS TYPE: Primary | ICD-10-CM

## 2025-04-11 PROCEDURE — 76942 ECHO GUIDE FOR BIOPSY: CPT

## 2025-04-11 PROCEDURE — 96365 THER/PROPH/DIAG IV INF INIT: CPT

## 2025-04-11 PROCEDURE — 25010000002 LIDOCAINE 1 % SOLUTION: Performed by: RADIOLOGY

## 2025-04-11 PROCEDURE — P9047 ALBUMIN (HUMAN), 25%, 50ML: HCPCS | Performed by: NURSE PRACTITIONER

## 2025-04-11 PROCEDURE — 25010000002 ALBUMIN HUMAN 25% PER 50 ML: Performed by: NURSE PRACTITIONER

## 2025-04-11 RX ORDER — SODIUM CHLORIDE 0.9 % (FLUSH) 0.9 %
10 SYRINGE (ML) INJECTION AS NEEDED
Status: DISCONTINUED | OUTPATIENT
Start: 2025-04-11 | End: 2025-04-12 | Stop reason: HOSPADM

## 2025-04-11 RX ORDER — LIDOCAINE HYDROCHLORIDE 10 MG/ML
10 INJECTION, SOLUTION INFILTRATION; PERINEURAL ONCE
Status: COMPLETED | OUTPATIENT
Start: 2025-04-11 | End: 2025-04-11

## 2025-04-11 RX ORDER — ALBUMIN (HUMAN) 12.5 G/50ML
35 SOLUTION INTRAVENOUS ONCE
Status: COMPLETED | OUTPATIENT
Start: 2025-04-11 | End: 2025-04-11

## 2025-04-11 RX ORDER — ALBUMIN (HUMAN) 12.5 G/50ML
55 SOLUTION INTRAVENOUS ONCE
Status: DISCONTINUED | OUTPATIENT
Start: 2025-04-11 | End: 2025-04-11

## 2025-04-11 RX ORDER — ALBUMIN (HUMAN) 12.5 G/50ML
55 SOLUTION INTRAVENOUS ONCE
Status: CANCELLED
Start: 2025-04-11 | End: 2025-04-11

## 2025-04-11 RX ADMIN — ALBUMIN (HUMAN) 37.5 G: 12.5 SOLUTION INTRAVENOUS at 08:50

## 2025-04-11 RX ADMIN — LIDOCAINE HYDROCHLORIDE 9 ML: 10 INJECTION, SOLUTION INFILTRATION; PERINEURAL at 07:54

## 2025-04-11 NOTE — DISCHARGE INSTRUCTIONS
EDUCATION /DISCHARGE INSTRUCTIONS  Paracentesis:  A needle is inserted into the space between your abdominal organs and the membrane that surrounds them (peritoneal space).  It is done for the diagnosis and treatment of fluid that is resistant to other therapies.  It helps determine the cause of the fluid and at the relieves pressure created by the fluid.  A sample is obtained and sent to the laboratory for study.  During the procedure:  You will lie on a bed on your back with your legs drawn up.  Your abdomen will be exposed from the chest to the pelvis.  You will otherwise be covered to maintain comfort.  A physician will clean your abdomen with antiseptic soap, place a sterile towel around the site and administer a local anesthetic to numb the area.  The physician will insert a needle into your abdominal wall.  There may be a popping sound which signifies the needle has pierced the abdominal wall. Next, the physician will attach tubing to transfer a sample into a collection bottle.  After the fluid is obtained the needle will be removed.  A pressure dressing is applied to the site.  Risks of the procedure include but are not limited to:   *  Bleeding    *  Wound infection   *  Low blood pressure   *  Decreased urination   *  Low sodium if a large amount of fluid is removed   *  Puncture of abdominal organs by the needle    Benefits of the procedure:  Benefits include the removal of fluid from the abdomen, relief of abdominal pressure and facilitation of a diagnosis.  Alternatives to the procedure:  Possible alternatives are diuretic drug therapy or surgery to place a shunt to drain fluid.  Risks of diuretic drug therapy include possible dehydration and renal failure.  The benefit of drug therapy is that it can be done at home under physician supervision.  Risks of shunt placement include exposure to anesthesia, infection, excessive bleeding and injury to abdominal organs.  The benefit of a shunt is that it can be  used to drain fluid over a longer period of time.    Post procedure: (Follow all the instructions below carefully)   *  Weigh yourself daily.   *  Follow your doctors dietary instructions.   *  Rest today (no pushing pulling, straining or heavy lifting).   *  Slowly increase activity tomorow.   *  If you received sedation do not drive for 24 hours.              * Skin affix  applied to puncture site. Do not try to remove, scratch or apply lotion   * Skin affix will fall off on it's own   *  You may shower tomorrow  Call your doctor if experiencing:   *  Signs of infection such as redness, swelling, excessive pain and / or foul       smelling drainage from the puncture site.   *  Chills or fever over 101 degrees (by mouth).   *  Fainting or any noted Rapid weight gain/loss   *  Unrelieved pain or any new or severe symptoms  Following the procedure:      Follow-up with the ordering physician as directed.   Continue to take other medications as directed by your physician unless    otherwise instructed.   If applicable, resume taking your blood thinners or Aspirin in 24 hours.              If you have any concerns please call the Radiology Nurses Desk at [phone].

## 2025-04-25 ENCOUNTER — HOSPITAL ENCOUNTER (OUTPATIENT)
Dept: ULTRASOUND IMAGING | Facility: HOSPITAL | Age: 80
Discharge: HOME | End: 2025-04-25
Payer: MEDICARE

## 2025-04-25 ENCOUNTER — TRANSCRIBE ORDERS (OUTPATIENT)
Dept: GENERAL RADIOLOGY | Facility: HOSPITAL | Age: 80
End: 2025-04-25
Payer: MEDICARE

## 2025-04-25 VITALS
TEMPERATURE: 97.3 F | HEIGHT: 62 IN | SYSTOLIC BLOOD PRESSURE: 95 MMHG | RESPIRATION RATE: 16 BRPM | HEART RATE: 95 BPM | BODY MASS INDEX: 37.73 KG/M2 | OXYGEN SATURATION: 100 % | DIASTOLIC BLOOD PRESSURE: 51 MMHG | WEIGHT: 205 LBS

## 2025-04-25 DIAGNOSIS — K74.60 CIRRHOSIS OF LIVER WITH ASCITES, UNSPECIFIED HEPATIC CIRRHOSIS TYPE: ICD-10-CM

## 2025-04-25 DIAGNOSIS — R18.8 CIRRHOSIS OF LIVER WITH ASCITES, UNSPECIFIED HEPATIC CIRRHOSIS TYPE: ICD-10-CM

## 2025-04-25 DIAGNOSIS — R18.8 ASCITES OF LIVER: Primary | ICD-10-CM

## 2025-04-25 PROCEDURE — 25010000002 ALBUMIN HUMAN 25% PER 50 ML: Performed by: INTERNAL MEDICINE

## 2025-04-25 PROCEDURE — 76942 ECHO GUIDE FOR BIOPSY: CPT

## 2025-04-25 PROCEDURE — 96365 THER/PROPH/DIAG IV INF INIT: CPT

## 2025-04-25 PROCEDURE — 25010000002 LIDOCAINE 1 % SOLUTION: Performed by: RADIOLOGY

## 2025-04-25 PROCEDURE — P9047 ALBUMIN (HUMAN), 25%, 50ML: HCPCS | Performed by: INTERNAL MEDICINE

## 2025-04-25 RX ORDER — LIDOCAINE HYDROCHLORIDE 10 MG/ML
10 INJECTION, SOLUTION INFILTRATION; PERINEURAL ONCE
Status: COMPLETED | OUTPATIENT
Start: 2025-04-25 | End: 2025-04-25

## 2025-04-25 RX ORDER — ALBUMIN (HUMAN) 12.5 G/50ML
37.5 SOLUTION INTRAVENOUS ONCE
Status: COMPLETED | OUTPATIENT
Start: 2025-04-25 | End: 2025-04-25

## 2025-04-25 RX ORDER — SODIUM CHLORIDE 0.9 % (FLUSH) 0.9 %
10 SYRINGE (ML) INJECTION AS NEEDED
Status: DISCONTINUED | OUTPATIENT
Start: 2025-04-25 | End: 2025-04-26 | Stop reason: HOSPADM

## 2025-04-25 RX ADMIN — ALBUMIN (HUMAN) 37.5 G: 12.5 SOLUTION INTRAVENOUS at 09:03

## 2025-04-25 RX ADMIN — LIDOCAINE HYDROCHLORIDE 10 ML: 10 INJECTION, SOLUTION INFILTRATION; PERINEURAL at 07:53

## 2025-04-25 RX ADMIN — Medication 10 ML: at 10:07

## 2025-04-25 NOTE — DISCHARGE INSTRUCTIONS
EDUCATION /DISCHARGE INSTRUCTIONS  Paracentesis:  A needle is inserted into the space between your abdominal organs and the membrane that surrounds them (peritoneal space).  It is done for the diagnosis and treatment of fluid that is resistant to other therapies.  It helps determine the cause of the fluid and at the relieves pressure created by the fluid.  A sample is obtained and sent to the laboratory for study.  During the procedure:  You will lie on a bed on your back with your legs drawn up.  Your abdomen will be exposed from the chest to the pelvis.  You will otherwise be covered to maintain comfort.  A physician will clean your abdomen with antiseptic soap, place a sterile towel around the site and administer a local anesthetic to numb the area.  The physician will insert a needle into your abdominal wall.  There may be a popping sound which signifies the needle has pierced the abdominal wall. Next, the physician will attach tubing to transfer a sample into a collection bottle.  After the fluid is obtained the needle will be removed.  A pressure dressing is applied to the site.  Risks of the procedure include but are not limited to:   *  Bleeding    *  Wound infection   *  Low blood pressure   *  Decreased urination   *  Low sodium if a large amount of fluid is removed   *  Puncture of abdominal organs by the needle    Benefits of the procedure:  Benefits include the removal of fluid from the abdomen, relief of abdominal pressure and facilitation of a diagnosis.  Alternatives to the procedure:  Possible alternatives are diuretic drug therapy or surgery to place a shunt to drain fluid.  Risks of diuretic drug therapy include possible dehydration and renal failure.  The benefit of drug therapy is that it can be done at home under physician supervision.  Risks of shunt placement include exposure to anesthesia, infection, excessive bleeding and injury to abdominal organs.  The benefit of a shunt is that it can be  used to drain fluid over a longer period of time.      Post procedure: (Follow all the instructions below carefully)   *  Weigh yourself daily.   *  Follow your doctors dietary instructions.   *  Rest today (no pushing pulling, straining or heavy lifting).   *  Slowly increase activity tomorow.   *  If you received sedation do not drive for 24 hours.              * Skin affix  applied to puncture site. Do not try to remove, scratch or apply lotion   * Skin affix will fall off on it's own   *  You may shower tomorrow  Call your doctor if experiencing:   *  Signs of infection such as redness, swelling, excessive pain and / or foul       smelling drainage from the puncture site.   *  Chills or fever over 101 degrees (by mouth).   *  Fainting or any noted Rapid weight gain/loss   *  Unrelieved pain or any new or severe symptoms  Following the procedure:      Follow-up with the ordering physician as directed.   Continue to take other medications as directed by your physician unless    otherwise instructed.   If applicable, resume taking your blood thinners or Aspirin in 24 hours.              If you have any concerns please call the Radiology Nurses Desk at [phone].

## 2025-04-30 ENCOUNTER — APPOINTMENT (OUTPATIENT)
Dept: CT IMAGING | Facility: HOSPITAL | Age: 80
DRG: 189 | End: 2025-04-30
Payer: MEDICARE

## 2025-04-30 ENCOUNTER — HOSPITAL ENCOUNTER (INPATIENT)
Facility: HOSPITAL | Age: 80
LOS: 4 days | Discharge: HOME | DRG: 189 | End: 2025-05-05
Attending: STUDENT IN AN ORGANIZED HEALTH CARE EDUCATION/TRAINING PROGRAM | Admitting: INTERNAL MEDICINE
Payer: MEDICARE

## 2025-04-30 ENCOUNTER — APPOINTMENT (OUTPATIENT)
Dept: GENERAL RADIOLOGY | Facility: HOSPITAL | Age: 80
DRG: 189 | End: 2025-04-30
Payer: MEDICARE

## 2025-04-30 DIAGNOSIS — J90 PLEURAL EFFUSION: ICD-10-CM

## 2025-04-30 DIAGNOSIS — J96.01 ACUTE HYPOXIC RESPIRATORY FAILURE: Primary | ICD-10-CM

## 2025-04-30 DIAGNOSIS — N18.6 ESRD ON DIALYSIS: ICD-10-CM

## 2025-04-30 DIAGNOSIS — Z99.2 ESRD ON DIALYSIS: ICD-10-CM

## 2025-04-30 LAB
ALBUMIN SERPL-MCNC: 3.5 G/DL (ref 3.5–5.2)
ALBUMIN/GLOB SERPL: 0.9 G/DL
ALP SERPL-CCNC: 263 U/L (ref 39–117)
ALT SERPL W P-5'-P-CCNC: 18 U/L (ref 1–33)
ANION GAP SERPL CALCULATED.3IONS-SCNC: 17 MMOL/L (ref 5–15)
AST SERPL-CCNC: 41 U/L (ref 1–32)
ATMOSPHERIC PRESS: 747.5 MMHG
BASE EXCESS BLDV CALC-SCNC: 1.1 MMOL/L (ref -2–2)
BASOPHILS # BLD AUTO: 0.03 10*3/MM3 (ref 0–0.2)
BASOPHILS NFR BLD AUTO: 0.4 % (ref 0–1.5)
BILIRUB SERPL-MCNC: 1.5 MG/DL (ref 0–1.2)
BUN SERPL-MCNC: 14 MG/DL (ref 8–23)
BUN/CREAT SERPL: 5.5 (ref 7–25)
CALCIUM SPEC-SCNC: 9.2 MG/DL (ref 8.6–10.5)
CHLORIDE SERPL-SCNC: 94 MMOL/L (ref 98–107)
CO2 SERPL-SCNC: 23 MMOL/L (ref 22–29)
CREAT SERPL-MCNC: 2.56 MG/DL (ref 0.57–1)
D-LACTATE SERPL-SCNC: 1.9 MMOL/L (ref 0.5–2)
D-LACTATE SERPL-SCNC: 2.6 MMOL/L (ref 0.5–2)
DEPRECATED RDW RBC AUTO: 49.3 FL (ref 37–54)
DEVICE COMMENT: ABNORMAL
EGFRCR SERPLBLD CKD-EPI 2021: 18.5 ML/MIN/1.73
EOSINOPHIL # BLD AUTO: 0 10*3/MM3 (ref 0–0.4)
EOSINOPHIL NFR BLD AUTO: 0 % (ref 0.3–6.2)
ERYTHROCYTE [DISTWIDTH] IN BLOOD BY AUTOMATED COUNT: 14.3 % (ref 12.3–15.4)
GAS FLOW AIRWAY: 4 LPM
GEN 5 1HR TROPONIN T REFLEX: 82 NG/L
GLOBULIN UR ELPH-MCNC: 4.1 GM/DL
GLUCOSE SERPL-MCNC: 159 MG/DL (ref 65–99)
HCO3 BLDV-SCNC: 25.5 MMOL/L (ref 22–28)
HCT VFR BLD AUTO: 38.3 % (ref 34–46.6)
HGB BLD-MCNC: 12.3 G/DL (ref 12–15.9)
IMM GRANULOCYTES # BLD AUTO: 0.05 10*3/MM3 (ref 0–0.05)
IMM GRANULOCYTES NFR BLD AUTO: 0.6 % (ref 0–0.5)
LYMPHOCYTES # BLD AUTO: 0.61 10*3/MM3 (ref 0.7–3.1)
LYMPHOCYTES NFR BLD AUTO: 7.7 % (ref 19.6–45.3)
MCH RBC QN AUTO: 30.6 PG (ref 26.6–33)
MCHC RBC AUTO-ENTMCNC: 32.1 G/DL (ref 31.5–35.7)
MCV RBC AUTO: 95.3 FL (ref 79–97)
MODALITY: ABNORMAL
MONOCYTES # BLD AUTO: 0.7 10*3/MM3 (ref 0.1–0.9)
MONOCYTES NFR BLD AUTO: 8.9 % (ref 5–12)
NEUTROPHILS NFR BLD AUTO: 6.5 10*3/MM3 (ref 1.7–7)
NEUTROPHILS NFR BLD AUTO: 82.4 % (ref 42.7–76)
NRBC BLD AUTO-RTO: 0 /100 WBC (ref 0–0.2)
NT-PROBNP SERPL-MCNC: 1539 PG/ML (ref 0–1800)
PCO2 BLDV: 39 MM HG (ref 41–51)
PH BLDV: 7.42 PH UNITS (ref 7.31–7.41)
PLATELET # BLD AUTO: 150 10*3/MM3 (ref 140–450)
PMV BLD AUTO: 10.6 FL (ref 6–12)
PO2 BLDV: 37.8 MM HG (ref 35–45)
POTASSIUM SERPL-SCNC: 3.3 MMOL/L (ref 3.5–5.2)
PROCALCITONIN SERPL-MCNC: 0.56 NG/ML (ref 0–0.25)
PROT SERPL-MCNC: 7.6 G/DL (ref 6–8.5)
RBC # BLD AUTO: 4.02 10*6/MM3 (ref 3.77–5.28)
SAO2 % BLDCOV: 73.3 % (ref 45–75)
SODIUM SERPL-SCNC: 134 MMOL/L (ref 136–145)
TOTAL RATE: 26 BREATHS/MINUTE
TROPONIN T % DELTA: 6
TROPONIN T NUMERIC DELTA: 5 NG/L
TROPONIN T SERPL HS-MCNC: 77 NG/L
WBC NRBC COR # BLD AUTO: 7.89 10*3/MM3 (ref 3.4–10.8)

## 2025-04-30 PROCEDURE — 71045 X-RAY EXAM CHEST 1 VIEW: CPT

## 2025-04-30 PROCEDURE — 25010000002 CEFTRIAXONE PER 250 MG: Performed by: STUDENT IN AN ORGANIZED HEALTH CARE EDUCATION/TRAINING PROGRAM

## 2025-04-30 PROCEDURE — 84484 ASSAY OF TROPONIN QUANT: CPT | Performed by: STUDENT IN AN ORGANIZED HEALTH CARE EDUCATION/TRAINING PROGRAM

## 2025-04-30 PROCEDURE — 85025 COMPLETE CBC W/AUTO DIFF WBC: CPT | Performed by: STUDENT IN AN ORGANIZED HEALTH CARE EDUCATION/TRAINING PROGRAM

## 2025-04-30 PROCEDURE — 94761 N-INVAS EAR/PLS OXIMETRY MLT: CPT

## 2025-04-30 PROCEDURE — 99291 CRITICAL CARE FIRST HOUR: CPT

## 2025-04-30 PROCEDURE — 94799 UNLISTED PULMONARY SVC/PX: CPT

## 2025-04-30 PROCEDURE — 80053 COMPREHEN METABOLIC PANEL: CPT | Performed by: STUDENT IN AN ORGANIZED HEALTH CARE EDUCATION/TRAINING PROGRAM

## 2025-04-30 PROCEDURE — 93005 ELECTROCARDIOGRAM TRACING: CPT | Performed by: STUDENT IN AN ORGANIZED HEALTH CARE EDUCATION/TRAINING PROGRAM

## 2025-04-30 PROCEDURE — 71250 CT THORAX DX C-: CPT

## 2025-04-30 PROCEDURE — 82803 BLOOD GASES ANY COMBINATION: CPT | Performed by: STUDENT IN AN ORGANIZED HEALTH CARE EDUCATION/TRAINING PROGRAM

## 2025-04-30 PROCEDURE — 94660 CPAP INITIATION&MGMT: CPT

## 2025-04-30 PROCEDURE — 84145 PROCALCITONIN (PCT): CPT | Performed by: STUDENT IN AN ORGANIZED HEALTH CARE EDUCATION/TRAINING PROGRAM

## 2025-04-30 PROCEDURE — 87040 BLOOD CULTURE FOR BACTERIA: CPT | Performed by: STUDENT IN AN ORGANIZED HEALTH CARE EDUCATION/TRAINING PROGRAM

## 2025-04-30 PROCEDURE — 99285 EMERGENCY DEPT VISIT HI MDM: CPT

## 2025-04-30 PROCEDURE — 93010 ELECTROCARDIOGRAM REPORT: CPT | Performed by: INTERNAL MEDICINE

## 2025-04-30 PROCEDURE — 36415 COLL VENOUS BLD VENIPUNCTURE: CPT | Performed by: STUDENT IN AN ORGANIZED HEALTH CARE EDUCATION/TRAINING PROGRAM

## 2025-04-30 PROCEDURE — 83880 ASSAY OF NATRIURETIC PEPTIDE: CPT | Performed by: STUDENT IN AN ORGANIZED HEALTH CARE EDUCATION/TRAINING PROGRAM

## 2025-04-30 PROCEDURE — 83605 ASSAY OF LACTIC ACID: CPT | Performed by: STUDENT IN AN ORGANIZED HEALTH CARE EDUCATION/TRAINING PROGRAM

## 2025-04-30 RX ORDER — ACETAMINOPHEN 325 MG/1
650 TABLET ORAL EVERY 4 HOURS PRN
Status: DISCONTINUED | OUTPATIENT
Start: 2025-04-30 | End: 2025-05-05 | Stop reason: HOSPADM

## 2025-04-30 RX ORDER — ALUMINA, MAGNESIA, AND SIMETHICONE 2400; 2400; 240 MG/30ML; MG/30ML; MG/30ML
15 SUSPENSION ORAL EVERY 6 HOURS PRN
Status: DISCONTINUED | OUTPATIENT
Start: 2025-04-30 | End: 2025-05-05 | Stop reason: HOSPADM

## 2025-04-30 RX ORDER — BISACODYL 5 MG/1
5 TABLET, DELAYED RELEASE ORAL DAILY PRN
Status: DISCONTINUED | OUTPATIENT
Start: 2025-04-30 | End: 2025-05-05 | Stop reason: HOSPADM

## 2025-04-30 RX ORDER — NITROGLYCERIN 0.4 MG/1
0.4 TABLET SUBLINGUAL
Status: DISCONTINUED | OUTPATIENT
Start: 2025-04-30 | End: 2025-05-05 | Stop reason: HOSPADM

## 2025-04-30 RX ORDER — ONDANSETRON 4 MG/1
4 TABLET, ORALLY DISINTEGRATING ORAL EVERY 6 HOURS PRN
Status: DISCONTINUED | OUTPATIENT
Start: 2025-04-30 | End: 2025-05-05 | Stop reason: HOSPADM

## 2025-04-30 RX ORDER — ACETAMINOPHEN 650 MG/1
650 SUPPOSITORY RECTAL EVERY 4 HOURS PRN
Status: DISCONTINUED | OUTPATIENT
Start: 2025-04-30 | End: 2025-05-05 | Stop reason: HOSPADM

## 2025-04-30 RX ORDER — SODIUM CHLORIDE 0.9 % (FLUSH) 0.9 %
10 SYRINGE (ML) INJECTION AS NEEDED
Status: DISCONTINUED | OUTPATIENT
Start: 2025-04-30 | End: 2025-05-05 | Stop reason: HOSPADM

## 2025-04-30 RX ORDER — SODIUM CHLORIDE 0.9 % (FLUSH) 0.9 %
10 SYRINGE (ML) INJECTION EVERY 12 HOURS SCHEDULED
Status: DISCONTINUED | OUTPATIENT
Start: 2025-04-30 | End: 2025-05-05 | Stop reason: HOSPADM

## 2025-04-30 RX ORDER — AMOXICILLIN 250 MG
2 CAPSULE ORAL 2 TIMES DAILY
Status: DISCONTINUED | OUTPATIENT
Start: 2025-04-30 | End: 2025-05-05 | Stop reason: HOSPADM

## 2025-04-30 RX ORDER — POLYETHYLENE GLYCOL 3350 17 G/17G
17 POWDER, FOR SOLUTION ORAL DAILY PRN
Status: DISCONTINUED | OUTPATIENT
Start: 2025-04-30 | End: 2025-05-05 | Stop reason: HOSPADM

## 2025-04-30 RX ORDER — ONDANSETRON 2 MG/ML
4 INJECTION INTRAMUSCULAR; INTRAVENOUS EVERY 6 HOURS PRN
Status: DISCONTINUED | OUTPATIENT
Start: 2025-04-30 | End: 2025-05-05 | Stop reason: HOSPADM

## 2025-04-30 RX ORDER — BISACODYL 10 MG
10 SUPPOSITORY, RECTAL RECTAL DAILY PRN
Status: DISCONTINUED | OUTPATIENT
Start: 2025-04-30 | End: 2025-05-05 | Stop reason: HOSPADM

## 2025-04-30 RX ORDER — SODIUM CHLORIDE 9 MG/ML
40 INJECTION, SOLUTION INTRAVENOUS AS NEEDED
Status: DISCONTINUED | OUTPATIENT
Start: 2025-04-30 | End: 2025-05-05 | Stop reason: HOSPADM

## 2025-04-30 RX ADMIN — CEFTRIAXONE 2000 MG: 2 INJECTION, POWDER, FOR SOLUTION INTRAMUSCULAR; INTRAVENOUS at 19:30

## 2025-04-30 NOTE — ED PROVIDER NOTES
EMERGENCY DEPARTMENT ENCOUNTER  Room Number:  10/10  PCP: Shazia Cardoza MD  Independent Historians: Patient and Family      HPI:  Chief Complaint: had concerns including Shortness of Breath.     A complete HPI/ROS/PMH/PSH/SH/FH are unobtainable due to: Dyspnea    Chronic or social conditions impacting patient care (Social Determinants of Health): None      Context: Philly Person is a 79 y.o. female with a medical history of ESRD on HD MWF, diabetes, cirrhosis, who presents to the ED c/o acute difficulty breathing.  Majority of history is provided by partner at bedside due to her dyspnea.  He states a week ago she had a paracentesis with roughly 6.5 L removed.  She underwent dialysis today where they removed 3.5 L.  She has had significant difficulty breathing since this morning around 3am, and it did not improve after dialysis.  She reports she makes a trace urine twice a day.  No other complaints at this time.    Review of prior external notes (non-ED) -and- Review of prior external test results outside of this encounter:  I reviewed nephrology progress note from 9 days ago.  Patient gets regular paracenteses.  Volume status variable due to paracenteses.  Usually needed every 2 weeks.  Blood pressure low and requiring midodrine for support.  Patient had her CKD care with Dr. Chaim Lynch.      Prescription drug monitoring program review:         PAST MEDICAL HISTORY  Active Ambulatory Problems     Diagnosis Date Noted    Elevated liver enzymes 03/16/2016    Anemia 03/22/2016    Arthritis 03/22/2016    Benign essential hypertension 03/22/2016    Hypercholesterolemia 03/22/2016    Microalbuminuria 03/22/2016    Nonalcoholic fatty liver disease 03/22/2016    Calculus of kidney 03/22/2016    Adiposity 03/22/2016    Type 2 diabetes mellitus 03/22/2016    Squamous cell carcinoma of skin 12/09/2016    Melanoma 01/30/2018    Basal cell carcinoma 01/30/2018    DJD (degenerative joint disease) of knee 08/05/2019     Angiodysplasia of colon without hemorrhage 2022    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 abscess w/o bleeding 2022    Dyslipidemia 2021    H/O: gout 2021    History of renal calculi 2016    Other fecal abnormalities 2024    Polyp of colon 2022    Renal hypertension 2021    Stage 4 chronic kidney disease 2021    Vitamin D deficiency 2022    Murmur 2024    Cirrhosis of liver with ascites 2024    Arteriovenous fistula of left upper extremity 2024    JENNIFER (acute kidney injury) 2024    Nonrheumatic aortic valve stenosis 2024    Other pancytopenia 2024    Hyponatremia 2024     Resolved Ambulatory Problems     Diagnosis Date Noted    Abnormal liver enzymes 2016    Acute renal failure 2016    Hyperkalemia 2016     Past Medical History:   Diagnosis Date    Acute renal failure on dialysis     Cataract     Diabetes mellitus     History of kidney stones     History of melanoma     Hyperlipidemia     Hypertension     Left knee pain     Nonalcoholic steatohepatitis     Seasonal allergies          PAST SURGICAL HISTORY  Past Surgical History:   Procedure Laterality Date     SECTION      COLONOSCOPY      CYSTOSCOPY W/ LASER LITHOTRIPSY      REPLACEMENT TOTAL KNEE Right 10/2015    SKIN CANCER EXCISION Left     ARM MELANOMA    TONSILLECTOMY AND ADENOIDECTOMY      TOTAL KNEE ARTHROPLASTY Left 2019    Procedure: LEFT TOTAL KNEE ARTHROPLASTY;  Surgeon: Magdaleno Saleh MD;  Location: [facility];  Service: Orthopedics    UPPER GASTROINTESTINAL ENDOSCOPY           FAMILY HISTORY  Family History   Problem Relation Age of Onset    Bipolar disorder Mother     Hypertension Mother     Heart disease Mother     Stroke Mother     Kidney disease Father     Leukemia Father     Kidney disease Sister     Thyroid disease Sister     Breast cancer Maternal Aunt     Malig Hyperthermia Neg Hx          SOCIAL  HISTORY  Social History     Socioeconomic History    Marital status:    Tobacco Use    Smoking status: Never    Smokeless tobacco: Never   Vaping Use    Vaping status: Never Used   Substance and Sexual Activity    Alcohol use: Yes     Comment: ON OCC    Drug use: Never    Sexual activity: Defer       ALLERGIES  Patient has no known allergies.      PHYSICAL EXAM    I have reviewed the triage vital signs and nursing notes.    ED Triage Vitals   Temp Heart Rate Resp BP SpO2   03/02/25 1448 03/02/25 1448 03/02/25 1448 03/02/25 1450 03/02/25 1448   97.4 °F (36.3 °C) 95 16 141/84 97 %      Temp src Heart Rate Source Patient Position BP Location FiO2 (%)   -- -- -- -- --              Physical Exam  GENERAL: alert, uncomfortable appearing  SKIN: Warm, dry  HENT: Normocephalic, atraumatic  EYES: no scleral icterus  CV: regular rhythm, tachycardia, fistula left upper extremity with palpable thrill  RESPIRATORY: decreased breath sounds right lung base, tachypneic, moderate respiratory distress  ABDOMEN: soft, nondistended, nontender  MUSCULOSKELETAL: no deformity, no pretibial pitting edema  NEURO: alert, moves all extremities, follows commands        LAB RESULTS  Recent Results (from the past 24 hours)   Comprehensive Metabolic Panel    Collection Time: 04/30/25  5:56 PM    Specimen: Arm, Right; Blood   Result Value Ref Range    Glucose 159 (H) 65 - 99 mg/dL    BUN 14 8 - 23 mg/dL    Creatinine 2.56 (H) 0.57 - 1.00 mg/dL    Sodium 134 (L) 136 - 145 mmol/L    Potassium 3.3 (L) 3.5 - 5.2 mmol/L    Chloride 94 (L) 98 - 107 mmol/L    CO2 23.0 22.0 - 29.0 mmol/L    Calcium 9.2 8.6 - 10.5 mg/dL    Total Protein 7.6 6.0 - 8.5 g/dL    Albumin 3.5 3.5 - 5.2 g/dL    ALT (SGPT) 18 1 - 33 U/L    AST (SGOT) 41 (H) 1 - 32 U/L    Alkaline Phosphatase 263 (H) 39 - 117 U/L    Total Bilirubin 1.5 (H) 0.0 - 1.2 mg/dL    Globulin 4.1 gm/dL    A/G Ratio 0.9 g/dL    BUN/Creatinine Ratio 5.5 (L) 7.0 - 25.0    Anion Gap 17.0 (H) 5.0 - 15.0  mmol/L    eGFR 18.5 (L) >60.0 mL/min/1.73   Lactic Acid, Plasma    Collection Time: 04/30/25  5:56 PM    Specimen: Arm, Right; Blood   Result Value Ref Range    Lactate 2.6 (C) 0.5 - 2.0 mmol/L   Procalcitonin    Collection Time: 04/30/25  5:56 PM    Specimen: Arm, Right; Blood   Result Value Ref Range    Procalcitonin 0.56 (H) 0.00 - 0.25 ng/mL   CBC Auto Differential    Collection Time: 04/30/25  5:56 PM    Specimen: Arm, Right; Blood   Result Value Ref Range    WBC 7.89 3.40 - 10.80 10*3/mm3    RBC 4.02 3.77 - 5.28 10*6/mm3    Hemoglobin 12.3 12.0 - 15.9 g/dL    Hematocrit 38.3 34.0 - 46.6 %    MCV 95.3 79.0 - 97.0 fL    MCH 30.6 26.6 - 33.0 pg    MCHC 32.1 31.5 - 35.7 g/dL    RDW 14.3 12.3 - 15.4 %    RDW-SD 49.3 37.0 - 54.0 fl    MPV 10.6 6.0 - 12.0 fL    Platelets 150 140 - 450 10*3/mm3    Neutrophil % 82.4 (H) 42.7 - 76.0 %    Lymphocyte % 7.7 (L) 19.6 - 45.3 %    Monocyte % 8.9 5.0 - 12.0 %    Eosinophil % 0.0 (L) 0.3 - 6.2 %    Basophil % 0.4 0.0 - 1.5 %    Immature Grans % 0.6 (H) 0.0 - 0.5 %    Neutrophils, Absolute 6.50 1.70 - 7.00 10*3/mm3    Lymphocytes, Absolute 0.61 (L) 0.70 - 3.10 10*3/mm3    Monocytes, Absolute 0.70 0.10 - 0.90 10*3/mm3    Eosinophils, Absolute 0.00 0.00 - 0.40 10*3/mm3    Basophils, Absolute 0.03 0.00 - 0.20 10*3/mm3    Immature Grans, Absolute 0.05 0.00 - 0.05 10*3/mm3    nRBC 0.0 0.0 - 0.2 /100 WBC   BNP    Collection Time: 04/30/25  5:56 PM    Specimen: Arm, Right; Blood   Result Value Ref Range    proBNP 1,539.0 0.0 - 1,800.0 pg/mL   High Sensitivity Troponin T    Collection Time: 04/30/25  5:56 PM    Specimen: Arm, Right; Blood   Result Value Ref Range    HS Troponin T 77 (C) <14 ng/L   ECG 12 Lead Dyspnea    Collection Time: 04/30/25  5:59 PM   Result Value Ref Range    QT Interval 337 ms    QTC Interval 469 ms   Blood Gas, Venous -    Collection Time: 04/30/25  6:17 PM    Specimen: Venous Blood   Result Value Ref Range    pH, Venous 7.424 (H) 7.310 - 7.410 pH Units    pCO2,  Venous 39.0 (L) 41.0 - 51.0 mm Hg    pO2, Venous 37.8 35.0 - 45.0 mm Hg    HCO3, Venous 25.5 22.0 - 28.0 mmol/L    Base Excess, Venous 1.1 -2.0 - 2.0 mmol/L    O2 Saturation, Venous 73.3 45.0 - 75.0 %    Barometric Pressure for Blood Gas 747.5000 mmHg    Modality Cannula     Flow Rate 4.0000 lpm    Rate 26 Breaths/minute    Device Comment sat 97    High Sensitivity Troponin T 1Hr    Collection Time: 04/30/25  7:31 PM    Specimen: Blood   Result Value Ref Range    HS Troponin T 82 (C) <14 ng/L    Troponin T Numeric Delta 5 ng/L    Troponin T % Delta 6 Abnormal if >/= 20%   STAT Lactic Acid, Reflex    Collection Time: 04/30/25  9:05 PM    Specimen: Blood   Result Value Ref Range    Lactate 1.9 0.5 - 2.0 mmol/L       RADIOLOGY  CT Chest Without Contrast Diagnostic  Result Date: 4/30/2025  CT OF THE CHEST WITHOUT CONTRAST  HISTORY: Dyspnea. Bilateral pleural effusions.  COMPARISON: None available.  TECHNIQUE: Axial CT imaging was obtained through the thorax. No IV contrast was administered.  FINDINGS: The patient has large bilateral pleural effusions, right greater than left. There is extensive bilateral lower lobe atelectasis, as well as some additional atelectasis within the right middle lobe and right lower lobe. The thyroid gland, trachea, and esophagus appear unremarkable. There are coronary artery calcifications. Thoracic aorta is normal in caliber. There is a small pericardial effusion. There are dystrophic calcifications of both the aortic valve annulus and mitral valve annulus. Mediastinal lymph nodes do not appear pathologically enlarged. There is evidence of prior granulomatous disease. Images through the upper abdomen demonstrate cirrhosis. There is some ascites which is noted within the upper abdomen, although it has decreased when compared to November 2024. There is cholelithiasis. There are dense vascular calcifications. There is relative pancreatic atrophy. Calcified granulomata are noted within the  spleen. No acute osseous abnormalities are identified.       1. Large bilateral pleural effusions, right greater than left. 2. Cirrhosis with ascites. The volume of fluid has decreased when compared to November 2024.  Radiation dose reduction techniques were utilized, including automated exposure control and exposure modulation based on body size.   This report was finalized on 4/30/2025 9:49 PM by Dr. Adriana Singer M.D on Workstation: [id number]      XR Chest 1 View  Result Date: 4/30/2025  XR CHEST 1 VW-  Clinical: Dyspnea  COMPARISON examination 7/29/2019  FINDINGS: There is a shallow inspiratory effort. Cardiomediastinal silhouette is stable. Bilateral pleural effusions, size greater on the right than the left. Some right-sided pleural effusion appears to be tracking into the minor fissure. No gross consolidation seen. Old granulomatous disease right lung again noted. No gross pulmonary edema. The remainder is unremarkable.  This report was finalized on 4/30/2025 6:57 PM by Dr. Jon Irwin M.D on Workstation: [id number]          MEDICATIONS GIVEN IN ER  Medications   cefTRIAXone (ROCEPHIN) 2,000 mg in sodium chloride 0.9 % 100 mL MBP (0 mg Intravenous Stopped 4/30/25 2015)         ORDERS PLACED DURING THIS VISIT:  Orders Placed This Encounter   Procedures    Blood Culture - Blood,    Blood Culture - Blood,    XR Chest 1 View    CT Chest Without Contrast Diagnostic    US Thoracentesis    Comprehensive Metabolic Panel    Lactic Acid, Plasma    Procalcitonin    Blood Gas, Venous -    CBC Auto Differential    BNP    High Sensitivity Troponin T    STAT Lactic Acid, Reflex    High Sensitivity Troponin T 1Hr    [facility] (on-call MD unless specified) Details    Pulmonology (on-call MD unless specified)    Oxygen Therapy- High Flow Nasal Cannula; Titrate 6-12 LPM Per SpO2; 90 - 95%    NIPPV (CPAP or BIPAP)    ECG 12 Lead Dyspnea    Inpatient Admission    CBC & Differential         OUTPATIENT MEDICATION MANAGEMENT:  No  current Epic-ordered facility-administered medications on file.     Current Outpatient Medications Ordered in Epic   Medication Sig Dispense Refill    allopurinol (ZYLOPRIM) 100 MG tablet Take 2 tablets by mouth Every Night. 180 tablet 1    atorvastatin (LIPITOR) 10 MG tablet Take 1 tablet by mouth Every Night. 90 tablet 1    betamethasone, augmented, (Diprolene) 0.05 % ointment Apply 1 Application topically to the appropriate area as directed 1 (One) Time Per Week.      Blood Glucose Monitoring Suppl (BlackStratus Blood Glucose Monito) w/Device kit 1 each by Other route Daily. for testing blood sugar once daily for diabetes E11.9 1 kit 0    coenzyme Q10 100 MG capsule Take 2 capsules by mouth Every Evening.      Cranberry 500 MG capsule Take 500 mg by mouth Daily.      glucose blood test strip Use as instructed to test blood sugar once daily . Uncontrolled diabetes e11.65 100 each 2    glucose blood test strip Use as instructed to test blood sugar once daily for diabetes E11.9 100 each 12    insulin degludec (Tresiba FlexTouch) 100 UNIT/ML solution pen-injector injection Inject 14 Units under the skin into the appropriate area as directed Daily. 15 mL 1    Insulin Pen Needle (Pen Needles) 32G X 4 MM misc Use 1 each Daily. 100 each 3    iron sucrose 100 mL.      lactulose (CHRONULAC) 10 GM/15ML solution Take 30 mL by mouth Daily. (Patient taking differently: Take 30 mL by mouth 3 (Three) Times a Week.) 946 mL 0    Methoxy PEG-Epoetin Beta (MIRCERA IJ) 225 mcg Every 14 (Fourteen) Days.      midodrine (PROAMATINE) 5 MG tablet Take 1 tablet by mouth 3 times a day.      Multiple Vitamins-Minerals (MULTIVITAMIN ADULT PO) Take 1 tablet by mouth Daily.      sevelamer (RENVELA) 800 MG tablet Take 1 tablet by mouth 3 (Three) Times a Day With Meals.      torsemide (DEMADEX) 20 MG tablet TAKE 1 TABLET BY MOUTH DAILY (Patient taking differently: Take 2 tablets by mouth 2 (Two) Times a Day.) 30 tablet 0    vitamin B-6 (PYRIDOXINE)  100 MG tablet Take 1 tablet by mouth Daily.      Wheat Dextrin (BENEFIBER DRINK MIX PO) Take  by mouth 3 (Three) Times a Week.           PROCEDURES  Procedures      Critical care provider statement:    Critical care time (minutes): 45.   Critical care time was exclusive of:  Separately billable procedures and treating other patients   Critical care was necessary to treat or prevent imminent or life-threatening deterioration of the following conditions:  Respiratory Failure   Critical care was time spent personally by me on the following activities:  Development of treatment plan with patient or surrogate, discussions with consultants, evaluation of patient's response to treatment, examination of patient, obtaining history from patient or surrogate, ordering and performing treatments and interventions, ordering and review of laboratory studies, ordering and review of radiographic studies, pulse oximetry, re-evaluation of patient's condition and review of old charts. Critical Care indicators:        PROGRESS, DATA ANALYSIS, CONSULTS, AND MEDICAL DECISION MAKING  All labs have been independently interpreted by me.  All radiology studies have been reviewed by me. All EKG's have been independently viewed and interpreted by me.  Discussion below represents my analysis of pertinent findings related to patient's condition, differential diagnosis, treatment plan and final disposition.    Differential diagnosis includes but is not limited to acute respiratory failure, hypoxic or hypercapnic respiratory failure, pulmonary edema, pleural effusion, fluid overload, electrolyte or metabolic derangements, ACS, STEMI, NSTEMI, CHF.    Clinical Scores:                                       ED Course as of 04/30/25 2202 Wed Apr 30, 2025   1730 Patient presents to the ED with family for evaluation of difficulty breathing.  Majority of history is provided by partner at bedside due to her dyspnea.  He states a week ago she had a  paracentesis with roughly 6.5 L removed.  She underwent dialysis today where they removed 3.5 L.  She has had significant difficulty breathing since this morning, and it did not improve after dialysis.  She reports she makes a trace urine twice a day.  No other complaints at this time.    On exam patient is tachypneic, tachycardic, moderate respiratory distress.  She has absent breath sounds right posterior lung base.  AV fistula with palpable thrill, abdomen soft nontender nondistended, no pitting pretibial edema    Concern for large pleural effusion and respiratory failure.  Will obtain basic labs, EKG and chest x-ray, infectious markers, as well as cardiac enzymes which I expect to be significantly elevated due to her dialysis status.  Patient family expressed understanding and are agreeable [DN]   1808 ECG 12 Lead Dyspnea  Sinus rhythm, rate 116, LAD, normal intervals, inferior anterior septal Q waves, no significant T ST changes, no STEMI    I compared EKG to prior on 7/29/2019.  No significant change.  EKGs interpreted by self [DN]   1820 pH, Venous(!): 7.424  Acute hypoxic respiratory failure with respiratory alkalosis [DN]   1820 pCO2, Venous(!): 39.0 [DN]   1821 Patient started on heated high flow nasal cannula.  Will monitor work of breathing, patient may need to be placed on BiPAP/CPAP [DN]   1831 Lactate(!!): 2.6 [DN]   1831 XR Chest 1 View  I reviewed patient's xray image(s), suspect bilateral pleural effusions, interpreted by self  I reviewed the radiologist's interpretation of above image(s)   [DN]   1838 Procalcitonin(!): 0.56  Will give empiric ceftriaxone [DN]   1838 proBNP: 1,539.0 [DN]   1850 HS Troponin T(!!): 77  ESRD on HD [DN]   2016 Troponin T Numeric Delta: 5 [DN]   2129 CT Chest Without Contrast Diagnostic  I reviewed patient's CT image(s), large right and moderate left pleural effusions, interpreted by self  I reviewed the radiologist's interpretation of above image(s)   [DN]   2144 I  discussed patient with Dr. RITCHIE Casas, agreeable with plan to consult for symptomatic pleural effusions [DN]   2151 Patient reassessed, still with significant work of breathing despite being placed on HHFNC.  Will place on BiPAP and observe [DN]   2200 I spoke with respiratory therapist, they will place patient on BiPAP [DN]   2201 I discussed patient with Dr. RITCHIE Casas,  [DN]   2201 patient failed HHFNC, will need BiPAP continuous.  Agreeable plan to admit to ICU [DN]      ED Course User Index  [DN] John Garcia MD             AS OF 22:02 EDT VITALS:    BP - 117/56  HR - 108  TEMP - 98.4 °F (36.9 °C)  O2 SATS - 95%    COMPLEXITY OF CARE  The patient requires admission.      DIAGNOSIS  Final diagnoses:   Acute hypoxic respiratory failure   Pleural effusion   ESRD on dialysis         DISPOSITION  ED Disposition       ED Disposition   Decision to Admit    Condition   --    Comment   Level of Care: Critical Care [6]   Diagnosis: Acute hypoxic respiratory failure [[id number]]   Admitting Physician: MARINA CASAS [[id number]]   Attending Physician: MARINA CASAS [[id number]]   Certification: I Certify That Inpatient Hospital Services Are Medically Necessary For Greater Than 2 Midnights                 New Medications Ordered This Visit   Medications    cefTRIAXone (ROCEPHIN) 2,000 mg in sodium chloride 0.9 % 100 mL MBP       Please note that portions of this document were completed with a voice recognition program.    Note Disclaimer: At [facility], we believe that sharing information builds trust and better relationships. You are receiving this note because you recently visited [facility]. It is possible you will see health information before a provider has talked with you about it. This kind of information can be easy to misunderstand. To help you fully understand what it means for your health, we urge you to discuss this note with your provider.         John Garcia MD  04/30/25 1816       Jose  John DECKER MD  04/30/25 1444

## 2025-05-01 ENCOUNTER — APPOINTMENT (OUTPATIENT)
Dept: ULTRASOUND IMAGING | Facility: HOSPITAL | Age: 80
DRG: 189 | End: 2025-05-01
Payer: MEDICARE

## 2025-05-01 LAB
ALBUMIN FLD-MCNC: 1.3 G/DL
ALBUMIN SERPL-MCNC: 3.1 G/DL (ref 3.5–5.2)
ALBUMIN/GLOB SERPL: 0.8 G/DL
ALP SERPL-CCNC: 220 U/L (ref 39–117)
ALT SERPL W P-5'-P-CCNC: 17 U/L (ref 1–33)
ANION GAP SERPL CALCULATED.3IONS-SCNC: 13.3 MMOL/L (ref 5–15)
APPEARANCE FLD: ABNORMAL
AST SERPL-CCNC: 39 U/L (ref 1–32)
BASOPHILS # BLD AUTO: 0.06 10*3/MM3 (ref 0–0.2)
BASOPHILS NFR BLD AUTO: 0.8 % (ref 0–1.5)
BILIRUB SERPL-MCNC: 0.9 MG/DL (ref 0–1.2)
BUN SERPL-MCNC: 19 MG/DL (ref 8–23)
BUN/CREAT SERPL: 6.3 (ref 7–25)
CALCIUM SPEC-SCNC: 8.9 MG/DL (ref 8.6–10.5)
CHLORIDE SERPL-SCNC: 97 MMOL/L (ref 98–107)
CO2 SERPL-SCNC: 24.7 MMOL/L (ref 22–29)
COLOR FLD: YELLOW
CREAT SERPL-MCNC: 3 MG/DL (ref 0.57–1)
DEPRECATED RDW RBC AUTO: 48.4 FL (ref 37–54)
EGFRCR SERPLBLD CKD-EPI 2021: 15.3 ML/MIN/1.73
EOSINOPHIL # BLD AUTO: 0.02 10*3/MM3 (ref 0–0.4)
EOSINOPHIL NFR BLD AUTO: 0.3 % (ref 0.3–6.2)
ERYTHROCYTE [DISTWIDTH] IN BLOOD BY AUTOMATED COUNT: 14.5 % (ref 12.3–15.4)
GLOBULIN UR ELPH-MCNC: 3.8 GM/DL
GLUCOSE BLDC GLUCOMTR-MCNC: 120 MG/DL (ref 70–130)
GLUCOSE BLDC GLUCOMTR-MCNC: 136 MG/DL (ref 70–130)
GLUCOSE BLDC GLUCOMTR-MCNC: 180 MG/DL (ref 70–130)
GLUCOSE BLDC GLUCOMTR-MCNC: 92 MG/DL (ref 70–130)
GLUCOSE FLD-MCNC: 145 MG/DL
GLUCOSE SERPL-MCNC: 139 MG/DL (ref 65–99)
HCT VFR BLD AUTO: 34.4 % (ref 34–46.6)
HGB BLD-MCNC: 11.4 G/DL (ref 12–15.9)
IMM GRANULOCYTES # BLD AUTO: 0.02 10*3/MM3 (ref 0–0.05)
IMM GRANULOCYTES NFR BLD AUTO: 0.3 % (ref 0–0.5)
LDH FLD-CCNC: 113 U/L
LDH SERPL-CCNC: 198 U/L (ref 135–214)
LYMPHOCYTES # BLD AUTO: 1.3 10*3/MM3 (ref 0.7–3.1)
LYMPHOCYTES NFR BLD AUTO: 17.2 % (ref 19.6–45.3)
LYMPHOCYTES NFR FLD MANUAL: 33 %
MCH RBC QN AUTO: 30.6 PG (ref 26.6–33)
MCHC RBC AUTO-ENTMCNC: 33.1 G/DL (ref 31.5–35.7)
MCV RBC AUTO: 92.5 FL (ref 79–97)
METHOD: ABNORMAL
MONOCYTES # BLD AUTO: 0.97 10*3/MM3 (ref 0.1–0.9)
MONOCYTES NFR BLD AUTO: 12.8 % (ref 5–12)
MONOCYTES NFR FLD: 20 %
MONOS+MACROS NFR FLD: 40 %
NEUTROPHILS NFR BLD AUTO: 5.19 10*3/MM3 (ref 1.7–7)
NEUTROPHILS NFR BLD AUTO: 68.6 % (ref 42.7–76)
NEUTROPHILS NFR FLD MANUAL: 7 %
NRBC BLD AUTO-RTO: 0 /100 WBC (ref 0–0.2)
NUC CELL # FLD: 446 /MM3
PH FLD: 7.54 [PH]
PLATELET # BLD AUTO: 147 10*3/MM3 (ref 140–450)
PMV BLD AUTO: 10.1 FL (ref 6–12)
POTASSIUM SERPL-SCNC: 4 MMOL/L (ref 3.5–5.2)
PROT FLD-MCNC: 2.3 G/DL
PROT SERPL-MCNC: 6.9 G/DL (ref 6–8.5)
QT INTERVAL: 337 MS
QTC INTERVAL: 469 MS
RBC # BLD AUTO: 3.72 10*6/MM3 (ref 3.77–5.28)
RBC # FLD AUTO: 1314 /MM3
SODIUM SERPL-SCNC: 135 MMOL/L (ref 136–145)
WBC NRBC COR # BLD AUTO: 7.56 10*3/MM3 (ref 3.4–10.8)

## 2025-05-01 PROCEDURE — 25010000002 LIDOCAINE 1 % SOLUTION: Performed by: RADIOLOGY

## 2025-05-01 PROCEDURE — 88305 TISSUE EXAM BY PATHOLOGIST: CPT | Performed by: INTERNAL MEDICINE

## 2025-05-01 PROCEDURE — 87206 SMEAR FLUORESCENT/ACID STAI: CPT | Performed by: INTERNAL MEDICINE

## 2025-05-01 PROCEDURE — 82042 OTHER SOURCE ALBUMIN QUAN EA: CPT | Performed by: INTERNAL MEDICINE

## 2025-05-01 PROCEDURE — 76942 ECHO GUIDE FOR BIOPSY: CPT

## 2025-05-01 PROCEDURE — 83615 LACTATE (LD) (LDH) ENZYME: CPT | Performed by: INTERNAL MEDICINE

## 2025-05-01 PROCEDURE — 88112 CYTOPATH CELL ENHANCE TECH: CPT | Performed by: INTERNAL MEDICINE

## 2025-05-01 PROCEDURE — 94799 UNLISTED PULMONARY SVC/PX: CPT

## 2025-05-01 PROCEDURE — 87070 CULTURE OTHR SPECIMN AEROBIC: CPT | Performed by: INTERNAL MEDICINE

## 2025-05-01 PROCEDURE — 87205 SMEAR GRAM STAIN: CPT | Performed by: INTERNAL MEDICINE

## 2025-05-01 PROCEDURE — 94660 CPAP INITIATION&MGMT: CPT

## 2025-05-01 PROCEDURE — 94761 N-INVAS EAR/PLS OXIMETRY MLT: CPT

## 2025-05-01 PROCEDURE — 85025 COMPLETE CBC W/AUTO DIFF WBC: CPT | Performed by: INTERNAL MEDICINE

## 2025-05-01 PROCEDURE — 80053 COMPREHEN METABOLIC PANEL: CPT | Performed by: INTERNAL MEDICINE

## 2025-05-01 PROCEDURE — 82945 GLUCOSE OTHER FLUID: CPT | Performed by: INTERNAL MEDICINE

## 2025-05-01 PROCEDURE — 87116 MYCOBACTERIA CULTURE: CPT | Performed by: INTERNAL MEDICINE

## 2025-05-01 PROCEDURE — 5A1D70Z PERFORMANCE OF URINARY FILTRATION, INTERMITTENT, LESS THAN 6 HOURS PER DAY: ICD-10-PCS | Performed by: HOSPITALIST

## 2025-05-01 PROCEDURE — 83986 ASSAY PH BODY FLUID NOS: CPT | Performed by: INTERNAL MEDICINE

## 2025-05-01 PROCEDURE — 82948 REAGENT STRIP/BLOOD GLUCOSE: CPT

## 2025-05-01 PROCEDURE — 83615 LACTATE (LD) (LDH) ENZYME: CPT | Performed by: STUDENT IN AN ORGANIZED HEALTH CARE EDUCATION/TRAINING PROGRAM

## 2025-05-01 PROCEDURE — 89051 BODY FLUID CELL COUNT: CPT | Performed by: INTERNAL MEDICINE

## 2025-05-01 PROCEDURE — 84157 ASSAY OF PROTEIN OTHER: CPT | Performed by: INTERNAL MEDICINE

## 2025-05-01 PROCEDURE — 25010000002 HEPARIN (PORCINE) PER 1000 UNITS: Performed by: STUDENT IN AN ORGANIZED HEALTH CARE EDUCATION/TRAINING PROGRAM

## 2025-05-01 RX ORDER — HEPARIN SODIUM 5000 [USP'U]/ML
5000 INJECTION, SOLUTION INTRAVENOUS; SUBCUTANEOUS EVERY 8 HOURS SCHEDULED
Status: DISCONTINUED | OUTPATIENT
Start: 2025-05-01 | End: 2025-05-05 | Stop reason: HOSPADM

## 2025-05-01 RX ORDER — MIDODRINE HYDROCHLORIDE 5 MG/1
10 TABLET ORAL
COMMUNITY
End: 2025-05-05 | Stop reason: HOSPADM

## 2025-05-01 RX ORDER — MIDODRINE HYDROCHLORIDE 5 MG/1
5 TABLET ORAL DAILY
Status: DISCONTINUED | OUTPATIENT
Start: 2025-05-01 | End: 2025-05-03

## 2025-05-01 RX ORDER — LIDOCAINE HYDROCHLORIDE 10 MG/ML
10 INJECTION, SOLUTION INFILTRATION; PERINEURAL ONCE
Status: COMPLETED | OUTPATIENT
Start: 2025-05-01 | End: 2025-05-01

## 2025-05-01 RX ORDER — PANTOPRAZOLE SODIUM 40 MG/1
40 TABLET, DELAYED RELEASE ORAL DAILY
COMMUNITY

## 2025-05-01 RX ORDER — MIDODRINE HYDROCHLORIDE 5 MG/1
10 TABLET ORAL DAILY
Status: DISCONTINUED | OUTPATIENT
Start: 2025-05-01 | End: 2025-05-03

## 2025-05-01 RX ORDER — MIDODRINE HYDROCHLORIDE 5 MG/1
5 TABLET ORAL
Status: DISCONTINUED | OUTPATIENT
Start: 2025-05-01 | End: 2025-05-01

## 2025-05-01 RX ADMIN — MUPIROCIN 1 APPLICATION: 20 OINTMENT TOPICAL at 00:37

## 2025-05-01 RX ADMIN — MIDODRINE HYDROCHLORIDE 10 MG: 5 TABLET ORAL at 12:41

## 2025-05-01 RX ADMIN — Medication 10 ML: at 08:11

## 2025-05-01 RX ADMIN — MIDODRINE HYDROCHLORIDE 5 MG: 5 TABLET ORAL at 18:39

## 2025-05-01 RX ADMIN — MUPIROCIN 1 APPLICATION: 20 OINTMENT TOPICAL at 21:40

## 2025-05-01 RX ADMIN — HEPARIN SODIUM 5000 UNITS: 5000 INJECTION INTRAVENOUS; SUBCUTANEOUS at 21:40

## 2025-05-01 RX ADMIN — Medication 10 ML: at 00:27

## 2025-05-01 RX ADMIN — Medication 10 ML: at 21:40

## 2025-05-01 RX ADMIN — MUPIROCIN 1 APPLICATION: 20 OINTMENT TOPICAL at 08:11

## 2025-05-01 RX ADMIN — HEPARIN SODIUM 5000 UNITS: 5000 INJECTION INTRAVENOUS; SUBCUTANEOUS at 14:51

## 2025-05-01 RX ADMIN — LIDOCAINE HYDROCHLORIDE 6 ML: 10 INJECTION, SOLUTION INFILTRATION; PERINEURAL at 08:38

## 2025-05-01 RX ADMIN — SENNOSIDES AND DOCUSATE SODIUM 2 TABLET: 50; 8.6 TABLET ORAL at 08:11

## 2025-05-01 NOTE — PLAN OF CARE
Goal Outcome Evaluation:      Pt arrived to CICU early this morning. Pt on bipap, plan for thoracentesis this morning. Am labs sent

## 2025-05-01 NOTE — ED NOTES
Nursing report ED to floor  Philly Person  79 y.o.  female    HPI :  HPI  Stated Reason for Visit: SOA    Chief Complaint  Chief Complaint   Patient presents with    Shortness of Breath       Admitting doctor:   Nabeel Oneill MD    Admitting diagnosis:   The primary encounter diagnosis was Acute hypoxic respiratory failure. Diagnoses of Pleural effusion and ESRD on dialysis were also pertinent to this visit.    Code status:   Current Code Status       Date Active Code Status Order ID Comments User Context       Prior            Allergies:   Patient has no known allergies.    Isolation:   No active isolations    Intake and Output    Intake/Output Summary (Last 24 hours) at 5/1/2025 0218  Last data filed at 4/30/2025 2015  Gross per 24 hour   Intake 100 ml   Output --   Net 100 ml       Weight:       04/30/25  1727   Weight: 93.4 kg (206 lb)       Most recent vitals:   Vitals:    05/01/25 0031 05/01/25 0101 05/01/25 0102 05/01/25 0118   BP: 138/63 126/57     Pulse: 101  99 102   Resp: 18      Temp:       SpO2: 97%  97% 98%   Weight:       Height:           Active LDAs/IV Access:   Lines, Drains & Airways       Active LDAs       Name Placement date Placement time Site Days    Peripheral IV 04/30/25 1759 20 G Right;Lateral Antecubital 04/30/25  1759  Antecubital  less than 1                    Labs (abnormal labs have a star):   Labs Reviewed   COMPREHENSIVE METABOLIC PANEL - Abnormal; Notable for the following components:       Result Value    Glucose 159 (*)     Creatinine 2.56 (*)     Sodium 134 (*)     Potassium 3.3 (*)     Chloride 94 (*)     AST (SGOT) 41 (*)     Alkaline Phosphatase 263 (*)     Total Bilirubin 1.5 (*)     BUN/Creatinine Ratio 5.5 (*)     Anion Gap 17.0 (*)     eGFR 18.5 (*)     All other components within normal limits    Narrative:     GFR Categories in Chronic Kidney Disease (CKD)              GFR Category          GFR (mL/min/1.73)    Interpretation  G1                    90 or  "greater        Normal or high (1)  G2                    60-89                Mild decrease (1)  G3a                   45-59                Mild to moderate decrease  G3b                   30-44                Moderate to severe decrease  G4                    15-29                Severe decrease  G5                    14 or less           Kidney failure    (1)In the absence of evidence of kidney disease, neither GFR category G1 or G2 fulfill the criteria for CKD.    eGFR calculation 2021 CKD-EPI creatinine equation, which does not include race as a factor   LACTIC ACID, PLASMA - Abnormal; Notable for the following components:    Lactate 2.6 (*)     All other components within normal limits   PROCALCITONIN - Abnormal; Notable for the following components:    Procalcitonin 0.56 (*)     All other components within normal limits    Narrative:     As a Marker for Sepsis (Non-Neonates):    1. <0.5 ng/mL represents a low risk of severe sepsis and/or septic shock.  2. >2 ng/mL represents a high risk of severe sepsis and/or septic shock.    As a Marker for Lower Respiratory Tract Infections that require antibiotic therapy:    PCT on Admission    Antibiotic Therapy       6-12 Hrs later    >0.5                Strongly Recommended  >0.25 - <0.5        Recommended   0.1 - 0.25          Discouraged              Remeasure/reassess PCT  <0.1                Strongly Discouraged     Remeasure/reassess PCT    As 28 day mortality risk marker: \"Change in Procalcitonin Result\" (>80% or <=80%) if Day 0 (or Day 1) and Day 4 values are available. Refer to [email or web address]    Change in PCT <=80%  A decrease of PCT levels below or equal to 80% defines a positive change in PCT test result representing a higher risk for 28-day all-cause mortality of patients diagnosed with severe sepsis for septic shock.    Change in PCT >80%  A decrease of PCT levels of more than 80% defines a negative change in PCT result representing a " lower risk for 28-day all-cause mortality of patients diagnosed with severe sepsis or septic shock.      BLOOD GAS, VENOUS - Abnormal; Notable for the following components:    pH, Venous 7.424 (*)     pCO2, Venous 39.0 (*)     All other components within normal limits   CBC WITH AUTO DIFFERENTIAL - Abnormal; Notable for the following components:    Neutrophil % 82.4 (*)     Lymphocyte % 7.7 (*)     Eosinophil % 0.0 (*)     Immature Grans % 0.6 (*)     Lymphocytes, Absolute 0.61 (*)     All other components within normal limits   TROPONIN - Abnormal; Notable for the following components:    HS Troponin T 77 (*)     All other components within normal limits    Narrative:     High Sensitive Troponin T Reference Range:  <14.0 ng/L- Negative Female for AMI  <22.0 ng/L- Negative Male for AMI  >=14 - Abnormal Female indicating possible myocardial injury.  >=22 - Abnormal Male indicating possible myocardial injury.   Clinicians would have to utilize clinical acumen, EKG, Troponin, and serial changes to determine if it is an Acute Myocardial Infarction or myocardial injury due to an underlying chronic condition.        HIGH SENSITIVITIY TROPONIN T 1HR - Abnormal; Notable for the following components:    HS Troponin T 82 (*)     All other components within normal limits    Narrative:     High Sensitive Troponin T Reference Range:  <14.0 ng/L- Negative Female for AMI  <22.0 ng/L- Negative Male for AMI  >=14 - Abnormal Female indicating possible myocardial injury.  >=22 - Abnormal Male indicating possible myocardial injury.   Clinicians would have to utilize clinical acumen, EKG, Troponin, and serial changes to determine if it is an Acute Myocardial Infarction or myocardial injury due to an underlying chronic condition.        BNP (IN-HOUSE) - Normal    Narrative:     This assay is used as an aid in the diagnosis of individuals suspected of having heart failure. It can be used as an aid in the diagnosis of acute decompensated  heart failure (ADHF) in patients presenting with signs and symptoms of ADHF to the emergency department (ED). In addition, NT-proBNP of <300 pg/mL indicates ADHF is not likely.    Age Range Result Interpretation  NT-proBNP Concentration (pg/mL:      <50             Positive            >450                   Gray                 300-450                    Negative             <300    50-75           Positive            >900                  Gray                300-900                  Negative            <300      >75             Positive            >1800                  Gray                300-1800                  Negative            <300   LACTIC ACID, REFLEX - Normal   BLOOD CULTURE   BLOOD CULTURE   COMPREHENSIVE METABOLIC PANEL   CBC WITH AUTO DIFFERENTIAL   POCT GLUCOSE FINGERSTICK   POCT GLUCOSE FINGERSTICK   POCT GLUCOSE FINGERSTICK   POCT GLUCOSE FINGERSTICK   CBC AND DIFFERENTIAL    Narrative:     The following orders were created for panel order CBC & Differential.  Procedure                               Abnormality         Status                     ---------                               -----------         ------                     CBC Auto Differential[[id number]]        Abnormal            Final result                 Please view results for these tests on the individual orders.   CBC AND DIFFERENTIAL    Narrative:     The following orders were created for panel order CBC & Differential.  Procedure                               Abnormality         Status                     ---------                               -----------         ------                     CBC Auto Differential[[id number]]                                                         Please view results for these tests on the individual orders.       EKG:   ECG 12 Lead Dyspnea   Preliminary Result   HEART ZCYB=326  bpm   RR Nyjphrhz=064  ms   TN Gdsucthy=626  ms   P Horizontal Axis=-20  deg   P Front Axis=63  deg   QRSD Interval=88  ms    QT Kxpfxauv=547  ms   AEiA=190  ms   QRS Axis=-56  deg   T Wave Axis=73  deg   - ABNORMAL ECG -   Sinus tachycardia   Atrial premature complex   Left anterior fascicular block   Anterior infarct, old   Date and Time of Study:2025-04-30 17:59:57          Meds given in ED:   Medications   nitroglycerin (NITROSTAT) SL tablet 0.4 mg (has no administration in time range)   sodium chloride 0.9 % flush 10 mL (10 mL Intravenous Given 5/1/25 0027)   sodium chloride 0.9 % flush 10 mL (has no administration in time range)   sodium chloride 0.9 % infusion 40 mL (has no administration in time range)   mupirocin (BACTROBAN) 2 % nasal ointment 1 Application (1 Application Each Nare Given 5/1/25 0037)   aluminum-magnesium hydroxide-simethicone (MAALOX MAX) 400-400-40 MG/5ML suspension 15 mL (has no administration in time range)   sennosides-docusate (PERICOLACE) 8.6-50 MG per tablet 2 tablet (2 tablets Oral Not Given 5/1/25 0039)     And   polyethylene glycol (MIRALAX) packet 17 g (has no administration in time range)     And   bisacodyl (DULCOLAX) EC tablet 5 mg (has no administration in time range)     And   bisacodyl (DULCOLAX) suppository 10 mg (has no administration in time range)   acetaminophen (TYLENOL) tablet 650 mg (has no administration in time range)     Or   acetaminophen (TYLENOL) suppository 650 mg (has no administration in time range)   ondansetron ODT (ZOFRAN-ODT) disintegrating tablet 4 mg (has no administration in time range)     Or   ondansetron (ZOFRAN) injection 4 mg (has no administration in time range)   cefTRIAXone (ROCEPHIN) 2,000 mg in sodium chloride 0.9 % 100 mL MBP (0 mg Intravenous Stopped 4/30/25 2015)       Imaging results:  CT Chest Without Contrast Diagnostic  Result Date: 4/30/2025   1. Large bilateral pleural effusions, right greater than left. 2. Cirrhosis with ascites. The volume of fluid has decreased when compared to November 2024.  Radiation dose reduction techniques were utilized,  including automated exposure control and exposure modulation based on body size.   This report was finalized on 4/30/2025 9:49 PM by Dr. Adriana Singer M.D on Workstation: [id number]        Ambulatory status:   - bed rest    Social issues:   Social History     Socioeconomic History    Marital status:    Tobacco Use    Smoking status: Never    Smokeless tobacco: Never   Vaping Use    Vaping status: Never Used   Substance and Sexual Activity    Alcohol use: Yes     Comment: ON OCC    Drug use: Never    Sexual activity: Defer       Peripheral Neurovascular  Peripheral Neurovascular (Adult)  Peripheral Neurovascular WDL: WDL    Neuro Cognitive  Neuro Cognitive (Adult)  Cognitive/Neuro/Behavioral WDL: WDL, level of consciousness, orientation  Level of Consciousness: Alert  Orientation: oriented x 4  Pupils  Pupil PERRLA: yes  Motor Response  Motor Response: general motor response  Hand /Ankle Strength  Hand , Left: strong  Hand , Right: strong  Dorsiflexion, Left: strong  Dorsiflexion, Right: strong  Plantarflexion, Left: strong  Plantarflexion, Right: strong  [place] Coma Scale  Best Eye Response: 4-->(E4) spontaneous  Best Motor Response: 6-->(M6) obeys commands  Best Verbal Response: 5-->(V5) oriented  [place] Coma Scale Score: 15    Learning  Learning Assessment  Learning Readiness and Ability: no barriers identified  Education Provided  Person Taught: patient, spouse  Teaching Method: verbal instruction  Teaching Focus: symptom/problem overview, diagnostic test, medical device/equipment use, medication administration  Education Outcome Evaluation: acceptance expressed    Respiratory  Respiratory WDL  Respiratory WDL: .WDL except, all  Rhythm/Pattern, Respiratory: shortness of breath  Expansion/Accessory Muscles/Retractions: abdominal muscle use, accessory muscle use  Breath Sounds  All Lung Fields Breath Sounds: All Fields  All Lung Fields Breath Sounds: Anterior:, diminished, equal  bilaterally    Abdominal Pain       Pain Assessments  Pain (Adult)  (0-10) Pain Rating: Rest: 0    NIH Stroke Scale       Natacha Navarrete RN  05/01/25 02:18 EDT

## 2025-05-01 NOTE — H&P
Pt now being requested to admit to icu please see consult note for [facility] for this admission.  Note created for documentation completion [facility] requirements.  Electronically signed by Nabeel Oneill MD, 04/30/25, 10:31 PM EDT.

## 2025-05-01 NOTE — PROGRESS NOTES
[facility]  [phone]  Dr. Kavon Koroma     Subjective:  LOS: 1    Chief Complaint: Shortness of breath    Patient getting thoracentesis as I entered the room.  She denied any acute concerns or complaints.    Objective   Vital Signs past 24hrs  Temp range: Temp (24hrs), Av.8 °F (36.6 °C), Min:97.4 °F (36.3 °C), Max:98.4 °F (36.9 °C)    BP range: BP: ()/(47-76) 129/52  Pulse range: Heart Rate:  [] 94  Resp rate range: Resp:  [18-29] 29  Device (Oxygen Therapy): nasal cannulaFlow (L/min) (Oxygen Therapy):  [4-60] 4  Oxygen range:SpO2:  [92 %-100 %] 97 %   Mechanical Ventilator:     Physical Exam  Vitals and nursing note reviewed.   Constitutional:       General: She is not in acute distress.  HENT:      Head: Normocephalic and atraumatic.   Cardiovascular:      Rate and Rhythm: Normal rate and regular rhythm.      Heart sounds: No murmur heard.  Pulmonary:      Effort: Pulmonary effort is normal. No respiratory distress.      Breath sounds: Normal breath sounds. No wheezing, rhonchi or rales.   Abdominal:      General: Abdomen is flat.      Tenderness: There is no abdominal tenderness.   Skin:     General: Skin is warm and dry.      Findings: No rash.   Neurological:      Mental Status: She is alert.       Results Review:    I have reviewed the laboratory and imaging data since the last note by [facility] physician.  My annotations are noted in assessment and plan.      Result Review:  I have personally reviewed the results from last note by [facility] physician to 2025 12:30 EDT and agree with these findings:  [x]  Laboratory list / accordion  [x]  Microbiology  [x]  Radiology  [x]  EKG/Telemetry   [x]  Cardiology/Vascular   [x]  Pathology  [x]  Old records  []  Other:    Medication Review:  I have reviewed the current MAR.  My annotations are noted in assessment and plan.    midodrine, 10 mg, Oral, Daily  midodrine, 5 mg, Oral, Daily  mupirocin, 1 Application, Each Nare,  BID  senna-docusate sodium, 2 tablet, Oral, BID  sodium chloride, 10 mL, Intravenous, Q12H           Lines, Drains & Airways       Active LDAs       Name Placement date Placement time Site Days    Peripheral IV 04/30/25 1759 20 G Right;Lateral Antecubital 04/30/25  1759  Antecubital  less than 1                  No active isolations  Diet Orders (active) (From admission, onward)       Start     Ordered    05/01/25 1007  Diet: Cardiac, Fluid Restriction (240 mL/tray); Low Sodium (2g); 1500 mL/day; Texture: Regular (IDDSI 7); Fluid Consistency: Thin (IDDSI 0)  Diet Effective Now         05/01/25 1006                      Assessment  ESRD on hemodialysis Monday Wednesday Friday  Bilateral pleural effusions  Cirrhosis with recurrent ascites status post every 2 week paracentesis  Acute hypoxemic respiratory failure  Type II by diabetes  Hypertension  Troponin elevation - trend  Hyponatremia    Plan  -Patient presenting on 4/30/2025 with shortness of breath.  Found to have large bilateral pleural effusions and of note has cirrhosis with chronic recurrent ascites with large-volume paracentesis every 2 weeks.  Admitted to ICU due to need for BiPAP for her significant work of breathing.  -Currently weaned off BiPAP.  Continue weaning nasal cannula oxygen for goal O2 saturation greater than 90%  - IR consulted and performed right thoracentesis (5/1/2025) with 1.95 L removed  - Will plan for left thoracentesis as well likely tomorrow  -Nephrology following for ESRD and volume overload  - Continue home medications as able  - Likely downgrade out of ICU later today.  Ultimately management is going to be difficult given the patient has recurrent ascites requiring frequent large-volume paracenteses and now bilateral large pleural effusions.  Will need to assess for the effusion recurrence as this could be hepatic hydrothorax.  Would need a shunt study to confirm this.  Will need to optimize her ascites management going  forward.        THESE ARE NEW MEDICAL PROBLEMS TO ME.      Kavon Koroma DO   05/01/25  12:30 EDT      Part of this note may be an electronic transcription/translation of spoken language to printed text using the Dragon Dictation System.

## 2025-05-01 NOTE — PLAN OF CARE
Goal Outcome Evaluation:  Plan of Care Reviewed With: patient, spouse        Progress: no change  Outcome Evaluation: Patient remained in CICU this shift. On 3L NC. Right thoracentesis completed - 1950 removed. HD currently running. Patient up to BSC with 1 ast - minimal urine, 1 BM. Midodrine restarted. Patient spouse updated at bedside. No complaints of pain this shift. Intermittent c/o SOA. No complaints at this time.

## 2025-05-01 NOTE — PROGRESS NOTES
Clinical Pharmacy Services: Medication History    Philly Person is a 79 y.o. female presenting to [facility] for Pleural effusion [J90]  ESRD on dialysis [N18.6, Z99.2]  Acute hypoxic respiratory failure [J96.01]    She  has a past medical history of Acute renal failure on dialysis, Angiodysplasia of colon without hemorrhage (07/14/2022), Arteriovenous fistula of left upper extremity (11/13/2024), Arthritis, Basal cell carcinoma (01/30/2018), Calculus of kidney (03/22/2016), Cataract, Diabetes mellitus, 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 abscess w/o bleeding (07/14/2022), H/O: gout (12/07/2021), History of kidney stones, History of melanoma, Hyperlipidemia, Hypertension, Left knee pain, Melanoma (01/30/2018), Nonalcoholic steatohepatitis, Seasonal allergies, Squamous cell carcinoma of skin (12/09/2016), Stage 4 chronic kidney disease (12/07/2021), and Type 2 diabetes mellitus (03/22/2016).    Allergies as of 04/30/2025    (No Known Allergies)       Medication information was obtained from: patient interview and medical records  Pharmacy and Phone Number: on file    Prior to Admission Medications       Prescriptions Last Dose Informant Patient Reported? Taking?    allopurinol (ZYLOPRIM) 100 MG tablet   No Yes    Take 2 tablets by mouth Every Night.    atorvastatin (LIPITOR) 10 MG tablet   No Yes    Take 1 tablet by mouth Every Night.    glucose blood test strip   No Yes    Use as instructed to test blood sugar once daily for diabetes E11.9    insulin degludec (Tresiba FlexTouch) 100 UNIT/ML solution pen-injector injection   No Yes    Inject 14 Units under the skin into the appropriate area as directed Daily.    lactulose (CHRONULAC) 10 GM/15ML solution   No Yes    Take 30 mL by mouth Daily.    Patient taking differently:  Take 30 mL by mouth 3 (Three) Times a Week.    midodrine (PROAMATINE) 5 MG tablet   Yes Yes    Take 1 tablet by mouth Every Evening.    midodrine (PROAMATINE) 5 MG tablet   Yes  Yes    Take 2 tablets by mouth Every Morning.    pantoprazole (PROTONIX) 40 MG EC tablet   Yes Yes    Take 1 tablet by mouth Daily.    sevelamer (RENVELA) 800 MG tablet   Yes Yes    Take 1 tablet by mouth 3 (Three) Times a Day With Meals.    torsemide (DEMADEX) 20 MG tablet   No Yes    TAKE 1 TABLET BY MOUTH DAILY    Patient taking differently:  Take 2 tablets by mouth 2 (Two) Times a Day.    betamethasone, augmented, (Diprolene) 0.05 % ointment   Yes Yes    Apply 1 Application topically to the appropriate area as directed 1 (One) Time Per Week.    Blood Glucose Monitoring Suppl ([email or web address] Blood Glucose Monito) w/Device kit   No Yes    1 each by Other route Daily. for testing blood sugar once daily for diabetes E11.9    coenzyme Q10 100 MG capsule  Family Member Yes Yes    Take 2 capsules by mouth Every Evening.    Cranberry 500 MG capsule  Family Member Yes Yes    Take 500 mg by mouth Daily.    glucose blood test strip   No No    Use as instructed to test blood sugar once daily . Uncontrolled diabetes e11.65    Patient taking differently:  1 each by Other route As Needed (bg). Use as instructed to test blood sugar once daily . Uncontrolled diabetes e11.65    Insulin Pen Needle (Pen Needles) 32G X 4 MM misc   No Yes    Use 1 each Daily.    iron sucrose   Yes No    100 mL.    Methoxy PEG-Epoetin Beta (MIRCERA IJ)   Yes Yes    225 mcg Every 14 (Fourteen) Days.    Multiple Vitamins-Minerals (MULTIVITAMIN ADULT PO)  Family Member Yes Yes    Take 1 tablet by mouth Daily.    vitamin B-6 (PYRIDOXINE) 100 MG tablet  Family Member Yes Yes    Take 1 tablet by mouth Daily.    Wheat Dextrin (BENEFIBER DRINK MIX PO)   Yes Yes    Take  by mouth 3 (Three) Times a Week.              Medication notes:   -Confirmed patient no longer taking hydralazine and removed from med list  -Changed midodrine dose based on Dr. Flores's order and patient confirmation  -Pt reports no longer taking ppi, had recent fill so left on list but marked  as not taking    This medication list is complete to the best of my knowledge as of 5/1/2025    Please call if questions.    Thanks,  Johny Burton.D, BCCCP    5/1/2025 13:28 EDT

## 2025-05-01 NOTE — CASE MANAGEMENT/SOCIAL WORK
Discharge Planning Assessment  [facility]     Patient Name: Philly Person  MRN: [id number]  Today's Date: 5/1/2025    Admit Date: 4/30/2025    Plan: Home, family to transport   Discharge Needs Assessment       Row Name 05/01/25 1408       Living Environment    People in Home spouse    Current Living Arrangements home    Primary Care Provided by self    Provides Primary Care For no one    Family Caregiver if Needed spouse    Able to Return to Prior Arrangements yes       Resource/Environmental Concerns    Resource/Environmental Concerns home accessibility    Home Accessibility Concerns stairs to enter home       Transition Planning    Patient/Family Anticipates Transition to home with family    Patient/Family Anticipated Services at Transition none    Transportation Anticipated family or friend will provide       Discharge Needs Assessment    Equipment Currently Used at Home cane, straight    Concerns to be Addressed discharge planning                   Discharge Plan       Row Name 05/01/25 1408       Plan    Plan Home, family to transport    Patient/Family in Agreement with Plan yes    Plan Comments CCP spoke with patient at bedside; explained role, verified facesheet, and discussed dc plan. Patient uses a cane at home. She lives with a spouse in a 3 level home but resides on 1 level. There are 3 steps to enter the home. Patient has used HH in the past but cannot recall which agency. She has no history of SNF. Patient  gets HD MWF at [facility] on [address] in [facility]. Patient is currently on a bipap. Patient denies any HH or DME needs and reports plan is home via family transport. CCP will continue to follow for dc planning needs pending clinical course. LANE, IZZY                       Demographic Summary       Row Name 05/01/25 1408       General Information    Admission Type inpatient    Arrived From home    Referral Source admission list    Reason for Consult discharge planning    Preferred Language  English       Contact Information    Permission Granted to Share Info With family/designee                   Functional Status       Row Name 05/01/25 1408       Functional Status    Usual Activity Tolerance moderate    Current Activity Tolerance moderate       Functional Status, IADL    Medications assistive equipment    Meal Preparation assistive equipment    Housekeeping assistive equipment    Laundry assistive equipment    Shopping assistive equipment       Mental Status    General Appearance WDL WDL                   Psychosocial    No documentation.                  Abuse/Neglect    No documentation.                  Legal    No documentation.                  Substance Abuse    No documentation.                  Patient Forms    No documentation.                     IZZY Munoz

## 2025-05-01 NOTE — CONSULTS
CONSULT NOTE    Patient Identification:  Philly Person  79 y.o.  female  1945  [id number]            Requesting physician: Dr John Garcia    Reason for Consultation:  pleural effusions ahrf    CC:  soa    History of Present Illness:  Patient is an 79-year-old with ESRD on hemodialysis Monday Wednesday Friday as well as cirrhosis requiring paracentesis undergoing 6 and half liters of fluid removal a week ago who presented to the emergency room with shortness of breath.  Shortness of breath really not improved after hemodialysis today.  No fever no chills.  She says at baseline she gets paracentesis every 2 weeks.  Shortness of breath ongoing for about a day not getting better after hemodialysis no chest pain no fevers no chills patient is awake alert she is having some increased work of breathing      Review of Systems:  12 system review of systems performed and all else negative    Past Medical History:   Diagnosis Date    Acute renal failure on dialysis     Angiodysplasia of colon without hemorrhage 07/14/2022    Arteriovenous fistula of left upper extremity 11/13/2024    Arthritis     Basal cell carcinoma 01/30/2018 1/2018.      Calculus of kidney 03/22/2016    Cataract     LEFT AND RIGHT    Diabetes mellitus     TYPE 2    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 abscess w/o bleeding 07/14/2022    H/O: gout 12/07/2021    History of kidney stones     History of melanoma     LEFT ARM    Hyperlipidemia     Hypertension     Left knee pain     Melanoma 01/30/2018 1/2018.      Nonalcoholic steatohepatitis     Seasonal allergies     Squamous cell carcinoma of skin 12/09/2016    New one in 11/2019 -left leg.      Stage 4 chronic kidney disease 12/07/2021    Progressive decline in kidney function since 2019.   most recent creatinine 1.85.    Etiology of kidney disease is likely associated with diabetic nephrosclerosis. biopsy-proven   No recent history of NSAID use.    Renal duplex US negative for  "renal artery stenosis.    kidney biopsy consistent to his diabetic nephropathy with extensive global sclerosis and 60% interstitial fibrosis.  It was noted     Type 2 diabetes mellitus 2016       Past Surgical History:   Procedure Laterality Date     SECTION      COLONOSCOPY      CYSTOSCOPY W/ LASER LITHOTRIPSY      REPLACEMENT TOTAL KNEE Right 10/2015    SKIN CANCER EXCISION Left     ARM MELANOMA    TONSILLECTOMY AND ADENOIDECTOMY      TOTAL KNEE ARTHROPLASTY Left 2019    Procedure: LEFT TOTAL KNEE ARTHROPLASTY;  Surgeon: Magdaleno Saleh MD;  Location: [facility] OR;  Service: Orthopedics    UPPER GASTROINTESTINAL ENDOSCOPY          (Not in a hospital admission)      No Known Allergies    Social History     Socioeconomic History    Marital status:    Tobacco Use    Smoking status: Never    Smokeless tobacco: Never   Vaping Use    Vaping status: Never Used   Substance and Sexual Activity    Alcohol use: Yes     Comment: ON OCC    Drug use: Never    Sexual activity: Defer       Family History   Problem Relation Age of Onset    Bipolar disorder Mother     Hypertension Mother     Heart disease Mother     Stroke Mother     Kidney disease Father     Leukemia Father     Kidney disease Sister     Thyroid disease Sister     Breast cancer Maternal Aunt     Malig Hyperthermia Neg Hx        Physical Exam:  /56   Pulse 108   Temp 98.4 °F (36.9 °C)   Resp 26   Ht 157.5 cm (62.01\")   Wt 93.4 kg (206 lb)   SpO2 95%   BMI 37.67 kg/m²   Body mass index is 37.67 kg/m².   General appearance: Ill, however conversant   Eyes: Anicteric sclerae, moist conjunctivae; no lid lag;   HENT: Atraumatic; oropharynx clear with moist mucous membranes   Neck: Trachea midline; large neck circumference  Lungs: Diminished bilateral bases, with slight increased respiratory effort and no intercostal retractions  CV: RRR, no rub  Abdomen: Nonrigid soft positive fluid  Skin: Normal temperature, turgor and texture; " no rash, ulcers or subcutaneous nodules  Psych: Appropriate affect, alert   Neuro speech intact moves all extremities normal cognition    LABS:  Results from last 7 days   Lab Units 04/30/25  1756   WBC 10*3/mm3 7.89   HEMOGLOBIN g/dL 12.3   PLATELETS 10*3/mm3 150     Results from last 7 days   Lab Units 04/30/25  1756 04/28/25  0000   SODIUM mmol/L 134*  --    POTASSIUM mmol/L 3.3*  --    CHLORIDE mmol/L 94*  --    CO2 mmol/L 23.0  --    BUN mg/dL 14 43*   CREATININE mg/dL 2.56*  --    GLUCOSE mg/dL 159*  --    CALCIUM mg/dL 9.2  --    Estimated Creatinine Clearance: 18.6 mL/min (A) (by C-G formula based on SCr of 2.56 mg/dL (H)).    Imaging: I personally visualized the images of scans/x-rays performed within last 3 days.  Imaging Results (Most Recent)       Procedure Component Value Units Date/Time    CT Chest Without Contrast Diagnostic [[id number]] Resulted: 04/30/25 2144     Updated: 04/30/25 2117    XR Chest 1 View [[id number]] Collected: 04/30/25 1856     Updated: 04/30/25 1900    Narrative:      XR CHEST 1 VW-     Clinical: Dyspnea     COMPARISON examination 7/29/2019     FINDINGS: There is a shallow inspiratory effort. Cardiomediastinal  silhouette is stable. Bilateral pleural effusions, size greater on the  right than the left. Some right-sided pleural effusion appears to be  tracking into the minor fissure. No gross consolidation seen. Old  granulomatous disease right lung again noted. No gross pulmonary edema.  The remainder is unremarkable.     This report was finalized on 4/30/2025 6:57 PM by Dr. Jon Irwin M.D  on Workstation: [id number]               Assessment / Recommendations:  ESRD on hemodialysis Monday Wednesday Friday  Bilateral pleural effusions  Cirrhosis with recurrent ascites status post every 2 week paracentesis  Acute hypoxemic respiratory failure  Type II by diabetes  Hypertension  Troponin elevation - trend  Hyponatremia      Right US guided thoracentesis    Nephrology consult - b  effusions, seems HD limited by her hypotension but wonder if any room to take more fluid off?    May need left thoracentesis as well.    ?repeat para?    Bipap prn work of breathing.      Significant challenges in fluid balances with patient with cirrhosis ESRD at age 79    Admit to icu  Dw er md, pt and pt     Hold pharm dvt proph given plan for thoracentesis in am.  [facility] scds  Npo while on potential need for bipap    Nabeel Oneill MD  [facility]  04/30/25  21:44 EDT

## 2025-05-01 NOTE — ED NOTES
Spoke to Dr. Younger for STAT nephrology consult, updated on pts vitals and condition. States nephrology will see pt in the AM. No new orders at this time.    Pt and family member at bedside updated. Pt has no complaints at this time. Pts work of breathing significantly improved after BiPAP.

## 2025-05-01 NOTE — CONSULTS
[facility] Consult Note      Patient Name: Philly Person  : 1945  MRN: [id number]  Primary Care Physician:  Shazia Cardoza MD  Referring Physician: Nabeel Oneill MD  Date of admission: 2025    Subjective     Reason for Consult: End-stage renal disease    HPI:   Philly Person is a 79 y.o. female known to have history of end-stage renal disease on maintenance hemodialysis on  under the care of Dr. Storm at [facility], history of cirrhosis requiring frequent paracentesis who presents to the hospital shortness of breath relieved by hemodialysis.  Her dialysis has been complicated by hypotension limiting UF.  In ER she went to be hypoxemic requiring oxygen by high flow cannula now on BiPAP.  Chest x-ray showed some congestion bilateral pleural effusion.   Nephrology was consulted for management of dialysis  Review of Systems:   14 point review of systems is otherwise negative except for mentioned above on HPI    Personal History     Past Medical History:   Diagnosis Date    Acute renal failure on dialysis     Angiodysplasia of colon without hemorrhage 2022    Arteriovenous fistula of left upper extremity 2024    Arthritis     Basal cell carcinoma 2018.      Calculus of kidney 2016    Cataract     LEFT AND RIGHT    Diabetes mellitus     TYPE 2    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 abscess w/o bleeding 2022    H/O: gout 2021    History of kidney stones     History of melanoma     LEFT ARM    Hyperlipidemia     Hypertension     Left knee pain     Melanoma 2018.      Nonalcoholic steatohepatitis     Seasonal allergies     Squamous cell carcinoma of skin 2016    New one in 2019 -left leg.      Stage 4 chronic kidney disease 2021    Progressive decline in kidney function since 2019.   most recent creatinine 1.85.    Etiology of kidney disease is likely associated  with diabetic nephrosclerosis. biopsy-proven   No recent history of NSAID use.    Renal duplex US negative for renal artery stenosis.    kidney biopsy consistent to his diabetic nephropathy with extensive global sclerosis and 60% interstitial fibrosis.  It was noted     Type 2 diabetes mellitus 2016       Past Surgical History:   Procedure Laterality Date     SECTION      COLONOSCOPY      CYSTOSCOPY W/ LASER LITHOTRIPSY      REPLACEMENT TOTAL KNEE Right 10/2015    SKIN CANCER EXCISION Left     ARM MELANOMA    TONSILLECTOMY AND ADENOIDECTOMY      TOTAL KNEE ARTHROPLASTY Left 2019    Procedure: LEFT TOTAL KNEE ARTHROPLASTY;  Surgeon: Magdaleno Saleh MD;  Location: [facility];  Service: Orthopedics    UPPER GASTROINTESTINAL ENDOSCOPY         Family History: family history includes Bipolar disorder in her mother; Breast cancer in her maternal aunt; Heart disease in her mother; Hypertension in her mother; Kidney disease in her father and sister; Leukemia in her father; Stroke in her mother; Thyroid disease in her sister.    Social History:  reports that she has never smoked. She has never used smokeless tobacco. She reports current alcohol use. She reports that she does not use drugs.    Home Medications:  Prior to Admission medications    Medication Sig Start Date End Date Taking? Authorizing Provider   allopurinol (ZYLOPRIM) 100 MG tablet Take 2 tablets by mouth Every Night. 25   Shazia Cardoza MD   atorvastatin (LIPITOR) 10 MG tablet Take 1 tablet by mouth Every Night. 25   Shazia Cardoza MD   betamethasone, augmented, (Diprolene) 0.05 % ointment Apply 1 Application topically to the appropriate area as directed 1 (One) Time Per Week. 25   Provider, MD Myah   Blood Glucose Monitoring Suppl ([employer] Blood Glucose Monito) w/Device kit 1 each by Other route Daily. for testing blood sugar once daily for diabetes E11.9 24   Shazia Cardoza MD   coenzyme Q10 100 MG  capsule Take 2 capsules by mouth Every Evening.    Myah Ch MD   Cranberry 500 MG capsule Take 500 mg by mouth Daily.    Myah Ch MD   glucose blood test strip Use as instructed to test blood sugar once daily . Uncontrolled diabetes e11.65 5/2/23   Shazia Cardoza MD   glucose blood test strip Use as instructed to test blood sugar once daily for diabetes E11.9 7/3/24   Shazia Cardoza MD   insulin degludec (Tresiba FlexTouch) 100 UNIT/ML solution pen-injector injection Inject 14 Units under the skin into the appropriate area as directed Daily. 3/6/25   Venkat Frost, DO   Insulin Pen Needle (Pen Needles) 32G X 4 MM misc Use 1 each Daily. 8/6/24   Venkat Frost, DO   iron sucrose 100 mL. 4/14/25 5/5/25  Myah Ch MD   lactulose (CHRONULAC) 10 GM/15ML solution Take 30 mL by mouth Daily.  Patient taking differently: Take 30 mL by mouth 3 (Three) Times a Week. 11/22/24   Eladio Shelley MD   Methoxy PEG-Epoetin Beta (MIRCERA IJ) 225 mcg Every 14 (Fourteen) Days. 4/14/25 4/13/26  Myah Ch MD   midodrine (PROAMATINE) 5 MG tablet Take 1 tablet by mouth 3 times a day. 3/7/25   Myah Ch MD   Multiple Vitamins-Minerals (MULTIVITAMIN ADULT PO) Take 1 tablet by mouth Daily.    Myah Ch MD   sevelamer (RENVELA) 800 MG tablet Take 1 tablet by mouth 3 (Three) Times a Day With Meals. 3/7/25   Myah Ch MD   torsemide (DEMADEX) 20 MG tablet TAKE 1 TABLET BY MOUTH DAILY  Patient taking differently: Take 2 tablets by mouth 2 (Two) Times a Day. 1/13/25   Shazia Cardoza MD   vitamin B-6 (PYRIDOXINE) 100 MG tablet Take 1 tablet by mouth Daily.    Myah Ch MD   Wheat Dextrin (BENEFIBER DRINK MIX PO) Take  by mouth 3 (Three) Times a Week.    Myah Ch MD       Allergies:  No Known Allergies    Objective     Vitals:   Temp:  [97.4 °F (36.3 °C)-98.4 °F (36.9 °C)] 97.6 °F (36.4 °C)  Heart Rate:  []  98  Resp:  [18-29] 29  BP: (106-138)/(54-76) 134/56  Flow (L/min) (Oxygen Therapy):  [7-60] 7    Intake/Output Summary (Last 24 hours) at 5/1/2025 0806  Last data filed at 5/1/2025 0547  Gross per 24 hour   Intake 120 ml   Output 0 ml   Net 120 ml       Physical Exam:   Constitutional: Pleasant awake oriented on BiPAP  HEENT: Sclera anicteric, no conjunctival injection  Neck: Supple, no thyromegaly, no lymphadenopathy, trachea at midline, no JVD  Respiratory: Clear to auscultation bilaterally, nonlabored respiration  Cardiovascular: RRR, no murmurs, no rubs or gallops, no carotid bruit  Gastrointestinal: Positive bowel sounds, abdomen is soft, nontender and nondistended  : No palpable bladder  Musculoskeletal: Abdominal distention.  Bilateral lower extremity edema  Psychiatric: Appropriate affect, cooperative  Neurologic: Oriented x3, moving all extremities, normal speech and mental status  Skin: Warm and dry       Scheduled Meds:     mupirocin, 1 Application, Each Nare, BID  senna-docusate sodium, 2 tablet, Oral, BID  sodium chloride, 10 mL, Intravenous, Q12H      IV Meds:        Results Reviewed:   I have personally reviewed the results from the time of this admission to 5/1/2025 08:06 EDT     Lab Results   Component Value Date    GLUCOSE 139 (H) 05/01/2025    CALCIUM 8.9 05/01/2025     (L) 05/01/2025    K 4.0 05/01/2025    CO2 24.7 05/01/2025    CL 97 (L) 05/01/2025    BUN 19 05/01/2025    CREATININE 3.00 (H) 05/01/2025    EGFRIFAFRI 35 (L) 09/30/2021    EGFRIFNONA 29 (L) 09/30/2021    BCR 6.3 (L) 05/01/2025    ANIONGAP 13.3 05/01/2025      Lab Results   Component Value Date    MG 2.2 02/10/2025    PHOS 3.6 11/22/2024    ALBUMIN 3.1 (L) 05/01/2025     US Paracentesis  Result Date: 4/25/2025  IMAGE GUIDED PARACENTESIS  HISTORY:  Cirrhosis of liver, unspecified type  TECHNIQUE: Informed consent was obtained and documented. The patient was placed in a supine position. After preparatory ultrasound scan and  image recording, the left lower quadrant was prepped and draped in the usual aseptic manner. 1% lidocaine was infiltrated at the designated puncture site. A 5 Kosovan paracentesis catheter was then inserted into the ascites using trocar technique . A total of 6.8 L fluid were drained . The catheter was then removed. Manual pressure was held. Sterile dressing was applied. There were no immediate complications. All elements of maximal sterile technique were followed.       Impression: Successful ultrasound-guided paracentesis.  This report was finalized on 4/25/2025 8:58 AM by Dr. Yohan Gillis M.D on Workstation: [id number]      US Paracentesis  Result Date: 4/11/2025  ULTRASOUND GUIDED PARACENTESIS  HISTORY: Ascites  Informed consent was obtained. The abdomen was prepped and draped in the usual sterile fashion with 2% chlorhexidine. Lidocaine was used for local anesthesia.  A 5 Kosovan catheter was inserted into the right lower quadrant using ultrasound guidance. 6400 mL of yellow-colored ascites was removed.  Confirmatory images were obtained.  Patient tolerated the procedure well without immediate complications.      Impression: Ultrasound-guided paracentesis as described  This report was finalized on 4/11/2025 9:03 AM by Dr. Hung Michelle M.D on Workstation: [id number]         Assessment / Plan     ASSESSMENT:  End-stage renal disease second biopsy-proven diabetic nephropathy currently on maintenance hemodialysis on Monday Wednesday and Friday with her dialysis been complicated by hypotension requiring midodrine and limiting UF.  Type 2 diabetes mellitus with end-stage renal disease.  Management by primary  Hyponatremia, hypervolemic  Liver cirrhosis related to Bond requiring frequent paracentesis  Hypervolemia with bilateral pleural effusion followed by pulmonary  Anemia of CKD hemoglobin is at goal  Chronic hypotension on midodrine  Hyperphosphatemia on binders that we will continue        PLAN:  Will dialyze  daily for volume removal and attempt to UF as tolerated  Continue midodrine 3 times daily  Place patient on fluid restriction of 1.5 L per 24 hours  Salt restricted diet  Surveillance labs      Thank you for involving us in the care of Philly Hacketttt.  Please feel free to call with any questions.    Christy Flores MD  05/01/25  08:06 EDT    [facility]  [phone]    Parts of this note may be an electronic transcription/translation of spoken language to printed text using the Dragon dictation system.

## 2025-05-02 ENCOUNTER — APPOINTMENT (OUTPATIENT)
Dept: GENERAL RADIOLOGY | Facility: HOSPITAL | Age: 80
DRG: 189 | End: 2025-05-02
Payer: MEDICARE

## 2025-05-02 PROBLEM — N18.6 ESRD (END STAGE RENAL DISEASE): Status: ACTIVE | Noted: 2025-05-02

## 2025-05-02 PROBLEM — J90 PLEURAL EFFUSION: Status: ACTIVE | Noted: 2025-05-02

## 2025-05-02 LAB
ALBUMIN FLD-MCNC: 1.7 G/DL
ALBUMIN SERPL-MCNC: 2.6 G/DL (ref 3.5–5.2)
ALBUMIN/GLOB SERPL: 0.7 G/DL
ALP SERPL-CCNC: 176 U/L (ref 39–117)
ALT SERPL W P-5'-P-CCNC: 13 U/L (ref 1–33)
ANION GAP SERPL CALCULATED.3IONS-SCNC: 9.1 MMOL/L (ref 5–15)
APPEARANCE FLD: ABNORMAL
AST SERPL-CCNC: 36 U/L (ref 1–32)
BASOPHILS # BLD AUTO: 0.05 10*3/MM3 (ref 0–0.2)
BASOPHILS NFR BLD AUTO: 1 % (ref 0–1.5)
BILIRUB SERPL-MCNC: 0.7 MG/DL (ref 0–1.2)
BUN SERPL-MCNC: 18 MG/DL (ref 8–23)
BUN/CREAT SERPL: 5.6 (ref 7–25)
CALCIUM SPEC-SCNC: 8.3 MG/DL (ref 8.6–10.5)
CHLORIDE SERPL-SCNC: 99 MMOL/L (ref 98–107)
CO2 SERPL-SCNC: 23.9 MMOL/L (ref 22–29)
COLOR FLD: YELLOW
CREAT SERPL-MCNC: 3.21 MG/DL (ref 0.57–1)
DEPRECATED RDW RBC AUTO: 51.7 FL (ref 37–54)
EGFRCR SERPLBLD CKD-EPI 2021: 14.1 ML/MIN/1.73
EOSINOPHIL # BLD AUTO: 0.09 10*3/MM3 (ref 0–0.4)
EOSINOPHIL NFR BLD AUTO: 1.7 % (ref 0.3–6.2)
ERYTHROCYTE [DISTWIDTH] IN BLOOD BY AUTOMATED COUNT: 14.4 % (ref 12.3–15.4)
GLOBULIN UR ELPH-MCNC: 3.5 GM/DL
GLUCOSE BLDC GLUCOMTR-MCNC: 179 MG/DL (ref 70–130)
GLUCOSE BLDC GLUCOMTR-MCNC: 225 MG/DL (ref 70–130)
GLUCOSE FLD-MCNC: 196 MG/DL
GLUCOSE SERPL-MCNC: 164 MG/DL (ref 65–99)
HCT VFR BLD AUTO: 31.3 % (ref 34–46.6)
HGB BLD-MCNC: 9.9 G/DL (ref 12–15.9)
IMM GRANULOCYTES # BLD AUTO: 0.03 10*3/MM3 (ref 0–0.05)
IMM GRANULOCYTES NFR BLD AUTO: 0.6 % (ref 0–0.5)
LAB AP CASE REPORT: NORMAL
LDH FLD-CCNC: 173 U/L
LDH SERPL-CCNC: 203 U/L (ref 135–214)
LYMPHOCYTES # BLD AUTO: 0.91 10*3/MM3 (ref 0.7–3.1)
LYMPHOCYTES NFR BLD AUTO: 17.6 % (ref 19.6–45.3)
LYMPHOCYTES NFR FLD MANUAL: 11 %
MCH RBC QN AUTO: 30.7 PG (ref 26.6–33)
MCHC RBC AUTO-ENTMCNC: 31.6 G/DL (ref 31.5–35.7)
MCV RBC AUTO: 97.2 FL (ref 79–97)
METHOD: ABNORMAL
MONOCYTES # BLD AUTO: 0.79 10*3/MM3 (ref 0.1–0.9)
MONOCYTES NFR BLD AUTO: 15.3 % (ref 5–12)
MONOCYTES NFR FLD: 13 %
MONOS+MACROS NFR FLD: 63 %
NEUTROPHILS NFR BLD AUTO: 3.29 10*3/MM3 (ref 1.7–7)
NEUTROPHILS NFR BLD AUTO: 63.8 % (ref 42.7–76)
NEUTROPHILS NFR FLD MANUAL: 13 %
NRBC BLD AUTO-RTO: 0 /100 WBC (ref 0–0.2)
NUC CELL # FLD: 136 /MM3
PATH REPORT.FINAL DX SPEC: NORMAL
PATH REPORT.GROSS SPEC: NORMAL
PH FLD: 7.7 [PH]
PHOSPHATE SERPL-MCNC: 3.9 MG/DL (ref 2.5–4.5)
PLATELET # BLD AUTO: 93 10*3/MM3 (ref 140–450)
PMV BLD AUTO: 9.3 FL (ref 6–12)
POTASSIUM SERPL-SCNC: 3.8 MMOL/L (ref 3.5–5.2)
PROT FLD-MCNC: 2.9 G/DL
PROT SERPL-MCNC: 6.1 G/DL (ref 6–8.5)
PROT SERPL-MCNC: 6.1 G/DL (ref 6–8.5)
RBC # BLD AUTO: 3.22 10*6/MM3 (ref 3.77–5.28)
RBC # FLD AUTO: 477 /MM3
SODIUM SERPL-SCNC: 132 MMOL/L (ref 136–145)
TRIGL FLD-MCNC: 27 MG/DL
WBC NRBC COR # BLD AUTO: 5.16 10*3/MM3 (ref 3.4–10.8)

## 2025-05-02 PROCEDURE — 85025 COMPLETE CBC W/AUTO DIFF WBC: CPT | Performed by: INTERNAL MEDICINE

## 2025-05-02 PROCEDURE — 84157 ASSAY OF PROTEIN OTHER: CPT | Performed by: STUDENT IN AN ORGANIZED HEALTH CARE EDUCATION/TRAINING PROGRAM

## 2025-05-02 PROCEDURE — 94799 UNLISTED PULMONARY SVC/PX: CPT

## 2025-05-02 PROCEDURE — 84100 ASSAY OF PHOSPHORUS: CPT | Performed by: HOSPITALIST

## 2025-05-02 PROCEDURE — 71045 X-RAY EXAM CHEST 1 VIEW: CPT

## 2025-05-02 PROCEDURE — 82945 GLUCOSE OTHER FLUID: CPT | Performed by: STUDENT IN AN ORGANIZED HEALTH CARE EDUCATION/TRAINING PROGRAM

## 2025-05-02 PROCEDURE — 94660 CPAP INITIATION&MGMT: CPT

## 2025-05-02 PROCEDURE — 82042 OTHER SOURCE ALBUMIN QUAN EA: CPT | Performed by: STUDENT IN AN ORGANIZED HEALTH CARE EDUCATION/TRAINING PROGRAM

## 2025-05-02 PROCEDURE — 87070 CULTURE OTHR SPECIMN AEROBIC: CPT | Performed by: STUDENT IN AN ORGANIZED HEALTH CARE EDUCATION/TRAINING PROGRAM

## 2025-05-02 PROCEDURE — 87075 CULTR BACTERIA EXCEPT BLOOD: CPT | Performed by: STUDENT IN AN ORGANIZED HEALTH CARE EDUCATION/TRAINING PROGRAM

## 2025-05-02 PROCEDURE — 88185 FLOWCYTOMETRY/TC ADD-ON: CPT

## 2025-05-02 PROCEDURE — 88184 FLOWCYTOMETRY/ TC 1 MARKER: CPT | Performed by: STUDENT IN AN ORGANIZED HEALTH CARE EDUCATION/TRAINING PROGRAM

## 2025-05-02 PROCEDURE — 0W9B3ZZ DRAINAGE OF LEFT PLEURAL CAVITY, PERCUTANEOUS APPROACH: ICD-10-PCS | Performed by: STUDENT IN AN ORGANIZED HEALTH CARE EDUCATION/TRAINING PROGRAM

## 2025-05-02 PROCEDURE — 83986 ASSAY PH BODY FLUID NOS: CPT | Performed by: STUDENT IN AN ORGANIZED HEALTH CARE EDUCATION/TRAINING PROGRAM

## 2025-05-02 PROCEDURE — 83615 LACTATE (LD) (LDH) ENZYME: CPT | Performed by: STUDENT IN AN ORGANIZED HEALTH CARE EDUCATION/TRAINING PROGRAM

## 2025-05-02 PROCEDURE — 82948 REAGENT STRIP/BLOOD GLUCOSE: CPT

## 2025-05-02 PROCEDURE — 84478 ASSAY OF TRIGLYCERIDES: CPT | Performed by: STUDENT IN AN ORGANIZED HEALTH CARE EDUCATION/TRAINING PROGRAM

## 2025-05-02 PROCEDURE — 80053 COMPREHEN METABOLIC PANEL: CPT | Performed by: INTERNAL MEDICINE

## 2025-05-02 PROCEDURE — 25010000002 HEPARIN (PORCINE) PER 1000 UNITS: Performed by: STUDENT IN AN ORGANIZED HEALTH CARE EDUCATION/TRAINING PROGRAM

## 2025-05-02 PROCEDURE — 87015 SPECIMEN INFECT AGNT CONCNTJ: CPT | Performed by: STUDENT IN AN ORGANIZED HEALTH CARE EDUCATION/TRAINING PROGRAM

## 2025-05-02 PROCEDURE — 87205 SMEAR GRAM STAIN: CPT | Performed by: STUDENT IN AN ORGANIZED HEALTH CARE EDUCATION/TRAINING PROGRAM

## 2025-05-02 PROCEDURE — 89051 BODY FLUID CELL COUNT: CPT | Performed by: STUDENT IN AN ORGANIZED HEALTH CARE EDUCATION/TRAINING PROGRAM

## 2025-05-02 RX ADMIN — SENNOSIDES AND DOCUSATE SODIUM 2 TABLET: 50; 8.6 TABLET ORAL at 09:02

## 2025-05-02 RX ADMIN — MIDODRINE HYDROCHLORIDE 5 MG: 5 TABLET ORAL at 18:19

## 2025-05-02 RX ADMIN — HEPARIN SODIUM 5000 UNITS: 5000 INJECTION INTRAVENOUS; SUBCUTANEOUS at 05:24

## 2025-05-02 RX ADMIN — Medication 10 ML: at 09:17

## 2025-05-02 RX ADMIN — MUPIROCIN 1 APPLICATION: 20 OINTMENT TOPICAL at 20:59

## 2025-05-02 RX ADMIN — MIDODRINE HYDROCHLORIDE 10 MG: 5 TABLET ORAL at 09:02

## 2025-05-02 RX ADMIN — HEPARIN SODIUM 5000 UNITS: 5000 INJECTION INTRAVENOUS; SUBCUTANEOUS at 21:00

## 2025-05-02 RX ADMIN — MUPIROCIN 1 APPLICATION: 20 OINTMENT TOPICAL at 09:01

## 2025-05-02 RX ADMIN — HEPARIN SODIUM 5000 UNITS: 5000 INJECTION INTRAVENOUS; SUBCUTANEOUS at 14:57

## 2025-05-02 RX ADMIN — Medication 10 ML: at 21:00

## 2025-05-02 NOTE — PLAN OF CARE
Goal Outcome Evaluation:  Plan of Care Reviewed With: patient, spouse        Progress: no change  Outcome Evaluation: Patient remained in CICU this shift - telemetry status waiting on bed. Left thoracentesis completed - 750cc, fluids sent to lab. Chest xray completed after. No complaints of chest pain or SOA. Patient on room air. PT/OT consult placed. Patient up to BR - small BM and unmeasured urine x1. HD orders put in for today, waiting on completion. Afebrile. Patient spouse updated at bedside this shift. No complaints at this time.

## 2025-05-02 NOTE — PROGRESS NOTES
Noted Chest xray post-thoracentesis with small 5mm apical pneumothorax. Unclear if this is a true PTX or just trapped lung. Discussed with pt and will monitor on repeat CXR in am. Pt told to let nursing know if she has any chest pain or shortness of breath. Pt expressed understanding.

## 2025-05-02 NOTE — PROGRESS NOTES
[facility]  [phone]  Dr. Kavon Koroma     Subjective:  LOS: 2    Chief Complaint: Shortness of breath    Patient doing well today.  Denies any acute concerns or complaints.  Is okay with proceeding with thoracentesis on the left    Objective   Vital Signs past 24hrs  Temp range: Temp (24hrs), Av.2 °F (36.8 °C), Min:97.6 °F (36.4 °C), Max:98.9 °F (37.2 °C)    BP range: BP: ()/(43-94) 140/55  Pulse range: Heart Rate:  [] 107  Resp rate range: Resp:  [20-30] 21  Device (Oxygen Therapy): room airFlow (L/min) (Oxygen Therapy):  [2-3] 2  Oxygen range:SpO2:  [93 %-100 %] 100 %   Mechanical Ventilator:     Physical Exam  Vitals and nursing note reviewed.   Constitutional:       General: She is not in acute distress.  HENT:      Head: Normocephalic and atraumatic.   Cardiovascular:      Rate and Rhythm: Normal rate and regular rhythm.      Heart sounds: No murmur heard.  Pulmonary:      Effort: Pulmonary effort is normal. No respiratory distress.      Breath sounds: Normal breath sounds. No wheezing, rhonchi or rales.   Abdominal:      General: Abdomen is flat.      Tenderness: There is no abdominal tenderness.   Skin:     General: Skin is warm and dry.      Findings: No rash.   Neurological:      Mental Status: She is alert.       Results Review:    I have reviewed the laboratory and imaging data since the last note by [facility] physician.  My annotations are noted in assessment and plan.      Result Review:  I have personally reviewed the results from last note by [facility] physician to 2025 15:52 EDT and agree with these findings:  [x]  Laboratory list / accordion  [x]  Microbiology  [x]  Radiology  [x]  EKG/Telemetry   [x]  Cardiology/Vascular   [x]  Pathology  [x]  Old records  []  Other:    Medication Review:  I have reviewed the current MAR.  My annotations are noted in assessment and plan.    heparin (porcine), 5,000 Units, Subcutaneous, Q8H  midodrine, 10 mg, Oral, Daily  midodrine,  5 mg, Oral, Daily  mupirocin, 1 Application, Each Nare, BID  senna-docusate sodium, 2 tablet, Oral, BID  sodium chloride, 10 mL, Intravenous, Q12H           Lines, Drains & Airways       Active LDAs       Name Placement date Placement time Site Days    Peripheral IV 04/30/25 1759 20 G Right;Lateral Antecubital 04/30/25  1759  Antecubital  1                  No active isolations  Diet Orders (active) (From admission, onward)       Start     Ordered    05/01/25 1007  Diet: Cardiac, Fluid Restriction (240 mL/tray); Low Sodium (2g); 1500 mL/day; Texture: Regular (IDDSI 7); Fluid Consistency: Thin (IDDSI 0)  Diet Effective Now         05/01/25 1006                      Assessment  ESRD on hemodialysis Monday Wednesday Friday  Bilateral pleural effusions  Small left pneumothorax  Cirrhosis with recurrent ascites status post every 2 week paracentesis  Acute hypoxemic respiratory failure  Type II by diabetes  Hypertension  Troponin elevation - trend  Hyponatremia    Plan  -Patient presenting on 4/30/2025 with shortness of breath.  Found to have large bilateral pleural effusions and of note has cirrhosis with chronic recurrent ascites with large-volume paracentesis every 2 weeks.  Admitted to ICU due to need for BiPAP for her significant work of breathing.  -Currently weaned off BiPAP.  Continue weaning nasal cannula oxygen for goal O2 saturation greater than 90%  - IR consulted and performed right thoracentesis (5/1/2025) with 1.95 L removed which was transudate. Left thoracentesis (5/2/25) with 750cc removed which was exudate  - reassess possible PTX vs trapped lung on repeat CXR in am.   -Nephrology following for ESRD and volume overload  - Continue home medications as able  - Ultimately management is going to be difficult given the patient has recurrent ascites requiring frequent large-volume paracenteses and now bilateral large pleural effusions.  Will need to assess for the effusion recurrence as this could be hepatic  hydrothorax.  Would need a shunt study to confirm this.  Will need to optimize her ascites and volume management going forward with both UF during dialysis and frequent paracentesis   - transfer out of ICU      Kavon Koroma DO   05/02/25  15:52 EDT      Part of this note may be an electronic transcription/translation of spoken language to printed text using the Dragon Dictation System.

## 2025-05-02 NOTE — PROGRESS NOTES
[facility] Progress Note      Patient Name: Philly Person  : 1945  MRN: [id number]  Primary Care Physician:  Shazia Cardoza MD  Date of admission: 2025    Subjective     Interval History:   The patient was seen and examined today for follow-up on end-stage renal disease and hypervolemia  Doing much better today after dialysis yesterday with 2 L of fluid removed in addition to the thoracentesis  No events noted today.    Review of Systems:   As noted above    Objective     Vitals:   Temp:  [97.6 °F (36.4 °C)-98.9 °F (37.2 °C)] 97.6 °F (36.4 °C)  Heart Rate:  [] 94  Resp:  [20-30] 21  BP: ()/(43-94) 117/53  Flow (L/min) (Oxygen Therapy):  [2-3] 2    Intake/Output Summary (Last 24 hours) at 2025 1421  Last data filed at 2025 1300  Gross per 24 hour   Intake 450 ml   Output 2000 ml   Net -1550 ml       Physical Exam:    General Appearance: alert, oriented x 3, no acute distress   Skin: warm and dry  HEENT: oral mucosa normal, nonicteric sclera  Neck: supple, JVD noted  Lungs: Bilateral decreased air entry.  Bilateral  crackles    Heart: RRR, normal S1 and S2  Abdomen: soft, nontender, nondistended  : no palpable bladder  Extremities: Lymphedema stage with 1+ pitting edema bilateral lower extremity up to the thigh  Neuro: normal speech and mental status     Scheduled Meds:     heparin (porcine), 5,000 Units, Subcutaneous, Q8H  midodrine, 10 mg, Oral, Daily  midodrine, 5 mg, Oral, Daily  mupirocin, 1 Application, Each Nare, BID  senna-docusate sodium, 2 tablet, Oral, BID  sodium chloride, 10 mL, Intravenous, Q12H      IV Meds:        Results Reviewed:   I have personally reviewed the results from the time of this admission to 2025 14:21 EDT     Results from last 7 days   Lab Units 25  0524 25  0332 25  1756   SODIUM mmol/L 132* 135* 134*   POTASSIUM mmol/L 3.8 4.0 3.3*   CHLORIDE mmol/L 99 97* 94*   CO2 mmol/L 23.9 24.7 23.0   BUN  mg/dL 18 19 14   CREATININE mg/dL 3.21* 3.00* 2.56*   CALCIUM mg/dL 8.3* 8.9 9.2   BILIRUBIN mg/dL 0.7 0.9 1.5*   ALK PHOS U/L 176* 220* 263*   ALT (SGPT) U/L 13 17 18   AST (SGOT) U/L 36* 39* 41*   GLUCOSE mg/dL 164* 139* 159*       Estimated Creatinine Clearance: 14.1 mL/min (A) (by C-G formula based on SCr of 3.21 mg/dL (H)).    Results from last 7 days   Lab Units 05/02/25  0524   PHOSPHORUS mg/dL 3.9             Results from last 7 days   Lab Units 05/02/25  0524 05/01/25  0332 04/30/25  1756 04/30/25  0000   WBC 10*3/mm3 5.16 7.56 7.89  --    HEMOGLOBIN g/dL 9.9* 11.4* 12.3 10.2*  30.6*   PLATELETS 10*3/mm3 93* 147 150  --              Assessment / Plan     ASSESSMENT:  End-stage renal disease second biopsy-proven diabetic nephropathy currently on maintenance hemodialysis on Monday Wednesday and Friday with her dialysis been complicated by hypotension requiring midodrine and limiting UF.  Type 2 diabetes mellitus with end-stage renal disease.  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  Continue salt restricted diet and fluid restriction at 1.5 L per 24 hours  Labs in a.m.      Thank you for involving us in the care of Philly Person.  Please feel free to call with any questions.    Christy Flores MD  05/02/25  14:21 EDT    [facility]""  [phone]    Parts of this note may be an electronic transcription/translation of spoken language to printed text using the Dragon dictation system.

## 2025-05-02 NOTE — PROGRESS NOTES
Name: Philly Person ADMIT: 2025   : 1945  PCP: Shazia Cardoza MD    MRN: [id number] LOS: 2 days   AGE/SEX: 79 y.o. female  ROOM: [facility]/     Subjective   Subjective   Breathing much improved today. Sitting up eating lunch.     Objective   Objective   Vital Signs  Temp:  [97.6 °F (36.4 °C)-98.9 °F (37.2 °C)] 97.6 °F (36.4 °C)  Heart Rate:  [] 94  Resp:  [20-30] 21  BP: ()/(41-94) 117/53  SpO2:  [93 %-100 %] 95 %  on  Flow (L/min) (Oxygen Therapy):  [2-3] 2;   Device (Oxygen Therapy): room air  Body mass index is 33.87 kg/m².    Physical Exam  Constitutional:       General: She is not in acute distress.     Appearance: She is not toxic-appearing.   HENT:      Head: Normocephalic and atraumatic.   Cardiovascular:      Rate and Rhythm: Normal rate and regular rhythm.   Pulmonary:      Effort: Pulmonary effort is normal. No respiratory distress.   Abdominal:      General: Bowel sounds are normal. There is distension.      Palpations: Abdomen is soft.      Tenderness: There is no abdominal tenderness.   Musculoskeletal:         General: No swelling.      Right lower leg: Edema present.      Left lower leg: Edema present.   Skin:     General: Skin is warm and dry.   Neurological:      General: No focal deficit present.      Mental Status: She is alert and oriented to person, place, and time.   Psychiatric:         Mood and Affect: Mood normal.         Behavior: Behavior normal.     Results Review  I reviewed the patient's new clinical results.  Results from last 7 days   Lab Units 25  03325  0000   WBC 10*3/mm3 5.16 7.56 7.89  --    HEMOGLOBIN g/dL 9.9* 11.4* 12.3 10.2*  30.6*   PLATELETS 10*3/mm3 93* 147 150  --      Results from last 7 days   Lab Units 25  0332 25  17525  0000   SODIUM mmol/L 132* 135* 134*  --    POTASSIUM mmol/L 3.8 4.0 3.3*  --    CHLORIDE mmol/L 99 97* 94*  --    CO2 mmol/L 23.9 24.7 23.0  --     BUN mg/dL 18 19 14 39*   CREATININE mg/dL 3.21* 3.00* 2.56*  --    GLUCOSE mg/dL 164* 139* 159*  --      Lab Results   Component Value Date    ANIONGAP 9.1 05/02/2025     Estimated Creatinine Clearance: 14.1 mL/min (A) (by C-G formula based on SCr of 3.21 mg/dL (H)).   Lab Results   Component Value Date    EGFR 14.1 (L) 05/02/2025     Results from last 7 days   Lab Units 05/02/25  0524 05/01/25  0332 04/30/25  1756   ALBUMIN g/dL 2.6* 3.1* 3.5   BILIRUBIN mg/dL 0.7 0.9 1.5*   ALK PHOS U/L 176* 220* 263*   AST (SGOT) U/L 36* 39* 41*   ALT (SGPT) U/L 13 17 18     Results from last 7 days   Lab Units 05/02/25  0524 05/01/25  0332 04/30/25  1756   CALCIUM mg/dL 8.3* 8.9 9.2   ALBUMIN g/dL 2.6* 3.1* 3.5   PHOSPHORUS mg/dL 3.9  --   --      Results from last 7 days   Lab Units 04/30/25 2105 04/30/25  1756   PROCALCITONIN ng/mL  --  0.56*   LACTATE mmol/L 1.9 2.6*     Glucose   Date/Time Value Ref Range Status   05/02/2025 1108 179 (H) 70 - 130 mg/dL Final   05/01/2025 2350 180 (H) 70 - 130 mg/dL Final   05/01/2025 2018 92 70 - 130 mg/dL Final   05/01/2025 1158 120 70 - 130 mg/dL Final   05/01/2025 0325 136 (H) 70 - 130 mg/dL Final       XR Chest 1 View  Result Date: 5/2/2025  Slight interval improvement in right pleural effusion. No pneumothorax is seen.  This report was finalized on 5/2/2025 5:31 AM by Dr. Adriana Singer M.D on Workstation: [id number]      US Thoracentesis  Result Date: 5/1/2025  Ultrasound-guided right thoracentesis as described     This report was finalized on 5/1/2025 10:13 AM by Dr. Hung Michelle M.D on Workstation: [id number]      CT Chest Without Contrast Diagnostic  Result Date: 4/30/2025   1. Large bilateral pleural effusions, right greater than left. 2. Cirrhosis with ascites. The volume of fluid has decreased when compared to November 2024.  Radiation dose reduction techniques were utilized, including automated exposure control and exposure modulation based on body size.   This  report was finalized on 4/30/2025 9:49 PM by Dr. Adriana Singer M.D on Workstation: [id number]        Scheduled Meds  heparin (porcine), 5,000 Units, Subcutaneous, Q8H  midodrine, 10 mg, Oral, Daily  midodrine, 5 mg, Oral, Daily  mupirocin, 1 Application, Each Nare, BID  senna-docusate sodium, 2 tablet, Oral, BID  sodium chloride, 10 mL, Intravenous, Q12H    Continuous Infusions   PRN Meds    acetaminophen **OR** acetaminophen    aluminum-magnesium hydroxide-simethicone    senna-docusate sodium **AND** polyethylene glycol **AND** bisacodyl **AND** bisacodyl    nitroglycerin    ondansetron ODT **OR** ondansetron    sodium chloride    sodium chloride     Diet  Diet: Cardiac, Fluid Restriction (240 mL/tray); Low Sodium (2g); 1500 mL/day; Texture: Regular (IDDSI 7); Fluid Consistency: Thin (IDDSI 0)       Assessment/Plan     Active Hospital Problems    Diagnosis  POA    **Acute hypoxic respiratory failure [J96.01]  Yes    ESRD (end stage renal disease) [N18.6]  Unknown    Pleural effusion [J90]  Unknown    Hyponatremia [E87.1]  Yes    Benign essential hypertension [I10]  Yes    Nonalcoholic fatty liver disease [K76.0]  Yes    Type 2 diabetes mellitus [E11.9]  Yes      Resolved Hospital Problems   No resolved problems to display.     Patient is a 79 y.o. female     Acute hypoxic respiratory failure  Bilateral pleural effusions right greater than left  1950 mL right ultrasound-guided thoracentesis on 5/1/2025  Chest x-ray with slight improvement  Now on room air (this morning Flow (L/min) (Oxygen Therapy):  [2-3] 2)    QUINN cirrhosis  Recurrent ascites  On every 2 weeks paracentesis  Followed by Dr. Pina    Thrombocytopenia  Not a new finding continue to monitor  Presumably from liver disease    ESRD  Anemia of CKD  Hyponatremia, hypervolemic  Chronic hypotension  Midodrine, dialysis, fluid restriction per nephrology    DM2 A1c 5.3% 3 weeks ago. Glucoses 100s on lab    DVT  prophylaxis  SCDs    Discharge  TBD  Expected Discharge Date: 5/6/2025; Expected Discharge Time:     Discussed with patient    Shiraz Turner MD  [facility]  05/02/25 13:14 EDT

## 2025-05-02 NOTE — PROCEDURES
Thoracentesis Procedure Note     Date: 05/02/25   Time: 3:47 PM EDT  Indication: Large left pleural effusion    A time-out was completed verifying correct patient, procedure, site, positioning, and special equipment if applicable. The patient’s left side was prepped and draped in a sterile manner after the appropriate infiltration level was confirmed by ultrasound. 1% lidocaine was used anesthetize the surrounding skin. A finder needle was then used to locate fluid and clear yellow fluid was obtained. A 10-blade scalpel used to make the incision. The thoracentesis catheter was then threaded without difficulty. The patient had 750mL of clear yellow fluid removed. A post-procedure chest x-ray was ordered and the fluid will be sent for several studies.    Estimated Blood Loss: Minimal  The patient tolerated the procedure well and there were no complications.    Kavon Koroma DO  5/2/2025

## 2025-05-02 NOTE — PLAN OF CARE
Goal Outcome Evaluation:      Pt remains in CICU. HD completed overnight. Pt ambulated to bathroom and tolerated. 60ml of fluid intake. Am labs sent. Possible L thoracentesis today.

## 2025-05-03 ENCOUNTER — APPOINTMENT (OUTPATIENT)
Dept: GENERAL RADIOLOGY | Facility: HOSPITAL | Age: 80
DRG: 189 | End: 2025-05-03
Payer: MEDICARE

## 2025-05-03 LAB
ALBUMIN SERPL-MCNC: 2.7 G/DL (ref 3.5–5.2)
ALBUMIN/GLOB SERPL: 0.8 G/DL
ALP SERPL-CCNC: 167 U/L (ref 39–117)
ALT SERPL W P-5'-P-CCNC: 15 U/L (ref 1–33)
ANION GAP SERPL CALCULATED.3IONS-SCNC: 7.8 MMOL/L (ref 5–15)
AST SERPL-CCNC: 30 U/L (ref 1–32)
BASOPHILS # BLD AUTO: 0.03 10*3/MM3 (ref 0–0.2)
BASOPHILS NFR BLD AUTO: 0.5 % (ref 0–1.5)
BILIRUB SERPL-MCNC: 0.8 MG/DL (ref 0–1.2)
BUN SERPL-MCNC: 31 MG/DL (ref 8–23)
BUN/CREAT SERPL: 7.1 (ref 7–25)
CALCIUM SPEC-SCNC: 8.4 MG/DL (ref 8.6–10.5)
CHLORIDE SERPL-SCNC: 100 MMOL/L (ref 98–107)
CO2 SERPL-SCNC: 22.2 MMOL/L (ref 22–29)
CREAT SERPL-MCNC: 4.34 MG/DL (ref 0.57–1)
DEPRECATED RDW RBC AUTO: 50.7 FL (ref 37–54)
EGFRCR SERPLBLD CKD-EPI 2021: 9.8 ML/MIN/1.73
EOSINOPHIL # BLD AUTO: 0.08 10*3/MM3 (ref 0–0.4)
EOSINOPHIL NFR BLD AUTO: 1.2 % (ref 0.3–6.2)
ERYTHROCYTE [DISTWIDTH] IN BLOOD BY AUTOMATED COUNT: 14.6 % (ref 12.3–15.4)
GLOBULIN UR ELPH-MCNC: 3.3 GM/DL
GLUCOSE BLDC GLUCOMTR-MCNC: 204 MG/DL (ref 70–130)
GLUCOSE SERPL-MCNC: 167 MG/DL (ref 65–99)
HCT VFR BLD AUTO: 31.5 % (ref 34–46.6)
HGB BLD-MCNC: 10.1 G/DL (ref 12–15.9)
IMM GRANULOCYTES # BLD AUTO: 0.03 10*3/MM3 (ref 0–0.05)
IMM GRANULOCYTES NFR BLD AUTO: 0.5 % (ref 0–0.5)
LYMPHOCYTES # BLD AUTO: 0.99 10*3/MM3 (ref 0.7–3.1)
LYMPHOCYTES NFR BLD AUTO: 15 % (ref 19.6–45.3)
Lab: NORMAL
MCH RBC QN AUTO: 30.8 PG (ref 26.6–33)
MCHC RBC AUTO-ENTMCNC: 32.1 G/DL (ref 31.5–35.7)
MCV RBC AUTO: 96 FL (ref 79–97)
MONOCYTES # BLD AUTO: 1.05 10*3/MM3 (ref 0.1–0.9)
MONOCYTES NFR BLD AUTO: 15.9 % (ref 5–12)
NEUTROPHILS NFR BLD AUTO: 4.44 10*3/MM3 (ref 1.7–7)
NEUTROPHILS NFR BLD AUTO: 66.9 % (ref 42.7–76)
NRBC BLD AUTO-RTO: 0 /100 WBC (ref 0–0.2)
PHOSPHATE SERPL-MCNC: 3.2 MG/DL (ref 2.5–4.5)
PLATELET # BLD AUTO: 122 10*3/MM3 (ref 140–450)
PMV BLD AUTO: 12.4 FL (ref 6–12)
POTASSIUM SERPL-SCNC: 3.6 MMOL/L (ref 3.5–5.2)
PROT SERPL-MCNC: 6 G/DL (ref 6–8.5)
QT INTERVAL: 369 MS
QTC INTERVAL: 475 MS
RBC # BLD AUTO: 3.28 10*6/MM3 (ref 3.77–5.28)
SODIUM SERPL-SCNC: 130 MMOL/L (ref 136–145)
WBC NRBC COR # BLD AUTO: 6.62 10*3/MM3 (ref 3.4–10.8)

## 2025-05-03 PROCEDURE — 94799 UNLISTED PULMONARY SVC/PX: CPT

## 2025-05-03 PROCEDURE — 84100 ASSAY OF PHOSPHORUS: CPT | Performed by: HOSPITALIST

## 2025-05-03 PROCEDURE — 93010 ELECTROCARDIOGRAM REPORT: CPT | Performed by: INTERNAL MEDICINE

## 2025-05-03 PROCEDURE — 94660 CPAP INITIATION&MGMT: CPT

## 2025-05-03 PROCEDURE — 25010000002 HEPARIN (PORCINE) PER 1000 UNITS: Performed by: STUDENT IN AN ORGANIZED HEALTH CARE EDUCATION/TRAINING PROGRAM

## 2025-05-03 PROCEDURE — 85025 COMPLETE CBC W/AUTO DIFF WBC: CPT | Performed by: INTERNAL MEDICINE

## 2025-05-03 PROCEDURE — 71045 X-RAY EXAM CHEST 1 VIEW: CPT

## 2025-05-03 PROCEDURE — 82948 REAGENT STRIP/BLOOD GLUCOSE: CPT

## 2025-05-03 PROCEDURE — 80053 COMPREHEN METABOLIC PANEL: CPT | Performed by: INTERNAL MEDICINE

## 2025-05-03 PROCEDURE — 93005 ELECTROCARDIOGRAM TRACING: CPT | Performed by: INTERNAL MEDICINE

## 2025-05-03 RX ORDER — MIDODRINE HYDROCHLORIDE 5 MG/1
10 TABLET ORAL
Status: DISCONTINUED | OUTPATIENT
Start: 2025-05-03 | End: 2025-05-05 | Stop reason: HOSPADM

## 2025-05-03 RX ORDER — MIDODRINE HYDROCHLORIDE 5 MG/1
10 TABLET ORAL
Status: DISCONTINUED | OUTPATIENT
Start: 2025-05-04 | End: 2025-05-03

## 2025-05-03 RX ORDER — MIDODRINE HYDROCHLORIDE 5 MG/1
5 TABLET ORAL ONCE
Status: COMPLETED | OUTPATIENT
Start: 2025-05-03 | End: 2025-05-03

## 2025-05-03 RX ADMIN — MIDODRINE HYDROCHLORIDE 5 MG: 5 TABLET ORAL at 20:39

## 2025-05-03 RX ADMIN — Medication 10 ML: at 21:35

## 2025-05-03 RX ADMIN — HEPARIN SODIUM 5000 UNITS: 5000 INJECTION INTRAVENOUS; SUBCUTANEOUS at 13:13

## 2025-05-03 RX ADMIN — MUPIROCIN 1 APPLICATION: 20 OINTMENT TOPICAL at 13:13

## 2025-05-03 RX ADMIN — ACETAMINOPHEN 325MG 650 MG: 325 TABLET ORAL at 06:25

## 2025-05-03 RX ADMIN — MIDODRINE HYDROCHLORIDE 5 MG: 5 TABLET ORAL at 22:41

## 2025-05-03 RX ADMIN — MUPIROCIN 1 APPLICATION: 20 OINTMENT TOPICAL at 21:35

## 2025-05-03 RX ADMIN — MIDODRINE HYDROCHLORIDE 10 MG: 5 TABLET ORAL at 23:30

## 2025-05-03 RX ADMIN — ACETAMINOPHEN 325MG 650 MG: 325 TABLET ORAL at 21:55

## 2025-05-03 RX ADMIN — Medication 10 ML: at 13:14

## 2025-05-03 RX ADMIN — MIDODRINE HYDROCHLORIDE 10 MG: 5 TABLET ORAL at 08:08

## 2025-05-03 RX ADMIN — SENNOSIDES AND DOCUSATE SODIUM 2 TABLET: 50; 8.6 TABLET ORAL at 13:13

## 2025-05-03 RX ADMIN — HEPARIN SODIUM 5000 UNITS: 5000 INJECTION INTRAVENOUS; SUBCUTANEOUS at 21:35

## 2025-05-03 RX ADMIN — HEPARIN SODIUM 5000 UNITS: 5000 INJECTION INTRAVENOUS; SUBCUTANEOUS at 06:03

## 2025-05-03 NOTE — PLAN OF CARE
Goal Outcome Evaluation:  Plan of Care Reviewed With: patient        Progress: improving  Outcome Evaluation: Patient awake, alert and oriented x 4.  Transferred from ICU today.  Patient had HD where 2.7  L were removed before HD had to be stopped due to hypotension. Patient on room air.  Complains of intermittent left chest tightness/pain when deep breaths are taken since having thoracentesis yesterday and havng a small pneumothorax on left side.  On fluid restriction of 1500 ml daily.  Vitals stable.

## 2025-05-03 NOTE — PROGRESS NOTES
Name: Philly Person ADMIT: 2025   : 1945  PCP: Shazia Cardoza MD    MRN: [id number] LOS: 3 days   AGE/SEX: 79 y.o. female  ROOM: [facility]/     Subjective   Subjective   Seen on dialysis. Denies any shortness of breath. Left-sided pain from PTX tylenol is helping     Objective   Objective   Vital Signs  Temp:  [97.6 °F (36.4 °C)-98.6 °F (37 °C)] 98.5 °F (36.9 °C)  Heart Rate:  [] 98  Resp:  [18] 18  BP: ()/(45-55) 111/48  SpO2:  [92 %-100 %] 94 %  on   ;   Device (Oxygen Therapy): room air  Body mass index is 33.59 kg/m².    Physical Exam  Constitutional:       General: She is not in acute distress.     Appearance: She is not toxic-appearing.   HENT:      Head: Normocephalic and atraumatic.   Cardiovascular:      Rate and Rhythm: Normal rate and regular rhythm.   Pulmonary:      Effort: Pulmonary effort is normal. No respiratory distress.      Comments: + Bilateral breath sounds anterior  Abdominal:      General: Bowel sounds are normal. There is distension.      Palpations: Abdomen is soft.      Tenderness: There is no abdominal tenderness.   Musculoskeletal:         General: No swelling.      Right lower leg: Edema present.      Left lower leg: Edema present.   Skin:     General: Skin is warm and dry.   Neurological:      General: No focal deficit present.      Mental Status: She is alert and oriented to person, place, and time.   Psychiatric:         Mood and Affect: Mood normal.         Behavior: Behavior normal.     Results Review  I reviewed the patient's new clinical results.  Results from last 7 days   Lab Units 25  0612 25  0525  1756   WBC 10*3/mm3 6.62 5.16 7.56 7.89   HEMOGLOBIN g/dL 10.1* 9.9* 11.4* 12.3   PLATELETS 10*3/mm3 122* 93* 147 150     Results from last 7 days   Lab Units 25  0612 25  0524 25  03325  1756   SODIUM mmol/L 130* 132* 135* 134*   POTASSIUM mmol/L 3.6 3.8 4.0 3.3*   CHLORIDE mmol/L 100 99 97*  94*   CO2 mmol/L 22.2 23.9 24.7 23.0   BUN mg/dL 31* 18 19 14   CREATININE mg/dL 4.34* 3.21* 3.00* 2.56*   GLUCOSE mg/dL 167* 164* 139* 159*     Lab Results   Component Value Date    ANIONGAP 7.8 05/03/2025     Estimated Creatinine Clearance: 10.3 mL/min (A) (by C-G formula based on SCr of 4.34 mg/dL (H)).   Lab Results   Component Value Date    EGFR 9.8 (L) 05/03/2025     Results from last 7 days   Lab Units 05/03/25  0612 05/02/25 0524 05/01/25 0332 04/30/25  1756   ALBUMIN g/dL 2.7* 2.6* 3.1* 3.5   BILIRUBIN mg/dL 0.8 0.7 0.9 1.5*   ALK PHOS U/L 167* 176* 220* 263*   AST (SGOT) U/L 30 36* 39* 41*   ALT (SGPT) U/L 15 13 17 18     Results from last 7 days   Lab Units 05/03/25  0612 05/02/25 0524 05/01/25 0332 04/30/25  1756   CALCIUM mg/dL 8.4* 8.3* 8.9 9.2   ALBUMIN g/dL 2.7* 2.6* 3.1* 3.5   PHOSPHORUS mg/dL 3.2 3.9  --   --      Results from last 7 days   Lab Units 04/30/25 2105 04/30/25  1756   PROCALCITONIN ng/mL  --  0.56*   LACTATE mmol/L 1.9 2.6*     Glucose   Date/Time Value Ref Range Status   05/02/2025 2102 225 (H) 70 - 130 mg/dL Final   05/02/2025 1108 179 (H) 70 - 130 mg/dL Final   05/01/2025 2350 180 (H) 70 - 130 mg/dL Final   05/01/2025 2018 92 70 - 130 mg/dL Final   05/01/2025 1158 120 70 - 130 mg/dL Final   05/01/2025 0325 136 (H) 70 - 130 mg/dL Final       XR Chest 1 View  Result Date: 5/3/2025  As described.  This report was finalized on 5/3/2025 5:52 AM by Dr. Gordon Hernandes M.D on Workstation: [id number]      XR Chest 1 View  Result Date: 5/2/2025  Slight interval improvement in right pleural effusion. No pneumothorax is seen.  This report was finalized on 5/2/2025 5:31 AM by Dr. Adriana Singer M.D on Workstation: [id number]        Scheduled Meds  heparin (porcine), 5,000 Units, Subcutaneous, Q8H  midodrine, 10 mg, Oral, Daily  midodrine, 5 mg, Oral, Daily  mupirocin, 1 Application, Each Nare, BID  senna-docusate sodium, 2 tablet, Oral, BID  sodium chloride, 10 mL, Intravenous,  Q12H    Continuous Infusions   PRN Meds    acetaminophen **OR** acetaminophen    aluminum-magnesium hydroxide-simethicone    senna-docusate sodium **AND** polyethylene glycol **AND** bisacodyl **AND** bisacodyl    nitroglycerin    ondansetron ODT **OR** ondansetron    sodium chloride    sodium chloride     Diet  Diet: Cardiac, Fluid Restriction (240 mL/tray); Low Sodium (2g); 1500 mL/day; Texture: Regular (IDDSI 7); Fluid Consistency: Thin (IDDSI 0)       Assessment/Plan     Active Hospital Problems    Diagnosis  POA    **Acute hypoxic respiratory failure [J96.01]  Yes    ESRD (end stage renal disease) [N18.6]  Unknown    Pleural effusion [J90]  Unknown    Hyponatremia [E87.1]  Yes    Benign essential hypertension [I10]  Yes    Nonalcoholic fatty liver disease [K76.0]  Yes    Type 2 diabetes mellitus [E11.9]  Yes      Resolved Hospital Problems   No resolved problems to display.     Patient is a 79 y.o. female     Acute hypoxic respiratory failure  Bilateral pleural effusions right greater than left  1950 mL right ultrasound-guided thoracentesis on 5/1/2025  750 mL left thoracentesis 5/2/25, 3 mm left apical pneumothorax  Pulmonology following    QUINN cirrhosis  Recurrent ascites  On every 2 weeks paracentesis  Followed by Dr. Pina. Discussed with Dr. Pina yesterday    Thrombocytopenia  Not a new finding continue to monitor- improved today  Presumably from liver disease    ESRD  Anemia of CKD  Hyponatremia, hypervolemic  Chronic hypotension  Midodrine, dialysis, fluid restriction per nephrology    DM2 A1c 5.3% 3 weeks ago. Glucoses 100s on lab    DVT prophylaxis  SCDs    Discharge  TBD  Expected Discharge Date: 5/6/2025; Expected Discharge Time:     Discussed with patient    Shiraz Turner MD  [facility]  05/03/25 09:33 EDT

## 2025-05-03 NOTE — PROGRESS NOTES
[facility] Progress Note      Patient Name: Philly Person  : 1945  MRN: [id number]  Primary Care Physician:  Shazia Cardoza MD  Date of admission: 2025    Subjective     Interval History:   Follow-up end-stage renal disease     The patient had left thoracentesis and having right now some pleuritic chest pain she was told she had small pneumothorax.  She denies any shortness of breath, no orthopnea or PND, no nausea or vomiting, plan for hemodialysis today today.    Review of Systems:   As noted above    Objective     Vitals:   Temp:  [97.6 °F (36.4 °C)-98.6 °F (37 °C)] 98.5 °F (36.9 °C)  Heart Rate:  [] 98  Resp:  [18] 18  BP: ()/(45-55) 111/48    Intake/Output Summary (Last 24 hours) at 5/3/2025 1036  Last data filed at 5/3/2025 0600  Gross per 24 hour   Intake 300 ml   Output --   Net 300 ml       Physical Exam:    General Appearance: Awake and alert, chronically ill, no acute  Skin: warm and dry  HEENT: oral mucosa normal, nonicteric sclera  Neck: No JVD  Lungs: Bilateral rhonchi, breathing effort not labored  Heart: RRR, normal S1 and S2  Abdomen: soft, nontender, nondistended  : no palpable bladder  Extremities: Lymphedema stage with 1+ pitting edema bilateral lower extremity up to the thigh  Neuro: normal speech and mental status     Scheduled Meds:     heparin (porcine), 5,000 Units, Subcutaneous, Q8H  midodrine, 10 mg, Oral, Daily  midodrine, 5 mg, Oral, Daily  mupirocin, 1 Application, Each Nare, BID  senna-docusate sodium, 2 tablet, Oral, BID  sodium chloride, 10 mL, Intravenous, Q12H      IV Meds:        Results Reviewed:   I have personally reviewed the results from the time of this admission to 5/3/2025 10:36 EDT     Results from last 7 days   Lab Units 25  0612 25  0524 25  0332   SODIUM mmol/L 130* 132* 135*   POTASSIUM mmol/L 3.6 3.8 4.0   CHLORIDE mmol/L 100 99 97*   CO2 mmol/L 22.2 23.9 24.7   BUN mg/dL 31* 18 19    CREATININE mg/dL 4.34* 3.21* 3.00*   CALCIUM mg/dL 8.4* 8.3* 8.9   BILIRUBIN mg/dL 0.8 0.7 0.9   ALK PHOS U/L 167* 176* 220*   ALT (SGPT) U/L 15 13 17   AST (SGOT) U/L 30 36* 39*   GLUCOSE mg/dL 167* 164* 139*       Estimated Creatinine Clearance: 10.3 mL/min (A) (by C-G formula based on SCr of 4.34 mg/dL (H)).    Results from last 7 days   Lab Units 05/03/25  0612 05/02/25  0524   PHOSPHORUS mg/dL 3.2 3.9             Results from last 7 days   Lab Units 05/03/25  0612 05/02/25  0524 05/01/25  0332 04/30/25  1756 04/30/25  0000   WBC 10*3/mm3 6.62 5.16 7.56 7.89  --    HEMOGLOBIN g/dL 10.1* 9.9* 11.4* 12.3 10.2*  30.6*   PLATELETS 10*3/mm3 122* 93* 147 150  --              Assessment / Plan     ASSESSMENT:  End-stage renal disease second biopsy-proven diabetic nephropathy currently on maintenance hemodialysis on Monday Wednesday and Friday, she did not have dialysis yesterday sodium today 130 will be planning for dialysis today and challenging fluid removal  Type 2 diabetes mellitus with end-stage renal disease.  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    Reviewed the chart and other providers notes, reviewed labs.  Copied text in this note has been reviewed and is accurate as of 05/03/25.         Thank you for involving us in the care of Philly Person.  Please feel free to call with any questions.    Terry Younger MD  05/03/25  10:36 EDT    [facility]  [phone]    Parts of this note may be an electronic transcription/translation of spoken language to  printed text using the Dragon dictation system.

## 2025-05-03 NOTE — NURSING NOTE
Dialysis complete, removed 2.7 liters with this treatment and no complications noted.  Pre and post vitals are in epic, and report given to Eliana Liu rn.

## 2025-05-03 NOTE — NURSING NOTE
Spoke with dialysis nurse. Pt would like to wait until closer to 7-8am to have dialysis done. Dialysis nurse said he'd call on call nephrologist and dialysis dispatcher to reschedule her treatment.

## 2025-05-03 NOTE — SIGNIFICANT NOTE
05/03/25 1527   OTHER   Discipline physical therapist   Therapy Assessment/Plan (PT)   Criteria for Skilled Interventions Met (PT) no problems identified which require skilled intervention  (Per pt & nursing, she is up ambulating in room with  w/o concern. Spoke with pt and she feels she is at her baseline mobility status at this time without any deficits. Denies need for acute PT services. PT to s/o, reconsult if status changes.)

## 2025-05-03 NOTE — PROGRESS NOTES
"                                              LOS: 3 days   Patient Care Team:  Shazia Cardoza MD as PCP - General (Family Medicine)    Chief Complaint:  F/up pleural effusion, respiratory failure and medical problems as below.    Subjective   Interval History  I reviewed the admission note, progress notes, PMH, PSH, Family hx, social history, imagings and prior records on this admission, summarized the finding in my note and formulated a transition of care plan.      Minimal pleuritic left-sided chest pain during cough.  No sputum production.  No dyspnea    REVIEW OF SYSTEMS:   CARDIOVASCULAR: No chest pain, chest pressure or chest discomfort. No palpitations or edema.   GASTROINTESTINAL: No anorexia, nausea, vomiting or diarrhea. No abdominal pain.  CONSTITUTIONAL: No fever or chills.     Ventilator/Non-Invasive Ventilation Settings (From admission, onward)       Start     Ordered    04/30/25 2151  NIPPV (CPAP or BIPAP)  Until Discontinued        Question Answer Comment   Type BIPAP    IPAP 12    EPAP 6        04/30/25 2151                          Physical Exam:     Vital Signs  Temp:  [97.6 °F (36.4 °C)-98.6 °F (37 °C)] 98.5 °F (36.9 °C)  Heart Rate:  [] 98  Resp:  [18] 18  BP: ()/(45-55) 111/48    Intake/Output Summary (Last 24 hours) at 5/3/2025 1024  Last data filed at 5/3/2025 0600  Gross per 24 hour   Intake 300 ml   Output --   Net 300 ml     Flowsheet Rows      Flowsheet Row First Filed Value   Admission Height 157.5 cm (62.01\") Documented at 04/30/2025 1727   Admission Weight 93.4 kg (206 lb) Documented at 04/30/2025 1727            PPE used per hospital policy    General Appearance:   Alert, cooperative, in no acute distress   ENMT:  Mallampati score 2, moist mucous membrane   Eyes:  Pupils equal and reactive to light. EOMI   Neck:   Trachea midline. No thyromegaly.   Lungs:    Clear to auscultation,respirations regular, even and nonlabored    Heart:   Regular rhythm and normal rate, " normal S1 and S2, no         murmur   Skin:   No rash or ecchymosis   Abdomen:     slightly distended. Soft. No tenderness. No HSM.   Neuro/psych:  Conscious, alert, oriented x3. Strength 5/5 in upper and lower  ext.  Appropriate mood and affect   Extremities:  No cyanosis, clubbing or edema.  Warm extremities and well-perfused          Results Review:        Results from last 7 days   Lab Units 05/03/25  0612 05/02/25  0524 05/01/25  0332   SODIUM mmol/L 130* 132* 135*   POTASSIUM mmol/L 3.6 3.8 4.0   CHLORIDE mmol/L 100 99 97*   CO2 mmol/L 22.2 23.9 24.7   BUN mg/dL 31* 18 19   CREATININE mg/dL 4.34* 3.21* 3.00*   GLUCOSE mg/dL 167* 164* 139*   CALCIUM mg/dL 8.4* 8.3* 8.9     Results from last 7 days   Lab Units 04/30/25  1931 04/30/25  1756   HSTROP T ng/L 82* 77*     Results from last 7 days   Lab Units 05/03/25  0612 05/02/25  0524 05/01/25  0332   WBC 10*3/mm3 6.62 5.16 7.56   HEMOGLOBIN g/dL 10.1* 9.9* 11.4*   HEMATOCRIT % 31.5* 31.3* 34.4   PLATELETS 10*3/mm3 122* 93* 147         Results from last 7 days   Lab Units 04/30/25  1756   PROBNP pg/mL 1,539.0                   Results from last 7 days   Lab Units 04/30/25  1817   FLOW RATE lpm 4.0000   MODALITY  Cannula         I reviewed the patient's new clinical results.        Medication Review:   heparin (porcine), 5,000 Units, Subcutaneous, Q8H  midodrine, 10 mg, Oral, Daily  midodrine, 5 mg, Oral, Daily  mupirocin, 1 Application, Each Nare, BID  senna-docusate sodium, 2 tablet, Oral, BID  sodium chloride, 10 mL, Intravenous, Q12H             Diagnostic imaging:  I personally and independently reviewed the following images:  CXR 5/3/2025: 3 mm left pneumothorax.    Assessment     ESRD on HD, MWF  Bilateral pleural effusions s/p Rt thora 5/1/25 with removal of 1.95 L and left thora 5/2/25 with removal of 750 ml  Cirrhosis with recurrent ascites s/p para Q 2 weeks  Acute hypoxemic respiratory failure requiring NIPPV  Hyponatremia, hypervolemic, clinically  significant in the setting of cirrhosis  Iatrogenic small left apical pneumothorax.    Anemia of CKD   DM II  HTN  Troponin elevation  Chronic hypotension   Thrombocytopenia 2ary to liver disease       All problems new to me    Plan       Observation for the small apical left pneumothorax.  Seems to be improving.    Titrate to wean off oxygen  Heparin subcu 3 times daily for DVT prophylaxis  Midodrine 10 and 5 mg daily  Bowel regimen    Okay to transfer to telemetry.      Kirsty Rodney MD  05/03/25  10:24 EDT        This note was dictated utilizing [employer] dictation

## 2025-05-03 NOTE — PLAN OF CARE
Goal Outcome Evaluation:      Pt remains in CICU. Pt HD was scheduled for around 3am, pt wanted to do hd later around 7-8am, HD nurse rescheduled for later. Pt having L chest pain at thoracentesis site. PRN pain meds given. Chest xray done see imaging, Intensivist APRN notified. EKG done. AM labs sent.

## 2025-05-04 ENCOUNTER — APPOINTMENT (OUTPATIENT)
Dept: GENERAL RADIOLOGY | Facility: HOSPITAL | Age: 80
DRG: 189 | End: 2025-05-04
Payer: MEDICARE

## 2025-05-04 LAB
ALBUMIN SERPL-MCNC: 2.8 G/DL (ref 3.5–5.2)
ALBUMIN/GLOB SERPL: 0.8 G/DL
ALP SERPL-CCNC: 187 U/L (ref 39–117)
ALT SERPL W P-5'-P-CCNC: 18 U/L (ref 1–33)
ANION GAP SERPL CALCULATED.3IONS-SCNC: 12 MMOL/L (ref 5–15)
AST SERPL-CCNC: 29 U/L (ref 1–32)
BASOPHILS # BLD AUTO: 0.05 10*3/MM3 (ref 0–0.2)
BASOPHILS NFR BLD AUTO: 0.8 % (ref 0–1.5)
BILIRUB SERPL-MCNC: 0.8 MG/DL (ref 0–1.2)
BUN SERPL-MCNC: 23 MG/DL (ref 8–23)
BUN/CREAT SERPL: 6.3 (ref 7–25)
CALCIUM SPEC-SCNC: 8.5 MG/DL (ref 8.6–10.5)
CHLORIDE SERPL-SCNC: 100 MMOL/L (ref 98–107)
CO2 SERPL-SCNC: 23 MMOL/L (ref 22–29)
CREAT SERPL-MCNC: 3.68 MG/DL (ref 0.57–1)
DEPRECATED RDW RBC AUTO: 50.6 FL (ref 37–54)
EGFRCR SERPLBLD CKD-EPI 2021: 12 ML/MIN/1.73
EOSINOPHIL # BLD AUTO: 0.11 10*3/MM3 (ref 0–0.4)
EOSINOPHIL NFR BLD AUTO: 1.8 % (ref 0.3–6.2)
ERYTHROCYTE [DISTWIDTH] IN BLOOD BY AUTOMATED COUNT: 15 % (ref 12.3–15.4)
GLOBULIN UR ELPH-MCNC: 3.4 GM/DL
GLUCOSE BLDC GLUCOMTR-MCNC: 148 MG/DL (ref 70–130)
GLUCOSE BLDC GLUCOMTR-MCNC: 163 MG/DL (ref 70–130)
GLUCOSE BLDC GLUCOMTR-MCNC: 188 MG/DL (ref 70–130)
GLUCOSE BLDC GLUCOMTR-MCNC: 199 MG/DL (ref 70–130)
GLUCOSE SERPL-MCNC: 149 MG/DL (ref 65–99)
HCT VFR BLD AUTO: 32.1 % (ref 34–46.6)
HGB BLD-MCNC: 10.4 G/DL (ref 12–15.9)
IMM GRANULOCYTES # BLD AUTO: 0.03 10*3/MM3 (ref 0–0.05)
IMM GRANULOCYTES NFR BLD AUTO: 0.5 % (ref 0–0.5)
LYMPHOCYTES # BLD AUTO: 1.42 10*3/MM3 (ref 0.7–3.1)
LYMPHOCYTES NFR BLD AUTO: 23 % (ref 19.6–45.3)
MCH RBC QN AUTO: 30.4 PG (ref 26.6–33)
MCHC RBC AUTO-ENTMCNC: 32.4 G/DL (ref 31.5–35.7)
MCV RBC AUTO: 93.9 FL (ref 79–97)
MONOCYTES # BLD AUTO: 1.12 10*3/MM3 (ref 0.1–0.9)
MONOCYTES NFR BLD AUTO: 18.2 % (ref 5–12)
NEUTROPHILS NFR BLD AUTO: 3.44 10*3/MM3 (ref 1.7–7)
NEUTROPHILS NFR BLD AUTO: 55.7 % (ref 42.7–76)
NRBC BLD AUTO-RTO: 0.3 /100 WBC (ref 0–0.2)
PHOSPHATE SERPL-MCNC: 3.6 MG/DL (ref 2.5–4.5)
PLATELET # BLD AUTO: 126 10*3/MM3 (ref 140–450)
PMV BLD AUTO: 12.7 FL (ref 6–12)
POTASSIUM SERPL-SCNC: 3.8 MMOL/L (ref 3.5–5.2)
PROT SERPL-MCNC: 6.2 G/DL (ref 6–8.5)
RBC # BLD AUTO: 3.42 10*6/MM3 (ref 3.77–5.28)
SODIUM SERPL-SCNC: 135 MMOL/L (ref 136–145)
WBC NRBC COR # BLD AUTO: 6.17 10*3/MM3 (ref 3.4–10.8)

## 2025-05-04 PROCEDURE — 36415 COLL VENOUS BLD VENIPUNCTURE: CPT | Performed by: INTERNAL MEDICINE

## 2025-05-04 PROCEDURE — 94799 UNLISTED PULMONARY SVC/PX: CPT

## 2025-05-04 PROCEDURE — 85025 COMPLETE CBC W/AUTO DIFF WBC: CPT | Performed by: INTERNAL MEDICINE

## 2025-05-04 PROCEDURE — 25010000002 HEPARIN (PORCINE) PER 1000 UNITS: Performed by: STUDENT IN AN ORGANIZED HEALTH CARE EDUCATION/TRAINING PROGRAM

## 2025-05-04 PROCEDURE — 84100 ASSAY OF PHOSPHORUS: CPT | Performed by: INTERNAL MEDICINE

## 2025-05-04 PROCEDURE — 71045 X-RAY EXAM CHEST 1 VIEW: CPT

## 2025-05-04 PROCEDURE — 80053 COMPREHEN METABOLIC PANEL: CPT | Performed by: INTERNAL MEDICINE

## 2025-05-04 PROCEDURE — 82948 REAGENT STRIP/BLOOD GLUCOSE: CPT

## 2025-05-04 RX ORDER — ALBUMIN (HUMAN) 12.5 G/50ML
12.5 SOLUTION INTRAVENOUS AS NEEDED
Status: CANCELLED | OUTPATIENT
Start: 2025-05-05 | End: 2025-05-05

## 2025-05-04 RX ADMIN — HEPARIN SODIUM 5000 UNITS: 5000 INJECTION INTRAVENOUS; SUBCUTANEOUS at 20:04

## 2025-05-04 RX ADMIN — HEPARIN SODIUM 5000 UNITS: 5000 INJECTION INTRAVENOUS; SUBCUTANEOUS at 05:48

## 2025-05-04 RX ADMIN — MIDODRINE HYDROCHLORIDE 10 MG: 5 TABLET ORAL at 09:24

## 2025-05-04 RX ADMIN — HEPARIN SODIUM 5000 UNITS: 5000 INJECTION INTRAVENOUS; SUBCUTANEOUS at 13:11

## 2025-05-04 RX ADMIN — MIDODRINE HYDROCHLORIDE 10 MG: 5 TABLET ORAL at 17:53

## 2025-05-04 RX ADMIN — MUPIROCIN 1 APPLICATION: 20 OINTMENT TOPICAL at 09:24

## 2025-05-04 RX ADMIN — Medication 10 ML: at 20:06

## 2025-05-04 RX ADMIN — Medication 10 ML: at 09:25

## 2025-05-04 RX ADMIN — MUPIROCIN 1 APPLICATION: 20 OINTMENT TOPICAL at 20:06

## 2025-05-04 RX ADMIN — SENNOSIDES AND DOCUSATE SODIUM 2 TABLET: 50; 8.6 TABLET ORAL at 09:24

## 2025-05-04 NOTE — PROGRESS NOTES
Name: Philly Person ADMIT: 2025   : 1945  PCP: Shazia Cardoza MD    MRN: [id number] LOS: 4 days   AGE/SEX: 79 y.o. female  ROOM: [facility]     Subjective   Subjective    at bedside. She denies any complaint. Left-sided chest pain from pneumothorax is improving.     Objective   Objective   Vital Signs  Temp:  [97.7 °F (36.5 °C)-99.8 °F (37.7 °C)] 98.1 °F (36.7 °C)  Heart Rate:  [] 109  Resp:  [18-20] 20  BP: ()/(44-59) 88/58  SpO2:  [99 %-100 %] 99 %  on  Flow (L/min) (Oxygen Therapy):  [2] 2;   Device (Oxygen Therapy): room air  Body mass index is 31.94 kg/m².    Physical Exam  Constitutional:       General: She is not in acute distress.     Appearance: She is not toxic-appearing.   HENT:      Head: Normocephalic and atraumatic.   Cardiovascular:      Rate and Rhythm: Normal rate and regular rhythm.   Pulmonary:      Effort: Pulmonary effort is normal. No respiratory distress.      Comments: Equal breath sounds anteriorly  Abdominal:      General: Bowel sounds are normal.      Palpations: Abdomen is soft.      Tenderness: There is no abdominal tenderness.   Musculoskeletal:         General: No swelling.      Right lower leg: Edema present.      Left lower leg: Edema present.   Skin:     General: Skin is warm and dry.   Neurological:      General: No focal deficit present.      Mental Status: She is alert and oriented to person, place, and time.   Psychiatric:         Mood and Affect: Mood normal.         Behavior: Behavior normal.     Results Review  I reviewed the patient's new clinical results.  Results from last 7 days   Lab Units 25  0347 25  0612 25  0332   WBC 10*3/mm3 6.17 6.62 5.16 7.56   HEMOGLOBIN g/dL 10.4* 10.1* 9.9* 11.4*   PLATELETS 10*3/mm3 126* 122* 93* 147     Results from last 7 days   Lab Units 25  0347 25  0612 25  0525  0332   SODIUM mmol/L 135* 130* 132* 135*   POTASSIUM mmol/L 3.8 3.6 3.8 4.0    CHLORIDE mmol/L 100 100 99 97*   CO2 mmol/L 23.0 22.2 23.9 24.7   BUN mg/dL 23 31* 18 19   CREATININE mg/dL 3.68* 4.34* 3.21* 3.00*   GLUCOSE mg/dL 149* 167* 164* 139*     Lab Results   Component Value Date    ANIONGAP 12.0 05/04/2025     Estimated Creatinine Clearance: 11.9 mL/min (A) (by C-G formula based on SCr of 3.68 mg/dL (H)).   Lab Results   Component Value Date    EGFR 12.0 (L) 05/04/2025     Results from last 7 days   Lab Units 05/04/25  0347 05/03/25  0612 05/02/25  0524 05/01/25  0332   ALBUMIN g/dL 2.8* 2.7* 2.6* 3.1*   BILIRUBIN mg/dL 0.8 0.8 0.7 0.9   ALK PHOS U/L 187* 167* 176* 220*   AST (SGOT) U/L 29 30 36* 39*   ALT (SGPT) U/L 18 15 13 17     Results from last 7 days   Lab Units 05/04/25  0347 05/03/25  0612 05/02/25  0524 05/01/25  0332   CALCIUM mg/dL 8.5* 8.4* 8.3* 8.9   ALBUMIN g/dL 2.8* 2.7* 2.6* 3.1*   PHOSPHORUS mg/dL 3.6 3.2 3.9  --      Results from last 7 days   Lab Units 04/30/25 2105 04/30/25  1756   PROCALCITONIN ng/mL  --  0.56*   LACTATE mmol/L 1.9 2.6*     Glucose   Date/Time Value Ref Range Status   05/04/2025 0623 148 (H) 70 - 130 mg/dL Final   05/03/2025 1952 204 (H) 70 - 130 mg/dL Final   05/02/2025 2102 225 (H) 70 - 130 mg/dL Final   05/02/2025 1108 179 (H) 70 - 130 mg/dL Final   05/01/2025 2350 180 (H) 70 - 130 mg/dL Final   05/01/2025 2018 92 70 - 130 mg/dL Final   05/01/2025 1158 120 70 - 130 mg/dL Final       XR Chest 1 View  Result Date: 5/4/2025  As described.  This report was finalized on 5/4/2025 7:52 AM by Dr. Gordon Hernandes M.D [email or web address]      XR Chest 1 View  Result Date: 5/3/2025  As described.  This report was finalized on 5/3/2025 5:52 AM by Dr. Gordon Hernandes M.D [facility]        Scheduled Meds  heparin (porcine), 5,000 Units, Subcutaneous, Q8H  midodrine, 10 mg, Oral, BID AC  mupirocin, 1 Application, Each Nare, BID  senna-docusate sodium, 2 tablet, Oral, BID  sodium chloride, 10 mL, Intravenous, Q12H    Continuous  Infusions   PRN Meds    acetaminophen **OR** acetaminophen    aluminum-magnesium hydroxide-simethicone    senna-docusate sodium **AND** polyethylene glycol **AND** bisacodyl **AND** bisacodyl    nitroglycerin    ondansetron ODT **OR** ondansetron    sodium chloride    sodium chloride     Diet  Diet: Cardiac, Fluid Restriction (240 mL/tray); Low Sodium (2g); 1500 mL/day; Texture: Regular (IDDSI 7); Fluid Consistency: Thin (IDDSI 0)       Assessment/Plan     Active Hospital Problems    Diagnosis  POA    **Acute hypoxic respiratory failure [J96.01]  Yes    ESRD (end stage renal disease) [N18.6]  Unknown    Pleural effusion [J90]  Unknown    Hyponatremia [E87.1]  Yes    Benign essential hypertension [I10]  Yes    Nonalcoholic fatty liver disease [K76.0]  Yes    Type 2 diabetes mellitus [E11.9]  Yes      Resolved Hospital Problems   No resolved problems to display.     Patient is a 79 y.o. female     Acute hypoxic respiratory failure  Bilateral pleural effusions right greater than left  1950 mL right ultrasound-guided thoracentesis on 5/1/2025  750 mL left thoracentesis 5/2/25, left apical pneumothorax 3 mm down to 2 mm today  Pulmonology following    QUINN cirrhosis  Recurrent ascites  On every 2 weeks paracentesis  Followed by Dr. Pina outpatient    Thrombocytopenia  Not a new finding continue to monitor-stable  Presumably from liver disease    ESRD  Anemia of CKD  Hyponatremia, hypervolemic  Chronic hypotension  Midodrine, dialysis, fluid restriction per nephrology  Hypotensive this morning but patient asymptomatic. Midodrine was increased yesterday by nephrology    DM2 A1c 5.3% 3 weeks ago. Glucoses 100s on lab    DVT prophylaxis  SCDs    Discharge  TBD  Expected Discharge Date: 5/6/2025; Expected Discharge Time:     Discussed with patient, family, and nursing staff    Shiraz Turner MD  [facility]  05/04/25 09:21 EDT

## 2025-05-04 NOTE — PROGRESS NOTES
[facility] Progress Note      Patient Name: Philly Person  : 1945  MRN: [id number]  Primary Care Physician:  Shazia Cardoza MD  Date of admission: 2025    Subjective     Interval History:   Follow-up end-stage renal disease     The patient had left thoracentesis and having right now some pleuritic chest pain she was told she had small pneumothorax.  She denies any shortness of breath, no orthopnea or PND, no nausea or vomiting, she had dialysis yesterday with 2.7 L of fluid removed, she developed hypotension earlier this morning her midodrine dose was increased and she is feeling better.  Review of Systems:   As noted above    Objective     Vitals:   Temp:  [97.5 °F (36.4 °C)-98.1 °F (36.7 °C)] 97.5 °F (36.4 °C)  Heart Rate:  [] 92  Resp:  [16-20] 16  BP: ()/(48-59) 110/48  No intake or output data in the 24 hours ending 25 1456      Physical Exam:    General Appearance: Awake and alert, chronically ill, no acute  Skin: warm and dry  HEENT: oral mucosa normal, nonicteric sclera  Neck: No JVD  Lungs: Bilateral rhonchi, breathing effort not labored  Heart: RRR, normal S1 and S2  Abdomen: soft, nontender, nondistended  : no palpable bladder  Extremities: Lymphedema stage with 1+ pitting edema bilateral lower extremity up to the thigh  Neuro: normal speech and mental status     Scheduled Meds:     heparin (porcine), 5,000 Units, Subcutaneous, Q8H  midodrine, 10 mg, Oral, BID AC  mupirocin, 1 Application, Each Nare, BID  senna-docusate sodium, 2 tablet, Oral, BID  sodium chloride, 10 mL, Intravenous, Q12H      IV Meds:        Results Reviewed:   I have personally reviewed the results from the time of this admission to 2025 14:56 EDT     Results from last 7 days   Lab Units 25  0347 25  0612 25  0524   SODIUM mmol/L 135* 130* 132*   POTASSIUM mmol/L 3.8 3.6 3.8   CHLORIDE mmol/L 100 100 99   CO2 mmol/L 23.0 22.2 23.9   BUN mg/dL 23  31* 18   CREATININE mg/dL 3.68* 4.34* 3.21*   CALCIUM mg/dL 8.5* 8.4* 8.3*   BILIRUBIN mg/dL 0.8 0.8 0.7   ALK PHOS U/L 187* 167* 176*   ALT (SGPT) U/L 18 15 13   AST (SGOT) U/L 29 30 36*   GLUCOSE mg/dL 149* 167* 164*       Estimated Creatinine Clearance: 11.9 mL/min (A) (by C-G formula based on SCr of 3.68 mg/dL (H)).    Results from last 7 days   Lab Units 05/04/25  0347 05/03/25  0612 05/02/25  0524   PHOSPHORUS mg/dL 3.6 3.2 3.9             Results from last 7 days   Lab Units 05/04/25  0347 05/03/25  0612 05/02/25  0524 05/01/25  0332 04/30/25  1756   WBC 10*3/mm3 6.17 6.62 5.16 7.56 7.89   HEMOGLOBIN g/dL 10.4* 10.1* 9.9* 11.4* 12.3   PLATELETS 10*3/mm3 126* 122* 93* 147 150             Assessment / Plan     ASSESSMENT:  End-stage renal disease second biopsy-proven diabetic nephropathy currently on maintenance hemodialysis on Monday Wednesday and Friday, had dialysis yesterday with 2.7 L removed, sodium today up to 135 volume status is excellent.  Type 2 diabetes mellitus with end-stage renal disease.  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  Chronic hypotension on midodrine  Hyperphosphatemia on binders that we will continue phosphorus today 3.6        PLAN:  Continue the same treatment  Surveillance labs  Will get her back to her usual schedule on Monday.    Reviewed the chart and other providers notes, reviewed labs.  I discussed the case with the patient and she voiced good understanding.  Copied text in this note has been reviewed and is accurate as of 05/04/25.         Thank you for involving us in the care of Philly Person.  Please feel free to call with any questions.    Terry Younger MD  05/04/25  14:56 EDT    [facility]  [phone]    Parts of this note may be  an electronic transcription/translation of spoken language to printed text using the Dragon dictation system.

## 2025-05-04 NOTE — PROGRESS NOTES
"                                              LOS: 4 days   Patient Care Team:  Shazia Cardoza MD as PCP - General (Family Medicine)    Chief Complaint:  F/up pleural effusion, respiratory failure and medical problems as below.    Subjective   Interval History    On RA.  Very minimal left-sided chest pain during cough.  No dyspnea.  No sputum production.    REVIEW OF SYSTEMS:   CARDIOVASCULAR: No chest pain, chest pressure or chest discomfort. No palpitations or edema.   GASTROINTESTINAL: No anorexia, nausea, vomiting or diarrhea. No abdominal pain.  CONSTITUTIONAL: No fever or chills.     Ventilator/Non-Invasive Ventilation Settings (From admission, onward)       Start     Ordered    04/30/25 2151  NIPPV (CPAP or BIPAP)  Until Discontinued        Question Answer Comment   Type BIPAP    IPAP 12    EPAP 6        04/30/25 2151                          Physical Exam:     Vital Signs  Temp:  [97.7 °F (36.5 °C)-98.1 °F (36.7 °C)] 98.1 °F (36.7 °C)  Heart Rate:  [] 87  Resp:  [16-20] 16  BP: ()/(48-59) 84/48  No intake or output data in the 24 hours ending 05/04/25 1404    Flowsheet Rows      Flowsheet Row First Filed Value   Admission Height 157.5 cm (62.01\") Documented at 04/30/2025 1727   Admission Weight 93.4 kg (206 lb) Documented at 04/30/2025 1727            PPE used per hospital policy    General Appearance:   Alert, cooperative, in no acute distress   ENMT:  Mallampati score 2, moist mucous membrane   Eyes:  Pupils equal and reactive to light. EOMI   Neck:   Trachea midline. No thyromegaly.   Lungs:    Clear to auscultation,respirations regular, even and nonlabored.  No crackles or wheezing.    Heart:   Regular rhythm and normal rate, normal S1 and S2, no         murmur   Skin:   No rash or ecchymosis   Abdomen:     slightly distended. Soft. No tenderness. No HSM.   Neuro/psych:  Conscious, alert, oriented x3. Strength 5/5 in upper and lower  ext.  Appropriate mood and affect   Extremities:  No " cyanosis, clubbing or edema.  Warm extremities and well-perfused          Results Review:        Results from last 7 days   Lab Units 05/04/25 0347 05/03/25  0612 05/02/25  0524   SODIUM mmol/L 135* 130* 132*   POTASSIUM mmol/L 3.8 3.6 3.8   CHLORIDE mmol/L 100 100 99   CO2 mmol/L 23.0 22.2 23.9   BUN mg/dL 23 31* 18   CREATININE mg/dL 3.68* 4.34* 3.21*   GLUCOSE mg/dL 149* 167* 164*   CALCIUM mg/dL 8.5* 8.4* 8.3*     Results from last 7 days   Lab Units 04/30/25  1931 04/30/25  1756   HSTROP T ng/L 82* 77*     Results from last 7 days   Lab Units 05/04/25 0347 05/03/25  0612 05/02/25  0524   WBC 10*3/mm3 6.17 6.62 5.16   HEMOGLOBIN g/dL 10.4* 10.1* 9.9*   HEMATOCRIT % 32.1* 31.5* 31.3*   PLATELETS 10*3/mm3 126* 122* 93*         Results from last 7 days   Lab Units 04/30/25  1756   PROBNP pg/mL 1,539.0                   Results from last 7 days   Lab Units 04/30/25  1817   FLOW RATE lpm 4.0000   MODALITY  Cannula         I reviewed the patient's new clinical results.        Medication Review:   heparin (porcine), 5,000 Units, Subcutaneous, Q8H  midodrine, 10 mg, Oral, BID AC  mupirocin, 1 Application, Each Nare, BID  senna-docusate sodium, 2 tablet, Oral, BID  sodium chloride, 10 mL, Intravenous, Q12H             Diagnostic imaging:  I personally and independently reviewed the following images:  CXR 5/4/25: 2 mm pneumothorax    Assessment     ESRD on HD, MWF  Bilateral pleural effusions s/p Rt thora 5/1/25 with removal of 1.95 L and left thora 5/2/25 with removal of 750 ml  Cirrhosis with recurrent ascites s/p para Q 2 weeks  Acute hypoxemic respiratory failure requiring NIPPV  Hyponatremia, hypervolemic, clinically significant in the setting of cirrhosis  Iatrogenic small left apical pneumothorax.    Anemia of CKD   DM II  HTN  Troponin elevation  Chronic hypotension   Thrombocytopenia 2ary to liver disease       Plan       Observation for the small apical left pneumothorax.  Seems to be improving.  Will do 1  more CXR in a.m. then stop following if it remains stable.  Titrate to wean off oxygen  Heparin subcu 3 times daily for DVT prophylaxis  Midodrine 10 and 5 mg daily  Bowel regimen          Kirsty Rodney MD  05/04/25  14:04 EDT        This note was dictated utilizing Dragon dictation

## 2025-05-04 NOTE — PLAN OF CARE
Goal Outcome Evaluation:              Outcome Evaluation: a/o x4, blood pressures low, RA, bowel movement today, voiding well, no pain complaints, chest x-ray ordered for 0700 tomorrow.

## 2025-05-05 ENCOUNTER — READMISSION MANAGEMENT (OUTPATIENT)
Dept: CALL CENTER | Facility: HOSPITAL | Age: 80
End: 2025-05-05
Payer: MEDICARE

## 2025-05-05 ENCOUNTER — INPATIENT HOSPITAL (AMBULATORY)
Dept: URBAN - METROPOLITAN AREA HOSPITAL 113 | Facility: HOSPITAL | Age: 80
End: 2025-05-05
Payer: MEDICARE

## 2025-05-05 ENCOUNTER — APPOINTMENT (OUTPATIENT)
Dept: GENERAL RADIOLOGY | Facility: HOSPITAL | Age: 80
DRG: 189 | End: 2025-05-05
Payer: MEDICARE

## 2025-05-05 VITALS
OXYGEN SATURATION: 99 % | SYSTOLIC BLOOD PRESSURE: 106 MMHG | HEART RATE: 99 BPM | BODY MASS INDEX: 32.86 KG/M2 | TEMPERATURE: 97.5 F | HEIGHT: 62 IN | WEIGHT: 178.57 LBS | RESPIRATION RATE: 16 BRPM | DIASTOLIC BLOOD PRESSURE: 40 MMHG

## 2025-05-05 DIAGNOSIS — K74.69 OTHER CIRRHOSIS OF LIVER: ICD-10-CM

## 2025-05-05 LAB
ALBUMIN SERPL-MCNC: 2.8 G/DL (ref 3.5–5.2)
ALBUMIN/GLOB SERPL: 0.8 G/DL
ALP SERPL-CCNC: 180 U/L (ref 39–117)
ALT SERPL W P-5'-P-CCNC: 17 U/L (ref 1–33)
ANION GAP SERPL CALCULATED.3IONS-SCNC: 13 MMOL/L (ref 5–15)
AST SERPL-CCNC: 30 U/L (ref 1–32)
BACTERIA FLD CULT: NORMAL
BACTERIA SPEC AEROBE CULT: NORMAL
BACTERIA SPEC AEROBE CULT: NORMAL
BASOPHILS # BLD AUTO: 0.06 10*3/MM3 (ref 0–0.2)
BASOPHILS NFR BLD AUTO: 1 % (ref 0–1.5)
BILIRUB SERPL-MCNC: 0.7 MG/DL (ref 0–1.2)
BUN SERPL-MCNC: 35 MG/DL (ref 8–23)
BUN/CREAT SERPL: 7 (ref 7–25)
CALCIUM SPEC-SCNC: 8.3 MG/DL (ref 8.6–10.5)
CHLORIDE SERPL-SCNC: 98 MMOL/L (ref 98–107)
CO2 SERPL-SCNC: 21 MMOL/L (ref 22–29)
CREAT SERPL-MCNC: 5.02 MG/DL (ref 0.57–1)
DEPRECATED RDW RBC AUTO: 51.6 FL (ref 37–54)
EGFRCR SERPLBLD CKD-EPI 2021: 8.2 ML/MIN/1.73
EOSINOPHIL # BLD AUTO: 0.17 10*3/MM3 (ref 0–0.4)
EOSINOPHIL NFR BLD AUTO: 3 % (ref 0.3–6.2)
ERYTHROCYTE [DISTWIDTH] IN BLOOD BY AUTOMATED COUNT: 15.4 % (ref 12.3–15.4)
GLOBULIN UR ELPH-MCNC: 3.4 GM/DL
GLUCOSE BLDC GLUCOMTR-MCNC: 140 MG/DL (ref 70–130)
GLUCOSE SERPL-MCNC: 144 MG/DL (ref 65–99)
GRAM STN SPEC: NORMAL
GRAM STN SPEC: NORMAL
HCT VFR BLD AUTO: 32.9 % (ref 34–46.6)
HGB BLD-MCNC: 10.6 G/DL (ref 12–15.9)
IMM GRANULOCYTES # BLD AUTO: 0.03 10*3/MM3 (ref 0–0.05)
IMM GRANULOCYTES NFR BLD AUTO: 0.5 % (ref 0–0.5)
LYMPHOCYTES # BLD AUTO: 1.69 10*3/MM3 (ref 0.7–3.1)
LYMPHOCYTES NFR BLD AUTO: 29.5 % (ref 19.6–45.3)
MCH RBC QN AUTO: 30.5 PG (ref 26.6–33)
MCHC RBC AUTO-ENTMCNC: 32.2 G/DL (ref 31.5–35.7)
MCV RBC AUTO: 94.5 FL (ref 79–97)
MONOCYTES # BLD AUTO: 0.91 10*3/MM3 (ref 0.1–0.9)
MONOCYTES NFR BLD AUTO: 15.9 % (ref 5–12)
NEUTROPHILS NFR BLD AUTO: 2.86 10*3/MM3 (ref 1.7–7)
NEUTROPHILS NFR BLD AUTO: 50.1 % (ref 42.7–76)
NRBC BLD AUTO-RTO: 0 /100 WBC (ref 0–0.2)
PHOSPHATE SERPL-MCNC: 4.4 MG/DL (ref 2.5–4.5)
PLATELET # BLD AUTO: 130 10*3/MM3 (ref 140–450)
PMV BLD AUTO: 11.1 FL (ref 6–12)
POTASSIUM SERPL-SCNC: 4.2 MMOL/L (ref 3.5–5.2)
PROT SERPL-MCNC: 6.2 G/DL (ref 6–8.5)
RBC # BLD AUTO: 3.48 10*6/MM3 (ref 3.77–5.28)
SODIUM SERPL-SCNC: 132 MMOL/L (ref 136–145)
WBC NRBC COR # BLD AUTO: 5.72 10*3/MM3 (ref 3.4–10.8)

## 2025-05-05 PROCEDURE — 84100 ASSAY OF PHOSPHORUS: CPT | Performed by: INTERNAL MEDICINE

## 2025-05-05 PROCEDURE — 80053 COMPREHEN METABOLIC PANEL: CPT | Performed by: INTERNAL MEDICINE

## 2025-05-05 PROCEDURE — 99222 1ST HOSP IP/OBS MODERATE 55: CPT | Performed by: INTERNAL MEDICINE

## 2025-05-05 PROCEDURE — 85025 COMPLETE CBC W/AUTO DIFF WBC: CPT | Performed by: INTERNAL MEDICINE

## 2025-05-05 PROCEDURE — 71046 X-RAY EXAM CHEST 2 VIEWS: CPT

## 2025-05-05 PROCEDURE — 82948 REAGENT STRIP/BLOOD GLUCOSE: CPT

## 2025-05-05 PROCEDURE — 25010000002 HEPARIN (PORCINE) PER 1000 UNITS: Performed by: STUDENT IN AN ORGANIZED HEALTH CARE EDUCATION/TRAINING PROGRAM

## 2025-05-05 RX ORDER — MIDODRINE HYDROCHLORIDE 10 MG/1
10 TABLET ORAL
Qty: 60 TABLET | Refills: 0 | Status: SHIPPED | OUTPATIENT
Start: 2025-05-05 | End: 2025-06-04

## 2025-05-05 RX ORDER — LACTULOSE 10 G/15ML
30 SOLUTION ORAL 3 TIMES WEEKLY
Start: 2025-05-05

## 2025-05-05 RX ADMIN — HEPARIN SODIUM 5000 UNITS: 5000 INJECTION INTRAVENOUS; SUBCUTANEOUS at 05:45

## 2025-05-05 RX ADMIN — MIDODRINE HYDROCHLORIDE 10 MG: 5 TABLET ORAL at 07:31

## 2025-05-05 NOTE — DISCHARGE SUMMARY
Date of Discharge:  5/5/2025    PCP: Shazia Cardoza MD    Discharge Diagnosis:   Active Hospital Problems    Diagnosis  POA    **Acute hypoxic respiratory failure [J96.01]  Yes    ESRD (end stage renal disease) [N18.6]  Unknown    Pleural effusion [J90]  Unknown    Hyponatremia [E87.1]  Yes    Benign essential hypertension [I10]  Yes    Nonalcoholic fatty liver disease [K76.0]  Yes    Type 2 diabetes mellitus [E11.9]  Yes      Resolved Hospital Problems   No resolved problems to display.       05/02 1400 BEDSIDE THORACENTESIS  Consults       Date and Time Order Name Status Description    5/2/2025  1:07 PM Inpatient Gastroenterology Consult Completed     5/2/2025 10:28 AM Inpatient Hospitalist Consult      4/30/2025 10:31 PM Inpatient Nephrology Consult      4/30/2025  9:30 PM Pulmonology (on-call MD unless specified) Completed           Hospital Course  Patient is a 79 y.o. female with a history of ESRD on dialysis and cirrhosis gets outpatient paracentesis every couple of weeks presented with shortness of breath and found to have bilateral pleural effusions. She was admitted initially to the ICU for acute hypoxic respiratory failure. On 5/1/2025 she underwent right-sided thoracentesis of 1950 mL. The next day she had 750 mL from the left. Dyspnea resolved. She developed a small left apical pneumothorax that is been monitored. It stable the last couple of days. She had some left-sided chest pain associated with the pneumothorax but has improved and is nearly resolved. GI felt as her disease progresses she would have less ascites as the fluid moves into the pleural space and if effusions become recurrent and etiology felt to be hepatic hydrothorax could consider TIPS but she would be at risk given age and MELD score. GI would consider referral to [facility] hepatology in that case. Nephrology managed her dialysis while here. Midodrine dose was adjusted. Discussed with nephrology they agreed with resuming her diuretic.  She is also on a fluid restriction.    I discussed the patient's findings and my recommendations with patient and nursing staff and Dr. Chaim Lynch.    Temp:  [97.3 °F (36.3 °C)-97.9 °F (36.6 °C)] 97.3 °F (36.3 °C)  Heart Rate:  [92-98] 94  Resp:  [16] 16  BP: ()/(39-89) 134/89  Body mass index is 32.92 kg/m².    Physical Exam  Constitutional:       General: She is not in acute distress.     Appearance: She is not toxic-appearing.   HENT:      Head: Normocephalic and atraumatic.   Cardiovascular:      Rate and Rhythm: Normal rate and regular rhythm.   Pulmonary:      Effort: Pulmonary effort is normal. No respiratory distress.      Breath sounds: Normal breath sounds. No wheezing or rhonchi.   Abdominal:      General: Bowel sounds are normal.      Palpations: Abdomen is soft.      Tenderness: There is no abdominal tenderness. There is no guarding or rebound.   Skin:     General: Skin is warm and dry.   Neurological:      General: No focal deficit present.      Mental Status: She is alert and oriented to person, place, and time.   Psychiatric:         Mood and Affect: Mood normal.         Behavior: Behavior normal.       Disposition: Home or Self Care       Discharge Medications        Changes to Medications        Instructions Start Date   midodrine 10 MG tablet  Commonly known as: PROAMATINE  What changed:   medication strength  how much to take  when to take this  Another medication with the same name was removed. Continue taking this medication, and follow the directions you see here.   10 mg, Oral, 2 Times Daily Before Meals      torsemide 20 MG tablet  Commonly known as: DEMADEX  What changed:   how much to take  when to take this   20 mg, Oral, Daily             Continue These Medications        Instructions Start Date   allopurinol 100 MG tablet  Commonly known as: ZYLOPRIM   200 mg, Oral, Nightly      atorvastatin 10 MG tablet  Commonly known as: LIPITOR   10 mg, Oral, Nightly      BENEFIBER DRINK MIX  PO   3 Times Weekly      coenzyme Q10 100 MG capsule   200 mg, Every Evening      Cranberry 500 MG capsule   500 mg, Daily      Diprolene 0.05 % ointment  Generic drug: betamethasone (augmented)   1 Application, Weekly      glucose blood test strip   Use as instructed to test blood sugar once daily . Uncontrolled diabetes e11.65      glucose blood test strip   Use as instructed to test blood sugar once daily for diabetes E11.9      HealthPro Blood Glucose Monito w/Device kit   1 each, Other, Daily, for testing blood sugar once daily for diabetes E11.9      iron sucrose   100 mg      lactulose 10 GM/15ML solution  Commonly known as: CHRONULAC   30 mL, Oral, 3 Times Weekly      MIRCERA IJ   225 mcg, Every 14 Days      multivitamin with minerals tablet tablet   1 tablet, Daily      pantoprazole 40 MG EC tablet  Commonly known as: PROTONIX   40 mg, Daily      Pen Needles 32G X 4 MM misc   1 each, Not Applicable, Daily      sevelamer 800 MG tablet  Commonly known as: RENVELA   800 mg, 3 Times Daily With Meals      Tresiba FlexTouch 100 UNIT/ML solution pen-injector injection  Generic drug: insulin degludec   14 Units, Subcutaneous, Daily      vitamin B-6 100 MG tablet  Commonly known as: PYRIDOXINE   100 mg, Daily              Diet Instructions       Diet: Regular/House Diet, Fluid Restriction (240 mL/tray) Diets, Renal Diets; Low Sodium (2-3g); 1500 mL/day      Discharge Diet:  Regular/House Diet  Fluid Restriction (240 mL/tray) Diets  Renal Diets       Renal Diet: Low Sodium (2-3g)    Fluid Restriction Diet (240 mL/tray): 1500 mL/day    Texture: Regular Texture (IDDSI 7)    Fluid Consistency: Thin (IDDSI 0)           Activity Instructions       Activity as Tolerated             Additional Instructions for the Follow-ups that You Need to Schedule       Call MD for problems / concerns.   As directed             Follow-up Information       Shazia Cardoza MD .    Specialty: Family Medicine  Contact information:  [address]  [address]  [facility] [address]  [phone]                            Future Appointments   Date Time Provider Department Center   5/9/2025  6:30 AM [facility] US 1 [facility] US [facility]   7/30/2025  1:00 PM Venkat Frost DO [facility] EN  [facility]   11/3/2025  2:20 PM [facility]   11/10/2025  2:15 PM Shazia Cardoza MD [facility] IM EAP[facility]2 [facility]     Pending Labs       Order Current Status    AFB Culture - Body Fluid, Pleural Cavity Preliminary result    Anaerobic Culture - Pleural Fluid, Pleural Cavity Preliminary result    Blood Culture - Blood, Arm, Right Preliminary result    Blood Culture - Blood, Hand, Right Preliminary result    Body Fluid Culture - Body Fluid, Pleural Cavity Preliminary result           Shiraz Turner MD  [facility]  05/05/25 12:38 EDT    Discharge time spent greater than 30 minutes.

## 2025-05-05 NOTE — PROGRESS NOTES
[facility] Progress Note      Patient Name: Philly Person  : 1945  MRN: [id number]  Primary Care Physician:  Shazia Cardoza MD  Date of admission: 2025    Subjective     Interval History:   Follow-up end-stage renal disease     Seen on dialysis today.  Denies any nausea or vomiting.  No fever no chills  Feeling better today  Review of Systems:   As noted above    Objective     Vitals:   Temp:  [97.5 °F (36.4 °C)-97.9 °F (36.6 °C)] 97.5 °F (36.4 °C)  Heart Rate:  [92-98] 94  Resp:  [16] 16  BP: ()/(39-59) 122/59  No intake or output data in the 24 hours ending 25 0921      Physical Exam:    General Appearance: Awake and alert, chronically ill, no acute  Skin: warm and dry  HEENT: oral mucosa normal, nonicteric sclera  Neck: No JVD  Lungs: Bilateral rhonchi, breathing effort not labored  Heart: RRR, normal S1 and S2  Abdomen: soft, nontender, nondistended  : no palpable bladder  Extremities: Lymphedema stage with 1+ pitting edema bilateral lower extremity up to the thigh  Neuro: normal speech and mental status     Scheduled Meds:     heparin (porcine), 5,000 Units, Subcutaneous, Q8H  midodrine, 10 mg, Oral, BID AC  mupirocin, 1 Application, Each Nare, BID  senna-docusate sodium, 2 tablet, Oral, BID  sodium chloride, 10 mL, Intravenous, Q12H      IV Meds:        Results Reviewed:   I have personally reviewed the results from the time of this admission to 2025 09:21 EDT     Results from last 7 days   Lab Units 25  0333 25  0347 25  0612   SODIUM mmol/L 132* 135* 130*   POTASSIUM mmol/L 4.2 3.8 3.6   CHLORIDE mmol/L 98 100 100   CO2 mmol/L 21.0* 23.0 22.2   BUN mg/dL 35* 23 31*   CREATININE mg/dL 5.02* 3.68* 4.34*   CALCIUM mg/dL 8.3* 8.5* 8.4*   BILIRUBIN mg/dL 0.7 0.8 0.8   ALK PHOS U/L 180* 187* 167*   ALT (SGPT) U/L 17 18 15   AST (SGOT) U/L 30 29 30   GLUCOSE mg/dL 144* 149* 167*       Estimated Creatinine Clearance: 8.8 mL/min (A)  (by C-G formula based on SCr of 5.02 mg/dL (H)).    Results from last 7 days   Lab Units 05/05/25  0333 05/04/25  0347 05/03/25  0612   PHOSPHORUS mg/dL 4.4 3.6 3.2             Results from last 7 days   Lab Units 05/05/25  0333 05/04/25  0347 05/03/25  0612 05/02/25  0524 05/01/25  0332   WBC 10*3/mm3 5.72 6.17 6.62 5.16 7.56   HEMOGLOBIN g/dL 10.6* 10.4* 10.1* 9.9* 11.4*   PLATELETS 10*3/mm3 130* 126* 122* 93* 147             Assessment / Plan     ASSESSMENT:  End-stage renal disease second biopsy-proven diabetic nephropathy currently on maintenance hemodialysis on Monday Wednesday and Friday,   Type 2 diabetes mellitus with end-stage renal disease.  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  Chronic hypotension on midodrine  Hyperphosphatemia on binders that we will continue phosphorus today 3.6        PLAN:  Continue dialysis on Monday Wednesday Friday  Okay to discharge home from nephrology standpoint  Surveillance    Reviewed the chart and other providers notes, reviewed labs.  I discussed the case with the patient and she voiced good understanding.  Copied text in this note has been reviewed and is accurate as of 05/05/25.         Thank you for involving us in the care of Philly Person.  Please feel free to call with any questions.    Christy Flores MD  05/05/25  09:21 EDT    [facility]  [phone]    Parts of this note may be an electronic transcription/translation of spoken language to printed text using the Dragon dictation system.

## 2025-05-05 NOTE — OUTREACH NOTE
Prep Survey      Flowsheet Row Responses   [facility] patient discharged from? [facility]   Is LACE score < 7 ? No   Eligibility Psychiatric   Date of Admission 04/30/25   Date of Discharge 05/05/25   Discharge diagnosis Acute hypoxic respiratory failure   Does the patient have one of the following disease processes/diagnoses(primary or secondary)? Other   Prep survey completed? Yes            Kirti KUNZ - Registered Nurse

## 2025-05-05 NOTE — CONSULTS
[facility]   Consult Note    Patient Name: Philly Person  : 1945  MRN: [id number]  Primary Care Physician:  Shazia Cardoza MD  Referring Physician: Nabeel Oneill MD  Date of admission: 2025    Inpatient Gastroenterology Consult  Consult performed by: Brendon Pina MD  Consult ordered by: Shiraz Turner MD        Subjective   Subjective     Reason for Consult/ Chief Complaint: cirrhosis    History of Present Illness  Philly Person is a 79 y.o. female  admitted with pleural effusions respiratory failure and ascites.  She has cirrhosis, and has paracentesis about every 2 weeks. She had bilateral pleural effusions   with right side larger volume than left side.  She is now on hemodialysis.  She denies any black or bloody stools.  She is feeling much better.     Review of Systems   Respiratory:  Positive for shortness of breath.    Neurological:  Positive for weakness.   All other systems reviewed and are negative.       Personal History     Past Medical History:   Diagnosis Date    Acute renal failure on dialysis     Angiodysplasia of colon without hemorrhage 2022    Arteriovenous fistula of left upper extremity 2024    Arthritis     Basal cell carcinoma 2018.      Calculus of kidney 2016    Cataract     LEFT AND RIGHT    Diabetes mellitus     TYPE 2    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 abscess w/o bleeding 2022    H/O: gout 2021    History of kidney stones     History of melanoma     LEFT ARM    Hyperlipidemia     Hypertension     Left knee pain     Melanoma 2018.      Nonalcoholic steatohepatitis     Seasonal allergies     Squamous cell carcinoma of skin 2016    New one in 2019 -left leg.      Stage 4 chronic kidney disease 2021    Progressive decline in kidney function since 2019.   most recent creatinine 1.85.    Etiology of kidney disease is likely associated with diabetic nephrosclerosis.  biopsy-proven   No recent history of NSAID use.    Renal duplex US negative for renal artery stenosis.    kidney biopsy consistent to his diabetic nephropathy with extensive global sclerosis and 60% interstitial fibrosis.  It was noted     Type 2 diabetes mellitus 2016       Past Surgical History:   Procedure Laterality Date     SECTION      COLONOSCOPY      CYSTOSCOPY W/ LASER LITHOTRIPSY      REPLACEMENT TOTAL KNEE Right 10/2015    SKIN CANCER EXCISION Left     ARM MELANOMA    TONSILLECTOMY AND ADENOIDECTOMY      TOTAL KNEE ARTHROPLASTY Left 2019    Procedure: LEFT TOTAL KNEE ARTHROPLASTY;  Surgeon: Magdaleno Saleh MD;  Location: [facility];  Service: Orthopedics    UPPER GASTROINTESTINAL ENDOSCOPY         Family History: family history includes Bipolar disorder in her mother; Breast cancer in her maternal aunt; Heart disease in her mother; Hypertension in her mother; Kidney disease in her father and sister; Leukemia in her father; Stroke in her mother; Thyroid disease in her sister. Otherwise pertinent FHx was reviewed and not pertinent to current issue.    Social History:  reports that she has never smoked. She has never used smokeless tobacco. She reports current alcohol use. She reports that she does not use drugs.    Home Medications:   Cranberry, HealthPro Blood Glucose Monito, Methoxy PEG-Epoetin Beta, Pen Needles, Wheat Dextrin, allopurinol, atorvastatin, betamethasone (augmented), coenzyme Q10, glucose blood, insulin degludec, iron sucrose, lactulose, midodrine, multivitamin with minerals, pantoprazole, sevelamer, torsemide, and vitamin B-6    Allergies:  No Known Allergies    Objective    Objective     Vitals:  Temp:  [97.5 °F (36.4 °C)-97.9 °F (36.6 °C)] 97.5 °F (36.4 °C)  Heart Rate:  [87-98] 94  Resp:  [16] 16  BP: ()/(39-59) 122/59    Physical Exam  HENT:      Right Ear: External ear normal.      Left Ear: External ear normal.      Mouth/Throat:      Pharynx: Oropharynx  is clear.   Eyes:      Conjunctiva/sclera: Conjunctivae normal.   Cardiovascular:      Pulses: Normal pulses.   Pulmonary:      Effort: Pulmonary effort is normal.   Abdominal:      General: Abdomen is flat.      Palpations: Abdomen is soft.   Skin:     General: Skin is warm.   Neurological:      General: No focal deficit present.      Mental Status: She is alert.   Psychiatric:         Mood and Affect: Mood normal.         Result Review    Result Review:  I have personally reviewed the results from the time of this admission to 5/5/2025 07:48 EDT and agree with these findings:  []  Laboratory list / accordion  []  Microbiology  []  Radiology  []  EKG/Telemetry   []  Cardiology/Vascular   []  Pathology  []  Old records  []  Other:  Most notable findings include:       Assessment & Plan   Assessment / Plan     Brief Patient Summary:  Philly Person is a 79 y.o. female who   Pleural effusions  Ascites secondary to cirhosis and portal hypertension  Non alcoholic liver disease    Active Hospital Problems:  Active Hospital Problems    Diagnosis     **Acute hypoxic respiratory failure     ESRD (end stage renal disease)     Pleural effusion     Hyponatremia     Benign essential hypertension     Nonalcoholic fatty liver disease     Type 2 diabetes mellitus      Plan: she does not have significant ascites on exam today.  Plan for outpatient paracentesis end of the week  Often with evolution of hepatic hydrothorax, the ascites tends to seem less as fluid is moving into pleural space   If the pleural effusions become recurrent, and  the primary etiology is thought to be hepatic hydrothorax once could consider TIPS, although given age, Meld score she would be at risk for complications .  Consider referral as outpatient to [facility] hepatology in event TIPS is needed they can formally evaluate and arrange.   In the meantime would consider periodic pleurocentesis       Brendon Pina MD

## 2025-05-05 NOTE — CASE MANAGEMENT/SOCIAL WORK
Case Management Discharge Note      Final Note: Home via spouse, no additional CCP needs.         Selected Continued Care - Admitted Since 4/30/2025       Destination    No services have been selected for the patient.                Durable Medical Equipment    No services have been selected for the patient.                Dialysis/Infusion    No services have been selected for the patient.                Home Medical Care    No services have been selected for the patient.                Therapy    No services have been selected for the patient.                Community Resources    No services have been selected for the patient.                Community & DME    No services have been selected for the patient.                    Transportation Services  Private: Car    Final Discharge Disposition Code: 01 - home or self-care

## 2025-05-06 ENCOUNTER — TRANSITIONAL CARE MANAGEMENT TELEPHONE ENCOUNTER (OUTPATIENT)
Dept: CALL CENTER | Facility: HOSPITAL | Age: 80
End: 2025-05-06
Payer: MEDICARE

## 2025-05-06 LAB
BACTERIA FLD CULT: NORMAL
GRAM STN SPEC: NORMAL
GRAM STN SPEC: NORMAL

## 2025-05-06 NOTE — OUTREACH NOTE
Call Center TCM Note      Flowsheet Row Responses   [facility] patient discharged from? [place]   Does the patient have one of the following disease processes/diagnoses(primary or secondary)? Other   TCM attempt successful? Yes   Call start time 1316   Call end time 1318   Discharge diagnosis Acute hypoxic respiratory failure   Meds reviewed with patient/caregiver? Yes   Is the patient having any side effects they believe may be caused by any medication additions or changes? No   Does the patient have all medications ordered at discharge? Yes   Is the patient taking all medications as directed (includes completed medication regime)? Yes   Does the patient have an appointment with their PCP within 7-14 days of discharge? No   Nursing Interventions Patient desires to follow up with specialty only   Has home health visited the patient within 72 hours of discharge? N/A   Psychosocial issues? No   Did the patient receive a copy of their discharge instructions? Yes   Nursing interventions Reviewed instructions with patient   What is the patient's perception of their health status since discharge? Improving   Is the patient/caregiver able to teach back the hierarchy of who to call/visit for symptoms/problems? PCP, Specialist, Home health nurse, Urgent Care, ED, 911 Yes   If the patient is a current smoker, are they able to teach back resources for cessation? Not a smoker   TCM call completed? Yes   Wrap up additional comments Pt doing well and wen call Dr for f/u if needed.   Call end time 1318            Soraida GAINES - Registered Nurse    5/6/2025, 13:19 EDT

## 2025-05-07 LAB — BACTERIA SPEC ANAEROBE CULT: NORMAL

## 2025-05-08 LAB
MYCOBACTERIUM SPEC CULT: NORMAL
NIGHT BLUE STAIN TISS: NORMAL

## 2025-05-09 ENCOUNTER — TRANSCRIBE ORDERS (OUTPATIENT)
Dept: GENERAL RADIOLOGY | Facility: HOSPITAL | Age: 80
End: 2025-05-09
Payer: MEDICARE

## 2025-05-09 ENCOUNTER — HOSPITAL ENCOUNTER (OUTPATIENT)
Dept: ULTRASOUND IMAGING | Facility: HOSPITAL | Age: 80
Discharge: HOME | End: 2025-05-09
Payer: MEDICARE

## 2025-05-09 VITALS
DIASTOLIC BLOOD PRESSURE: 55 MMHG | WEIGHT: 180 LBS | RESPIRATION RATE: 14 BRPM | BODY MASS INDEX: 33.13 KG/M2 | OXYGEN SATURATION: 96 % | SYSTOLIC BLOOD PRESSURE: 96 MMHG | TEMPERATURE: 97.3 F | HEART RATE: 96 BPM | HEIGHT: 62 IN

## 2025-05-09 DIAGNOSIS — R18.8 ASCITES OF LIVER: Primary | ICD-10-CM

## 2025-05-09 DIAGNOSIS — R18.8 ASCITES OF LIVER: ICD-10-CM

## 2025-05-09 PROCEDURE — 76942 ECHO GUIDE FOR BIOPSY: CPT

## 2025-05-09 NOTE — DISCHARGE INSTRUCTIONS
EDUCATION /DISCHARGE INSTRUCTIONS  Paracentesis:  A needle is inserted into the space between your abdominal organs and the membrane that surrounds them (peritoneal space).  It is done for the diagnosis and treatment of fluid that is resistant to other therapies.  It helps determine the cause of the fluid and at the relieves pressure created by the fluid.  A sample is obtained and sent to the laboratory for study.  During the procedure:  You will lie on a bed on your back with your legs drawn up.  Your abdomen will be exposed from the chest to the pelvis.  You will otherwise be covered to maintain comfort.  A physician will clean your abdomen with antiseptic soap, place a sterile towel around the site and administer a local anesthetic to numb the area.  The physician will insert a needle into your abdominal wall.  There may be a popping sound which signifies the needle has pierced the abdominal wall. Next, the physician will attach tubing to transfer a sample into a collection bottle.  After the fluid is obtained the needle will be removed.  A pressure dressing is applied to the site.  Risks of the procedure include but are not limited to:   *  Bleeding    *  Wound infection   *  Low blood pressure   *  Decreased urination   *  Low sodium if a large amount of fluid is removed   *  Puncture of abdominal organs by the needle    Benefits of the procedure:  Benefits include the removal of fluid from the abdomen, relief of abdominal pressure and facilitation of a diagnosis.  Alternatives to the procedure:  Possible alternatives are diuretic drug therapy or surgery to place a shunt to drain fluid.  Risks of diuretic drug therapy include possible dehydration and renal failure.  The benefit of drug therapy is that it can be done at home under physician supervision.  Risks of shunt placement include exposure to anesthesia, infection, excessive bleeding and injury to abdominal organs.  The benefit of a shunt is that it can be  used to drain fluid over a longer period of time.    Post procedure: (Follow all the instructions below carefully)   *  Weigh yourself daily.   *  Follow your doctors dietary instructions.   *  Rest today (no pushing pulling, straining or heavy lifting).   *  Slowly increase activity tomorow.   *  If you received sedation do not drive for 24 hours.              * Skin affix  applied to puncture site. Do not try to remove, scratch or apply lotion   * Skin affix will fall off on it's own   *  You may shower tomorrow  Call your doctor if experiencing:   *  Signs of infection such as redness, swelling, excessive pain and / or foul       smelling drainage from the puncture site.   *  Chills or fever over 101 degrees (by mouth).   *  Fainting or any noted Rapid weight gain/loss   *  Unrelieved pain or any new or severe symptoms  Following the procedure:      Follow-up with the ordering physician as directed.   Continue to take other medications as directed by your physician unless    otherwise instructed.   If applicable, resume taking your blood thinners or Aspirin in 24 hours.              If you have any concerns please call the Radiology Nurses Desk at [phone].

## 2025-05-09 NOTE — NURSING NOTE
Per radiologist, no fluid to remove for paracentesis; ok for patient to leave; pt taken back to Main Lobby by  for exit; no s/s of distress noted at 0800.

## 2025-05-15 LAB
MYCOBACTERIUM SPEC CULT: NORMAL
NIGHT BLUE STAIN TISS: NORMAL

## 2025-05-16 ENCOUNTER — READMISSION MANAGEMENT (OUTPATIENT)
Dept: CALL CENTER | Facility: HOSPITAL | Age: 80
End: 2025-05-16
Payer: MEDICARE

## 2025-05-16 NOTE — OUTREACH NOTE
Medical Week 2 Survey      Flowsheet Row Responses   [facility] patient discharged from? [place]   Does the patient have one of the following disease processes/diagnoses(primary or secondary)? Other   Week 2 attempt successful? No   Unsuccessful attempts Attempt 1  [no answer on both pt nd spouses phones]            DANA FROST - Registered Nurse

## 2025-05-20 ENCOUNTER — READMISSION MANAGEMENT (OUTPATIENT)
Dept: CALL CENTER | Facility: HOSPITAL | Age: 80
End: 2025-05-20
Payer: MEDICARE

## 2025-05-20 NOTE — OUTREACH NOTE
Medical Week 2 Survey      Flowsheet Row Responses   [facility] patient discharged from? [place]   Does the patient have one of the following disease processes/diagnoses(primary or secondary)? Other   Week 2 attempt successful? No   Unsuccessful attempts Attempt 2   Revoke Janet Petersen Registered Nurse

## 2025-05-21 ENCOUNTER — HOSPITAL ENCOUNTER (OUTPATIENT)
Dept: ULTRASOUND IMAGING | Facility: HOSPITAL | Age: 80
End: 2025-05-21
Payer: MEDICARE

## 2025-05-22 LAB
MYCOBACTERIUM SPEC CULT: NORMAL
NIGHT BLUE STAIN TISS: NORMAL

## 2025-05-29 LAB
MYCOBACTERIUM SPEC CULT: NORMAL
NIGHT BLUE STAIN TISS: NORMAL

## 2025-05-30 ENCOUNTER — APPOINTMENT (OUTPATIENT)
Dept: GENERAL RADIOLOGY | Facility: HOSPITAL | Age: 80
End: 2025-05-30
Payer: MEDICARE

## 2025-05-30 ENCOUNTER — HOSPITAL ENCOUNTER (EMERGENCY)
Facility: HOSPITAL | Age: 80
Discharge: HOME | End: 2025-05-30
Attending: EMERGENCY MEDICINE
Payer: MEDICARE

## 2025-05-30 ENCOUNTER — HOSPITAL ENCOUNTER (OUTPATIENT)
Dept: ULTRASOUND IMAGING | Facility: HOSPITAL | Age: 80
Discharge: HOME | End: 2025-05-30
Payer: MEDICARE

## 2025-05-30 VITALS
TEMPERATURE: 96.8 F | HEIGHT: 62 IN | WEIGHT: 180 LBS | RESPIRATION RATE: 14 BRPM | OXYGEN SATURATION: 100 % | SYSTOLIC BLOOD PRESSURE: 127 MMHG | BODY MASS INDEX: 33.13 KG/M2 | DIASTOLIC BLOOD PRESSURE: 63 MMHG | HEART RATE: 88 BPM

## 2025-05-30 VITALS
SYSTOLIC BLOOD PRESSURE: 135 MMHG | RESPIRATION RATE: 18 BRPM | OXYGEN SATURATION: 95 % | TEMPERATURE: 97.8 F | HEART RATE: 93 BPM | DIASTOLIC BLOOD PRESSURE: 58 MMHG

## 2025-05-30 DIAGNOSIS — J90 CHRONIC PLEURAL EFFUSION: Primary | ICD-10-CM

## 2025-05-30 DIAGNOSIS — Z99.2 ESRD (END STAGE RENAL DISEASE) ON DIALYSIS: ICD-10-CM

## 2025-05-30 DIAGNOSIS — N18.6 ESRD (END STAGE RENAL DISEASE) ON DIALYSIS: ICD-10-CM

## 2025-05-30 DIAGNOSIS — R18.8 ASCITES OF LIVER: ICD-10-CM

## 2025-05-30 PROCEDURE — 76942 ECHO GUIDE FOR BIOPSY: CPT

## 2025-05-30 PROCEDURE — 71045 X-RAY EXAM CHEST 1 VIEW: CPT

## 2025-05-30 PROCEDURE — 99283 EMERGENCY DEPT VISIT LOW MDM: CPT

## 2025-05-30 RX ORDER — SODIUM CHLORIDE 0.9 % (FLUSH) 0.9 %
10 SYRINGE (ML) INJECTION AS NEEDED
OUTPATIENT
Start: 2025-05-30

## 2025-05-30 NOTE — ED NOTES
Patient is coming to ED from radiology-spoke with saurabh. She was in for a paracentesis this morning- they did not find any fluid but did find bilateral pleural effusions. Says patient will need a thoracentesis but was unable to get ahold of a provider for an order for this. Pcp said send pt to ed

## 2025-05-30 NOTE — DISCHARGE INSTRUCTIONS
EDUCATION /DISCHARGE INSTRUCTIONS  Paracentesis:  A needle is inserted into the space between your abdominal organs and the membrane that surrounds them (peritoneal space).  It is done for the diagnosis and treatment of fluid that is resistant to other therapies.  It helps determine the cause of the fluid and at the relieves pressure created by the fluid.  A sample is obtained and sent to the laboratory for study.  During the procedure:  You will lie on a bed on your back with your legs drawn up.  Your abdomen will be exposed from the chest to the pelvis.  You will otherwise be covered to maintain comfort.  A physician will clean your abdomen with antiseptic soap, place a sterile towel around the site and administer a local anesthetic to numb the area.  The physician will insert a needle into your abdominal wall.  There may be a popping sound which signifies the needle has pierced the abdominal wall. Next, the physician will attach tubing to transfer a sample into a collection bottle.  After the fluid is obtained the needle will be removed.  A pressure dressing is applied to the site.  Risks of the procedure include but are not limited to:   *  Bleeding    *  Wound infection   *  Low blood pressure   *  Decreased urination   *  Low sodium if a large amount of fluid is removed   *  Puncture of abdominal organs by the needle    Benefits of the procedure:  Benefits include the removal of fluid from the abdomen, relief of abdominal pressure and facilitation of a diagnosis.  Alternatives to the procedure:  Possible alternatives are diuretic drug therapy or surgery to place a shunt to drain fluid.  Risks of diuretic drug therapy include possible dehydration and renal failure.  The benefit of drug therapy is that it can be done at home under physician supervision.  Risks of shunt placement include exposure to anesthesia, infection, excessive bleeding and injury to abdominal organs.  The benefit of a shunt is that it can be  used to drain fluid over a longer period of time.  THIS EDUCATION INFORMATION WAS REVIEWED PRIOR TO THE PROCEDURE AND CONSENT. Patient initials__________________Time_________________    Post procedure: (Follow all the instructions below carefully)   *  Weigh yourself daily.   *  Follow your doctors dietary instructions.   *  Rest today (no pushing pulling, straining or heavy lifting).   *  Slowly increase activity tomorow.   *  If you received sedation do not drive for 24 hours.              * Skin affix  applied to puncture site. Do not try to remove, scratch or apply lotion   * Skin affix will fall off on it's own   *  You may shower tomorrow  Call your doctor if experiencing:   *  Signs of infection such as redness, swelling, excessive pain and / or foul       smelling drainage from the puncture site.   *  Chills or fever over 101 degrees (by mouth).   *  Fainting or any noted Rapid weight gain/loss   *  Unrelieved pain or any new or severe symptoms  Following the procedure:      Follow-up with the ordering physician as directed.   Continue to take other medications as directed by your physician unless    otherwise instructed.   If applicable, resume taking your blood thinners or Aspirin in 24 hours.              If you have any concerns please call the Radiology Nurses Desk at [phone]

## 2025-05-30 NOTE — NURSING NOTE
Pt did not have fluid for Paracentesis. Radiologist Dr. Michelle found bilateral Pleural Effusions. He wanted me to attempt to get order for Thoracentesis from ordering provider. Called [facility] GILLIS and her MA Cora 3 times anf left messages but no return call. I called Dr. Cardoza her PCP to get an order but she wanted me to take her to ED instead. Her and her  taken by WC to ED.

## 2025-05-30 NOTE — ED PROVIDER NOTES
" EMERGENCY DEPARTMENT ENCOUNTER  Room Number:  09/09  PCP: Shazia Cardoza MD  Independent Historians: Pt      HPI:  Chief Complaint: \" They sent me here from the radiology department\"    A complete HPI/ROS/PMH/PSH/SH/FH are unobtainable due to: None    Chronic or social conditions impacting patient care (Social Determinants of Health): None      Context: Philyl Person is a 79 y.o. female with a medical history of ESRD requiring dialysis, type 2 diabetes, and essential hypertension who presents to the ED  from the radiology department.  She reports she was here for regular scary visual paracentesis because there is concern she has cirrhosis.  They did not find any fluid in her abdomen to sample or relieve today but noted pleural effusion on the right.  Patient reports she has had pleural effusions in the past that became severe and required thoracentesis.  She denies any chest pain or shortness of breath at this time.  She is able to complete her daily tasks without dyspnea.  She has no complaints for me at this time and all including dyspnea, chest pain, lower extremity edema or other concerns.      Review of prior external notes (non-ED) -and- Review of prior external test results outside of this encounter:  Patient paracentesis study today showed no ascites present thus paracentesis was not performed.  Right pleural effusion was demonstrated.      PAST MEDICAL HISTORY  Active Ambulatory Problems     Diagnosis Date Noted    Elevated liver enzymes 03/16/2016    Anemia 03/22/2016    Arthritis 03/22/2016    Benign essential hypertension 03/22/2016    Hypercholesterolemia 03/22/2016    Microalbuminuria 03/22/2016    Nonalcoholic fatty liver disease 03/22/2016    Calculus of kidney 03/22/2016    Adiposity 03/22/2016    Type 2 diabetes mellitus 03/22/2016    Squamous cell carcinoma of skin 12/09/2016    Melanoma 01/30/2018    Basal cell carcinoma 01/30/2018    DJD (degenerative joint disease) of knee 08/05/2019    " Angiodysplasia of colon without hemorrhage 2022    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 abscess w/o bleeding 2022    Dyslipidemia 2021    H/O: gout 2021    History of renal calculi 2016    Other fecal abnormalities 2024    Polyp of colon 2022    Renal hypertension 2021    Stage 4 chronic kidney disease 2021    Vitamin D deficiency 2022    Murmur 2024    Cirrhosis of liver with ascites 2024    Arteriovenous fistula of left upper extremity 2024    JENNIFER (acute kidney injury) 2024    Nonrheumatic aortic valve stenosis 2024    Other pancytopenia 2024    Hyponatremia 2024    Acute hypoxic respiratory failure 2025    ESRD (end stage renal disease) 2025    Pleural effusion 2025     Resolved Ambulatory Problems     Diagnosis Date Noted    Abnormal liver enzymes 2016    Acute renal failure 2016    Hyperkalemia 2016     Past Medical History:   Diagnosis Date    Acute renal failure on dialysis     Cataract     Diabetes mellitus     History of kidney stones     History of melanoma     Hyperlipidemia     Hypertension     Left knee pain     Nonalcoholic steatohepatitis     Seasonal allergies          PAST SURGICAL HISTORY  Past Surgical History:   Procedure Laterality Date     SECTION      COLONOSCOPY      CYSTOSCOPY W/ LASER LITHOTRIPSY      REPLACEMENT TOTAL KNEE Right 10/2015    SKIN CANCER EXCISION Left     ARM MELANOMA    TONSILLECTOMY AND ADENOIDECTOMY      TOTAL KNEE ARTHROPLASTY Left 2019    Procedure: LEFT TOTAL KNEE ARTHROPLASTY;  Surgeon: Magdaleno Saleh MD;  Location: [facility] OR;  Service: Orthopedics    UPPER GASTROINTESTINAL ENDOSCOPY           FAMILY HISTORY  Family History   Problem Relation Age of Onset    Bipolar disorder Mother     Hypertension Mother     Heart disease Mother     Stroke Mother     Kidney disease Father     Leukemia Father     Kidney disease  Sister     Thyroid disease Sister     Breast cancer Maternal Aunt     Maljuan Hyperthermia Neg Hx          SOCIAL HISTORY  Social History     Socioeconomic History    Marital status:    Tobacco Use    Smoking status: Never    Smokeless tobacco: Never   Vaping Use    Vaping status: Never Used   Substance and Sexual Activity    Alcohol use: Yes     Comment: ON OCC    Drug use: Never    Sexual activity: Defer         ALLERGIES  Patient has no known allergies.      REVIEW OF SYSTEMS  Review of Systems  Included in HPI  All systems reviewed and negative except for those discussed in HPI.      PHYSICAL EXAM    I have reviewed the triage vital signs and nursing notes.    ED Triage Vitals [05/30/25 1008]   Temp Heart Rate Resp BP SpO2   97.8 °F (36.6 °C) 97 16 -- 95 %      Temp src Heart Rate Source Patient Position BP Location FiO2 (%)   Oral Monitor -- -- --       Physical Exam    Physical Exam   Constitutional: No distress.  Nontoxic  HENT:  Head: Normocephalic and atraumatic.   Oropharynx: Mucous membranes are moist.   Eyes: . No scleral icterus. No conjunctival pallor.  Neck: Normal range of motion. Neck supple.   Cardiovascular: Pink warm and well perfused throughout.    Pulmonary/Chest: No respiratory distress.  No tachypnea or increased work of breathing appreciated.  Speaks in full sentences with no distress  Abdominal: Soft. There is no tenderness. There is no rebound and no guarding.   Musculoskeletal: Moves all extremities equally.  No lower extremity pitting edema  Neurological: Alert and oriented.  No acute focal deficit appreciated.  Skin: Skin is pink, warm, and dry.   Psychiatric: Mood and affect normal.   Nursing note and vitals reviewed.             LAB RESULTS  No results found for this or any previous visit (from the past 24 hours).      RADIOLOGY  XR Chest 1 View  Result Date: 5/30/2025  XR CHEST 1 VW-  INDICATION: Pleural effusion  COMPARISON: Chest radiographs dating back to 9/29/2015 and CT  chest 4/30/2025      Moderate left and small to moderate right pleural effusions. Dense right lower lobe calcified granuloma. No other focal consolidation. No pneumothorax. Normal size cardiac silhouette. No focal osseous abnormality.  This report was finalized on 5/30/2025 10:35 AM by Dr. Eladio Oakes M.D on Workstation: [id number]       Paracentesis  Result Date: 5/30/2025  ULTRASOUND-GUIDED PARACENTESIS  HISTORY: Ascites   The risks, benefits and alternatives of the procedure were discussed with the patient and informed consent was obtained. Prior to the procedure ultrasound of the abdomen was performed to evaluate for ascites. However, no ascites was present, and paracentesis was not performed. However, demonstrated was a right pleural effusion.       No ascites present. Paracentesis not performed. A right pleural effusion is demonstrated  This report was finalized on 5/30/2025 9:20 AM by Dr. Hung Michelle M.D on Workstation: [id number]          MEDICATIONS GIVEN IN ER  Medications - No data to display      ORDERS PLACED DURING THIS VISIT:  Orders Placed This Encounter   Procedures    XR Chest 1 View    Monitor Blood Pressure    Continuous Pulse Oximetry    General Consult (Use specialty-specific consult if known)         OUTPATIENT MEDICATION MANAGEMENT:  No current Epic-ordered facility-administered medications on file.     Current Outpatient Medications Ordered in Epic   Medication Sig Dispense Refill    allopurinol (ZYLOPRIM) 100 MG tablet Take 2 tablets by mouth Every Night. 180 tablet 1    atorvastatin (LIPITOR) 10 MG tablet Take 1 tablet by mouth Every Night. 90 tablet 1    betamethasone, augmented, (Diprolene) 0.05 % ointment Apply 1 Application topically to the appropriate area as directed 1 (One) Time Per Week.      Blood Glucose Monitoring Suppl ([employer] Blood Glucose Monito) w/Device kit 1 each by Other route Daily. for testing blood sugar once daily for diabetes E11.9 1 kit 0    coenzyme Q10  100 MG capsule Take 2 capsules by mouth Every Evening.      Cranberry 500 MG capsule Take 500 mg by mouth Daily.      glucose blood test strip Use as instructed to test blood sugar once daily . Uncontrolled diabetes e11.65 (Patient taking differently: 1 each by Other route As Needed (bg). Use as instructed to test blood sugar once daily . Uncontrolled diabetes e11.65) 100 each 2    glucose blood test strip Use as instructed to test blood sugar once daily for diabetes E11.9 100 each 12    insulin degludec (Tresiba FlexTouch) 100 UNIT/ML solution pen-injector injection Inject 14 Units under the skin into the appropriate area as directed Daily. 15 mL 1    Insulin Pen Needle (Pen Needles) 32G X 4 MM misc Use 1 each Daily. 100 each 3    lactulose (CHRONULAC) 10 GM/15ML solution Take 30 mL by mouth 3 (Three) Times a Week.      lactulose (CHRONULAC) 10 GM/15ML solution Take 30 mL by mouth 2 (Two) Times a Day. 1800 mL 11    Methoxy PEG-Epoetin Beta (MIRCERA IJ) 225 mcg Every 14 (Fourteen) Days.      midodrine (PROAMATINE) 10 MG tablet Take 1 tablet by mouth 2 (Two) Times a Day Before Meals for 30 days. 60 tablet 0    Multiple Vitamins-Minerals (MULTIVITAMIN ADULT PO) Take 1 tablet by mouth Daily.      pantoprazole (PROTONIX) 40 MG EC tablet Take 1 tablet by mouth Daily.      sevelamer (RENVELA) 800 MG tablet Take 1 tablet by mouth 3 (Three) Times a Day With Meals.      torsemide (DEMADEX) 20 MG tablet TAKE 1 TABLET BY MOUTH DAILY (Patient taking differently: Take 0.5 tablets by mouth 2 (Two) Times a Day.) 30 tablet 0    vitamin B-6 (PYRIDOXINE) 100 MG tablet Take 1 tablet by mouth Daily.      Wheat Dextrin (BENEFIBER DRINK MIX PO) Take  by mouth 3 (Three) Times a Week.           PROCEDURES  Procedures            PROGRESS, DATA ANALYSIS, CONSULTS, AND MEDICAL DECISION MAKING  All labs have been independently interpreted by me.  All radiology studies have been reviewed by me. All EKGs have been independently viewed and  interpreted by me.  Discussion below represents my analysis of pertinent findings related to patient's condition, differential diagnosis, treatment plan and final disposition.    Differential diagnosis:       Clinical Scores:                  ED Course as of 05/30/25 1107   Fri May 30, 2025   1036 RADIOLOGY      Study: Single view chest  Findings: Left costophrenic angle suggesting effusion.  Similar appearance 5/5/2025  I independently viewed and interpreted these images contemporaneously with treatment.    [RS]   1100 Patient very eager for discharge if no further is required.  Since someone radiology spoke with the patient's primary care physician, I feel obligated to touch base with her to ensure comfort with discharge planning. [RS]   1107 CONSULT        Provider: Dr. Cardoza - PCP    Discussion: Reviewed patient history, ED presentation evaluation.  I just wanted to close the loop as she had been involved by the radiology department and disposition planning for this patient.  She feels very comfortable with plan for the patient be discharged and outpatient follow-up.    Agreeable c treatment and planned disposition.         [RS]      ED Course User Index  [RS] Niraj Michelle MD         Prescription drug monitoring program review:     AS OF 11:07 EDT VITALS:    BP - 141/65  HR - 87  TEMP - 97.8 °F (36.6 °C) (Oral)  O2 SATS - 97%    COMPLEXITY OF CARE  Admission was considered but after careful review of the patient's presentation, physical examination, diagnostic results, and response to treatment the patient may be safely discharged with outpatient follow-up.      DIAGNOSIS  Final diagnoses:   Chronic pleural effusion   ESRD (end stage renal disease) on dialysis         DISPOSITION  ED Disposition       ED Disposition   Discharge    Condition   Stable    Comment   --                  DISCHARGE    Patient discharged in stable condition.    Reviewed implications of results, diagnosis, meds, responsibility to  follow up, warning signs and symptoms of possible worsening, potential complications and reasons to return to ER.    Patient/Family voiced understanding of above instructions.    Discussed plan for discharge, as there is no emergent indication for admission. Patient referred to primary care provider for regular health maintenance. Pt/family is agreeable and understands need for follow up and possible repeat testing.  Pt is aware that discharge does not mean that nothing is wrong but it indicates no emergency is present that requires admission and they must continue care with follow-up as given below or physician of their choice.     FOLLOW-UP  No follow-up provider specified.       Medication List        Changed      * glucose blood test strip  Use as instructed to test blood sugar once daily . Uncontrolled diabetes e11.65  What changed:   how much to take  how to take this  when to take this  reasons to take this     * glucose blood test strip  Use as instructed to test blood sugar once daily for diabetes E11.9  What changed: Another medication with the same name was changed. Make sure you understand how and when to take each.     torsemide 20 MG tablet  Commonly known as: DEMADEX  TAKE 1 TABLET BY MOUTH DAILY  What changed:   how much to take  when to take this           * This list has 2 medication(s) that are the same as other medications prescribed for you. Read the directions carefully, and ask your doctor or other care provider to review them with you.                    Please note that portions of this document were completed with a voice recognition program.    Note Disclaimer: At [facility], we believe that sharing information builds trust and better relationships. You are receiving this note because you recently visited [facility]. It is possible you will see health information before a provider has talked with you about it. This kind of information can be easy to misunderstand. To help you fully  understand what it means for your health, we urge you to discuss this note with your provider.         Niraj Michelle MD  05/30/25 [id number]

## 2025-06-05 LAB
MYCOBACTERIUM SPEC CULT: NORMAL
NIGHT BLUE STAIN TISS: NORMAL

## 2025-06-12 LAB
MYCOBACTERIUM SPEC CULT: NORMAL
NIGHT BLUE STAIN TISS: NORMAL

## 2025-07-15 ENCOUNTER — OFFICE (AMBULATORY)
Dept: URBAN - METROPOLITAN AREA CLINIC 76 | Facility: CLINIC | Age: 80
End: 2025-07-15
Payer: MEDICARE

## 2025-07-15 VITALS
HEIGHT: 62 IN | SYSTOLIC BLOOD PRESSURE: 114 MMHG | OXYGEN SATURATION: 97 % | WEIGHT: 181 LBS | HEART RATE: 96 BPM | DIASTOLIC BLOOD PRESSURE: 74 MMHG

## 2025-07-15 DIAGNOSIS — K55.20 ANGIODYSPLASIA OF COLON WITHOUT HEMORRHAGE: ICD-10-CM

## 2025-07-15 DIAGNOSIS — N18.9 CHRONIC KIDNEY DISEASE, UNSPECIFIED: ICD-10-CM

## 2025-07-15 DIAGNOSIS — K74.60 UNSPECIFIED CIRRHOSIS OF LIVER: ICD-10-CM

## 2025-07-15 PROCEDURE — 99214 OFFICE O/P EST MOD 30 MIN: CPT | Performed by: NURSE PRACTITIONER

## 2025-07-30 ENCOUNTER — OFFICE VISIT (OUTPATIENT)
Dept: ENDOCRINOLOGY | Age: 80
End: 2025-07-30
Payer: MEDICARE

## 2025-07-30 VITALS
DIASTOLIC BLOOD PRESSURE: 78 MMHG | SYSTOLIC BLOOD PRESSURE: 124 MMHG | BODY MASS INDEX: 33.13 KG/M2 | HEART RATE: 94 BPM | WEIGHT: 180 LBS | OXYGEN SATURATION: 95 % | HEIGHT: 62 IN

## 2025-07-30 DIAGNOSIS — E11.29 TYPE 2 DIABETES MELLITUS WITH DIABETIC MICROALBUMINURIA, WITHOUT LONG-TERM CURRENT USE OF INSULIN: Primary | ICD-10-CM

## 2025-07-30 DIAGNOSIS — R80.9 TYPE 2 DIABETES MELLITUS WITH DIABETIC MICROALBUMINURIA, WITHOUT LONG-TERM CURRENT USE OF INSULIN: Primary | ICD-10-CM

## 2025-07-30 NOTE — PROGRESS NOTES
Chief complaint/Reason for consult: T2DM    HPI:   - 79 year old female here for management of diabetes mellitus type 2  - Last seen in 1/2025  - She started HD recently  - Has had diabetes for 15 years years  - Complications include diabetic retinopathy, ESRD on HD  - Is currently taking Tresiba 14 units daily  - Stopped Trulicity due to cost in June 2024  - Stopped metformin  - Denies hypoglycemia  - BG typically in the low 100's but as high as 150    The following portions of the patient's history were reviewed and updated as appropriate: allergies, current medications, past family history, past medical history, past social history, past surgical history, and problem list.      Objective     Vitals:    07/30/25 1244   BP: 124/78   Pulse: 94   SpO2: 95%        Physical Exam  Vitals reviewed.   Constitutional:       Appearance: Normal appearance.   HENT:      Head: Normocephalic and atraumatic.   Eyes:      General: No scleral icterus.  Pulmonary:      Effort: Pulmonary effort is normal. No respiratory distress.   Neurological:      Mental Status: She is alert.      Gait: Gait normal.   Psychiatric:         Mood and Affect: Mood normal.         Behavior: Behavior normal.         Thought Content: Thought content normal.         Judgment: Judgment normal.         Assessment & Plan   T2DM complicated by retinopathy and ESRD, controlled  - Cont. Tresiba 14 units daily     2. Obesity (BMI of 32.91)  - Patient has been losing weight recently        - Return to clinic in 6 months

## 2025-08-07 RX ORDER — SILVER SULFADIAZINE 10 MG/G
1 CREAM TOPICAL DAILY
Qty: 1 KIT | Refills: 0 | Status: SHIPPED | OUTPATIENT
Start: 2025-08-07

## 2025-08-12 RX ORDER — BLOOD-GLUCOSE METER
1 EACH MISCELLANEOUS DAILY
Qty: 100 EACH | Refills: 3 | Status: SHIPPED | OUTPATIENT
Start: 2025-08-12

## (undated) DEVICE — BNDG ELAS ELITE V/CLOSE 6IN 5YD LF STRL

## (undated) DEVICE — PK KN TOTL 40

## (undated) DEVICE — SOL NACL 0.9PCT 1000ML

## (undated) DEVICE — GLV SURG TRIUMPH CLASSIC PF LTX 8.5 STRL

## (undated) DEVICE — SYR LUERLOK 20CC BX/50

## (undated) DEVICE — DUAL CUT SAGITTAL BLADE

## (undated) DEVICE — BANDAGE,GAUZE,BULKEE II,4.5"X4.1YD,STRL: Brand: MEDLINE

## (undated) DEVICE — UNDERCAST PADDING: Brand: DEROYAL

## (undated) DEVICE — ANTIBACTERIAL UNDYED BRAIDED (POLYGLACTIN 910), SYNTHETIC ABSORBABLE SUTURE: Brand: COATED VICRYL

## (undated) DEVICE — KT DRN EVAC WND PVC PCH WTROC RND 10F400

## (undated) DEVICE — GAUZE,SPONGE,FLUFF,6"X6.75",STRL,10/TRAY: Brand: MEDLINE

## (undated) DEVICE — ZIP 24 SURGICAL SKIN CLOSURE DEVICE, PSA: Brand: ZIP 24 SURGICAL SKIN CLOSURE DEVICE

## (undated) DEVICE — APPL DURAPREP IODOPHOR APL 26ML

## (undated) DEVICE — NDL SPINE 20G 3 1/2 YEL STRL 1P/U

## (undated) DEVICE — GLV SURG PREMIERPRO ORTHO LTX PF SZ9 BRN

## (undated) DEVICE — GLV SURG SENSICARE W/ALOE PF LF 9 STRL

## (undated) DEVICE — GLV SURG PREMIERPRO ORTHO LTX PF SZ8.5 BRN

## (undated) DEVICE — STPLR SKIN VISISTAT WD 35CT

## (undated) DEVICE — DRSNG WND GZ CURAD OIL EMULSION 3X8IN LF STRL 1PK

## (undated) DEVICE — 2108 SERIES SAGITTAL BLADE, NO OFFSET  (12.4 X 1.19 X 82.1MM)